# Patient Record
Sex: FEMALE | Race: BLACK OR AFRICAN AMERICAN | Employment: FULL TIME | RURAL
[De-identification: names, ages, dates, MRNs, and addresses within clinical notes are randomized per-mention and may not be internally consistent; named-entity substitution may affect disease eponyms.]

---

## 2017-03-07 ENCOUNTER — OFFICE VISIT (OUTPATIENT)
Dept: FAMILY MEDICINE CLINIC | Age: 47
End: 2017-03-07

## 2017-03-07 VITALS
DIASTOLIC BLOOD PRESSURE: 90 MMHG | SYSTOLIC BLOOD PRESSURE: 147 MMHG | WEIGHT: 123 LBS | BODY MASS INDEX: 24.15 KG/M2 | OXYGEN SATURATION: 98 % | HEIGHT: 60 IN | TEMPERATURE: 98.3 F | HEART RATE: 83 BPM | RESPIRATION RATE: 18 BRPM

## 2017-03-07 DIAGNOSIS — I10 ESSENTIAL HYPERTENSION: ICD-10-CM

## 2017-03-07 DIAGNOSIS — M62.830 BACK MUSCLE SPASM: Primary | ICD-10-CM

## 2017-03-07 DIAGNOSIS — E11.9 TYPE 2 DIABETES MELLITUS WITHOUT COMPLICATION, WITHOUT LONG-TERM CURRENT USE OF INSULIN (HCC): ICD-10-CM

## 2017-03-07 RX ORDER — METHOCARBAMOL 750 MG/1
750 TABLET, FILM COATED ORAL 4 TIMES DAILY
Qty: 100 TAB | Refills: 1 | Status: SHIPPED | OUTPATIENT
Start: 2017-03-07 | End: 2017-11-28

## 2017-03-07 RX ORDER — GLIMEPIRIDE 2 MG/1
2 TABLET ORAL
Qty: 30 TAB | Refills: 5 | Status: SHIPPED | OUTPATIENT
Start: 2017-03-07 | End: 2017-07-25 | Stop reason: SDUPTHER

## 2017-03-07 RX ORDER — DIAZEPAM 10 MG/1
TABLET ORAL
Qty: 10 TAB | Refills: 0 | Status: SHIPPED | OUTPATIENT
Start: 2017-03-07 | End: 2017-11-28

## 2017-03-07 RX ORDER — METFORMIN HYDROCHLORIDE 1000 MG/1
1000 TABLET ORAL 2 TIMES DAILY WITH MEALS
Qty: 180 TAB | Refills: 1 | Status: SHIPPED | OUTPATIENT
Start: 2017-03-07 | End: 2018-02-27 | Stop reason: SDUPTHER

## 2017-03-07 RX ORDER — HYDROCHLOROTHIAZIDE 25 MG/1
25 TABLET ORAL DAILY
Qty: 30 TAB | Refills: 5 | Status: SHIPPED | OUTPATIENT
Start: 2017-03-07 | End: 2017-10-04 | Stop reason: SDUPTHER

## 2017-03-07 NOTE — MR AVS SNAPSHOT
Visit Information Date & Time Provider Department Dept. Phone Encounter #  
 3/7/2017  1:20 PM Kelin Wells MD [facility] [phone] [id number] Upcoming Health Maintenance Date Due  
 FOOT EXAM Q1 11/17/1980 EYE EXAM RETINAL OR DILATED Q1 11/17/1980 Pneumococcal 19-64 Medium Risk (1 of 1 - PPSV23) 11/17/1989 DTaP/Tdap/Td series (1 - Tdap) 11/17/1991 PAP AKA CERVICAL CYTOLOGY 11/17/1991 INFLUENZA AGE 9 TO ADULT 8/1/2016 HEMOGLOBIN A1C Q6M 6/7/2017 MICROALBUMIN Q1 12/7/2017 LIPID PANEL Q1 12/7/2017 Allergies as of 3/7/2017  Review Complete On: 3/7/2017 By: Kelin Wells MD  
 No Known Allergies Current Immunizations  Never Reviewed No immunizations on file. Not reviewed this visit You Were Diagnosed With   
  
 Codes Comments Back muscle spasm    -  Primary ICD-10-CM: V72.714 ICD-9-CM: 724.8 Type 2 diabetes mellitus without complication, without long-term current use of insulin (HCC)     ICD-10-CM: E11.9 ICD-9-CM: 250.00 Essential hypertension     ICD-10-CM: I10 
ICD-9-CM: 401.9 Vitals BP Pulse Temp Resp Height(growth percentile) Weight(growth percentile) 147/90 (BP 1 Location: Left arm, BP Patient Position: Sitting) 83 98.3 °F (36.8 °C) (Oral) 18 5' (1.524 m) 123 lb (55.8 kg) LMP SpO2 BMI OB Status Smoking Status 01/29/2017 (Within Days) 98% 24.02 kg/m2 Having regular periods Current Every Day Smoker Vitals History BMI and BSA Data Body Mass Index Body Surface Area 24.02 kg/m 2 1.54 m 2 Preferred Pharmacy Pharmacy Name Phone [facility] [place] [address] [phone] Your Updated Medication List  
  
   
This list is accurate as of: 3/7/17  2:31 PM.  Always use your most recent med list.  
  
  
  
  
 diazePAM 10 mg tablet Commonly known as:  VALIUM  
 1 by mouth nightly  Indications: MUSCLE SPASM  
  
 gabapentin 600 mg tablet Commonly known as:  NEURONTIN  
1 po tid for pain with extra 1 qhs  Indications: NEUROPATHIC PAIN  
  
 glimepiride 2 mg tablet Commonly known as:  AMARYL Take 1 Tab by mouth every morning. hydroCHLOROthiazide 25 mg tablet Commonly known as:  HYDRODIURIL Take 1 Tab by mouth daily. Indications: hypertension  
  
 metFORMIN 1,000 mg tablet Commonly known as:  GLUCOPHAGE Take 1 Tab by mouth two (2) times daily (with meals). methocarbamol 750 mg tablet Commonly known as:  ROBAXIN Take 1 Tab by mouth four (4) times daily. naproxen 500 mg tablet Commonly known as:  NAPROSYN Take 1 Tab by mouth two (2) times daily (with meals). Prescriptions Printed Refills  
 diazePAM (VALIUM) 10 mg tablet 0 Si by mouth nightly  Indications: MUSCLE SPASM Class: Print Prescriptions Sent to Pharmacy Refills  
 metFORMIN (GLUCOPHAGE) 1,000 mg tablet 1 Sig: Take 1 Tab by mouth two (2) times daily (with meals). Class: Normal  
 Pharmacy: [place] [address] Ph #: [phone] Route: Oral  
 glimepiride (AMARYL) 2 mg tablet 5 Sig: Take 1 Tab by mouth every morning. Class: Normal  
 Pharmacy: [place] [address] Ph #: [phone] Route: Oral  
 hydroCHLOROthiazide (HYDRODIURIL) 25 mg tablet 5 Sig: Take 1 Tab by mouth daily. Indications: hypertension Class: Normal  
 Pharmacy: [place] [address] Ph #: [phone] Route: Oral  
 methocarbamol (ROBAXIN) 750 mg tablet 1 Sig: Take 1 Tab by mouth four (4) times daily. Class: Normal  
 Pharmacy: [place] [address] Ph #: [phone] Route: Oral  
  
We Performed the Following HEMOGLOBIN A1C WITH EAG [66516 CPT(R)] LIPID PANEL [45243 CPT(R)] METABOLIC PANEL, COMPREHENSIVE [78636 CPT(R)] MICROALBUMIN, UR, RAND W/ MICROALBUMIN/CREA RATIO [id number] CPT(R)] Introducing [facility]! [facility] introduces apprupt patient portal. Now you can access parts of your medical record, email your doctor's office, and request medication refills online. 1. In your internet browser, go to [email or web address]. [email or web address] 2. Click on the First Time User? Click Here link in the Sign In box. You will see the New Member Sign Up page. 3. Enter your apprupt Access Code exactly as it appears below. You will not need to use this code after youve completed the sign-up process. If you do not sign up before the expiration date, you must request a new code. · apprupt Access Code: [email or web address] Expires: 6/5/2017  1:21 PM 
 
4. Enter the last four digits of your Social Security Number (xxxx) and Date of Birth (mm/dd/yyyy) as indicated and click Submit. You will be taken to the next sign-up page. 5. Create a apprupt ID. This will be your apprupt login ID and cannot be changed, so think of one that is secure and easy to remember. 6. Create a apprupt password. You can change your password at any time. 7. Enter your Password Reset Question and Answer. This can be used at a later time if you forget your password. 8. Enter your e-mail address. You will receive e-mail notification when new information is available in [address] 9. Click Sign Up. You can now view and download portions of your medical record. 10. Click the Download Summary menu link to download a portable copy of your medical information. If you have questions, please visit the Frequently Asked Questions section of the apprupt website. Remember, apprupt is NOT to be used for urgent needs. For medical emergencies, dial 911. Now available from your iPhone and Android! Please provide this summary of care documentation to your next provider. Your primary care clinician is listed as Jane Limon. If you have any questions after today's visit, please call [phone].

## 2017-03-07 NOTE — PROGRESS NOTES
Carlos Ramos is a 55 y.o. female who presents with the following:  Chief Complaint   Patient presents with    Diabetes     need to discuss dosage of metformin. Patient only been taking 1 a day but should have been taking twice.  Neck Pain       Diabetes   The history is provided by the patient (Ispatient is in for diabetic follow-up with a left trapezius spasm that is causing her a fair amount of pain and she still has her carpal tunnel syndrome. ). Pertinent negatives include no chest pain, no abdominal pain, no headaches and no shortness of breath. Neck Pain   Pertinent negatives include no chest pain, no abdominal pain, no headaches and no shortness of breath. No Known Allergies    Current Outpatient Prescriptions   Medication Sig    metFORMIN (GLUCOPHAGE) 1,000 mg tablet Take 1 Tab by mouth two (2) times daily (with meals).  glimepiride (AMARYL) 2 mg tablet Take 1 Tab by mouth every morning.  hydroCHLOROthiazide (HYDRODIURIL) 25 mg tablet Take 1 Tab by mouth daily. Indications: hypertension    diazePAM (VALIUM) 10 mg tablet 1 by mouth nightly  Indications: MUSCLE SPASM    methocarbamol (ROBAXIN) 750 mg tablet Take 1 Tab by mouth four (4) times daily.  naproxen (NAPROSYN) 500 mg tablet Take 1 Tab by mouth two (2) times daily (with meals).  gabapentin (NEURONTIN) 600 mg tablet  1 po tid for pain with extra 1 qhs  Indications: NEUROPATHIC PAIN     No current facility-administered medications for this visit.         Past Medical History:   Diagnosis Date    Diabetes [facility])        Past Surgical History:   Procedure Laterality Date    HX GYN      BTL       Family History   Problem Relation Age of Onset    Stroke Mother     Diabetes Mother     Heart Disease Father     Diabetes Sister     Diabetes Maternal Grandmother        Social History     Social History    Marital status:      Spouse name: N/A    Number of children: N/A    Years of education: N/A     Social History Main Topics    Smoking status: Current Every Day Smoker     Packs/day: 0.50    Smokeless tobacco: Never Used    Alcohol use No    Drug use: No    Sexual activity: Yes     Partners: Male     Birth control/ protection: Surgical     Other Topics Concern    None     Social History Narrative       Review of Systems   Respiratory: Negative for shortness of breath. Cardiovascular: Negative for chest pain. Gastrointestinal: Negative for abdominal pain. Musculoskeletal: Positive for neck pain. Neurological: Negative for headaches. Visit Vitals    /90 (BP 1 Location: Left arm, BP Patient Position: Sitting)    Pulse 83    Temp 98.3 °F (36.8 °C) (Oral)    Resp 18    Ht 5' (1.524 m)    Wt 123 lb (55.8 kg)    LMP 01/29/2017 (Within Days)    SpO2 98%    BMI 24.02 kg/m2     Physical Exam   Constitutional: She is oriented to person, place, and time and well-developed, well-nourished, and in no distress. HENT:   Head: Normocephalic and atraumatic. Right Ear: External ear normal.   Left Ear: External ear normal.   Mouth/Throat: Oropharynx is clear and moist.   Eyes: Conjunctivae and EOM are normal. Pupils are equal, round, and reactive to light. Right eye exhibits no discharge. Left eye exhibits no discharge. Neck: Normal range of motion. Neck supple. No tracheal deviation present. No thyromegaly present. Cardiovascular: Normal rate, regular rhythm, normal heart sounds and intact distal pulses. Exam reveals no gallop and no friction rub. No murmur heard. Pulmonary/Chest: Effort normal and breath sounds normal. No respiratory distress. She has no wheezes. She exhibits no tenderness. Abdominal: Soft. Bowel sounds are normal. She exhibits no distension and no mass. There is no tenderness. There is no rebound and no guarding. Musculoskeletal: She exhibits tenderness (Left trapezius as spasmed and tender). She exhibits no edema.    Lymphadenopathy:     She has no cervical adenopathy. Neurological: She is alert and oriented to person, place, and time. She has normal reflexes. No cranial nerve deficit. She exhibits normal muscle tone. Gait normal. Coordination normal. GCS score is 15. Skin: Skin is warm and dry. No rash noted. No erythema. No pallor. Psychiatric: Mood, memory, affect and judgment normal.         ICD-10-CM ICD-9-CM    1. Back muscle spasm M62.830 724.8 diazePAM (VALIUM) 10 mg tablet      methocarbamol (ROBAXIN) 750 mg tablet   2. Type 2 diabetes mellitus without complication, without long-term current use of insulin ([facility]) E11.9 250.00 metFORMIN (GLUCOPHAGE) 1,000 mg tablet      glimepiride (AMARYL) 2 mg tablet      LIPID PANEL      METABOLIC PANEL, COMPREHENSIVE      HEMOGLOBIN A1C WITH EAG      MICROALBUMIN, UR, RAND W/ MICROALBUMIN/CREA RATIO   3. Essential hypertension I10 401.9 hydroCHLOROthiazide (HYDRODIURIL) 25 mg tablet      LIPID PANEL      METABOLIC PANEL, COMPREHENSIVE      MICROALBUMIN, UR, RAND W/ MICROALBUMIN/CREA RATIO       Orders Placed This Encounter    LIPID PANEL    METABOLIC PANEL, COMPREHENSIVE    HEMOGLOBIN A1C WITH EAG    MICROALBUMIN, UR, RAND W/ MICROALBUMIN/CREA RATIO    metFORMIN (GLUCOPHAGE) 1,000 mg tablet     Sig: Take 1 Tab by mouth two (2) times daily (with meals). Dispense:  180 Tab     Refill:  1    glimepiride (AMARYL) 2 mg tablet     Sig: Take 1 Tab by mouth every morning. Dispense:  30 Tab     Refill:  5    hydroCHLOROthiazide (HYDRODIURIL) 25 mg tablet     Sig: Take 1 Tab by mouth daily. Indications: hypertension     Dispense:  30 Tab     Refill:  5    diazePAM (VALIUM) 10 mg tablet     Si by mouth nightly  Indications: MUSCLE SPASM     Dispense:  10 Tab     Refill:  0    methocarbamol (ROBAXIN) 750 mg tablet     Sig: Take 1 Tab by mouth four (4) times daily.      Dispense:  100 Tab     Refill:  1       Follow-up Disposition: Not on Eliane Mccarty MD

## 2017-03-08 LAB
ALBUMIN SERPL-MCNC: 4.5 G/DL (ref 3.5–5.5)
ALBUMIN/CREAT UR: 65.3 MG/G CREAT (ref 0–30)
ALBUMIN/GLOB SERPL: 1.7 {RATIO} (ref 1.1–2.5)
ALP SERPL-CCNC: 117 IU/L (ref 39–117)
ALT SERPL-CCNC: 37 IU/L (ref 0–32)
AST SERPL-CCNC: 17 IU/L (ref 0–40)
BILIRUB SERPL-MCNC: 0.4 MG/DL (ref 0–1.2)
BUN SERPL-MCNC: 15 MG/DL (ref 6–24)
BUN/CREAT SERPL: 19 (ref 9–23)
CALCIUM SERPL-MCNC: 9.4 MG/DL (ref 8.7–10.2)
CHLORIDE SERPL-SCNC: 97 MMOL/L (ref 96–106)
CHOLEST SERPL-MCNC: 266 MG/DL (ref 100–199)
CO2 SERPL-SCNC: 22 MMOL/L (ref 18–29)
CREAT SERPL-MCNC: 0.81 MG/DL (ref 0.57–1)
CREAT UR-MCNC: 75.8 MG/DL
EST. AVERAGE GLUCOSE BLD GHB EST-MCNC: 237 MG/DL
GLOBULIN SER CALC-MCNC: 2.6 G/DL (ref 1.5–4.5)
GLUCOSE SERPL-MCNC: 284 MG/DL (ref 65–99)
HBA1C MFR BLD: 9.9 % (ref 4.8–5.6)
HDLC SERPL-MCNC: 40 MG/DL
INTERPRETATION, 910389: NORMAL
LDLC SERPL CALC-MCNC: ABNORMAL MG/DL (ref 0–99)
MICROALBUMIN UR-MCNC: 49.5 UG/ML
POTASSIUM SERPL-SCNC: 4.2 MMOL/L (ref 3.5–5.2)
PROT SERPL-MCNC: 7.1 G/DL (ref 6–8.5)
SODIUM SERPL-SCNC: 137 MMOL/L (ref 134–144)
TRIGL SERPL-MCNC: 416 MG/DL (ref 0–149)
VLDLC SERPL CALC-MCNC: ABNORMAL MG/DL (ref 5–40)

## 2017-07-11 ENCOUNTER — OFFICE VISIT (OUTPATIENT)
Dept: FAMILY MEDICINE CLINIC | Age: 47
End: 2017-07-11

## 2017-07-11 VITALS
RESPIRATION RATE: 18 BRPM | BODY MASS INDEX: 23.29 KG/M2 | WEIGHT: 118.6 LBS | SYSTOLIC BLOOD PRESSURE: 124 MMHG | HEIGHT: 60 IN | DIASTOLIC BLOOD PRESSURE: 76 MMHG | HEART RATE: 71 BPM | OXYGEN SATURATION: 95 %

## 2017-07-11 DIAGNOSIS — Z01.419 WELL WOMAN EXAM: Primary | ICD-10-CM

## 2017-07-11 DIAGNOSIS — E78.2 MIXED DYSLIPIDEMIA: ICD-10-CM

## 2017-07-11 DIAGNOSIS — B37.31 VULVOVAGINITIS CANDIDA ALBICANS: ICD-10-CM

## 2017-07-11 DIAGNOSIS — I10 ESSENTIAL HYPERTENSION: ICD-10-CM

## 2017-07-11 DIAGNOSIS — E11.9 TYPE 2 DIABETES MELLITUS WITHOUT COMPLICATION, WITHOUT LONG-TERM CURRENT USE OF INSULIN (HCC): ICD-10-CM

## 2017-07-11 RX ORDER — FLUCONAZOLE 150 MG/1
150 TABLET ORAL DAILY
Qty: 2 TAB | Refills: 2 | Status: SHIPPED | OUTPATIENT
Start: 2017-07-11 | End: 2017-11-28
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
## 2017-07-12 LAB
ALBUMIN SERPL-MCNC: 4.4 G/DL (ref 3.5–5.5)
ALBUMIN/CREAT UR: 38.3 MG/G CREAT (ref 0–30)
ALBUMIN/GLOB SERPL: 1.6 {RATIO} (ref 1.2–2.2)
ALP SERPL-CCNC: 84 IU/L (ref 39–117)
ALT SERPL-CCNC: 18 IU/L (ref 0–32)
AST SERPL-CCNC: 13 IU/L (ref 0–40)
BILIRUB SERPL-MCNC: 0.5 MG/DL (ref 0–1.2)
BUN SERPL-MCNC: 11 MG/DL (ref 6–24)
BUN/CREAT SERPL: 16 (ref 9–23)
CALCIUM SERPL-MCNC: 10.7 MG/DL (ref 8.7–10.2)
CHLORIDE SERPL-SCNC: 98 MMOL/L (ref 96–106)
CHOLEST SERPL-MCNC: 228 MG/DL (ref 100–199)
CO2 SERPL-SCNC: 23 MMOL/L (ref 18–29)
CREAT SERPL-MCNC: 0.7 MG/DL (ref 0.57–1)
CREAT UR-MCNC: 75.9 MG/DL
EST. AVERAGE GLUCOSE BLD GHB EST-MCNC: 200 MG/DL
GLOBULIN SER CALC-MCNC: 2.7 G/DL (ref 1.5–4.5)
GLUCOSE SERPL-MCNC: 199 MG/DL (ref 65–99)
HBA1C MFR BLD: 8.6 % (ref 4.8–5.6)
HDLC SERPL-MCNC: 43 MG/DL
INTERPRETATION, 910389: NORMAL
LDLC SERPL CALC-MCNC: 152 MG/DL (ref 0–99)
MICROALBUMIN UR-MCNC: 29.1 UG/ML
POTASSIUM SERPL-SCNC: 3.9 MMOL/L (ref 3.5–5.2)
PROT SERPL-MCNC: 7.1 G/DL (ref 6–8.5)
SODIUM SERPL-SCNC: 139 MMOL/L (ref 134–144)
TRIGL SERPL-MCNC: 164 MG/DL (ref 0–149)
VLDLC SERPL CALC-MCNC: 33 MG/DL (ref 5–40)

## 2017-07-13 NOTE — PROGRESS NOTES
Spoke with patient. Patient aware of results and states understanding at this time. Patient has scheduled an appt for 7-25-17 at 1040 to discuss options.

## 2017-07-14 LAB
CYTOLOGIST CVX/VAG CYTO: NORMAL
CYTOLOGY CVX/VAG DOC THIN PREP: NORMAL
DX ICD CODE: NORMAL
DX ICD CODE: NORMAL
HPV I/H RISK 4 DNA CVX QL PROBE+SIG AMP: NEGATIVE
Lab: NORMAL
OTHER STN SPEC: NORMAL
PATH REPORT.FINAL DX SPEC: NORMAL
STAT OF ADQ CVX/VAG CYTO-IMP: NORMAL

## 2017-07-25 ENCOUNTER — OFFICE VISIT (OUTPATIENT)
Dept: FAMILY MEDICINE CLINIC | Age: 47
End: 2017-07-25

## 2017-07-25 ENCOUNTER — TELEPHONE (OUTPATIENT)
Dept: FAMILY MEDICINE CLINIC | Age: 47
End: 2017-07-25

## 2017-07-25 VITALS
RESPIRATION RATE: 18 BRPM | BODY MASS INDEX: 23.56 KG/M2 | OXYGEN SATURATION: 98 % | HEIGHT: 60 IN | TEMPERATURE: 98.3 F | SYSTOLIC BLOOD PRESSURE: 135 MMHG | HEART RATE: 68 BPM | DIASTOLIC BLOOD PRESSURE: 73 MMHG | WEIGHT: 120 LBS

## 2017-07-25 DIAGNOSIS — E11.9 TYPE 2 DIABETES MELLITUS WITHOUT COMPLICATION, WITHOUT LONG-TERM CURRENT USE OF INSULIN (HCC): ICD-10-CM

## 2017-07-25 DIAGNOSIS — Z13.29 SCREENING FOR THYROID DISORDER: Primary | ICD-10-CM

## 2017-07-25 RX ORDER — GLIMEPIRIDE 4 MG/1
4 TABLET ORAL
Qty: 30 TAB | Refills: 5 | Status: SHIPPED | OUTPATIENT
Start: 2017-07-25 | End: 2017-11-28 | Stop reason: SDUPTHER

## 2017-07-25 RX ORDER — PIOGLITAZONEHYDROCHLORIDE 30 MG/1
TABLET ORAL
Qty: 30 TAB | Refills: 5 | Status: SHIPPED | OUTPATIENT
Start: 2017-07-25 | End: 2017-11-28

## 2017-07-25 NOTE — PROGRESS NOTES
Momo Lopez is a 55 y.o. female who presents with the following:  Chief Complaint   Patient presents with    Abnormal Lab Results     discuss labs       Abnormal Lab Results   The history is provided by the patient (Patient's hemoglobin A1c is over 8 for the third straight time and now she is willing to take some medication changes to try to get this down below 8). Pertinent negatives include no chest pain, no abdominal pain, no headaches and no shortness of breath. No Known Allergies    Current Outpatient Prescriptions   Medication Sig    pioglitazone (ACTOS) 30 mg tablet 1 by mouth daily    glimepiride (AMARYL) 4 mg tablet Take 1 Tab by mouth every morning.  metFORMIN (GLUCOPHAGE) 1,000 mg tablet Take 1 Tab by mouth two (2) times daily (with meals).  hydroCHLOROthiazide (HYDRODIURIL) 25 mg tablet Take 1 Tab by mouth daily. Indications: hypertension    fluconazole (DIFLUCAN) 150 mg tablet Take 1 Tab by mouth daily. One po qd  Indications: VULVOVAGINAL CANDIDIASIS    HYDROcodone-acetaminophen (NORCO) 7.5-325 mg per tablet Take 1 Tab by mouth every six (6) hours as needed for Pain. Max Daily Amount: 4 Tabs.  diclofenac EC (VOLTAREN) 75 mg EC tablet Take 1 Tab by mouth two (2) times a day.  diazePAM (VALIUM) 10 mg tablet 1 by mouth nightly  Indications: MUSCLE SPASM    methocarbamol (ROBAXIN) 750 mg tablet Take 1 Tab by mouth four (4) times daily.  gabapentin (NEURONTIN) 600 mg tablet  1 po tid for pain with extra 1 qhs  Indications: NEUROPATHIC PAIN     No current facility-administered medications for this visit.         Past Medical History:   Diagnosis Date    Diabetes [facility])        Past Surgical History:   Procedure Laterality Date    HX GYN      BTL       Family History   Problem Relation Age of Onset   Ralph  Stroke Mother     Diabetes Mother     Heart Disease Father     Diabetes Sister     Diabetes Maternal Grandmother        Social History     Social History    Marital status:      Spouse name: N/A    Number of children: N/A    Years of education: N/A     Social History Main Topics    Smoking status: Current Every Day Smoker     Packs/day: 0.50    Smokeless tobacco: Never Used    Alcohol use No    Drug use: No    Sexual activity: Yes     Partners: Male     Birth control/ protection: Surgical     Other Topics Concern    Not on file     Social History Narrative       Review of Systems   Respiratory: Negative for shortness of breath. Cardiovascular: Negative for chest pain. Gastrointestinal: Negative for abdominal pain. Neurological: Negative for headaches. Visit Vitals    /73 (BP 1 Location: Left arm, BP Patient Position: Sitting)    Pulse 68    Temp 98.3 °F (36.8 °C)    Resp 18    Ht 5' (1.524 m)    Wt 120 lb (54.4 kg)    SpO2 98%    BMI 23.44 kg/m2     Physical Exam   Constitutional: She is oriented to person, place, and time and well-developed, well-nourished, and in no distress. HENT:   Head: Normocephalic and atraumatic. Right Ear: External ear normal.   Left Ear: External ear normal.   Mouth/Throat: Oropharynx is clear and moist.   Eyes: Conjunctivae and EOM are normal. Pupils are equal, round, and reactive to light. Right eye exhibits no discharge. Left eye exhibits no discharge. Neck: Normal range of motion. Neck supple. No tracheal deviation present. Thyromegaly (Thyroid appears prominent on the anterior part of the neck) present. Cardiovascular: Normal rate, regular rhythm, normal heart sounds and intact distal pulses. Exam reveals no gallop and no friction rub. No murmur heard. Pulmonary/Chest: Effort normal and breath sounds normal. No respiratory distress. She has no wheezes. She exhibits no tenderness. Abdominal: Soft. Bowel sounds are normal. She exhibits no distension and no mass. There is no tenderness. There is no rebound and no guarding. Musculoskeletal: She exhibits no edema or tenderness.    Lymphadenopathy: She has no cervical adenopathy. Neurological: She is alert and oriented to person, place, and time. She has normal reflexes. No cranial nerve deficit. She exhibits normal muscle tone. Gait normal. Coordination normal. GCS score is 15. Skin: Skin is warm and dry. No rash noted. No erythema. No pallor. Psychiatric: Mood, memory, affect and judgment normal.         ICD-10-CM ICD-9-CM    1. Screening for thyroid disorder Z13.29 V77.0 [place] COLLECTION VENOUS BLOOD,VENIPUNCTURE      THYROID PANEL W/TSH   2. Type 2 diabetes mellitus without complication, without long-term current use of insulin ([facility]) E11.9 250.00 pioglitazone (ACTOS) 30 mg tablet      glimepiride (AMARYL) 4 mg tablet       Orders Placed This Encounter    THYROID PANEL W/TSH    [place] COLLECTION VENOUS BLOOD,VENIPUNCTURE    pioglitazone (ACTOS) 30 mg tablet     Si by mouth daily     Dispense:  30 Tab     Refill:  5    glimepiride (AMARYL) 4 mg tablet     Sig: Take 1 Tab by mouth every morning. Dispense:  30 Tab     Refill:  5    would have the patient return in 3 months for follow-up laboratory and to see me at that time.     Follow-up Disposition: Not on Ami Marie MD

## 2017-07-26 LAB
FT4I SERPL CALC-MCNC: 2.1 (ref 1.2–4.9)
T3RU NFR SERPL: 30 % (ref 24–39)
T4 SERPL-MCNC: 7 UG/DL (ref 4.5–12)
TSH SERPL DL<=0.005 MIU/L-ACNC: 2.32 UIU/ML (ref 0.45–4.5)

## 2017-07-26 NOTE — TELEPHONE ENCOUNTER
RX denied. Please review form that we had copied to chose which medication you would like for her to try.

## 2017-07-27 NOTE — TELEPHONE ENCOUNTER
Patient was just here on 7-25 for her diabetes. And everything was discussed.   Can we just send in this medication at this time for her to try before we make her come back in?

## 2017-07-27 NOTE — TELEPHONE ENCOUNTER
We never discussed how to use the medicine or the side effects of the medicine or anything along that line

## 2017-07-28 NOTE — TELEPHONE ENCOUNTER
Called and notified patient. She is going to try to get a ride to come in next Tuesday. Patient stated that she will call back to confirm appt time.

## 2017-10-04 DIAGNOSIS — I10 ESSENTIAL HYPERTENSION: ICD-10-CM

## 2017-10-04 RX ORDER — HYDROCHLOROTHIAZIDE 25 MG/1
TABLET ORAL
Qty: 30 TAB | Refills: 5 | Status: SHIPPED | OUTPATIENT
Start: 2017-10-04 | End: 2017-11-28 | Stop reason: SDUPTHER

## 2017-11-28 ENCOUNTER — OFFICE VISIT (OUTPATIENT)
Dept: FAMILY MEDICINE CLINIC | Age: 47
End: 2017-11-28

## 2017-11-28 VITALS
DIASTOLIC BLOOD PRESSURE: 80 MMHG | HEART RATE: 72 BPM | OXYGEN SATURATION: 98 % | WEIGHT: 123.8 LBS | HEIGHT: 60 IN | SYSTOLIC BLOOD PRESSURE: 158 MMHG | BODY MASS INDEX: 24.3 KG/M2 | RESPIRATION RATE: 18 BRPM

## 2017-11-28 DIAGNOSIS — E78.2 MIXED DYSLIPIDEMIA: ICD-10-CM

## 2017-11-28 DIAGNOSIS — Z23 ENCOUNTER FOR IMMUNIZATION: ICD-10-CM

## 2017-11-28 DIAGNOSIS — E11.9 TYPE 2 DIABETES MELLITUS WITHOUT COMPLICATION, WITHOUT LONG-TERM CURRENT USE OF INSULIN (HCC): Primary | ICD-10-CM

## 2017-11-28 DIAGNOSIS — I10 ESSENTIAL HYPERTENSION: ICD-10-CM

## 2017-11-28 RX ORDER — HYDROCHLOROTHIAZIDE 25 MG/1
TABLET ORAL
Qty: 90 TAB | Refills: 1 | Status: SHIPPED | OUTPATIENT
Start: 2017-11-28 | End: 2018-02-27

## 2017-11-28 RX ORDER — AMLODIPINE BESYLATE 10 MG/1
10 TABLET ORAL DAILY
Qty: 90 TAB | Refills: 1 | Status: SHIPPED | OUTPATIENT
Start: 2017-11-28 | End: 2018-02-27

## 2017-11-28 RX ORDER — GLIMEPIRIDE 4 MG/1
4 TABLET ORAL
Qty: 90 TAB | Refills: 1 | Status: SHIPPED | OUTPATIENT
Start: 2017-11-28 | End: 2018-02-27 | Stop reason: SDUPTHER

## 2017-11-28 NOTE — MR AVS SNAPSHOT
Visit Information Date & Time Provider Department Dept. Phone Encounter #  
 11/28/2017  8:40 AM Jared Garcia MD [facility] [phone] [id number] Upcoming Health Maintenance Date Due  
 FOOT EXAM Q1 11/17/1980 EYE EXAM RETINAL OR DILATED Q1 11/17/1980 HEMOGLOBIN A1C Q6M 1/11/2018 MICROALBUMIN Q1 7/11/2018 LIPID PANEL Q1 7/11/2018 PAP AKA CERVICAL CYTOLOGY 7/11/2020 DTaP/Tdap/Td series (2 - Td) 11/4/2026 Allergies as of 11/28/2017  Review Complete On: 11/28/2017 By: Jared Garcia MD  
 No Known Allergies Current Immunizations  Never Reviewed Name Date Influenza Vaccine (Quad) PF 11/28/2017 Tdap 11/4/2016 Not reviewed this visit You Were Diagnosed With   
  
 Codes Comments Type 2 diabetes mellitus without complication, without long-term current use of insulin (HCC)    -  Primary ICD-10-CM: E11.9 ICD-9-CM: 250.00 Mixed dyslipidemia     ICD-10-CM: E78.2 ICD-9-CM: 272.2 Essential hypertension     ICD-10-CM: I10 
ICD-9-CM: 401.9 Encounter for immunization     ICD-10-CM: I98 ICD-9-CM: V03.89 Vitals BP Pulse Resp Height(growth percentile) Weight(growth percentile) LMP  
 158/80 (BP 1 Location: Right arm, BP Patient Position: Sitting) 72 18 5' (1.524 m) 123 lb 12.8 oz (56.2 kg) 10/09/2017 SpO2 BMI OB Status Smoking Status 98% 24.18 kg/m2 Having regular periods Current Every Day Smoker Vitals History BMI and BSA Data Body Mass Index Body Surface Area  
 24.18 kg/m 2 1.54 m 2 Preferred Pharmacy Pharmacy Name Phone [facility] [place] [address], [place]  [address] [phone] Your Updated Medication List  
  
   
This list is accurate as of: 11/28/17  9:13 AM.  Always use your most recent med list. amLODIPine 10 mg tablet Commonly known as:  Daxa Claudia Take 1 Tab by mouth daily. diazePAM 10 mg tablet Commonly known as:  VALIUM  
1 by mouth nightly  Indications: MUSCLE SPASM  
  
 diclofenac EC 75 mg EC tablet Commonly known as:  VOLTAREN Take 1 Tab by mouth two (2) times a day. fluconazole 150 mg tablet Commonly known as:  DIFLUCAN Take 1 Tab by mouth daily. One po qd  Indications: VULVOVAGINAL CANDIDIASIS  
  
 gabapentin 600 mg tablet Commonly known as:  NEURONTIN  
1 po tid for pain with extra 1 qhs  Indications: NEUROPATHIC PAIN  
  
 glimepiride 4 mg tablet Commonly known as:  AMARYL Take 1 Tab by mouth every morning. hydroCHLOROthiazide 25 mg tablet Commonly known as:  HYDRODIURIL  
TAKE ONE TABLET BY MOUTH ONCE DAILY FOR  HYPERTENSION  
  
 HYDROcodone-acetaminophen 7.5-325 mg per tablet Commonly known as:  Rafi Punt Take 1 Tab by mouth every six (6) hours as needed for Pain. Max Daily Amount: 4 Tabs. metFORMIN 1,000 mg tablet Commonly known as:  GLUCOPHAGE Take 1 Tab by mouth two (2) times daily (with meals). methocarbamol 750 mg tablet Commonly known as:  ROBAXIN Take 1 Tab by mouth four (4) times daily. Prescriptions Sent to Pharmacy Refills  
 hydroCHLOROthiazide (HYDRODIURIL) 25 mg tablet 1 Sig: TAKE ONE TABLET BY MOUTH ONCE DAILY FOR  HYPERTENSION Class: Normal  
 Pharmacy: [address] [place] [address] Ph #: [phone]  
 glimepiride (AMARYL) 4 mg tablet 1 Sig: Take 1 Tab by mouth every morning. Class: Normal  
 Pharmacy: [address] [facility], [address] [place] [address] [address] Ph #: [phone] Route: Oral  
 amLODIPine (NORVASC) 10 mg tablet 1 Sig: Take 1 Tab by mouth daily. Class: Normal  
 Pharmacy: [address] [facility], [address] Ph #: [phone] Route: Oral  
  
We Performed the Following HEMOGLOBIN A1C WITH EAG [30065 CPT(R)]  DIABETES FOOT EXAM [7 Custom] INFLUENZA VIRUS VAC QUAD,SPLIT,PRESV FREE SYRINGE IM [id number] CPT(R)] LIPID PANEL [00373 CPT(R)] METABOLIC PANEL, COMPREHENSIVE [53505 CPT(R)] MICROALBUMIN, UR, RAND W/ MICROALBUMIN/CREA RATIO [id number] CPT(R)] OR COLLECTION VENOUS BLOOD,VENIPUNCTURE [id number] CPT(R)] OR IMMUNIZ ADMIN,1 SINGLE/COMB VAC/TOXOID [id number] CPT(R)] Introducing [facility] & HEALTH SERVICES! Dawn Hope introduces [employer] patient portal. Now you can access parts of your medical record, email your doctor's office, and request medication refills online. 1. In your internet browser, go to [email or web address] 2. Click on the First Time User? Click Here link in the Sign In box. You will see the New Member Sign Up page. 3. Enter your [employer] Access Code exactly as it appears below. You will not need to use this code after youve completed the sign-up process. If you do not sign up before the expiration date, you must request a new code. · [employer] Access Code: [email or web address] Expires: 2/26/2018  9:13 AM 
 
4. Enter the last four digits of your Social Security Number (xxxx) and Date of Birth (mm/dd/yyyy) as indicated and click Submit. You will be taken to the next sign-up page. 5. Create a [employer] ID. This will be your [employer] login ID and cannot be changed, so think of one that is secure and easy to remember. 6. Create a [employer] password. You can change your password at any time. 7. Enter your Password Reset Question and Answer. This can be used at a later time if you forget your password. 8. Enter your e-mail address. You will receive e-mail notification when new information is available in [address] 9. Click Sign Up. You can now view and download portions of your medical record. 10. Click the Download Summary menu link to download a portable copy of your medical information.  
 
If you have questions, please visit the Frequently Asked Questions section of the [employer]. Remember, [employer] is NOT to be used for urgent needs. For medical emergencies, dial 911. Now available from your iPhone and Android! Please provide this summary of care documentation to your next provider. Your primary care clinician is listed as Otis Matter. If you have any questions after today's visit, please call [phone].

## 2017-11-28 NOTE — PROGRESS NOTES
Mike Grant is a 52 y.o. female who presents with the following:  Chief Complaint   Patient presents with    Hypertension    Diabetes       Hypertension    The history is provided by the patient (Patient taking her medication without leg cramps chest pain or edema. Pressure still running a bit high though. ). Associated symptoms include blurred vision. Pertinent negatives include no chest pain, no orthopnea, no palpitations, no malaise/fatigue, no headaches, no tinnitus, no dizziness and no shortness of breath. Diabetes   The history is provided by the patient (She never received her p.o. glitazone after the last visit so has not been taking it but feels her sugars are doing reasonably well at this time without any syncope or low readings). Pertinent negatives include no chest pain, no abdominal pain, no headaches and no shortness of breath. Cholesterol Problem   The history is provided by the patient (The patient's lipids were up on her last lab work and we were trying dietary measures first.). Pertinent negatives include no chest pain, no abdominal pain, no headaches and no shortness of breath. No Known Allergies    Current Outpatient Prescriptions   Medication Sig    hydroCHLOROthiazide (HYDRODIURIL) 25 mg tablet TAKE ONE TABLET BY MOUTH ONCE DAILY FOR  HYPERTENSION    glimepiride (AMARYL) 4 mg tablet Take 1 Tab by mouth every morning.  amLODIPine (NORVASC) 10 mg tablet Take 1 Tab by mouth daily.  metFORMIN (GLUCOPHAGE) 1,000 mg tablet Take 1 Tab by mouth two (2) times daily (with meals). No current facility-administered medications for this visit.         Past Medical History:   Diagnosis Date    Diabetes [facility])        Past Surgical History:   Procedure Laterality Date    HX GYN      BTL       Family History   Problem Relation Age of Onset   [de-identified] Stroke Mother     Diabetes Mother     Heart Disease Father     Diabetes Sister     Diabetes Maternal Grandmother     Breast Cancer Maternal Aunt     Breast Cancer Paternal Aunt        Social History     Social History    Marital status:      Spouse name: N/A    Number of children: N/A    Years of education: N/A     Social History Main Topics    Smoking status: Current Every Day Smoker     Packs/day: 0.50    Smokeless tobacco: Never Used    Alcohol use No    Drug use: No    Sexual activity: Yes     Partners: Male     Birth control/ protection: Surgical     Other Topics Concern    None     Social History Narrative       Review of Systems   Constitutional: Negative for chills, fever, malaise/fatigue and weight loss. HENT: Negative for congestion, hearing loss, sore throat and tinnitus. Eyes: Positive for blurred vision. Negative for pain and discharge. Patient is reminded she is overdue for her diabetic vision check. Respiratory: Negative for cough, shortness of breath and wheezing. Cardiovascular: Negative for chest pain, palpitations, orthopnea, claudication and leg swelling. Gastrointestinal: Negative for abdominal pain, constipation and heartburn. Genitourinary: Negative for dysuria, frequency and urgency. Musculoskeletal: Negative for falls, joint pain and myalgias. Skin: Negative for itching and rash. Neurological: Negative for dizziness, tingling, tremors and headaches. Endo/Heme/Allergies: Negative for environmental allergies and polydipsia. Psychiatric/Behavioral: Negative for depression and substance abuse. The patient is not nervous/anxious. Visit Vitals    /80 (BP 1 Location: Right arm, BP Patient Position: Sitting)    Pulse 72    Resp 18    Ht 5' (1.524 m)    Wt 123 lb 12.8 oz (56.2 kg)    LMP 10/09/2017    SpO2 98%    BMI 24.18 kg/m2     Physical Exam   Constitutional: She is oriented to person, place, and time and well-developed, well-nourished, and in no distress. HENT:   Head: Normocephalic and atraumatic.    Right Ear: External ear normal.   Left Ear: External ear normal.   Mouth/Throat: Oropharynx is clear and moist.   Eyes: Conjunctivae and EOM are normal. Pupils are equal, round, and reactive to light. Right eye exhibits no discharge. Left eye exhibits no discharge. Neck: Normal range of motion. Neck supple. No tracheal deviation present. No thyromegaly present. Cardiovascular: Normal rate, regular rhythm, normal heart sounds and intact distal pulses. Exam reveals no gallop and no friction rub. No murmur heard. Pulmonary/Chest: Effort normal and breath sounds normal. No respiratory distress. She has no wheezes. She exhibits no tenderness. Abdominal: Soft. Bowel sounds are normal. She exhibits no distension and no mass. There is no tenderness. There is no rebound and no guarding. Musculoskeletal: She exhibits no edema or tenderness. Lymphadenopathy:     She has no cervical adenopathy. Neurological: She is alert and oriented to person, place, and time. She has normal reflexes. No cranial nerve deficit. She exhibits normal muscle tone. Gait normal. Coordination normal.   Skin: Skin is warm and dry. No rash noted. No erythema. No pallor. Psychiatric: Mood, memory, affect and judgment normal.         ICD-10-CM ICD-9-CM    1. Type 2 diabetes mellitus without complication, without long-term current use of insulin ([facility]) E11.9 250.00 MICROALBUMIN, UR, RAND W/ MICROALBUMIN/CREA RATIO      LIPID PANEL      HEMOGLOBIN A1C WITH EAG      METABOLIC PANEL, COMPREHENSIVE      MN COLLECTION VENOUS BLOOD,VENIPUNCTURE       DIABETES FOOT EXAM      glimepiride (AMARYL) 4 mg tablet   2. Mixed dyslipidemia E78.2 272.2 LIPID PANEL      METABOLIC PANEL, COMPREHENSIVE      MN COLLECTION VENOUS BLOOD,VENIPUNCTURE   3.  Essential hypertension I10 401.9 MICROALBUMIN, UR, RAND W/ MICROALBUMIN/CREA RATIO      LIPID PANEL      METABOLIC PANEL, COMPREHENSIVE      MN COLLECTION VENOUS BLOOD,VENIPUNCTURE      hydroCHLOROthiazide (HYDRODIURIL) 25 mg tablet      amLODIPine (NORVASC) 10 mg tablet   4. Encounter for immunization Z23 V03.89 INFLUENZA VIRUS VAC QUAD,SPLIT,PRESV FREE SYRINGE IM      MN IMMUNIZ ADMIN,1 SINGLE/COMB VAC/TOXOID       Orders Placed This Encounter    MN IMMUNIZ ADMIN,1 SINGLE/COMB VAC/TOXOID    Influenza virus vaccine (QUADRIVALENT PRES FREE SYRINGE) IM (38532)    MICROALBUMIN, UR, RAND W/ MICROALBUMIN/CREA RATIO    LIPID PANEL    HEMOGLOBIN A1C WITH EAG    METABOLIC PANEL, COMPREHENSIVE     DIABETES FOOT EXAM    MN COLLECTION VENOUS BLOOD,VENIPUNCTURE    hydroCHLOROthiazide (HYDRODIURIL) 25 mg tablet     Sig: TAKE ONE TABLET BY MOUTH ONCE DAILY FOR  HYPERTENSION     Dispense:  90 Tab     Refill:  1    glimepiride (AMARYL) 4 mg tablet     Sig: Take 1 Tab by mouth every morning. Dispense:  90 Tab     Refill:  1    amLODIPine (NORVASC) 10 mg tablet     Sig: Take 1 Tab by mouth daily.      Dispense:  90 Tab     Refill:  1       Follow-up Disposition: Not on Socorro Jeffrey MD

## 2017-11-29 LAB
ALBUMIN SERPL-MCNC: 4.2 G/DL (ref 3.5–5.5)
ALBUMIN/CREAT UR: ABNORMAL MG/G CREAT (ref 0–30)
ALBUMIN/GLOB SERPL: 1.8 {RATIO} (ref 1.2–2.2)
ALP SERPL-CCNC: 84 IU/L (ref 39–117)
ALT SERPL-CCNC: 23 IU/L (ref 0–32)
AST SERPL-CCNC: 13 IU/L (ref 0–40)
BILIRUB SERPL-MCNC: 0.4 MG/DL (ref 0–1.2)
BUN SERPL-MCNC: 12 MG/DL (ref 6–24)
BUN/CREAT SERPL: 17 (ref 9–23)
CALCIUM SERPL-MCNC: 9.2 MG/DL (ref 8.7–10.2)
CHLORIDE SERPL-SCNC: 105 MMOL/L (ref 96–106)
CHOLEST SERPL-MCNC: 206 MG/DL (ref 100–199)
CO2 SERPL-SCNC: 21 MMOL/L (ref 18–29)
CREAT SERPL-MCNC: 0.72 MG/DL (ref 0.57–1)
CREAT UR-MCNC: 7.7 MG/DL
EST. AVERAGE GLUCOSE BLD GHB EST-MCNC: 177 MG/DL
GFR SERPLBLD CREATININE-BSD FMLA CKD-EPI: 100 ML/MIN/1.73
GFR SERPLBLD CREATININE-BSD FMLA CKD-EPI: 115 ML/MIN/1.73
GLOBULIN SER CALC-MCNC: 2.3 G/DL (ref 1.5–4.5)
GLUCOSE SERPL-MCNC: 178 MG/DL (ref 65–99)
HBA1C MFR BLD: 7.8 % (ref 4.8–5.6)
HDLC SERPL-MCNC: 41 MG/DL
INTERPRETATION, 910389: NORMAL
LDLC SERPL CALC-MCNC: 130 MG/DL (ref 0–99)
MICROALBUMIN UR-MCNC: <3 UG/ML
POTASSIUM SERPL-SCNC: 4.1 MMOL/L (ref 3.5–5.2)
PROT SERPL-MCNC: 6.5 G/DL (ref 6–8.5)
SODIUM SERPL-SCNC: 142 MMOL/L (ref 134–144)
TRIGL SERPL-MCNC: 174 MG/DL (ref 0–149)
VLDLC SERPL CALC-MCNC: 35 MG/DL (ref 5–40)

## 2018-02-27 ENCOUNTER — OFFICE VISIT (OUTPATIENT)
Dept: FAMILY MEDICINE CLINIC | Age: 48
End: 2018-02-27

## 2018-02-27 ENCOUNTER — TELEPHONE (OUTPATIENT)
Dept: FAMILY MEDICINE CLINIC | Age: 48
End: 2018-02-27

## 2018-02-27 VITALS
HEART RATE: 87 BPM | RESPIRATION RATE: 18 BRPM | TEMPERATURE: 98.1 F | WEIGHT: 121.5 LBS | SYSTOLIC BLOOD PRESSURE: 136 MMHG | BODY MASS INDEX: 23.85 KG/M2 | OXYGEN SATURATION: 97 % | HEIGHT: 60 IN | DIASTOLIC BLOOD PRESSURE: 82 MMHG

## 2018-02-27 DIAGNOSIS — E78.2 MIXED DYSLIPIDEMIA: ICD-10-CM

## 2018-02-27 DIAGNOSIS — R94.31 ABNORMAL EKG: ICD-10-CM

## 2018-02-27 DIAGNOSIS — G95.9 CERVICAL MYELOPATHY WITH CERVICAL RADICULOPATHY (HCC): ICD-10-CM

## 2018-02-27 DIAGNOSIS — R07.9 CHEST PAIN, UNSPECIFIED TYPE: ICD-10-CM

## 2018-02-27 DIAGNOSIS — M54.12 CERVICAL MYELOPATHY WITH CERVICAL RADICULOPATHY (HCC): ICD-10-CM

## 2018-02-27 DIAGNOSIS — E11.9 TYPE 2 DIABETES MELLITUS WITHOUT COMPLICATION, WITHOUT LONG-TERM CURRENT USE OF INSULIN (HCC): ICD-10-CM

## 2018-02-27 DIAGNOSIS — I10 ESSENTIAL HYPERTENSION: Primary | ICD-10-CM

## 2018-02-27 RX ORDER — HYDROCHLOROTHIAZIDE 25 MG/1
25 TABLET ORAL DAILY
Qty: 90 TAB | Refills: 1 | Status: SHIPPED | OUTPATIENT
Start: 2018-02-27 | End: 2018-12-14 | Stop reason: SDUPTHER

## 2018-02-27 RX ORDER — HYDROCHLOROTHIAZIDE 25 MG/1
1 TABLET ORAL DAILY
COMMUNITY
Start: 2017-11-28 | End: 2018-02-27 | Stop reason: SDUPTHER

## 2018-02-27 RX ORDER — METFORMIN HYDROCHLORIDE 1000 MG/1
1000 TABLET ORAL 2 TIMES DAILY WITH MEALS
Qty: 180 TAB | Refills: 1 | Status: SHIPPED | OUTPATIENT
Start: 2018-02-27 | End: 2019-04-04 | Stop reason: SDUPTHER

## 2018-02-27 RX ORDER — GLIMEPIRIDE 4 MG/1
4 TABLET ORAL
Qty: 90 TAB | Refills: 1 | Status: SHIPPED | OUTPATIENT
Start: 2018-02-27 | End: 2018-12-14 | Stop reason: SDUPTHER

## 2018-02-27 NOTE — MR AVS SNAPSHOT
Nasir Tejeda 
 
 
 [address] [facility] [phone] Patient: Rashida Choudhary MRN: [id number] Visit Information Date & Time Provider Department Dept. Phone Encounter #  
 2/27/2018  9:00 AM Leigh Martinez  [address] [place] [address] [phone] [id number] Upcoming Health Maintenance Date Due  
 EYE EXAM RETINAL OR DILATED Q1 11/17/1980 HEMOGLOBIN A1C Q6M 5/28/2018 FOOT EXAM Q1 11/28/2018 MICROALBUMIN Q1 11/28/2018 LIPID PANEL Q1 11/28/2018 PAP AKA CERVICAL CYTOLOGY 7/11/2020 DTaP/Tdap/Td series (2 - Td) 11/4/2026 Allergies as of 2/27/2018  Review Complete On: 2/27/2018 By: Leigh Martinez MD  
 No Known Allergies Current Immunizations  Never Reviewed Name Date Influenza Vaccine (Quad) PF 11/28/2017 Tdap 11/4/2016 Not reviewed this visit You Were Diagnosed With   
  
 Codes Comments Essential hypertension    -  Primary ICD-10-CM: I10 
ICD-9-CM: 401.9 Type 2 diabetes mellitus without complication, without long-term current use of insulin (HCC)     ICD-10-CM: E11.9 ICD-9-CM: 250.00 Mixed dyslipidemia     ICD-10-CM: E78.2 ICD-9-CM: 272.2 Cervical myelopathy with cervical radiculopathy     ICD-10-CM: M47.12 
ICD-9-CM: 721.1 Chest pain, unspecified type     ICD-10-CM: R07.9 ICD-9-CM: 786.50 Abnormal EKG     ICD-10-CM: R94.31 
ICD-9-CM: 794.31 Vitals BP Pulse Temp Resp Height(growth percentile) Weight(growth percentile) 136/82 (BP 1 Location: Left arm, BP Patient Position: Sitting) 87 98.1 °F (36.7 °C) (Oral) 18 5' (1.524 m) 121 lb 8 oz (55.1 kg) LMP SpO2 BMI OB Status Smoking Status 02/23/2018 (Exact Date) 97% 23.73 kg/m2 Having regular periods Current Every Day Smoker Vitals History BMI and BSA Data Body Mass Index Body Surface Area  
 23.73 kg/m 2 1.53 m 2 Preferred Pharmacy Pharmacy Name Phone [facility] [address] [phone] Your Updated Medication List  
  
   
This list is accurate as of 2/27/18  9:41 AM.  Always use your most recent med list.  
  
  
  
  
 glimepiride 4 mg tablet Commonly known as:  AMARYL Take 1 Tab by mouth every morning. hydroCHLOROthiazide 25 mg tablet Commonly known as:  HYDRODIURIL Take 1 Tab by mouth daily. metFORMIN 1,000 mg tablet Commonly known as:  GLUCOPHAGE Take 1 Tab by mouth two (2) times daily (with meals). Prescriptions Sent to Pharmacy Refills  
 metFORMIN (GLUCOPHAGE) 1,000 mg tablet 1 Sig: Take 1 Tab by mouth two (2) times daily (with meals). Class: Normal  
 Pharmacy: [facility] [address] Ph #: [phone] Route: Oral  
 glimepiride (AMARYL) 4 mg tablet 1 Sig: Take 1 Tab by mouth every morning. Class: Normal  
 Pharmacy: [facility] [address] Ph #: [phone] Route: Oral  
 hydroCHLOROthiazide (HYDRODIURIL) 25 mg tablet 1 Sig: Take 1 Tab by mouth daily. Class: Normal  
 Pharmacy: [facility] [address] Ph #: [phone] Route: Oral  
  
We Performed the Following AMB POC EKG ROUTINE W/ 12 LEADS, INTER & REP [72470 CPT(R)] COLLECTION VENOUS BLOOD,VENIPUNCTURE [id number] CPT(R)] HEMOGLOBIN A1C WITH EAG [09821 CPT(R)] LIPID PANEL [19220 CPT(R)] METABOLIC PANEL, COMPREHENSIVE [23434 CPT(R)] MICROALBUMIN, UR, RAND W/ MICROALB/CREAT RATIO [id number] CPT(R)] REFERRAL TO CARDIOLOGY [AZC19 Custom] To-Do List   
 02/27/2018 Imaging:  XR SPINE CERV W OBL/FLEX/EXT MIN 6 V COMP Referral Information Referral ID Referred By Referred To  
  
 [id number] Rajeev Tanner, [address] [place] [address] [address] [facility] Phone: [phone] Fax: [phone] Visits Status Start Date End Date 1 New Request 2/27/18 2/27/19 If your referral has a status of pending review or denied, additional information will be sent to support the outcome of this decision. Introducing \[facility]\"" & [facility] SERVICES! Mercedes Petit introduces [employer] patient portal. Now you can access parts of your medical record, email your doctor's office, and request medication refills online. 1. In your internet browser, go to [email or web address] 2. Click on the First Time User? Click Here link in the Sign In box. You will see the New Member Sign Up page. 3. Enter your [employer] Access Code exactly as it appears below. You will not need to use this code after youve completed the sign-up process. If you do not sign up before the expiration date, you must request a new code. · [employer] Access Code: [email or web address] Expires: 5/28/2018  9:41 AM 
 
4. Enter the last four digits of your Social Security Number (xxxx) and Date of Birth (mm/dd/yyyy) as indicated and click Submit. You will be taken to the next sign-up page. 5. Create a [employer] ID. This will be your [employer] login ID and cannot be changed, so think of one that is secure and easy to remember. 6. Create a [employer] password. You can change your password at any time. 7. Enter your Password Reset Question and Answer. This can be used at a later time if you forget your password. 8. Enter your e-mail address. You will receive e-mail notification when new information is available in [address] 9. Click Sign Up. You can now view and download portions of your medical record. 10. Click the Download Summary menu link to download a portable copy of your medical information. If you have questions, please visit the Frequently Asked Questions section of the [employer] website. Remember, [employer] is NOT to be used for urgent needs. For medical emergencies, dial 911. Now available from your iPhone and Android! Please provide this summary of care documentation to your next provider. Your primary care clinician is listed as Jim Vidal. If you have any questions after today's visit, please call [phone].

## 2018-02-27 NOTE — PROGRESS NOTES
Meenu Guzman is a 52 y.o. female who presents with the following:  Chief Complaint   Patient presents with    Hypertension    Diabetes    Arm Pain     left    Leg Pain     BL, hands and feet will feel numb as well.  Cholesterol Problem       Hypertension    The history is provided by the patient (Patient is been having a great deal of stress at home which causes her to have some chest pain which radiates into her left arm). Associated symptoms include chest pain and neck pain. Pertinent negatives include no orthopnea, no palpitations, no malaise/fatigue, no blurred vision, no headaches, no tinnitus, no dizziness and no shortness of breath. Diabetes   The history is provided by the patient (Patient is been watching her diet and has not experienced any edema or shortness of breath but is having numbness and tingling in her hands and feet when she wakes up in the morning). Associated symptoms include chest pain. Pertinent negatives include no abdominal pain, no headaches and no shortness of breath. Arm Pain    The history is provided by the patient (Patient is having left arm pain that comes from the neck down into the shoulder and down into her triceps and biceps associated with some weakness. ). Associated symptoms include numbness, tingling and neck pain. Pertinent negatives include no itching. Leg Pain    The history is provided by the patient (Patient is having left leg pain as well as weakness in the thigh and calf associated with neck pain but no back pain). Associated symptoms include numbness, tingling and neck pain. Pertinent negatives include no itching. Cholesterol Problem   The history is provided by the patient (Patient has had elevated lipids and is a smoker and has had some chest pain with radiation into the left arm when she is been under family stress). Associated symptoms include chest pain. Pertinent negatives include no abdominal pain, no headaches and no shortness of breath. Associated symptoms comments: Patient does not report chest pain with normal activities even though she is quite physically active. No Known Allergies    Current Outpatient Prescriptions   Medication Sig    metFORMIN (GLUCOPHAGE) 1,000 mg tablet Take 1 Tab by mouth two (2) times daily (with meals).  glimepiride (AMARYL) 4 mg tablet Take 1 Tab by mouth every morning.  hydroCHLOROthiazide (HYDRODIURIL) 25 mg tablet Take 1 Tab by mouth daily. No current facility-administered medications for this visit. Past Medical History:   Diagnosis Date    Diabetes [facility])        Past Surgical History:   Procedure Laterality Date    HX GYN      BTL       Family History   Problem Relation Age of Onset    Stroke Mother     Diabetes Mother     Heart Disease Father     Diabetes Sister     Diabetes Maternal Grandmother     Breast Cancer Maternal Aunt     Breast Cancer Paternal Aunt        Social History     Social History    Marital status:      Spouse name: N/A    Number of children: N/A    Years of education: N/A     Social History Main Topics    Smoking status: Current Every Day Smoker     Packs/day: 0.50    Smokeless tobacco: Never Used    Alcohol use No    Drug use: No    Sexual activity: Yes     Partners: Male     Birth control/ protection: Surgical     Other Topics Concern    None     Social History Narrative       Review of Systems   Constitutional: Negative for chills, fever, malaise/fatigue and weight loss. HENT: Negative for congestion, hearing loss, sore throat and tinnitus. Eyes: Negative for blurred vision, pain and discharge. Respiratory: Negative for cough, shortness of breath and wheezing. Cardiovascular: Positive for chest pain. Negative for palpitations, orthopnea, claudication and leg swelling. Gastrointestinal: Negative for abdominal pain, constipation and heartburn. Genitourinary: Negative for dysuria, frequency and urgency.    Musculoskeletal: Positive for neck pain. Negative for falls, joint pain and myalgias. Skin: Negative for itching and rash. Neurological: Positive for tingling and numbness. Negative for dizziness, tremors and headaches. Endo/Heme/Allergies: Negative for environmental allergies and polydipsia. Psychiatric/Behavioral: Negative for depression and substance abuse. The patient is not nervous/anxious. Visit Vitals    /82 (BP 1 Location: Left arm, BP Patient Position: Sitting)    Pulse 87    Temp 98.1 °F (36.7 °C) (Oral)    Resp 18    Ht 5' (1.524 m)    Wt 121 lb 8 oz (55.1 kg)    LMP 02/23/2018 (Exact Date)    SpO2 97%    BMI 23.73 kg/m2     Physical Exam   Constitutional: She is oriented to person, place, and time and well-developed, well-nourished, and in no distress. HENT:   Head: Normocephalic and atraumatic. Right Ear: External ear normal.   Left Ear: External ear normal.   Mouth/Throat: Oropharynx is clear and moist.   Eyes: Conjunctivae and EOM are normal. Pupils are equal, round, and reactive to light. Right eye exhibits no discharge. Left eye exhibits no discharge. Neck: Normal range of motion. Neck supple. No tracheal deviation present. No thyromegaly present. Cardiovascular: Normal rate, regular rhythm, normal heart sounds and intact distal pulses. Exam reveals no gallop and no friction rub. No murmur heard. Pulmonary/Chest: Effort normal and breath sounds normal. No respiratory distress. She has no wheezes. She exhibits no tenderness. Abdominal: Soft. Bowel sounds are normal. She exhibits no distension and no mass. There is no tenderness. There is no rebound and no guarding. Musculoskeletal: She exhibits no edema or tenderness. Patient is noted to have weakness in the left biceps left triceps and in the left deltoid as well as a weak  in the left upper extremity. Her left thigh and the quadriceps seems to be weak as well as the gastroc group.    Lymphadenopathy:     She has no cervical adenopathy. Neurological: She is alert and oriented to person, place, and time. She has normal reflexes. No cranial nerve deficit. She exhibits normal muscle tone. Gait normal. Coordination normal.   Skin: Skin is warm and dry. No rash noted. No erythema. No pallor. Psychiatric: Mood, memory, affect and judgment normal.         ICD-10-CM ICD-9-CM    1. Essential hypertension I10 401.9 hydroCHLOROthiazide (HYDRODIURIL) 25 mg tablet      COLLECTION VENOUS BLOOD,VENIPUNCTURE      METABOLIC PANEL, COMPREHENSIVE   2. Type 2 diabetes mellitus without complication, without long-term current use of insulin ([facility]) E11.9 250.00 metFORMIN (GLUCOPHAGE) 1,000 mg tablet      glimepiride (AMARYL) 4 mg tablet      COLLECTION VENOUS BLOOD,VENIPUNCTURE      HEMOGLOBIN A1C WITH EAG      MICROALBUMIN, UR, RAND W/ MICROALB/CREAT RATIO   3. Mixed dyslipidemia E78.2 272.2 COLLECTION VENOUS BLOOD,VENIPUNCTURE      LIPID PANEL   4. Cervical myelopathy with cervical radiculopathy M47.12 721.1 XR SPINE CERV W OBL/FLEX/EXT MIN 6 V COMP       Orders Placed This Encounter    COLLECTION VENOUS BLOOD,VENIPUNCTURE    XR SPINE CERV W OBL/FLEX/EXT MIN 6 V COMP     Standing Status:   Future     Standing Expiration Date:   3/27/2019     Order Specific Question:   Reason for Exam     Answer:   Left arm pain, left leg pain     Order Specific Question:   Is Patient Pregnant? Answer:   No    HEMOGLOBIN A1C WITH EAG    LIPID PANEL    METABOLIC PANEL, COMPREHENSIVE    MICROALBUMIN, UR, RAND W/ MICROALB/CREAT RATIO    DISCONTD: hydroCHLOROthiazide (HYDRODIURIL) 25 mg tablet     Sig: Take 1 Tab by mouth daily.  metFORMIN (GLUCOPHAGE) 1,000 mg tablet     Sig: Take 1 Tab by mouth two (2) times daily (with meals). Dispense:  180 Tab     Refill:  1    glimepiride (AMARYL) 4 mg tablet     Sig: Take 1 Tab by mouth every morning.      Dispense:  90 Tab     Refill:  1    hydroCHLOROthiazide (HYDRODIURIL) 25 mg tablet     Sig: Take 1 Tab by mouth daily. Dispense:  90 Tab     Refill:  1   Check the results of the neck x-ray and have told the patient we may have to go into physical therapy to relieve her leg and arm symptoms and then we may have to proceed with an MRI if things do not progress positively from there.     Follow-up Disposition: Not on Joana Blair MD

## 2018-02-28 LAB
ALBUMIN SERPL-MCNC: 4.6 G/DL (ref 3.5–5.5)
ALBUMIN/CREAT UR: 60.7 MG/G CREAT (ref 0–30)
ALBUMIN/GLOB SERPL: 1.7 {RATIO} (ref 1.2–2.2)
ALP SERPL-CCNC: 94 IU/L (ref 39–117)
ALT SERPL-CCNC: 14 IU/L (ref 0–32)
AST SERPL-CCNC: 10 IU/L (ref 0–40)
BILIRUB SERPL-MCNC: 0.5 MG/DL (ref 0–1.2)
BUN SERPL-MCNC: 11 MG/DL (ref 6–24)
BUN/CREAT SERPL: 13 (ref 9–23)
CALCIUM SERPL-MCNC: 9.9 MG/DL (ref 8.7–10.2)
CHLORIDE SERPL-SCNC: 95 MMOL/L (ref 96–106)
CHOLEST SERPL-MCNC: 249 MG/DL (ref 100–199)
CO2 SERPL-SCNC: 23 MMOL/L (ref 18–29)
CREAT SERPL-MCNC: 0.84 MG/DL (ref 0.57–1)
CREAT UR-MCNC: 30.3 MG/DL
EST. AVERAGE GLUCOSE BLD GHB EST-MCNC: 209 MG/DL
GFR SERPLBLD CREATININE-BSD FMLA CKD-EPI: 83 ML/MIN/1.73
GFR SERPLBLD CREATININE-BSD FMLA CKD-EPI: 96 ML/MIN/1.73
GLOBULIN SER CALC-MCNC: 2.7 G/DL (ref 1.5–4.5)
GLUCOSE SERPL-MCNC: 238 MG/DL (ref 65–99)
HBA1C MFR BLD: 8.9 % (ref 4.8–5.6)
HDLC SERPL-MCNC: 37 MG/DL
INTERPRETATION, 910389: NORMAL
LDLC SERPL CALC-MCNC: 165 MG/DL (ref 0–99)
MICROALBUMIN UR-MCNC: 18.4 UG/ML
POTASSIUM SERPL-SCNC: 4.1 MMOL/L (ref 3.5–5.2)
PROT SERPL-MCNC: 7.3 G/DL (ref 6–8.5)
SODIUM SERPL-SCNC: 137 MMOL/L (ref 134–144)
TRIGL SERPL-MCNC: 234 MG/DL (ref 0–149)
VLDLC SERPL CALC-MCNC: 47 MG/DL (ref 5–40)

## 2018-02-28 RX ORDER — PREDNISONE 20 MG/1
TABLET ORAL
Qty: 15 TAB | Refills: 0 | Status: SHIPPED | OUTPATIENT
Start: 2018-02-28 | End: 2018-04-10 | Stop reason: ALTCHOICE

## 2018-04-10 ENCOUNTER — OFFICE VISIT (OUTPATIENT)
Dept: FAMILY MEDICINE CLINIC | Age: 48
End: 2018-04-10

## 2018-04-10 VITALS
DIASTOLIC BLOOD PRESSURE: 84 MMHG | TEMPERATURE: 99.1 F | BODY MASS INDEX: 23.75 KG/M2 | HEART RATE: 90 BPM | SYSTOLIC BLOOD PRESSURE: 126 MMHG | HEIGHT: 60 IN | WEIGHT: 121 LBS | OXYGEN SATURATION: 97 % | RESPIRATION RATE: 18 BRPM

## 2018-04-10 DIAGNOSIS — J00 ACUTE NASOPHARYNGITIS: Primary | ICD-10-CM

## 2018-04-10 DIAGNOSIS — R05.9 COUGH: ICD-10-CM

## 2018-04-10 DIAGNOSIS — R09.81 NASAL CONGESTION: ICD-10-CM

## 2018-04-10 DIAGNOSIS — R50.9 FEVER, UNSPECIFIED FEVER CAUSE: ICD-10-CM

## 2018-04-10 DIAGNOSIS — R52 BODY ACHES: ICD-10-CM

## 2018-04-10 LAB
QUICKVUE INFLUENZA TEST: NEGATIVE
VALID INTERNAL CONTROL?: YES

## 2018-04-10 RX ORDER — PSEUDOEPHEDRINE HCL 120 MG/1
120 TABLET, FILM COATED, EXTENDED RELEASE ORAL
Qty: 20 TAB | Refills: 1 | Status: SHIPPED | OUTPATIENT
Start: 2018-04-10 | End: 2018-12-14 | Stop reason: ALTCHOICE

## 2018-04-10 RX ORDER — PREDNISONE 20 MG/1
TABLET ORAL
Qty: 15 TAB | Refills: 0 | Status: SHIPPED | OUTPATIENT
Start: 2018-04-10 | End: 2018-04-24 | Stop reason: ALTCHOICE

## 2018-04-10 RX ORDER — IPRATROPIUM BROMIDE 42 UG/1
1 SPRAY, METERED NASAL 4 TIMES DAILY
Qty: 15 ML | Refills: 1 | Status: SHIPPED | OUTPATIENT
Start: 2018-04-10 | End: 2018-12-14 | Stop reason: ALTCHOICE

## 2018-04-10 NOTE — MR AVS SNAPSHOT
[address] [facility] [address] 
 
 
 [address] [place] [address] [phone] Patient: Shelley Trujillo MRN: [id number] LE Visit Information Date & Time Provider Department Dept. Phone Encounter #  
 4/10/2018 10:00 AM Nubia Mcpherson  [address] [facility] [phone] [id number] Your Appointments 2018 10:20 AM  
New Patient with Janet Newton MD  
[facility] [address] [facility]) Appt Note: ref hesham - cp;  $50 cp [address] [facility] [address] [phone]  
  
   
 [address] [address] 2018  9:00 AM  
Follow Up with Nubia Mcpherson MD  
[facility] ([address]) Appt Note: 3 month f/u  
 [address] [facility]  
[phone] [address] [facility] Upcoming Health Maintenance Date Due  
 EYE EXAM RETINAL OR DILATED Q1 1980 HEMOGLOBIN A1C Q6M 2018 FOOT EXAM Q1 2018 MICROALBUMIN Q1 2019 LIPID PANEL Q1 2019 PAP AKA CERVICAL CYTOLOGY 2020 DTaP/Tdap/Td series (2 - Td) 2026 Allergies as of 4/10/2018  Review Complete On: 4/10/2018 By: Nubia Mcpherson MD  
 No Known Allergies Current Immunizations  Never Reviewed Name Date Influenza Vaccine (Quad) PF 2017 Tdap 2016 Not reviewed this visit You Were Diagnosed With   
  
 Codes Comments Acute nasopharyngitis    -  Primary ICD-10-CM: [place] Duty ICD-9-CM: 255 Fever, unspecified fever cause     ICD-10-CM: R50.9 ICD-9-CM: 780.60 Body aches     ICD-10-CM: R52 ICD-9-CM: 780.96 Cough     ICD-10-CM: R05 ICD-9-CM: 786.2 Nasal congestion     ICD-10-CM: R09.81 ICD-9-CM: 478.19 Vitals BP Pulse Temp Resp Height(growth percentile) Weight(growth percentile) 126/84 (BP 1 Location: Left arm, BP Patient Position: Sitting) 90 99.1 °F (37.3 °C) (Oral) 18 5' (1.524 m) 121 lb (54.9 kg) SpO2 BMI OB Status Smoking Status 97% 23.63 kg/m2 Having regular periods Current Every Day Smoker Vitals History BMI and BSA Data Body Mass Index Body Surface Area  
 23.63 kg/m 2 1.52 m 2 Preferred Pharmacy Pharmacy Name Phone [address] [phone] Your Updated Medication List  
  
   
This list is accurate as of 4/10/18 10:52 AM.  Always use your most recent med list.  
  
  
  
  
 glimepiride 4 mg tablet Commonly known as:  AMARYL Take 1 Tab by mouth every morning. hydroCHLOROthiazide 25 mg tablet Commonly known as:  HYDRODIURIL Take 1 Tab by mouth daily. metFORMIN 1,000 mg tablet Commonly known as:  GLUCOPHAGE Take 1 Tab by mouth two (2) times daily (with meals). We Performed the Following AMB POC RAPID INFLUENZA TEST [71775 CPT(R)] Introducing [facility] SERVICES! [employer] introduces [employer] patient portal. Now you can access parts of your medical record, email your doctor's office, and request medication refills online. 1. In your internet browser, go to [email or web address] 2. Click on the First Time User? Click Here link in the Sign In box. You will see the New Member Sign Up page. 3. Enter your [employer] Access Code exactly as it appears below. You will not need to use this code after youve completed the sign-up process. If you do not sign up before the expiration date, you must request a new code. · [employer] Access Code: [email or web address] Expires: 5/28/2018 10:41 AM 
 
4. Enter the last four digits of your Social Security Number (xxxx) and Date of Birth (mm/dd/yyyy) as indicated and click Submit. You will be taken to the next sign-up page. 5. Create a [email or web address] ID. This will be your [email or web address] login ID and cannot be changed, so think of one that is secure and easy to remember. 6. Create a [email or web address] password. You can change your password at any time. 7. Enter your Password Reset Question and Answer. This can be used at a later time if you forget your password. 8. Enter your e-mail address. You will receive e-mail notification when new information is available in [address] 9. Click Sign Up. You can now view and download portions of your medical record. 10. Click the Download Summary menu link to download a portable copy of your medical information. If you have questions, please visit the Frequently Asked Questions section of the [email or web address] website. Remember, [email or web address] is NOT to be used for urgent needs. For medical emergencies, dial 911. Now available from your iPhone and Android! Please provide this summary of care documentation to your next provider. Your primary care clinician is listed as Samaria Stokes. If you have any questions after today's visit, please call [phone].

## 2018-04-10 NOTE — LETTER
NOTIFICATION RETURN TO WORK / SCHOOL 
 
4/10/2018 10:52 AM 
 
Ms. Laurent [address] [place] [phone] To Whom It May Concern: 
 
Tim Boucher is currently under the care of Kusum Dunaway. She will return to work/school on: 4/14/18 If there are questions or concerns please have the patient contact our office. Sincerely, Piero Oscar MD

## 2018-04-10 NOTE — PROGRESS NOTES
Manpreet Godinez is a 52 y.o. female who presents with the following:  Chief Complaint   Patient presents with    Cough    Nasal Congestion    Generalized Body Aches    Headache    Fever       Cough   The history is provided by the patient (Patient with a 3 day history of postnasal drip cough with nasal congestion and headache and having general body aches in her torso where she is been coughing so hard). Associated symptoms include headaches. Pertinent negatives include no chest pain, no abdominal pain and no shortness of breath. Nasal Congestion    Associated symptoms include congestion, sore throat, cough and headaches. Pertinent negatives include no chills, no ear pain, no shortness of breath, no neck pain, no neck pain and no chest pain. Generalized Body Aches   Associated symptoms include headaches. Pertinent negatives include no chest pain, no abdominal pain and no shortness of breath. Headache   Associated symptoms include headaches. Pertinent negatives include no chest pain, no abdominal pain and no shortness of breath. Fever    Associated symptoms include congestion, headaches, sore throat and cough. Pertinent negatives include no chest pain, no diarrhea, no vomiting, no shortness of breath, no neck pain and no rash. No Known Allergies    Current Outpatient Prescriptions   Medication Sig    predniSONE (DELTASONE) 20 mg tablet Take 5 tablets day one, take 4 tablets day two, take 3 tablets day three, take 2 tablets day four, take 1 tablet day five.  ipratropium (ATROVENT) 42 mcg (0.06 %) nasal spray 1 [place] by Both Nostrils route four (4) times daily. Indications: Rhinorrhea    pseudoephedrine CR (SUDAFED 12 HOUR) 120 mg CR tablet Take 1 Tab by mouth two (2) times daily as needed for Congestion.  metFORMIN (GLUCOPHAGE) 1,000 mg tablet Take 1 Tab by mouth two (2) times daily (with meals).  glimepiride (AMARYL) 4 mg tablet Take 1 Tab by mouth every morning.     hydroCHLOROthiazide (HYDRODIURIL) 25 mg tablet Take 1 Tab by mouth daily. No current facility-administered medications for this visit. Past Medical History:   Diagnosis Date    Diabetes [facility])        Past Surgical History:   Procedure Laterality Date    HX GYN      BTL       Family History   Problem Relation Age of Onset    Stroke Mother     Diabetes Mother     Heart Disease Father     Diabetes Sister     Diabetes Maternal Grandmother     Breast Cancer Maternal Aunt     Breast Cancer Paternal Aunt        Social History     Social History    Marital status:      Spouse name: N/A    Number of children: N/A    Years of education: N/A     Social History Main Topics    Smoking status: Current Every Day Smoker     Packs/day: 0.50    Smokeless tobacco: Never Used    Alcohol use No    Drug use: No    Sexual activity: Yes     Partners: Male     Birth control/ protection: Surgical     Other Topics Concern    None     Social History Narrative       Review of Systems   Constitutional: Positive for fever and malaise/fatigue. Negative for chills. HENT: Positive for congestion and sore throat. Negative for ear discharge, ear pain, hearing loss, nosebleeds and tinnitus. Eyes: Negative for blurred vision, double vision, photophobia and discharge. Respiratory: Positive for cough. Negative for sputum production, shortness of breath, wheezing and stridor. Cardiovascular: Negative for chest pain, palpitations, orthopnea and PND. Gastrointestinal: Negative for abdominal pain, diarrhea, heartburn, nausea and vomiting. Genitourinary: Negative for dysuria, frequency and urgency. Musculoskeletal: Negative for myalgias and neck pain. Skin: Negative for itching and rash. Neurological: Positive for headaches. Negative for dizziness and tingling. Endo/Heme/Allergies: Does not bruise/bleed easily. Psychiatric/Behavioral: Negative for depression. The patient has insomnia. The patient is not nervous/anxious. Visit Vitals    /84 (BP 1 Location: Left arm, BP Patient Position: Sitting)    Pulse 90    Temp 99.1 °F (37.3 °C) (Oral)    Resp 18    Ht 5' (1.524 m)    Wt 121 lb (54.9 kg)    SpO2 97%    BMI 23.63 kg/m2     Physical Exam   Constitutional: She is oriented to person, place, and time. She appears distressed. Has coryza that is clear - mild distress   HENT:   Head: Normocephalic and atraumatic. Right Ear: External ear normal.   Left Ear: External ear normal.   Mouth/Throat: No oropharyngeal exudate. Red mm's in the nose and tht   Eyes: Conjunctivae and EOM are normal. Pupils are equal, round, and reactive to light. Right eye exhibits no discharge. Left eye exhibits no discharge. Neck: Normal range of motion. Neck supple. No tracheal deviation present. No thyromegaly present. Cardiovascular: Normal rate, regular rhythm, normal heart sounds and intact distal pulses. Exam reveals no gallop and no friction rub. No murmur heard. Pulmonary/Chest: Effort normal and breath sounds normal. No stridor. No respiratory distress. She has no wheezes. She has no rales. She exhibits no tenderness. Abdominal: She exhibits no distension and no mass. There is no tenderness. There is no guarding. Musculoskeletal: She exhibits no edema or tenderness. Lymphadenopathy:     She has no cervical adenopathy. Neurological: She is alert and oriented to person, place, and time. She has normal reflexes. Gait normal.   Skin: Skin is warm and dry. No rash noted. She is not diaphoretic. No erythema. Psychiatric: Mood, memory, affect and judgment normal.         ICD-10-CM ICD-9-CM    1. Acute nasopharyngitis J00 460 predniSONE (DELTASONE) 20 mg tablet      ipratropium (ATROVENT) 42 mcg (0.06 %) nasal spray      pseudoephedrine CR (SUDAFED 12 HOUR) 120 mg CR tablet   2. Fever, unspecified fever cause R50.9 780.60 AMB POC RAPID INFLUENZA TEST   3. Body aches R52 780.96 AMB POC RAPID INFLUENZA TEST   4.  Cough R05 786.2 AMB POC RAPID INFLUENZA TEST   5. Nasal congestion R09.81 478.19 AMB POC RAPID INFLUENZA TEST       Orders Placed This Encounter    AMB POC RAPID INFLUENZA TEST    predniSONE (DELTASONE) 20 mg tablet     Sig: Take 5 tablets day one, take 4 tablets day two, take 3 tablets day three, take 2 tablets day four, take 1 tablet day five. Dispense:  15 Tab     Refill:  0    ipratropium (ATROVENT) 42 mcg (0.06 %) nasal spray     [place] by Both Nostrils route four (4) times daily. Indications: Rhinorrhea     Dispense:  15 mL     Refill:  1    pseudoephedrine CR (SUDAFED 12 HOUR) 120 mg CR tablet     Sig: Take 1 Tab by mouth two (2) times daily as needed for Congestion. 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 Patient's been advised that her sugars will run higher because she will be on prednisone for 5 days.     Follow-up Disposition: Not on Anurag Abraham MD

## 2018-04-17 ENCOUNTER — OFFICE VISIT (OUTPATIENT)
Dept: FAMILY MEDICINE CLINIC | Age: 48
End: 2018-04-17

## 2018-04-17 VITALS
DIASTOLIC BLOOD PRESSURE: 82 MMHG | OXYGEN SATURATION: 97 % | SYSTOLIC BLOOD PRESSURE: 130 MMHG | RESPIRATION RATE: 18 BRPM | HEIGHT: 60 IN | BODY MASS INDEX: 22.89 KG/M2 | WEIGHT: 116.6 LBS | HEART RATE: 116 BPM

## 2018-04-17 DIAGNOSIS — M94.0 COSTOCHONDRITIS, ACUTE: ICD-10-CM

## 2018-04-17 DIAGNOSIS — S39.012A STRAIN OF BACK, INITIAL ENCOUNTER: ICD-10-CM

## 2018-04-17 DIAGNOSIS — S16.1XXA STRAIN OF NECK MUSCLE, INITIAL ENCOUNTER: ICD-10-CM

## 2018-04-17 DIAGNOSIS — S66.911A STRAIN OF RIGHT HAND, INITIAL ENCOUNTER: Primary | ICD-10-CM

## 2018-04-17 RX ORDER — GABAPENTIN 600 MG/1
600 TABLET ORAL
Qty: 30 TAB | Refills: 2 | Status: SHIPPED | OUTPATIENT
Start: 2018-04-17 | End: 2018-05-17
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
## 2018-04-17 NOTE — LETTER
NOTIFICATION RETURN TO WORK / SCHOOL 
 
4/17/2018 1:35 PM 
 
Ms. Linda Shields [address] [place] [id number] To Whom It May Concern: 
 
Linda Shields is currently under the care of Kusum Dunaway. She will return to work/school on: Patient will be out of work from 4/13/18 - 4/25/18 If there are questions or concerns please have the patient contact our office. Sincerely, Kelin Lozada MD

## 2018-04-17 NOTE — MR AVS SNAPSHOT
[address] [facility] 
 
 
 [address] [facility] [phone] Patient: Trav Mccullough MRN: [id number] Visit Information Date & Time Provider Department Dept. Phone Encounter #  
 4/17/2018  1:20 PM Sebastien Fernández  [address] [facility] [phone] [id number] Your Appointments 4/24/2018 10:20 AM  
New Patient with Cloteal Conception, MD [facility]) Appt Note: ref hesham - cp;  $50 cp [address] [facility] 67 [phone]  
  
   
 [address] [address] 5/29/2018  9:00 AM  
Follow Up with Sebastien Fernández MD  
[facility] [address] [facility] ([facility]) Appt Note: 3 month f/u  
 [address]  
[phone] [address] [facility] Upcoming Health Maintenance Date Due  
 EYE EXAM RETINAL OR DILATED Q1 11/17/1980 HEMOGLOBIN A1C Q6M 8/27/2018 FOOT EXAM Q1 11/28/2018 MICROALBUMIN Q1 2/27/2019 LIPID PANEL Q1 2/27/2019 PAP AKA CERVICAL CYTOLOGY 7/11/2020 DTaP/Tdap/Td series (2 - Td) 11/4/2026 Allergies as of 4/17/2018  Review Complete On: 4/17/2018 By: Sebastien Fernández MD  
 No Known Allergies Current Immunizations  Never Reviewed Name Date Influenza Vaccine (Quad) PF 11/28/2017 Tdap 11/4/2016 Not reviewed this visit Vitals BP Pulse Resp Height(growth percentile) Weight(growth percentile) SpO2  
 130/82 (!) 116 18 5' (1.524 m) 116 lb 9.6 oz (52.9 kg) 97% BMI OB Status Smoking Status 22.77 kg/m2 Perimenopausal Current Every Day Smoker Vitals History BMI and BSA Data Body Mass Index Body Surface Area  
 22.77 kg/m 2 1.5 m 2 Preferred Pharmacy Pharmacy Name Phone [address] 212 [facility] [address] [phone] Your Updated Medication List  
  
   
This list is accurate as of 4/17/18  1:36 PM.  Always use your most recent med list.  
  
  
  
  
 cyclobenzaprine 10 mg tablet Commonly known as:  FLEXERIL Take 1 Tab by mouth three (3) times daily as needed for Muscle Spasm(s). FLONASE ALLERGY RELIEF 50 mcg/actuation nasal spray Generic drug:  fluticasone 2 Sprays by Both Nostrils route daily. glimepiride 4 mg tablet Commonly known as:  AMARYL Take 1 Tab by mouth every morning. hydroCHLOROthiazide 25 mg tablet Commonly known as:  HYDRODIURIL Take 1 Tab by mouth daily. * ibuprofen 800 mg tablet Commonly known as:  MOTRIN Take 1 Tab by mouth every eight (8) hours as needed for Pain. * ibuprofen 600 mg tablet Commonly known as:  MOTRIN Take 1 Tab by mouth every eight (8) hours as needed for Pain.  
  
 ipratropium 42 mcg (0.06 %) nasal spray Commonly known as:  ATROVENT  
1 [place] by Both Nostrils route four (4) times daily. Indications: Rhinorrhea  
  
 metFORMIN 1,000 mg tablet Commonly known as:  GLUCOPHAGE Take 1 Tab by mouth two (2) times daily (with meals). predniSONE 20 mg tablet Commonly known as:  Jefm Canter Take 5 tablets day one, take 4 tablets day two, take 3 tablets day three, take 2 tablets day four, take 1 tablet day five. pseudoephedrine  mg CR tablet Commonly known as:  SUDAFED 12 HOUR Take 1 Tab by mouth two (2) times daily as needed for Congestion. * Notice: This list has 2 medication(s) that are the same as other medications prescribed for you. Read the directions carefully, and ask your doctor or other care provider to review them with you. Introducing [facility] HE[facility]!    
 Juli Crockett introduces [employer] patient portal. Now you can access parts of your medical record, email your doctor's office, and request medication refills online. 1. In your internet browser, go to [email or web address] 2. Click on the First Time User? Click Here link in the Sign In box. You will see the New Member Sign Up page. 3. Enter your Accessbio Access Code exactly as it appears below. You will not need to use this code after youve completed the sign-up process. If you do not sign up before the expiration date, you must request a new code. · Accessbio Access Code: [email or web address] Expires: 5/28/2018 10:41 AM 
 
4. Enter the last four digits of your Social Security Number (xxxx) and Date of Birth (mm/dd/yyyy) as indicated and click Submit. You will be taken to the next sign-up page. 5. Create a Accessbio ID. This will be your Accessbio login ID and cannot be changed, so think of one that is secure and easy to remember. 6. Create a Accessbio password. You can change your password at any time. 7. Enter your Password Reset Question and Answer. This can be used at a later time if you forget your password. 8. Enter your e-mail address. You will receive e-mail notification when new information is available in [address] 9. Click Sign Up. You can now view and download portions of your medical record. 10. Click the Download Summary menu link to download a portable copy of your medical information. If you have questions, please visit the Frequently Asked Questions section of the Accessbio website. Remember, Accessbio is NOT to be used for urgent needs. For medical emergencies, dial 911. Now available from your iPhone and Android! Please provide this summary of care documentation to your next provider. Your primary care clinician is listed as Bhumi Licea. If you have any questions after today's visit, please call [phone].

## 2018-04-24 ENCOUNTER — OFFICE VISIT (OUTPATIENT)
Dept: CARDIOLOGY CLINIC | Age: 48
End: 2018-04-24

## 2018-04-24 VITALS
HEIGHT: 60 IN | OXYGEN SATURATION: 97 % | RESPIRATION RATE: 18 BRPM | HEART RATE: 97 BPM | SYSTOLIC BLOOD PRESSURE: 104 MMHG | WEIGHT: 116 LBS | BODY MASS INDEX: 22.78 KG/M2 | DIASTOLIC BLOOD PRESSURE: 68 MMHG

## 2018-04-24 DIAGNOSIS — R94.31 ABNORMAL EKG: ICD-10-CM

## 2018-04-24 DIAGNOSIS — Z72.0 TOBACCO ABUSE: ICD-10-CM

## 2018-04-24 DIAGNOSIS — E11.9 TYPE 2 DIABETES MELLITUS WITHOUT COMPLICATION, WITHOUT LONG-TERM CURRENT USE OF INSULIN (HCC): ICD-10-CM

## 2018-04-24 DIAGNOSIS — R06.09 DYSPNEA ON EXERTION: ICD-10-CM

## 2018-04-24 DIAGNOSIS — E78.2 MIXED DYSLIPIDEMIA: ICD-10-CM

## 2018-04-24 DIAGNOSIS — R07.9 CHEST PAIN, UNSPECIFIED TYPE: Primary | ICD-10-CM

## 2018-04-24 DIAGNOSIS — I10 ESSENTIAL HYPERTENSION: ICD-10-CM

## 2018-04-24 RX ORDER — ASPIRIN 81 MG/1
81 TABLET ORAL DAILY
Qty: 90 TAB | Refills: 3
Start: 2018-04-24 | End: 2019-04-04 | Stop reason: ALTCHOICE

## 2018-04-24 RX ORDER — LISINOPRIL 5 MG/1
5 TABLET ORAL DAILY
Qty: 30 TAB | Refills: 5 | Status: SHIPPED | OUTPATIENT
Start: 2018-04-24 | End: 2018-05-22 | Stop reason: SINTOL

## 2018-04-24 RX ORDER — ATORVASTATIN CALCIUM 20 MG/1
20 TABLET, FILM COATED ORAL
Qty: 30 TAB | Refills: 5 | Status: SHIPPED | OUTPATIENT
Start: 2018-04-24 | End: 2018-12-14 | Stop reason: ALTCHOICE

## 2018-04-24 NOTE — PROGRESS NOTES
Verified patient with two patient identifiers. Medications reviewed/approved by Dr. Regina Coello. Verbal from Dr. Regina Coello to remove the medications that were deleted during the visit. Chief Complaint   Patient presents with    Chest Pain     new patient evaluation - referred by Dr. Ernie Ceja     1. Have you been to the ER, urgent care clinic since your last visit? Hospitalized since your last visit? New pt.    2. Have you seen or consulted any other health care providers outside of the [address] [place] [address] since your last visit? Include any pap smears or colon screening. New pt.

## 2018-04-24 NOTE — MR AVS SNAPSHOT
[address] [facility] [address] 
 
 
 [address] [address] [phone] Patient: Susana Arboleda MRN: [id number] Visit Information Date & Time Provider Department Dept. Phone Encounter #  
 4/24/2018 10:20 AM Melissa Wilde, [address] [place] [address] Cardiology [facility] [id number] Your Appointments 5/29/2018  9:00 AM  
Follow Up with MD CHESTER Valenzuela [address] [facility] PRIMARY CARE [facility] ([facility]) Appt Note: 3 month f/u  
 1600 [facility]  
[phone] [address] [facility] Upcoming Health Maintenance Date Due  
 EYE EXAM RETINAL OR DILATED Q1 11/17/1980 HEMOGLOBIN A1C Q6M 8/27/2018 FOOT EXAM Q1 11/28/2018 MICROALBUMIN Q1 2/27/2019 LIPID PANEL Q1 2/27/2019 PAP AKA CERVICAL CYTOLOGY 7/11/2020 DTaP/Tdap/Td series (2 - Td) 11/4/2026 Allergies as of 4/24/2018  Review Complete On: 4/24/2018 By: Wilmer Barbour LPN No Known Allergies Current Immunizations  Never Reviewed Name Date Influenza Vaccine (Quad) PF 11/28/2017 Tdap 11/4/2016 Not reviewed this visit You Were Diagnosed With   
  
 Codes Comments Chest pain, unspecified type    -  Primary ICD-10-CM: R07.9 ICD-9-CM: 786.50 Type 2 diabetes mellitus without complication, without long-term current use of insulin (HCC)     ICD-10-CM: E11.9 ICD-9-CM: 250.00 Essential hypertension     ICD-10-CM: I10 
ICD-9-CM: 401.9 Mixed dyslipidemia     ICD-10-CM: E78.2 ICD-9-CM: 272.2 Abnormal EKG     ICD-10-CM: R94.31 
ICD-9-CM: 794.31 Tobacco abuse     ICD-10-CM: Z72.0 ICD-9-CM: 305.1 Dyspnea on exertion     ICD-10-CM: R06.09 
ICD-9-CM: 786.09 Vitals  BP Pulse Resp Height(growth percentile) Weight(growth percentile) SpO2  
 104/68 (BP 1 Location: Right arm, BP Patient Position: Sitting) 97 18 5' (1.524 m) 116 lb (52.6 kg) 97% BMI OB Status Smoking Status 22.65 kg/m2 Perimenopausal Current Every Day Smoker Vitals History BMI and BSA Data Body Mass Index Body Surface Area  
 22.65 kg/m 2 1.49 m 2 Preferred Pharmacy Pharmacy Name Phone [address] [phone] Your Updated Medication List  
  
   
This list is accurate as of 4/24/18 11:07 AM.  Always use your most recent med list.  
  
  
  
  
 aspirin delayed-release 81 mg tablet Take 1 Tab by mouth daily. atorvastatin 20 mg tablet Commonly known as:  LIPITOR Take 1 Tab by mouth nightly. cyclobenzaprine 10 mg tablet Commonly known as:  FLEXERIL Take 1 Tab by mouth three (3) times daily as needed for Muscle Spasm(s). gabapentin 600 mg tablet Commonly known as:  NEURONTIN Take 1 Tab by mouth nightly as needed for up to 30 days. glimepiride 4 mg tablet Commonly known as:  AMARYL Take 1 Tab by mouth every morning. hydroCHLOROthiazide 25 mg tablet Commonly known as:  HYDRODIURIL Take 1 Tab by mouth daily. ibuprofen 800 mg tablet Commonly known as:  MOTRIN Take 1 Tab by mouth every eight (8) hours as needed for Pain.  
  
 ipratropium 42 mcg (0.06 %) nasal spray Commonly known as:  ATROVENT  
1 [place] by Both Nostrils route four (4) times daily. Indications: Rhinorrhea  
  
 lisinopril 5 mg tablet Commonly known as:  Florentin Valente Take 1 Tab by mouth daily. metFORMIN 1,000 mg tablet Commonly known as:  GLUCOPHAGE Take 1 Tab by mouth two (2) times daily (with meals). pseudoephedrine  mg CR tablet Commonly known as:  SUDAFED 12 HOUR Take 1 Tab by mouth two (2) times daily as needed for Congestion. Prescriptions Sent to Pharmacy Refills  
 atorvastatin (LIPITOR) 20 mg tablet 5 Sig: Take 1 Tab by mouth nightly.   
 Class: Normal  
 Pharmacy: [address] Ph #: [phone] Route: Oral  
 lisinopril (PRINIVIL, ZESTRIL) 5 mg tablet 5 Sig: Take 1 Tab by mouth daily. Class: Normal  
 Pharmacy: [address] Ph #: [phone] Route: Oral  
  
We Performed the Following AMB POC EKG ROUTINE W/ 12 LEADS, INTER & REP [20730 CPT(R)] To-Do List   
 Around 04/27/2018 ECHO:  2D ECHO COMPLETE ADULT (TTE) W OR WO CONTR   
  
 04/27/2018 Imaging:  NM CARDIAC SPECT W STRS/REST MULT Around 04/27/2018 ECG:  NUCLEAR STRESS TEST Introducing [facility] & HEALTH SERVICES! Ruth Martinez introduces [employer] patient portal. Now you can access parts of your medical record, email your doctor's office, and request medication refills online. 1. In your internet browser, go to [email or web address] 2. Click on the First Time User? Click Here link in the Sign In box. You will see the New Member Sign Up page. 3. Enter your [employer]ISON Access Code exactly as it appears below. You will not need to use this code after youve completed the sign-up process. If you do not sign up before the expiration date, you must request a new code. · [employer]ISON Access Code: [email or web address] Expires: 5/28/2018 10:41 AM 
 
4. Enter the last four digits of your Social Security Number (xxxx) and Date of Birth (mm/dd/yyyy) as indicated and click Submit. You will be taken to the next sign-up page. 5. Create a BISON ID. This will be your [employer]ISON login ID and cannot be changed, so think of one that is secure and easy to remember. 6. Create a BISON password. You can change your password at any time. 7. Enter your Password Reset Question and Answer. This can be used at a later time if you forget your password. 8. Enter your e-mail address. You will receive e-mail notification when new information is available in [address] 9. Click Sign Up. You can now view and download portions of your medical record. 10. Click the Download Summary menu link to download a portable copy of your medical information. If you have questions, please visit the Frequently Asked Questions section of the [employer] website. Remember, [employer] is NOT to be used for urgent needs. For medical emergencies, dial 911. Now available from your iPhone and Android! Please provide this summary of care documentation to your next provider. Your primary care clinician is listed as Christiane Dee. If you have any questions after today's visit, please call [phone].
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
## 2018-05-02 ENCOUNTER — TELEPHONE (OUTPATIENT)
Dept: CARDIOLOGY CLINIC | Age: 48
End: 2018-05-02

## 2018-05-02 NOTE — TELEPHONE ENCOUNTER
Verified patient with two patient identifiers. Results given and questions answered. Patient verbalized understanding. Pt wants to discuss results with Dr. Barney Ro.

## 2018-05-02 NOTE — TELEPHONE ENCOUNTER
----- Message from Dolores Carmona MD sent at 4/30/2018  4:15 PM EDT -----  Regarding: tests  Advise stress nuclear scan and Echo/doppler look fine.   Thanks [facility]

## 2018-05-22 ENCOUNTER — OFFICE VISIT (OUTPATIENT)
Dept: FAMILY MEDICINE CLINIC | Age: 48
End: 2018-05-22

## 2018-05-22 VITALS
WEIGHT: 118 LBS | BODY MASS INDEX: 23.16 KG/M2 | TEMPERATURE: 98.4 F | OXYGEN SATURATION: 100 % | DIASTOLIC BLOOD PRESSURE: 63 MMHG | HEART RATE: 81 BPM | SYSTOLIC BLOOD PRESSURE: 138 MMHG | RESPIRATION RATE: 18 BRPM | HEIGHT: 60 IN

## 2018-05-22 DIAGNOSIS — M25.541 ARTHRALGIA OF RIGHT HAND: Primary | ICD-10-CM

## 2018-05-22 DIAGNOSIS — E78.2 MIXED DYSLIPIDEMIA: ICD-10-CM

## 2018-05-22 DIAGNOSIS — E11.9 TYPE 2 DIABETES MELLITUS WITHOUT COMPLICATION, WITHOUT LONG-TERM CURRENT USE OF INSULIN (HCC): ICD-10-CM

## 2018-05-22 DIAGNOSIS — I10 ESSENTIAL HYPERTENSION: ICD-10-CM

## 2018-05-22 NOTE — PROGRESS NOTES
1. Have you been to the ER, urgent care clinic since your last visit? Hospitalized since your last visit? No    2. Have you seen or consulted any other health care providers outside of the [address] [place] [address] since your last visit? Include any pap smears or colon screening.  No
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
## 2018-05-22 NOTE — MR AVS SNAPSHOT
[address] [phone] Patient: Donis Lombard MRN: [id number] STEPHAN:[id number] Visit Information Date & Time Provider Department Dept. Phone Encounter #  
 5/22/2018  3:20 PM Guido Lennon  [address] [facility] [phone] [id number] Upcoming Health Maintenance Date Due  
 EYE EXAM RETINAL OR DILATED Q1 11/17/1980 Influenza Age 5 to Adult 8/1/2018 HEMOGLOBIN A1C Q6M 8/27/2018 FOOT EXAM Q1 11/28/2018 MICROALBUMIN Q1 2/27/2019 LIPID PANEL Q1 2/27/2019 PAP AKA CERVICAL CYTOLOGY 7/11/2020 DTaP/Tdap/Td series (2 - Td) 11/4/2026 Allergies as of 5/22/2018  Review Complete On: 5/22/2018 By: Rod Escobedo RN No Known Allergies Current Immunizations  Never Reviewed Name Date Influenza Vaccine (Quad) PF 11/28/2017 Tdap 11/4/2016 Not reviewed this visit You Were Diagnosed With   
  
 Codes Comments Arthralgia of right hand    -  Primary ICD-10-CM: M25.541 ICD-9-CM: 719.44 Vitals BP Pulse Temp Resp Height(growth percentile) Weight(growth percentile)  
 138/63 (BP 1 Location: Left arm, BP Patient Position: Sitting) 81 98.4 °F (36.9 °C) (Oral) 18 5' (1.524 m) 118 lb (53.5 kg) SpO2 BMI OB Status Smoking Status 100% 23.05 kg/m2 Perimenopausal Current Every Day Smoker Vitals History BMI and BSA Data Body Mass Index Body Surface Area 23.05 kg/m 2 1.5 m 2 Preferred Pharmacy Pharmacy Name Phone [address] [phone] Your Updated Medication List  
  
   
This list is accurate as of 5/22/18  4:47 PM.  Always use your most recent med list.  
  
  
  
  
 aspirin delayed-release 81 mg tablet Take 1 Tab by mouth daily. atorvastatin 20 mg tablet Commonly known as:  LIPITOR Take 1 Tab by mouth nightly. cyclobenzaprine 10 mg tablet Commonly known as:  FLEXERIL Take 1 Tab by mouth three (3) times daily as needed for Muscle Spasm(s). glimepiride 4 mg tablet Commonly known as:  AMARYL Take 1 Tab by mouth every morning. hydroCHLOROthiazide 25 mg tablet Commonly known as:  HYDRODIURIL Take 1 Tab by mouth daily. ibuprofen 800 mg tablet Commonly known as:  MOTRIN Take 1 Tab by mouth every eight (8) hours as needed for Pain.  
  
 ipratropium 42 mcg (0.06 %) nasal spray Commonly known as:  ATROVENT  
1 [place] by Both Nostrils route four (4) times daily. Indications: Rhinorrhea  
  
 metFORMIN 1,000 mg tablet Commonly known as:  GLUCOPHAGE Take 1 Tab by mouth two (2) times daily (with meals). pseudoephedrine  mg CR tablet Commonly known as:  SUDAFED 12 HOUR Take 1 Tab by mouth two (2) times daily as needed for Congestion. We Performed the Following SHMUEL, DIRECT, W/REFLEX [id number] CPT(R)] COLLECTION VENOUS BLOOD,VENIPUNCTURE [02591 CPT(R)]   
 RA + CCP ABS [MCN22441 Custom] SED RATE (ESR) Q098328 CPT(R)] Introducing [facility]! [address] introduces [employer] patient portal. Now you can access parts of your medical record, email your doctor's office, and request medication refills online. 1. In your internet browser, go to [email or web address] 2. Click on the First Time User? Click Here link in the Sign In box. You will see the New Member Sign Up page. 3. Enter your [employer] Access Code exactly as it appears below. You will not need to use this code after youve completed the sign-up process. If you do not sign up before the expiration date, you must request a new code. · [employer] Access Code: [email or web address] Expires: 5/28/2018 10:41 AM 
 
4. Enter the last four digits of your Social Security Number (xxxx) and Date of Birth (mm/dd/yyyy) as indicated and click Submit. You will be taken to the next sign-up page. 5. Create a [employer] ID. This will be your [employer] login ID and cannot be changed, so think of one that is secure and easy to remember. 6. Create a [employer] password. You can change your password at any time. 7. Enter your Password Reset Question and Answer. This can be used at a later time if you forget your password. 8. Enter your e-mail address. You will receive e-mail notification when new information is available in [address] 9. Click Sign Up. You can now view and download portions of your medical record. 10. Click the Download Summary menu link to download a portable copy of your medical information. If you have questions, please visit the Frequently Asked Questions section of the [employer] website. Remember, [employer] is NOT to be used for urgent needs. For medical emergencies, dial 911. Now available from your iPhone and Android! Please provide this summary of care documentation to your next provider. Your primary care clinician is listed as Moiz Melo. If you have any questions after today's visit, please call [phone].

## 2018-05-24 LAB
ANA SER QL: NEGATIVE
CCP IGA+IGG SERPL IA-ACNC: 17 UNITS (ref 0–19)
ERYTHROCYTE [SEDIMENTATION RATE] IN BLOOD BY WESTERGREN METHOD: 4 MM/HR (ref 0–32)
RHEUMATOID FACT SERPL-ACNC: 16.5 IU/ML (ref 0–13.9)

## 2018-06-08 ENCOUNTER — TELEPHONE (OUTPATIENT)
Dept: FAMILY MEDICINE CLINIC | Age: 48
End: 2018-06-08

## 2018-06-11 NOTE — TELEPHONE ENCOUNTER
Left vm both hand surgeons stated she needed an xray first so I can order one for Filomena Ramirez [facility] if ok with the patient

## 2018-06-14 ENCOUNTER — DOCUMENTATION ONLY (OUTPATIENT)
Dept: FAMILY MEDICINE CLINIC | Age: 48
End: 2018-06-14

## 2018-06-14 DIAGNOSIS — M79.643 PAIN OF HAND, UNSPECIFIED LATERALITY: Primary | ICD-10-CM

## 2018-07-10 ENCOUNTER — OFFICE VISIT (OUTPATIENT)
Dept: FAMILY MEDICINE CLINIC | Age: 48
End: 2018-07-10

## 2018-07-10 VITALS
WEIGHT: 115 LBS | OXYGEN SATURATION: 99 % | SYSTOLIC BLOOD PRESSURE: 155 MMHG | DIASTOLIC BLOOD PRESSURE: 90 MMHG | TEMPERATURE: 99.1 F | BODY MASS INDEX: 22.58 KG/M2 | HEIGHT: 60 IN | RESPIRATION RATE: 18 BRPM | HEART RATE: 85 BPM

## 2018-07-10 DIAGNOSIS — I10 ESSENTIAL HYPERTENSION: Primary | ICD-10-CM

## 2018-07-10 DIAGNOSIS — M79.641 RIGHT HAND PAIN: ICD-10-CM

## 2018-07-10 DIAGNOSIS — E11.9 TYPE 2 DIABETES MELLITUS WITHOUT COMPLICATION, WITHOUT LONG-TERM CURRENT USE OF INSULIN (HCC): ICD-10-CM

## 2018-07-10 PROBLEM — E11.21 TYPE 2 DIABETES WITH NEPHROPATHY (HCC): Status: ACTIVE | Noted: 2018-07-10

## 2018-07-10 RX ORDER — AMLODIPINE BESYLATE 10 MG/1
10 TABLET ORAL DAILY
Qty: 30 TAB | Refills: 5 | Status: SHIPPED | OUTPATIENT
Start: 2018-07-10 | End: 2018-12-14 | Stop reason: ALTCHOICE

## 2018-07-10 NOTE — PROGRESS NOTES
Asher Preciado is a 52 y.o. female who presents with the following:  Chief Complaint   Patient presents with    Hand Injury     right, discuss work    Diabetes       Hand Injury    The history is provided by the patient (Patient is currently in physical therapy for her right hand which is still swelling and giving her pain but she wants me to sign an affidavit stating she is able to go back to work when I have not been following this problem. ). Associated symptoms comments: Patient has been followed by Evita Collier and I suggested she take the form back to me him and see if they believe they can sign. .   Diabetes   The history is provided by the patient (The patient's diabetes is being treated with 2 oral agents but the patient is not checking her blood sugars at all. She states she is only checking her sugars when she does not feel right.). Pertinent negatives include no chest pain and no shortness of breath. Hypertension    The history is provided by the patient (The patient's blood pressure is still elevated and she needs an additional medication to try to get this down. No chest pain no shortness of breath no edema. ). Pertinent negatives include no chest pain, no palpitations, no peripheral edema and no shortness of breath. No Known Allergies    Current Outpatient Prescriptions   Medication Sig    amLODIPine (NORVASC) 10 mg tablet Take 1 Tab by mouth daily.  atorvastatin (LIPITOR) 20 mg tablet Take 1 Tab by mouth nightly.  aspirin delayed-release 81 mg tablet Take 1 Tab by mouth daily.  ibuprofen (MOTRIN) 800 mg tablet Take 1 Tab by mouth every eight (8) hours as needed for Pain.  ipratropium (ATROVENT) 42 mcg (0.06 %) nasal spray 1 Center by Both Nostrils route four (4) times daily. Indications: Rhinorrhea    pseudoephedrine CR (SUDAFED 12 HOUR) 120 mg CR tablet Take 1 Tab by mouth two (2) times daily as needed for Congestion.     metFORMIN (GLUCOPHAGE) 1,000 mg tablet Take 1 Tab by mouth two (2) times daily (with meals).  glimepiride (AMARYL) 4 mg tablet Take 1 Tab by mouth every morning.  hydroCHLOROthiazide (HYDRODIURIL) 25 mg tablet Take 1 Tab by mouth daily.  cyclobenzaprine (FLEXERIL) 10 mg tablet Take 1 Tab by mouth three (3) times daily as needed for Muscle Spasm(s). No current facility-administered medications for this visit. Past Medical History:   Diagnosis Date    Asthma     Chest wall contusion, left, subsequent encounter     Diabetes ([address]     Diverticular disease     Dyslipidemia     Hypertension     Rib fractures        Past Surgical History:   Procedure Laterality Date    HX GYN      BTL       Family History   Problem Relation Age of Onset    Stroke Mother     Diabetes Mother     Heart Disease Father     Diabetes Sister     Diabetes Maternal Grandmother     Breast Cancer Maternal Aunt     Breast Cancer Paternal Aunt        Social History     Social History    Marital status:      Spouse name: N/A    Number of children: N/A    Years of education: N/A     Social History Main Topics    Smoking status: Current Every Day Smoker     Packs/day: 0.50    Smokeless tobacco: Never Used    Alcohol use No    Drug use: No    Sexual activity: Yes     Partners: Male     Birth control/ protection: Surgical     Other Topics Concern    None     Social History Narrative       Review of Systems   Respiratory: Negative for shortness of breath. Cardiovascular: Negative for chest pain and palpitations. Visit Vitals    /90 (BP 1 Location: Left arm, BP Patient Position: Sitting)  Comment: has not taken BP med today    Pulse 85    Temp 99.1 °F (37.3 °C) (Oral)    Resp 18    Ht 5' (1.524 m)    Wt 115 lb (52.2 kg)    SpO2 99%    BMI 22.46 kg/m2     Physical Exam   Constitutional: She is oriented to person, place, and time and well-developed, well-nourished, and in no distress. HENT:   Head: Normocephalic and atraumatic.    Right Ear: External ear normal.   Left Ear: External ear normal.   Mouth/Throat: Oropharynx is clear and moist.   Eyes: Conjunctivae and EOM are normal. Pupils are equal, round, and reactive to light. Right eye exhibits no discharge. Left eye exhibits no discharge. Neck: Normal range of motion. Neck supple. No tracheal deviation present. No thyromegaly present. Cardiovascular: Normal rate, regular rhythm, normal heart sounds and intact distal pulses. Exam reveals no gallop and no friction rub. No murmur heard. Pulmonary/Chest: Effort normal and breath sounds normal. No respiratory distress. She has no wheezes. She exhibits no tenderness. Abdominal: Soft. Bowel sounds are normal. She exhibits no distension and no mass. There is no tenderness. There is no rebound and no guarding. Musculoskeletal: She exhibits tenderness (The patient complains of pain in the right hand and swelling and states she really cannot use it at this time). She exhibits no edema. Lymphadenopathy:     She has no cervical adenopathy. Neurological: She is alert and oriented to person, place, and time. She has normal reflexes. No cranial nerve deficit. She exhibits normal muscle tone. Gait normal. Coordination normal.   Skin: Skin is warm and dry. No rash noted. No erythema. No pallor. Psychiatric: Mood, memory, affect and judgment normal.         ICD-10-CM ICD-9-CM    1. Essential hypertension I10 401.9 amLODIPine (NORVASC) 10 mg tablet   2. Type 2 diabetes mellitus without complication, without long-term current use of insulin ([facility]) E11.9 250.00 CANCELED: METABOLIC PANEL, COMPREHENSIVE      CANCELED: LIPID PANEL      CANCELED: HEMOGLOBIN A1C WITH EAG      CANCELED: MICROALBUMIN, UR, RAND W/ MICROALB/CREAT RATIO   3. Right hand pain M79.641 729.5        Orders Placed This Encounter    amLODIPine (NORVASC) 10 mg tablet     Sig: Take 1 Tab by mouth daily.      Dispense:  30 Tab     Refill:  5   I told the patient she needs to start checking her blood sugars once daily but varying between breakfast lunch dinner and bedtime and she needs to write these down and bring it with her to her next appointment concerning her diabetes. That should be in August.  She needs to come back next month to have her blood pressure rechecked and she needs to follow-up with Denzel Lucas regarding her hand.     Follow-up Disposition: Not on Zeina Lozano MD

## 2018-07-10 NOTE — MR AVS SNAPSHOT
[address] [place] [address] [phone] Patient: Rosemary Feliciano MRN: [id number] Visit Information Date & Time Provider Department Dept. Phone Encounter #  
 7/10/2018  3:20 PM Sona Brennan MD [address] [place] [address] [id number] Upcoming Health Maintenance Date Due  
 EYE EXAM RETINAL OR DILATED Q1 11/17/1980 Influenza Age 5 to Adult 8/1/2018 HEMOGLOBIN A1C Q6M 8/27/2018 FOOT EXAM Q1 11/28/2018 MICROALBUMIN Q1 2/27/2019 LIPID PANEL Q1 2/27/2019 PAP AKA CERVICAL CYTOLOGY 7/11/2020 DTaP/Tdap/Td series (2 - Td) 11/4/2026 Allergies as of 7/10/2018  Review Complete On: 7/10/2018 By: Sona Brennan MD  
 No Known Allergies Current Immunizations  Never Reviewed Name Date Influenza Vaccine (Quad) PF 11/28/2017 Tdap 11/4/2016 Not reviewed this visit You Were Diagnosed With   
  
 Codes Comments Type 2 diabetes mellitus without complication, without long-term current use of insulin (HCC)    -  Primary ICD-10-CM: E11.9 ICD-9-CM: 250.00 Essential hypertension     ICD-10-CM: I10 
ICD-9-CM: 401.9 Vitals BP Pulse Temp Resp Height(growth percentile) Weight(growth percentile) 155/90 (BP 1 Location: Left arm, BP Patient Position: Sitting) 85 99.1 °F (37.3 °C) (Oral) 18 5' (1.524 m) 115 lb (52.2 kg) SpO2 BMI OB Status Smoking Status 99% 22.46 kg/m2 Perimenopausal Current Every Day Smoker Vitals History BMI and BSA Data Body Mass Index Body Surface Area  
 22.46 kg/m 2 1.49 m 2 Preferred Pharmacy Pharmacy Name Phone [address] [address] Juan Diego Rachelle [phone] Your Updated Medication List  
  
   
This list is accurate as of 7/10/18  3:49 PM.  Always use your most recent med list.  
  
  
  
  
 aspirin delayed-release 81 mg tablet Take 1 Tab by mouth daily. atorvastatin 20 mg tablet Commonly known as:  LIPITOR Take 1 Tab by mouth nightly. cyclobenzaprine 10 mg tablet Commonly known as:  FLEXERIL Take 1 Tab by mouth three (3) times daily as needed for Muscle Spasm(s). glimepiride 4 mg tablet Commonly known as:  AMARYL Take 1 Tab by mouth every morning. hydroCHLOROthiazide 25 mg tablet Commonly known as:  HYDRODIURIL Take 1 Tab by mouth daily. ibuprofen 800 mg tablet Commonly known as:  MOTRIN Take 1 Tab by mouth every eight (8) hours as needed for Pain.  
  
 ipratropium 42 mcg (0.06 %) nasal spray Commonly known as:  ATROVENT  
1 [place] by Both Nostrils route four (4) times daily. Indications: Rhinorrhea  
  
 metFORMIN 1,000 mg tablet Commonly known as:  GLUCOPHAGE Take 1 Tab by mouth two (2) times daily (with meals). pseudoephedrine  mg CR tablet Commonly known as:  SUDAFED 12 HOUR Take 1 Tab by mouth two (2) times daily as needed for Congestion. We Performed the Following HEMOGLOBIN A1C WITH EAG [16316 CPT(R)] LIPID PANEL [32987 CPT(R)] METABOLIC PANEL, COMPREHENSIVE [87210 CPT(R)] MICROALBUMIN, UR, RAND W/ MICROALB/CREAT RATIO [id number] CPT(R)] Introducing [facility]! Owen Landeros introduces [employer] patient portal. Now you can access parts of your medical record, email your doctor's office, and request medication refills online. 1. In your internet browser, go to [email or web address] 2. Click on the First Time User? Click Here link in the Sign In box. You will see the New Member Sign Up page. 3. Enter your [employer] Access Code exactly as it appears below. You will not need to use this code after youve completed the sign-up process. If you do not sign up before the expiration date, you must request a new code. · [employer] Access Code: [email or web address] Expires: 9/10/2018 10:08 AM 
 
 4. Enter the last four digits of your Social Security Number (xxxx) and Date of Birth (mm/dd/yyyy) as indicated and click Submit. You will be taken to the next sign-up page. 5. Create a Peerform ID. This will be your Peerform login ID and cannot be changed, so think of one that is secure and easy to remember. 6. Create a Peerform password. You can change your password at any time. 7. Enter your Password Reset Question and Answer. This can be used at a later time if you forget your password. 8. Enter your e-mail address. You will receive e-mail notification when new information is available in [address] 9. Click Sign Up. You can now view and download portions of your medical record. 10. Click the Download Summary menu link to download a portable copy of your medical information. If you have questions, please visit the Frequently Asked Questions section of the Peerform website. Remember, [employer] is NOT to be used for urgent needs. For medical emergencies, dial 911. Now available from your iPhone and Android! Please provide this summary of care documentation to your next provider. Your primary care clinician is listed as Lorin Yanes. If you have any questions after today's visit, please call [phone].

## 2018-12-14 PROBLEM — E11.21 TYPE 2 DIABETES WITH NEPHROPATHY (HCC): Status: RESOLVED | Noted: 2018-07-10 | Resolved: 2018-12-14

## 2019-07-24 PROBLEM — L02.91 ABSCESS: Status: ACTIVE | Noted: 2019-07-24

## 2019-07-24 PROBLEM — B37.31 VAGINAL CANDIDIASIS: Status: ACTIVE | Noted: 2019-07-24

## 2020-03-03 PROBLEM — R94.31 ABNORMAL EKG: Status: RESOLVED | Noted: 2018-02-27 | Resolved: 2020-03-03

## 2020-03-03 PROBLEM — R07.9 CHEST PAIN: Status: RESOLVED | Noted: 2018-02-27 | Resolved: 2020-03-03

## 2020-03-03 PROBLEM — M79.641 RIGHT HAND PAIN: Status: RESOLVED | Noted: 2018-07-10 | Resolved: 2020-03-03

## 2020-03-03 PROBLEM — L02.91 ABSCESS: Status: RESOLVED | Noted: 2019-07-24 | Resolved: 2020-03-03

## 2020-03-03 PROBLEM — B37.31 VAGINAL CANDIDIASIS: Status: RESOLVED | Noted: 2019-07-24 | Resolved: 2020-03-03

## 2020-03-09 ENCOUNTER — PATIENT OUTREACH (OUTPATIENT)
Dept: OTHER | Age: 50
End: 2020-03-09

## 2020-03-09 NOTE — LETTER
3/9/2020 2:06 PM 
 
Ms. Mak Lunch Get Eddy [phone] Welcome to Associate Care Management Congratulations for taking a step towards improving your health by participating in the [facility] Management (ACM) program. ACM is a new model of care designed to improve the coordination of your health care with an emphasis on your overall well-being. This confidential program is offered free of charge to [facility] participants and their covered family members. To make the most of your first ACM call, please have the following items ready to discuss: 
  
Make a list of any questions you have about your health including questions about dietary recommendations, lifestyle, and disease management. ? Inform the ACM of any health care providers you have visited in the last month and the reason for your visit. This includes urgent care or ED visits. ? Have a list of all prescribed medications including, over-the counter, herbal and dietary supplements. Inform ACM of any refills needed. ? Inform ACM if any new problems have developed in the last month. ? Confirm the date of your next ACM call. ? Activate a [employer] account, if you haven't already. [employer] can provide a communication between you and your care team; as well as a chance to view your care plan. ? If you want to designate a caregiver have access to your record, please ask your doctor's office for the form. ? Think about your goals to achieve better health. With continued partnership through [place], we hope to optimize your health, increase your quality of life, and prevent hospitalization. We look forward to continuing to serve you. If you have any health concerns prior to your next [place] outreach, please contact your primary care doctor. Your ACM contact information is listed below for non-urgent questions. Jocelin Mcclellan RN, MS, [facility]  Associate Care Manager [employer]       Phone:  [phone]     Fax:  [phone]    [email or web address]

## 2020-03-09 NOTE — PROGRESS NOTES
[facility] progress note    Patient eligible for [facility] 994 care management    Two patient identifiers verified. Discussed the care management program.  Patient agrees to care management services at this time. Patient's primary care provider relationship reviewed with patient and modified, as applicable. Patient has significant diagnosis of IDDM; HTN; Diverticular Dz; now R leg pain (numbness and tingling x 6 weeks). Care management assessment completed:  * Ms. Thurman notes that she is very worried about her R leg.     - No injury or activity provoking it,   - Just woke up one day and felt the numbness/ tingling down her Right leg.     - Doesn't like the way the pain meds work. - Anxious to find out what is going on.    - For LUPILLO tomorrow and ortho apt Wednesday. 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 - He walks with walker or 4 pt cane, so she does not have to lift or turn him. - Father also has some sun-downers and wanders at night. * CM asks which MMO plan she is using/  Plus or Flex? - She doesn't know. -\"this insurance thing is really confusing. I don't know why I have to pay all this much money now. \"      - CM searches media for current insurance card/  Not found. * DM is better controlled/  A1C is down from 11 in July to 9.5 with use of the Cohen devise and finding it easier to control BS. Patient stated problem: \"I am really worried about my leg. \"    Care manager identified primary concern:  Higher risk of nerve and vascular problems with diabetes and HTN. Emotional Status/Adjustment to Health State: Anxious about her leg pain source- and would like to know possible treatment plan.        Readiness to Change: []  Pre-contemplative    [x]  Contemplative  []  Preparation               []  Action                  []  Maintenance    Barriers/Challenges to Care: []  Decline in memory    []  Language barrier     [x]  Emotional                  []  Limited mobility  []  Lack of motivation     [] Vision, hearing or cognitive impairment []  Knowledge [x] Financial Barriers  []  Other     Patient stated goal \" help me with this leg pain. \"    Care Manager/Provider identified medical goal:  Pain control- treatment;   DM management. Support System/Resources: Grown children/ two live with her 34 and 22. Help with their grandfather. Advance Care Planning: Patient defers to another time. Upcoming appointments:   Future Appointments   Date Time Provider Ankush Nicholson   3/10/2020 10:30 AM Johnm Melissa 36. for Chronic Disease:    1. Diagnosis 1- IDDM  2. Diagnosis 2-  HTN/  R leg pain   - Current smoker. 3. Focused Assessment-  Diabetes Condition Focused Assessment    Skin- any open wounds or incisions? no  New or worsening pain? yes  If yes, pain rated 0-10: 6 Location/pain characteristics:  Right leg/ numbness and tingling in thigh mostly. But also in feet. New or worsening numbness or tingling? yes    Patient reported important numbers to know:  Last    Last A1C 9.5   2/10   Urine protein (microalbumin) neg or pos? Positive 3/22/19   43.2   Weight 108   /76     Last lipid panel:   2/10/20  2/11/2020 12:12 PM - Parmin[facility], [facility] Lab Results In     Component Value Flag Ref Range Units Status   Cholesterol, total 200  High   100 - 199 mg/dL Final   Triglyceride 84   0 - 149 mg/dL Final   HDL Cholesterol 49   >39 mg/dL Final   VLDL, calculated 17   5 - 40 mg/dL Final   LDL, calculated 134          Nutrition- prescribed diet? yes   Diet prescribed or recommended: Diabetic diet/  No added sugar.     4. Key pt activities to achieve better health:   []  Weight loss  []  Improved diabetic control  []  Decreased cholesterol levels  []  Decreased blood pressure  [x]  Smoking cessation /  Stress control/  Pain management  []    Patient verbalized understanding of all information discussed. Pt has my contact information for any further questions, concerns, or needs. Plan for next call: Tomorrow after testing. Chart Review:     OV  2/10      Blood counts look good and thyroid is normal.  Kidney and liver enzyme levels are good. Cholesterol levels are improved from 6 months ago but are still elevated-you would benefit from being on a medication to help lower the cholesterol levels because of the diabetes. Your sugars are definitely heading in the right direction-6 months ago your number was 11.6. Now it is 9.5. Keep up the work as our goal is to get it to 6.5. Your vitamin D is extremely low-would like to send in a replacement therapy to your pharmacy if that is okay with you. Let me know what you think about being on a medication for your cholesterol as well.         Note Details   Progress Notes     Expand All Collapse All       Tiffany Babb is a 52 y.o. female who presents to the office today with the following:       Chief Complaint   Patient presents with    Leg Pain       Right Upper Thigh.      painful, burning sensation on the outer side of the thigh and anterior of the thighs bilaterally , worse on the right than on the left. No weakness, no fatigue noted with activity, just worsening pain. No new activities, no wearing tight clothes, no recent unusual weight gain. States that her blood sugars have been doing better with the [place] device. No fevers, chills, abdominal pain, abdominal pain, claudication, rashes. Vitals:     02/10/20 0825   BP: 112/76   BP 1 Location: Left arm   BP Patient Position: Sitting   Pulse: 79   Resp: 20   Temp: 96.8 °F (36 °C)   TempSrc: Temporal   SpO2: 99%   Weight: 108 lb 12.8 oz (49.4 kg)   Height: 5' 1\" (1.549 m)     ASSESSMENT AND PLAN:  Meralgia parasthetica   remove the cause of the compression.  This may mean resting from an aggravating activity, losing weight, wearing loose clothing, muscle relaxant, OMT on inguinal ligament, numbness will persist despite treatment. In more severe cases, your doctor may give you an injection of a corticosteroid preparation to reduce inflammation. This generally relieves the symptoms for some time.

## 2020-03-10 ENCOUNTER — PATIENT OUTREACH (OUTPATIENT)
Dept: OTHER | Age: 50
End: 2020-03-10

## 2020-03-10 NOTE — PROGRESS NOTES
CM  follow up call. Patient with IDDM; HTN; Diverticular Dz; now R leg pain (numbness and tingling x 6 weeks). Chart review:  LUPILLO testing today. 4:30pm  CM called from patient for CM follow up services. Verified  and address for HIPAA security. *LUPILLO attended today. - Hasn't talked to the doctor to find out the results. * MED CHANGES:  None  * MD APTS:   PT apt for tomorrow/  [facility]. - CM encourages pt to utilize [facility] practice to save money. Reviewed how our insurance is so different this year. - CM asks pt which kind of insurance she has/  Flex or Plus - she does not know,and doesn't have her card with  Her.     -CM encourages pt to call practice and ask for referral to in network PT. CM looks up provider network for Plus Plan    PT in network is [facility].    * Patient Concerns: Worried she will upset her doctor. - CM would be glad to speak with practice if needed. * CM Concerns:   CM shares that her financial burden will be very costly if she goes out of network. * Education/ Resources. Utilizing in network facilities/ physicians. 5:00pm  Sent staff message to Ms. Gonzalez Mccormack LPN at [facility] practice. Explained the 50 mile rule/ and offered my assistance pursuing an OON Exception. Instructed to call  Customer Service issues, please make sure you are using the [facility] dedicated line, as those reps have been educated about working with [facility] -- [phone]   - Please let me know what time you called (date and morning/afternoon would be helpful so we can find the call). * For issues finding a provider, USE the SITE: [email or web address]     Will follow for CM services. Next planned outreach:   FU tomorrow if needed.                Chart Review:    Lower Extremity Arterial Findings     LUPILLO     The right resting LUPILLO is normal. The left resting LUPILLO is normal. The right anterior tibial artery and posterior tibial artery has biphasic waveforms. The left anterior tibial artery and posterior tibial artery has biphasic waveforms.    Arterial Pressure Measurements      Right Left   Brachial  mmHg       150 mmHg         Post Tibial  mmHg       150 mmHg         Tibial/DP  mmHg       151 mmHg         LUPILLO 1.1        0.97

## 2020-03-11 ENCOUNTER — TELEPHONE (OUTPATIENT)
Dept: PHARMACY | Age: 50
End: 2020-03-11

## 2020-03-11 ENCOUNTER — PATIENT OUTREACH (OUTPATIENT)
Dept: OTHER | Age: 50
End: 2020-03-11

## 2020-03-11 NOTE — PROGRESS NOTES
CCM  Care Coordination. Patient with IDDM; HTN; Diverticular Dz; now R leg pain (numbness and tingling x 6 weeks). Chart Review only. No call made to patient. Viewed information that Ms. Corazon Hines LPN at PCP practice has submitted an OON Exception for PT services. 2:10pm  Called Ms. Thurman. Verified  and address for HIPAA security. * Ms. Ounr Prescott has not heard any results from LUPILLO yesterday. * At work today. Pain is about the same. * Pt is aware the OON exception is in process to allow PT closer to home. - Quite anxious to \"get started. \"     - Encouraged her to contact me by end of next week if she hasn't heard anything. I can be an advocate for her. * CM has offered assistance to pt and practice if problem arise. Next outreach if no assistance needed:   . Info on smoking, DM and vascular disease. Gauge patient on willingness to consider smoking Cessation.

## 2020-03-11 NOTE — TELEPHONE ENCOUNTER
Called patient regarding [facility] Wellness program and P. Emailed enrollment forms for both using email on file. Routed to  for tracking purposes.

## 2020-03-11 NOTE — LETTER
Kendra [address] [facility] Yaneth S[address] [place] [address] Phone: toll free [phone] option 7 Ms. Con Zarate [address]  Congratulations! You have successfully enrolled in the Be Well With Diabetes program for 2020. What you receive Beginning July 1, you will begin receiving up to $300 in waived copays for specific medications and pharmacy-related supplies purchased through our home delivery pharmacy, [facility]. A list of eligible medications and pharmacy supplies can be found at [employer] under Be Well With Diabetes. In addition, youll receive advice and help from our pharmacists, associate care management team and diabetes educators. (And, if you also participate in the Be Well program, you can earn points and Lifestyle Management or Health Management program credit, if applicable.) What you need to do To maintain your benefit this year and remain eligible next year, complete the following requirements: 
 
 
 
 
 
Remember, program requirements must be completed by deadlines shown. If not, your benefit may be terminated, and you will not be eligible to participate again until the following year. To keep you on track, well review your [employer] account and send reminders for action. (If you dont have a [address], submit documentation to [email or web address] or by fax to [phone].) Congratulations and thank you for taking steps to improve your health and to Be Well With Diabetes. [address] [facility] Phone: [phone], option 7 Email: [email or web address] Fax: [phone]

## 2020-03-12 NOTE — TELEPHONE ENCOUNTER
Pharmacy Pop Care Documentation:   Patient is missing the following requirements: DM Program Application. If completed by 3/15/20 patient will be eligible for enrollment in the DM Program on 4/01/20.     Dennie Schlossman, Via [employer] [facility]   Department, toll free: [phone], option 7

## 2020-04-02 ENCOUNTER — PATIENT OUTREACH (OUTPATIENT)
Dept: OTHER | Age: 50
End: 2020-04-02

## 2020-04-02 NOTE — PROGRESS NOTES
Resolving current episode of case management. Patient with IDDM; HTN; Diverticular Dz; now R leg pain (numbness and tingling x 6 weeks). * Ms. Thurman reports that her back pain has gotten a little better. - Still working. * No PT - she thinks this is due to COVID-19 problems. * CM reports to patient that the Diabetes program has tried to reach her. She has no message or letter from them. - Shared contact info of [id number] [facility] staff:   Abdullahi Perales, [address] [employer]   Department, toll free: [phone], option 7   * CM reviewed that she would be of higher risk for COVID complications with IDDM and active smoking.     - Patient understands. She is practicing all the good hygiene measures that are being promoted by CDC and WHO. - Educated at work. * Informed patient that my department is being pulled into screening efforts for the COVID-19 outbreak. - Ending CCM at this time/  She is open to CM calls at a later time after pandemic is controlled.

## 2020-04-03 ENCOUNTER — PATIENT MESSAGE (OUTPATIENT)
Dept: OTHER | Age: 50
End: 2020-04-03

## 2020-04-03 DIAGNOSIS — G62.9 NEUROPATHY: Primary | ICD-10-CM

## 2020-04-03 DIAGNOSIS — M54.31 SCIATICA OF RIGHT SIDE: ICD-10-CM

## 2020-04-06 NOTE — TELEPHONE ENCOUNTER
DM Program Application received: 9/48/02. Pharmacy Pop Care Documentation:   Patient has completed all the requirements by 6/01/20 and therefore will be enrolled in the DM Program on 7/01/20. Application received: 7/30/30  Application scanned. Letter mailed to patient.     Nails Reynolds, Via [employer] [facility]   Department, toll free: [phone], option 7

## 2020-06-30 ENCOUNTER — DOCUMENTATION ONLY (OUTPATIENT)
Dept: PHARMACY | Age: 50
End: 2020-06-30

## 2020-06-30 NOTE — PROGRESS NOTES
Pharmacy Pop Care Documentation:      The application for Elizabeth Liang for enrollment into the diabetes management program has been reviewed and accepted on 6/30/20.     Fei Shea

## 2020-07-16 ENCOUNTER — TELEPHONE (OUTPATIENT)
Dept: PHARMACY | Age: 50
End: 2020-07-16

## 2020-07-16 NOTE — TELEPHONE ENCOUNTER
Called patient to schedule 2020 yearly pharmacist appointment to discuss medications for Diabetes Management Program.    No answer. TAD was full and would not accept any further messages. I will continue to attempt to contact this patient. [employer]era message sent to patient.     Faith Jerez, Via [employer]   Department, toll free: [phone], option 7

## 2020-07-22 NOTE — TELEPHONE ENCOUNTER
Second attempt made to contact patient to schedule 2020 yearly pharmacist appointment to discuss medications for Diabetes Management Program.    Spoke to patient and appointment scheduled for 7/28/20 at 1 PM.    Patient needs to get set-up with [facility]. I attempted to transfer her to them to start the process but she declined and advised that she would get it all straightened out at her telephone appointment on 7/28/20.     Urmila Botello, Via [employer] [facility]   Department, toll free: [phone], option 7

## 2020-09-25 ENCOUNTER — PATIENT OUTREACH (OUTPATIENT)
Dept: OTHER | Age: 50
End: 2020-09-25

## 2020-09-25 NOTE — PROGRESS NOTES
CCM progress note    Former CM patient with- significant diagnosis of IDDM (A1C 10.3- 5/8/2020) ; HTN; Diverticular Dz;  HTN    Patient eligible for [place] [address] care management    Two patient identifiers verified. Discussed the care management program.  Patient agrees to care management services at this time. Patient's primary care provider relationship reviewed with patient and modified, as applicable. 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 - Shares 24/7 monitoring with her children and siblings. * Has been 'sick with a cold' - tested negative for COVID (unable to locate testing in labs)     - Sinus infection - now cleared. - Still smoking, is considering quitting.     - No one else in the household became sick. * DM is better controlled with CGM- Librae machine.   - Altered dosing for insulin since she works nights, she now takes her insulin in the morning before sleeping and her BS are now running in the 100's. - Due for FU A1C - last tested in May. Patient stated problem: \"I am still dealing with this leg pain\"  * She reports that \"now they think it is coming from my back. \"   * She thinks they want to order an MRI. Unsure. Care manager identified primary concern:   DM/ HTN/ Radicular leg pain vs vascular. Emotional Status/Adjustment to Health State: Stable, no big changes - disappointed that leg pain is still present. Readiness to Change: []  Pre-contemplative    []  Contemplative  [x]  Preparation               []  Action                  []  Maintenance    Barriers/Challenges to Care: []  Decline in memory    []  Language barrier     []  Emotional                  []  Limited mobility  []  Lack of motivation     [] Vision, hearing or cognitive impairment [x]  Knowledge [] Financial Barriers  [x]  Other - Works nights.      Patient stated goal   /  Care Manager/Provider identified medical goal s  * Reactivating DM support, education and Care Coordination  * Care Coordination for R leg pain     Support System/Resources: Enrolled with Juany with DM; Interested in smoking cessation. Advance Care Planning: None     Upcoming appointments: No future appointments. Care Plan for Chronic Disease:    1. Diabetes:  1)  General:   Patient is able to state a basic definition of diabetes including: risk factors, basic pathophysiology, complications and treatment. 2)  Diet:   Patient will be able to read a basic food label and identify role of calories, carbohydrates, protein and fats in healthy eating. 3)  Activity:   Patient will identify the potential health benefits of regular exercise:  decreased cholesterol, improved mood, decreased blood pressure, lower stress/anxiety, weight loss, decreased blood sugar. 4)  Monitoring:  Patient will be able to identify what an A1C test measures. 5)  Medications:  Patient will demonstrate knowledge of diabetes medications. 6)  Risk Reduction:  Patient is able to state goal target for A1C (<7), blood pressure (<130/70), and LDL cholesterol (<100) to reduce diabetes complications. 7)  Problem Solving:  Patient will identify s/sx and treatment for hyper- and hypoglycemia. 8)  Healthy Coping:  Patient will be able to identify two community support resources. Patient will identify two stress relieving techniques. Patient will identify health risk factors and complications of diabetes  1) Kidney disease  2) Heart disease  3) Neuropathy  4) Skin breakdown  5) Diabetic Retinopathy  6) Prevention:   Stable controlled A1C,  healthy lifestyle (diet, exercise, hydration, stress reduction, monitoring, skin care)   SMOKING CESSATION?      4. Key pt activities to achieve better health:   []  Weight loss  [x]  Improved diabetic control  [x]  Decreased cholesterol levels  []  Decreased blood pressure  []    []    Patient encouraged to reach out to Be Well with DM program for updates. Need for repeat A1C to monitor progress. Patient verbalized understanding of all information discussed. Mailing resouces for Quit Smoking support:   Quit Smoking Resource I found on [employer] website- our insurance. Enroll by :  log in to [employer] site, select healthy living, then Quit Smoking! Or Call toll-free QuitLine at [phone]    CM informed patient that I would be out of the office 10/5-10/12. Shared contact information for coverage:   Martha Swift RN  [phone] if needs arise in my absence  Pt has my contact information for any further questions, concerns, or needs.     * Sharing chart with Henry Darby for next call:  Monday 10/19

## 2020-09-29 ENCOUNTER — TELEPHONE (OUTPATIENT)
Dept: PHARMACY | Age: 50
End: 2020-09-29

## 2020-10-08 ENCOUNTER — DOCUMENTATION ONLY (OUTPATIENT)
Dept: PHARMACY | Age: 50
End: 2020-10-08

## 2020-10-08 NOTE — PROGRESS NOTES
The application for Rosalio Salguero for enrollment into the diabetes management program has been reviewed and accepted on 10/08/20.     Nixon Pandey

## 2020-10-20 ENCOUNTER — PATIENT OUTREACH (OUTPATIENT)
Dept: OTHER | Age: 50
End: 2020-10-20

## 2020-10-20 NOTE — PROGRESS NOTES
CM  follow up call. Former CM patient with- significant diagnosis of IDDM (A1C 10.3- 2020) ; HTN; Diverticular Dz;  HTN. Chart review:  Enrollment in Be Well with DM program in place. Contacted  patient for CM follow up services. Verified  and address for HIPAA security. * Working- today was 6th day in a row. - Has an apt with her father for 11:30.   - She knows lack of sleep and stress are components that impact DM and HTN. - Latrell drives her and her dad when she is tired. * CM asks what she does to relieve stress. - Spiritual - prays, reads bible. - CM introduces meditation as a good option- picking one phrase from the bible and repeating while taking deep abdominal breaths. Start at 1-2 minutes and build up to 10-20 minutes-  Helps with relaxing entire body- functions better. - Another strategy for decreasing stress- taking a walk. - CM reviews fight or flight response- see education. * Next A1C in November.  (every 3 months with Dr. Alvarado Seen. )    - Reports BS ranging 120-210. * MED CHANGES:  None  * MD APTS:   BW and OV in Nov- doesn't know specific date. * Patient Concerns:  Lack of sleep- working nights 6 days in a row. * CM Concerns:  Stress level impacting HTN/DM  * Education/ Resources. A.  Stress is a normal and natural part of life. There are both good and bad stressors that effect our bodies. Johanna Ariza is a fight or flight reflex that used to save us from danger. Our bodies produce stress hormones (adrenaline, cortisol and norepinephrine) that give us energy and ability to fight for our lives or flee from danger. That includes a surge in our blood pressure to kick us into gear for action. C. If we dont use our body to react to what upsets us, our body still responds (increase heart rate, shifting blood flow to big muscles and away from our gut and skin). It can take hours to days to get our bodies back to calm, normal state.     D. What do you normally do to handle your stress? Do you practice that regularly, or just when stress really gets to you? Would you be willing to find 5-10 minutes per day for stress relief? E. Ideas for stress reduction:    1. Take a walk (mindfully with noticing nature, sun, breeze);    2.  Breathing exercise -  Focus on in and out, Belly breathing  three areas belly, chest, shoulders; In- belly first/ out- belly last.  Breathe in to the count of three, breathe out to the count of 6;   3.  Meditation. Sit in quiet place, hands on lap, find a word or phrase to repeat. If thoughts of the day come, gently refocus to your word/phrase. 4.  Progressive Relaxation. Squeeze and release parts of your body, starting with your toes and working up to your neck and head. 5.  Stretching or Yoga. Door jam hang with your hands on either side; yawn with your arms stretched out; slow twists to each side; bend to touch your toes. 6.  Laugh!!! Find a funny video, movie, jokes. 7.  Exercise- proven to reduce stress. 8.  Guided Visualization. Picture your happy place with focus on details like color and smells. Aides can be purchased. 9.  Massage. 10. Nap for 15-30 minutes. 11.  Pet time. Time with your animals helps to reduce stress. Dogs can also encourage exercise. A famous Cardiologist, Sarah Beth Foley said, [facility] your dog three times per day, even if you dont have one.     12. Journal.  Its helpful to process your day and reflect. Many people find that a gratitude journal is a mood . Even on a bad day, you can dig deep and find 2-3 things you are thankful for. Will follow for CM services. Next planned outreach:  Tues 11/17 - DM FU- A1C check? Leg pain?

## 2020-11-03 PROBLEM — E11.21 TYPE 2 DIABETES WITH NEPHROPATHY (HCC): Status: ACTIVE | Noted: 2020-11-03

## 2020-11-17 ENCOUNTER — PATIENT OUTREACH (OUTPATIENT)
Dept: OTHER | Age: 50
End: 2020-11-17

## 2020-11-17 NOTE — PROGRESS NOTES
CM  follow up call. Former CM patient with- significant diagnosis of IDDM (A1C 10.3- 2020) ; HTN; Diverticular Dz;  HTN. Chart review:  OV and VV with PCP    Contacted  patient for CM follow up services. Verified  and address for HIPAA security. * CM wishes Ms. Thurman a happy birthday!     - she reports that she will be busy today and does not expect a celebration   - CM hopes that that is not the case, and notes that she deserves to be honored for her 50th birthday. * Renewed FMLA for elderly father with dementia. - Virtual visit at noon to support paperwork. * Sister is also having surgery for cancer in [address] [place] [address] today. - She is POA for her, and is going to the hospital to support her sister. * Improved A1C  9.1. Lipid panel  Total Chol was 200 now 227.    - Reviewed fats. Animal (butter/ red meat/ cheese vs plant (Olive/ avacado. - into  good cholesterol (HDL- plant based fats)  and bad cholesterol (animal based fats)  as well as fats that make your blood clot more and slow down healing (triglycerides)  and  microalbumin- which is a report of how much blood protein is found in your urine and can mean that your kidneys may be hurt. Will follow for CM services. Next planned outreach:  Tues 12/15- Close episode?         Chart Review:     2020 12:08 PM - Parminder, [facility] Lab Results In     Component  Value  Flag  Ref Range  Units  Status    Hemoglobin A1c  9.1  High    4.8 - 5.6  %  Final    Comment:      2020 12:08 PM - Parminder, [facility] Lab Results In     Component  Value  Flag  Ref Range  Units  Status    Cholesterol, total  227  High    100 - 199  mg/dL  Final    Triglyceride  114   0 - 149  mg/dL  Final    HDL Cholesterol  50   >39  mg/dL  Final    VLDL Cholesterol Jed  20   5 - 40  mg/dL  Final    LDL Chol Calc ([facility])  157  High    0 - 99  mg/dL  Final           OV 11/3  Overall, you are doing better with your sugars, so you are absolutely right that your sugars are getting better! Your kidney function, potassium and calcium are good; your alk phos (a liver and bone enzyme) is elevated, but likely because your vitamin d is low - I will send in a supplement to the pharmacy for this if you'd like. Additionally, with diabetes and elevated cholesterol levels, it would be good to start a cholesterol medication to reduce the chances of developing cardiac disease and risk of stroke.      11/03/20 1308   BP: 120/80   Pulse: 97   Resp: 12   Temp: 97.7 °F (36.5 °C)   SpO2: 97%   Weight: 115 lb 3.2 oz (52.3 kg)       ASSESSMENT AND PLAN:  diabetes  diabetes improved, asymptomatic, no significant medication side effects noted. current treatment plan is effective, no change in therapy  orders and follow up as documented in patient record  reviewed diet, exercise and weight control  specific diabetic recommendations: low cholesterol diet, weight control and daily exercise discussed, home glucose monitoring emphasized, all medications, side effects and compliance discussed carefully, diabetic Sick Day rules reviewed, handout given and labs immediately prior to next visit.      Low back pain   Refer to PT, continue to monitor  Conservative treatment (otc analgesics, heat, stretches)     Hand pain, weakness, difficulty with  - refer to hand surgeon for further evaluation  Treatment for hand weakness that can begin at home includes the following. Immobilization: Simply limiting the use of the affected hand can help build strength. Splints and braces can help provide stability and promote improved functionality.

## 2020-12-15 ENCOUNTER — PATIENT OUTREACH (OUTPATIENT)
Dept: OTHER | Age: 50
End: 2020-12-15

## 2020-12-15 NOTE — PROGRESS NOTES
Resolving current episode of case management. Patient has met patient stated and/or medical goals. CM patient with- significant diagnosis of IDDM (A1C 10.3- 2020) ; HTN; Diverticular Dz;  HTN. Patient consistenly demonstrates understanding of the medical action plan through execution of plan. Appointments with key providers are scheduled and attended. Plan of care is modified and updated to address new challenges and barriers with minimal support from the CM team(proactively uses physicians and community resources) The support system remains current and has been modified as needed. Patient continues to acquire needed resources from the current support system established. Teach back demonstrates that patient understands education for self management of chronic conditions. Patient consistenly communicates understanding of signs,symptoms and complications for all major diagnoses. Patient modifies her lifestyle toreduce or avoid risk factors to her health. ECM will follow as needed and patient has ECM contact information for future needs. Final call to Ms. Thurman. Verified  and address for HIPAA security. * Ms. Thurman reports that her blood sugars are running 170-130. - Taking Lantus and Januvia in the morning now. Helping to keep blood sugars stable. * She is working [employer]. * CM reminds her that she is working on COVID units, so will be offered vaccine. - Asks if she has plans to accept the shot. - She worries because she has allergies. - CM clarified- she did get the flu shot with no reaction. - CM explains that if she did not have reaction to flu shot she most likely would not have reaction to COVID vaccine. Explained that seasonal allergies are not the same as an allergic reaction that would threaten airway/ cause hives. - she has never needed ED for allergic response. - She will rethink getting the COVID vaccine.     * She has cut down on cigarettes, but is not interested in quitting, \"just yet. \"      - CM offers coaching and assistance when she does decide to quit. - CM will call in spring to check in with patient. * Agreed that this episode for CM is complete and her DM needs have been met and her leg pain has subsided. * CM reminds patient to take care of herself as well as she cares for her friends and family. - Wishes patient a Wang Scott. - Offers assistance if patient needs her. Feel free to call.

## 2020-12-21 ENCOUNTER — PATIENT MESSAGE (OUTPATIENT)
Dept: PHARMACY | Age: 50
End: 2020-12-21

## 2020-12-21 NOTE — LETTER
Kendra Gonzáles [address] [facility], Luige [place] [address] Phone: toll free [phone] option 7 Ms. Michelle Clifton [address] [address] Thanks so much for taking the first step towards better health.  
   
Congratulations! You have completed the requirements needed to maintain enrollment in the [address] [place] [address] Diabetes Management Program for 2020 and you will automatically be re-enrolled into the [address] [place] [address] Diabetes Management Program for 2021.  
   
You do not need to re-apply for 2021. Your waived copay allowance for specific medications and pharmacy-related supplies received via mail will reset to $600 on 1/01/2021.  
   
To maintain your benefit throughout the year and be eligible to receive the benefit next year, you must make sure you complete all the following ongoing requirements:  
   
          Requirements to be completed between Jan. 1 and June 30:  
   
1. Visit with your physician (First yearly visit) 2. Meet with a Kendra Gonzáles Team Member by phone or in person at a hospital location (This visit may be conducted prior to the start of the requirements period.) 3. First A1C  
   
          Requirements to be completed between July 1 and Dec. 31  
1. Visit with your physician (Second yearly visit) 2. Second A1C  
3. Flu vaccination  
   
          Requirements to be completed throughout year:  
   
1. Lipid panel (once yearly) 2. Urine albumin (once yearly) 3. Pneumonia Vaccination (as indicated) 4. Take diabetes medication as prescribed as well as cholesterol (Statin) or high blood pressure (ACE/ARB), if needed. 5. 70% adherence is required of diabetes medications 6. Provide documentation if cholesterol and/or high blood pressure medications are not needed.  
   
          Requirements if A1C is greater than 8 percent: Engage with a [facility] [address]  diabetes educator at one of our hospital locations or an ambulatory care coordinator by phone, if your A1c is greater than 8 percent.  
   
You will have to submit documentation of completion of requirements if your Physician/diabetes educator/ambulatory care coordinator does not use the [facility] electronic charting system or if you have your lab/urine tests done outside of [facility] [place]. Return the documentation to [email or web address] or by fax at [phone]  
   
If requirements(s) are not met by the dates listed above you will be disqualified from the program and the credit valued at $600 towards your diabetic medications and supplies will be revoked. You will be able to reapply the following calendar year.  
[address] [facility] Team  
[phone] Option #7 Email: [email or web address] Fax Number: [phone]

## 2021-02-01 ENCOUNTER — TELEPHONE (OUTPATIENT)
Dept: PHARMACY | Age: 51
End: 2021-02-01

## 2021-02-01 RX ORDER — BACLOFEN 10 MG/1
10 TABLET ORAL 3 TIMES DAILY
Qty: 30 TAB | Refills: 0 | Status: SHIPPED | OUTPATIENT
Start: 2021-02-01 | End: 2021-08-27 | Stop reason: SDUPTHER

## 2021-02-01 RX ORDER — KETOROLAC TROMETHAMINE 10 MG/1
10 TABLET, FILM COATED ORAL
Qty: 10 TAB | Refills: 0 | Status: SHIPPED | OUTPATIENT
Start: 2021-02-01 | End: 2021-03-23 | Stop reason: ALTCHOICE

## 2021-02-01 RX ORDER — METHYLPREDNISOLONE 4 MG/1
TABLET ORAL
Qty: 1 DOSE PACK | Refills: 0 | Status: SHIPPED | OUTPATIENT
Start: 2021-02-01 | End: 2021-03-23 | Stop reason: ALTCHOICE

## 2021-02-02 DIAGNOSIS — M54.31 SCIATICA OF RIGHT SIDE: ICD-10-CM

## 2021-02-02 DIAGNOSIS — G62.9 NEUROPATHY: ICD-10-CM

## 2021-02-02 NOTE — TELEPHONE ENCOUNTER
Called patient to schedule 2021 yearly pharmacist appointment to discuss medications for Diabetes Management Program.      No answer. Unable to leave VM due to mailbox being full.        [address] [facility] Specialist  Department, toll free: [phone], option 7

## 2021-02-11 NOTE — PROGRESS NOTES
Results and comments sent through my chart to patient. MRI of the spine indicates some mild degenerative changes and mild right foraminal protrusion with minimal to mild stenosis at the lower aspect of the lumbar spine L4-L5. We will follow up with pain management and with a spine specialist, if you would like. Hope you are doing better!

## 2021-03-09 DIAGNOSIS — E11.65 UNCONTROLLED TYPE 2 DIABETES MELLITUS WITH HYPERGLYCEMIA (HCC): ICD-10-CM

## 2021-03-09 RX ORDER — INSULIN GLARGINE 100 [IU]/ML
INJECTION, SOLUTION SUBCUTANEOUS
Qty: 21 ML | Refills: 3 | Status: SHIPPED | OUTPATIENT
Start: 2021-03-09 | End: 2021-09-30 | Stop reason: SDUPTHER

## 2021-03-15 ENCOUNTER — PATIENT OUTREACH (OUTPATIENT)
Dept: OTHER | Age: 51
End: 2021-03-15

## 2021-03-15 ENCOUNTER — TELEPHONE (OUTPATIENT)
Dept: FAMILY MEDICINE CLINIC | Age: 51
End: 2021-03-15

## 2021-03-15 DIAGNOSIS — M54.30 SCIATICA, UNSPECIFIED LATERALITY: Primary | ICD-10-CM

## 2021-03-15 NOTE — TELEPHONE ENCOUNTER
Patient is being followed for DM and told Kady Arnold that she continues to have back problems and needed a N Surg referral.  Kady Arnold suggests Dr. Elmira Ruff with BS N Surg.

## 2021-03-15 NOTE — ACP (ADVANCE CARE PLANNING)
Advance Care Planning (ACP)       Attempted to discuss ACP with Ms. Thurman today, she declines to discuss at this time. Will readdress at future visit.

## 2021-03-15 NOTE — PROGRESS NOTES
[facility] progress note    Patient eligible for [facility] 994 care management    Two patient identifiers verified. Discussed the care management program.  Patient agrees to care management services at this time. Patient's primary care provider relationship reviewed with patient and modified, as applicable. Patient has significant diagnosis of DM type 2 and low back pain/radicular pain to LE bilateral.     Care management assessment completed:  * LPB with radicular pain to LEs- limiting ability to work. - Short course of steroids/ reviewed steroid elevating BS.    - Attended vascular surgeon apt/  With no POC/ cleared for DVT. - Awaiting referral to neuro surgeon/  Can't find anyone for her insurance. Plus plan. CM will assist/ call MD office with Dr. Piotr Matthews information. 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 - Stress with poor sleep patterns/  Works nights/ but has to be up to care for father during the day as well. * DM -  \"Somedays are good/ somedays are bad\"     - Going tomorrow for BW/ A1C. Patient stated problem: \"My back hurts so bad- it's hard to \"    Care manager identified primary concern:  Removing barriers to treatment for radicular LBP/   DM with A1C >9       Emotional Status/Adjustment to Health State: Tired of pain/ but active with family and hopeful for treatment. Readiness to Change: []  Pre-contemplative    [x]  Contemplative  []  Preparation               []  Action                  []  Maintenance    Barriers/Challenges to Care: []  Decline in memory    []  Language barrier     []  Emotional                  []  Limited mobility  []  Lack of motivation     [] Vision, hearing or cognitive impairment [x]  Knowledge [x] Financial Barriers  [x]  Comorbid conditions using steroids to treat radicular pain. Patient stated goal -  Care Coordination for radicular LBP.      Care Manager/Provider identified medical goals:  DM       Advance Care Planning: Declines    11:17 - Call placed to Dr. Renae Orona office.     - Problem with finding in network physicians for neruro surgery consult. - CM shares in [address] her number for further assistance if needed. 11:30 am  Called patient back to inform her of Dr. Yecenia Mccormack identified with PCP. Office visit tomorrow. - She will get formal referral at that time. Upcoming appointments:   Future Appointments   Date Time Provider Ankush Lyn   3/16/2021  8:10 AM Alcides Montalvo DO [facility] MAIN BS AMB       Care Plan for Chronic Disease:    Goals        Patient Stated    Tjalling Harkeswei 125 for radicular LBP (pt-stated)      3/15-  Care Coordination with PCP office- finding in network provider for Plus plan - neuro surgeon.  Dr. Major Round elevated BS with steroids to be expected. Other     DM  Diabetes Support and Education / Care Coordination      Referred from Be Well with DM. A1C>9    1)  General:   Patient is able to state a basic definition of diabetes including: risk factors, basic pathophysiology, complications and treatment. 2)  Diet:   Patient will be able to read a basic food label and identify role of calories, carbohydrates, protein and fats in healthy eating. 3)  Activity:   Patient will identify the potential health benefits of regular exercise:  decreased cholesterol, improved mood, decreased blood pressure, lower stress/anxiety, weight loss, decreased blood sugar. 4)  Monitoring:  Patient will be able to identify what an A1C test measures. 5)  Medications:  Patient will demonstrate knowledge of diabetes medications. 6)  Risk Reduction:  Patient is able to state goal target for A1C (<7), blood pressure (<130/70), and LDL cholesterol (<100) to reduce diabetes complications. 7)  Problem Solving:  Patient will identify s/sx and treatment for hyper- and hypoglycemia.     8)  Healthy Coping:  Patient will be able to identify two community support resources. Patient will identify two stress relieving techniques. Patient will identify health risk factors and complications of diabetes  1) Kidney disease  2) Heart disease  3) Neuropathy  4) Skin breakdown  5) Diabetic Retinopathy  6) Prevention:   Stable controlled A1C,  healthy lifestyle (diet, exercise, hydration, stress reduction, monitoring, skin care)   3/15- Pt to have BW this week/  A1C. Focused Assessment  Patient declines/   \"nothing you can do about it anyway. \"     4. Key pt activities to achieve better health:   []  Weight loss  [x]  Improved diabetic control  []  Decreased cholesterol levels  []  Decreased blood pressure  [x]  Treatment for chronic LBP with radicular pain to LE    Patient verbalized understanding of all information discussed. Pt has my contact information for any further questions, concerns, or needs. Plan for next call:  Tues 4/6 - FU on referral for LBP.

## 2021-03-18 ENCOUNTER — TELEPHONE (OUTPATIENT)
Dept: PHARMACY | Age: 51
End: 2021-03-18

## 2021-03-18 NOTE — TELEPHONE ENCOUNTER
Called patient to schedule 2021 yearly pharmacist appointment to discuss medications for Diabetes Management Program.    Spoke to patient and appointment scheduled for 3/23/21 at 2:30 PM.    Shukri Hinkle, [address] [employer]   Department, toll free: [phone], option 7

## 2021-03-23 ENCOUNTER — TELEPHONE (OUTPATIENT)
Dept: PHARMACY | Age: 51
End: 2021-03-23

## 2021-03-23 NOTE — LETTER
[address] [facility],[address] 
[address] [facility] [address] [place] [id number] [facility] [address]  
 
 
 
 
03/23/21 Dear Kar Garnett, You are currently enrolled in the Be Well with Diabetes program. After your recent pharmacist visit, the below were identified as opportunities to assist you with your diabetes management: - I have sent a message to your doctor about starting a medication to help lower your cholesterol to help prevent heart attack and stoke. I will let you know if I hear anything from her. 
- I have sent a message to your doctor about starting a medication to help protect your kidneys from further damage done by high blood sugar levels. I will let you know if I hear anything from her. 
- It is very important to call in your medications 1-2 weeks ahead of time to give the pharmacy time to fill them and deliver. When you stop taking a medication like Januvia for 1-2 week, your blood sugar can spiral out of control. I have asked your doctor about considering a different medication that can be taken once weekly and filled for a 3 month supply. - There is no reason why your Lantus would have been denied. You had not filled it at mail order since July of last year. It is very important that you take this as directed and do not miss doses. Current medications eligible for copay waiver, up to $600, through [employer]: 
- Lantus, Enrique Bone - Freestyle Antony and generic pen needles and syringes Home Delivery pharmacy: [phone], option 0 - please allow 2 weeks for processing and shipping prescriptions To sign up for an online [facility] account, you can visit this website: [facility]. [employer] or search for \"[facility]\" in your mobile phone's mary alice store.  You will need one of your prescription numbers, mobile phone number associated with your home delivery, and email address to get started. If you have any questions/issues with making an account, you can email [email or web address] or call [phone] for assistance. Please work with your provider to ensure that the 2021 requirements are completed by the appropriate dates. - Initial Requirements due by 07/01/2021: A1c (1st) - Ongoing Requirements due by 12/31/2021: Provider visit for DM (2nd), ACC/diabetes educator visit (if A1c over 8%), A1c (2nd), On statin or contraindication(s) Not identified, On ACEi/ARB or contraindication(s) Not identified, Lipid panel and Urine microalbumin *Documentation of any requirements completed or reviewed outside of the [address] [place] [address] medical record will need to be faxed or emailed (fax/email info below). If there are outstanding items for the pharmacist to discuss with your provider, the pharmacist will follow-up with you as appropriate. Thank you, Kendra 2 Team 
Telephone: [phone] Option #7 Fax: [phone] Email: [email or web address]

## 2021-03-23 NOTE — TELEPHONE ENCOUNTER
[facility] HEALTH CLINICAL PHARMACY REVIEW - Be Well with Diabetes Phu Montoya is a 48 y.o. female enrolled in the [address] [place] [address] Diabetes Program. Patient provided Yohannes Zepeda with verbal consent to remain in the program for this year. Patient enrolled 7/1/20. Medications:  
Current Outpatient Medications Medication Sig  
 insulin glargine (LANTUS,BASAGLAR) 100 unit/mL (3 mL) inpn Take 20 units QHS  baclofen (LIORESAL) 10 mg tablet Take 1 Tab by mouth three (3) times daily. Indications: muscle spasms caused by a spinal disease  ketorolac (TORADOL) 10 mg tablet Take 1 Tab by mouth every six (6) hours as needed for Pain. Indications: excessive pain  methylPREDNISolone (MEDROL DOSEPACK) 4 mg tablet As directed  Indications: OA, nerve impingement  omeprazole (PRILOSEC) 40 mg capsule Take 1 Cap by mouth daily.  SITagliptin (Januvia) 100 mg tablet TAKE 1 TABLET BY MOUTH ONCE DAILY  amitriptyline (ELAVIL) 25 mg tablet Take 1 Tab by mouth nightly. Indications: neuropathic pain  diclofenac (VOLTAREN) 1 % gel Apply 4 g to affected area four (4) times daily.  DULoxetine (CYMBALTA) 30 mg capsule Take 1 Cap by mouth two (2) times a day. Indications: chronic muscle or bone pain, diabetic complication causing injury to some body nerves  albuterol (PROVENTIL HFA, VENTOLIN HFA, PROAIR HFA) 90 mcg/actuation inhaler Take 2 Puffs by inhalation every four (4) hours as needed for Wheezing or Shortness of Breath. Indications: asthma attack  fluconazole (DIFLUCAN) 150 mg tablet TAKE 1 TABLET BY MOUTH AND THEN TAKE 1 TABLET 3 DAYS LATER, if needed repeat for 3rd dose  TRUEplus Pen Needle 31 gauge x 3/16\" ndle  inhalational spacing device 1 Each by Does Not Apply route as needed for Wheezing. Emergency one time authorization for acute symptoms- please notify [facility] if necessary  Insulin Needles, Disposable, 31 gauge x 5/16\" ndle Use to administer insulin SQ QPM as directed.  flash glucose sensor (FreeStyle Antony 14 Day Sensor) kit Use to check blood glucose TID and PRN. Dx E11.9  
 flash glucose scanning reader (FreeStyle Antony 14 Day [place]) misc Use to check blood glucose TID and PRN. Dx E11.9 No current facility-administered medications for this visit. Current Pharmacy: [facility] Current testing supplies/frequency: [facility] Pen needles/syringes: [employer] brand Allergies: 
No Known Allergies Vitals/Labs: 
BP Readings from Last 3 Encounters:  
11/03/20 120/80  
05/08/20 126/82  
02/10/20 112/76 Lab Results Component Value Date/Time Microalb/Creat ratio (ug/mg creat.) 365 (H) 05/08/2020 09:25 AM  
 
Lab Results Component Value Date/Time Hemoglobin A1c 9.1 (H) 11/03/2020 01:53 PM  
 Hemoglobin A1c 10.3 (H) 05/08/2020 09:25 AM  
 Hemoglobin A1c 9.5 (H) 02/10/2020 08:40 AM  
 
Lab Results Component Value Date/Time Cholesterol, total 227 (H) 11/03/2020 01:53 PM  
 HDL Cholesterol 50 11/03/2020 01:53 PM  
 LDL, calculated 157 (H) 11/03/2020 01:53 PM  
 LDL, calculated 134 (H) 02/10/2020 08:40 AM  
 VLDL, calculated 20 11/03/2020 01:53 PM  
 VLDL, calculated 17 02/10/2020 08:40 AM  
 Triglyceride 114 11/03/2020 01:53 PM  
 
ALT (SGPT) Date Value Ref Range Status 11/03/2020 13 0 - 32 IU/L Final  
 
AST (SGOT) Date Value Ref Range Status 11/03/2020 10 0 - 40 IU/L Final  
 
ASCVD Risk Score- 8.7% - 10 year risk Lab Results Component Value Date/Time Creatinine 0.68 11/03/2020 01:53 PM  
 
Lab Results Component Value Date/Time GFR est non- 11/03/2020 01:53 PM  
 GFR est  11/03/2020 01:53 PM  
 
 
Immunizations: 
Immunization History Administered Date(s) Administered  Influenza Vaccine 10/11/2018, 11/02/2020  Influenza Vaccine Korbitec) PF (>6 Mo Flulaval, Fluarix, and >3 Yrs [address], [place] [address]) 11/28/2017, 10/08/2019  Pneumococcal Polysaccharide (PPSV-23) 11/03/2020  Tdap 11/04/2016 Social History: 
Social History Tobacco Use  Smoking status: Current Every Day Smoker Packs/day: 0.50  Smokeless tobacco: Never Used Substance Use Topics  Alcohol use: No  
 
ASSESSMENT: 
Initial Program Requirements (Y indicates has completed for the year, N indicates needs to be completed by 07/01/2021): Yes - Provider Visit for DM (1st) No - A1c (1st) Ongoing Program Requirements (Y indicates has completed for the year, N indicates needs to be completed by 12/31/2021): No - Provider Visit for DM (2nd) Yes - ACC/diabetes educator visit (if A1c over 8%)- Enrolled in CCM and currently engaged. No - A1c (2nd) No - Lipid panel No - Urine microalbumin Yes - Pneumococcal vaccination: Up to date, not needed again until 72 Yes - Influenza vaccination for Fall 2021- employee Yes - Medication adherence over 70%- On insulin so PDC will not calculate. Patient has significant adherence issues outline below. No - On statin or contraindication(s) Not identified No - On ACEi/ARB or contraindication(s) Not identified Current medications eligible for copay waiver, up to $600, through [employer]: 
- Lantus, Pamela Kramer - Freestyle Antony and generic pen needles and syringes Diabetes Care: - Glycemic Goal: <7.0%. Is not at blood glucose goal but is on Lantus 25 units daily and Januvia 100mg daily therapy. Type 2 DM under poor control as evidenced by A1c >9% over the last year or more. - Patient seemed to have very low health literacy and did not seem to understand severity of disease. Has to help take care of a lot of other people. Father hads dementia, sister just had heart attack. She has a lot of things on her plate and struggles to balance everything. 
- Home blood sugar records:  Patient checks multiple times per day with Kylah Patricia. Stated that she averages ~160 over the last month.  She is going to have an A1c completed in the next 1-2 weeks. - Any episodes of hypoglycemia? yes - She was having lows which led to skipping doses of lantus. Directly related ot skipping meals. Patient reports recent improvement. - Appropriateness of Insulin Therapy: Lantus dosing seems appropriate. Skips dose if BG <100 - Therapy Optimization: Discussed the possibility of changing from Januvia to a GLP1a such as Ozempic or Trulicity. Patient likes the idea of once weekly dosing and also getting 3 month supplies. I think this would help improve adherence thus improve A1c. I will mention in note to pcp. 
- Daily aspirin? No: recommended 81mg daily - Medication compliance: noncompliant: Patient report compliance when she has available medications. She reports that she only refills when she runs out which is a problem. She skips doses for 1-2 weeks because it can take 7-10 business days for refills to process and be sent through the mail. Educated her on the importance of refilling her medication when she has about 1-2 weeks remaining. Encouraged her to set an alarm each month at about the 21 day joan of receiving her medication to remind herself to re-order. 
- Diet compliance: compliant most of the time- Reports that in late 2020 she was struggling to eat consistent meals due to her hectic schedule. She is working full time and taking care of her father who is 80 and has dementia. During this time she was skipping insulin doses if her BG was <100. She reports that her responsibilities have mpt changed, but she has been better about eating on a more regular schedule - Current exercise: no regular exercise - What might prevent you from meeting your goal?: stress and time constraints - Patient plan for overcoming barriers: Focus on eating regular meals, Start setting reminders to refill medications 7-10 before running out.  
 
Other Considerations: 
- Blood Pressure Goal: BP less than 140/90 mmHg due to history of DM: Is at blood pressure goal.  
- Lipids: Pt is a candidate for moderate to high intensity statin therapy based on current guidelines. - Smoking status: Did not address due to length of call. Will discuss in future followup PLAN: 
- Consideration(s) for provider: · Recommended ACEi · Recommended Statin · Asked her to consider GLP1a to replace Januvia to help with adherence. - Refilled lantus, januvia, sensors, pen needles- all went through for 0$ 
- Recommended ASA - DM program gaps identified:  
· Initial requirements: A1c (1st) · Ongoing requirements: Provider visit for DM (2nd), ACC/diabetes educator visit (if A1c over 8%), A1c (2nd), On statin or contraindication(s) Not identified, On ACEi/ARB or contraindication(s) Not identified, Lipid panel and Urine microalbumin  
- Education to patient: Discussed general issues about diabetes pathophysiology and management., Addressed diet and exercise, Addressed medication adherence, Overview of Be Well With Diabetes program, Benefit/indication for statin in patients with diabetes, Benefit/indication for ACE/ARB in patients with diabetes and Reminder A1c and lipid panel can be completed for free at [facility] screenings - Follow up: PCP for identified gaps or as scheduled below - Upcoming appointments:  
Future Appointments Date Time Provider Ankush Nicholson 3/23/2021  2:30 PM [facility] RICRERROL Flowers, PharmD, [address] Phone: [phone] option-7 For Pharmacy Admin Tracking Only PHSO: PHSO Patient?: Yes Total # of Interventions Recommended: Count: 4 
- New Order #: 2 New Medication Order Reason(s): Needs Additional Medication Therapy - Refills Provided #: 4 
- Updated Order #: 1 Updated Order Reason(s): Other Recommended intervention potential cost savings: 1 Total Interventions Accepted: 2 Time Spent (min): 60

## 2021-03-23 NOTE — TELEPHONE ENCOUNTER
Dr. Grace Mc patient is currently enrolled in the [facility] Employee Diabetes Management Program. After your patient's recent visit with a [facility] Clinical Pharmacist, the below were identified as opportunities to assist with their diabetes management (if patient is not eligible for below recommendations, please reply with reason/contraindication): · Based one her diagnosis of diabetes and 10 year ASCVD risk score of 8.6%, it is highly recommended for her to be on a moderate to high-intensity statin as well as a low-dose ACEi/ARB. Based on our discussion today, she is open to trying both of these. I see that you have sent messages to her in the past, but she has never opened a ilab message, therefore has never received them. I did not see any documentation in her recent office visits. It is a requirement of our program for her to be taking both of these medications or have a provider-documented reason for not. · I suspect that adherence is an issue for her, although she will not directly admit it. Due to cost, she can only fill her Januvia for 30 days at a time. She is currently refilling this medication every 45 days. There are several other options available. She was interested in a once weekly GLP1 agonist such as Trulicity or Ozempic to replace Januvia if you agree. Either of these are covered and can be filled as 3 month supplies for a 0$ charge. 
  
See pharmacist note dated 3/23/21 for complete details. Please let me know if you have any questions. 
  
Thanks! Nel Martinez, ShakiraD, BCACP [address] [facility] Phone: [phone] option-7

## 2021-04-08 ENCOUNTER — PATIENT OUTREACH (OUTPATIENT)
Dept: OTHER | Age: 51
End: 2021-04-08

## 2021-04-08 NOTE — PROGRESS NOTES
CM  follow up call. Patient with DM type 2 (A1C 9.1) and low back pain/radicular pain to LE bilateral.     Chart review:  No new documentation     Contacted  patient for CM follow up services. Verified  and address for HIPAA security. * Got her first Moderna shot. * BS \"going well\"      - Likes the Be Well with DM program - home deliveries. * She is not using My Chart even though it is open. - CM suggests she ask a friend at work or a family member to assist.      - Not aware of questionnaire left for her. * Father care taking is taxing for her.     - He has good days and bad days. * Back pain is about the same. MRI completed- thinking about next step. - Financial concerns for interventions. - CM understands we will FU to see what she decides. 10:45 am  Email sent to PharmD-  Frankey Jetty Ms. Thurman's inability to use My Chart. Asked for alternative ways to complete the questionnaire Mail? Offered my support. 12:00 noon Incoming email from [employer]- Mr. Dillone Ya.    * auto generated questionnaire no need to fill out. 12:10 pm  Contacted ms. Thurman to let her know no need to worry about questionnaire. Will follow for CM services. Next planned outreach:   -  FU on back pain plans.

## 2021-04-19 ENCOUNTER — TELEPHONE (OUTPATIENT)
Dept: FAMILY MEDICINE CLINIC | Age: 51
End: 2021-04-19

## 2021-04-19 NOTE — TELEPHONE ENCOUNTER
Patient is still looking for forms about disability she says from February 2 through 22. Please call.

## 2021-04-20 ENCOUNTER — NURSE TRIAGE (OUTPATIENT)
Dept: OTHER | Facility: CLINIC | Age: 51
End: 2021-04-20

## 2021-04-20 ENCOUNTER — VIRTUAL VISIT (OUTPATIENT)
Dept: FAMILY MEDICINE CLINIC | Age: 51
End: 2021-04-20
Payer: COMMERCIAL

## 2021-04-20 DIAGNOSIS — J30.1 SEASONAL ALLERGIC RHINITIS DUE TO POLLEN: ICD-10-CM

## 2021-04-20 DIAGNOSIS — J45.41 MODERATE PERSISTENT ASTHMA WITH EXACERBATION: Primary | ICD-10-CM

## 2021-04-20 PROCEDURE — 99442 PR PHYS/QHP TELEPHONE EVALUATION 11-20 MIN: CPT | Performed by: NURSE PRACTITIONER

## 2021-04-20 RX ORDER — BENZONATATE 200 MG/1
200 CAPSULE ORAL
Qty: 30 CAP | Refills: 0 | Status: SHIPPED | OUTPATIENT
Start: 2021-04-20 | End: 2021-04-27

## 2021-04-20 RX ORDER — FLUTICASONE PROPIONATE AND SALMETEROL 250; 50 UG/1; UG/1
1 POWDER RESPIRATORY (INHALATION) EVERY 12 HOURS
Qty: 1 INHALER | Refills: 0 | Status: SHIPPED | OUTPATIENT
Start: 2021-04-20

## 2021-04-20 RX ORDER — PREDNISONE 10 MG/1
TABLET ORAL
Qty: 9 TAB | Refills: 0 | Status: SHIPPED | OUTPATIENT
Start: 2021-04-20 | End: 2021-07-07 | Stop reason: ALTCHOICE

## 2021-04-20 NOTE — PROGRESS NOTES
Regine Knight is a 48 y.o. female evaluated via telephone on 4/20/2021. Consent:    She and/or health care decision maker is aware that that she may receive a bill for this telephone service, depending on her insurance coverage, and has provided verbal consent to proceed: Yes      Documentation:  I communicated with the patient and/or health care decision maker about cough. She reports a dry, tickling cough for the past week since exposure to pollen. She has been using her albuterol with some relief. Denies fever, sick contacts. She has received her COVID vaccine. Details of this discussion including any medical advice provided: Rx Advair to use BID. Short course of PO prednisone due to hyperglycemia. She needs to schedule a check up with PCP when symptoms resolve. I affirm this is a Patient Initiated Episode with an Established Patient who has not had a related appointment within my department in the past 7 days or scheduled within the next 24 hours. ASSESSMENT AND PLAN:       ICD-10-CM ICD-9-CM    1. Moderate persistent asthma with exacerbation  J45.41 493.92 benzonatate (TESSALON) 200 mg capsule      predniSONE (DELTASONE) 10 mg tablet      fluticasone propion-salmeteroL (ADVAIR/WIXELA) 250-50 mcg/dose diskus inhaler   2. Seasonal allergic rhinitis due to pollen  J30.1 477.0 predniSONE (DELTASONE) 10 mg tablet         Patient aware of plan of care and verbalized understanding. Questions answered. RTC PRN.     Jacky Au NP    Total Time: minutes: 11-20 minutes    Note: not billable if this call serves to triage the patient into an appointment for the relevant concern      Jacky Au NP

## 2021-04-20 NOTE — TELEPHONE ENCOUNTER
Reason for Disposition   Using nasal washes and pain medicine > 24 hours and sinus pain persists    Answer Assessment - Initial Assessment Questions  1. ONSET: \"When did the cough begin? \"       3-4 days ago    2. SEVERITY: \"How bad is the cough today? \"      Annoying    3. RESPIRATORY DISTRESS: \"Describe your breathing. \"   Normal    4. FEVER: \"Do you have a fever? \" If so, ask: \"What is your temperature, how was it measured, and when did it start? \"  No    5. HEMOPTYSIS: \"Are you coughing up any blood? \" If so ask: \"How much? \" (flecks, streaks, tablespoons, etc.)  No    6. TREATMENT: \"What have you done so far to treat the cough? \" (e.g., meds, fluids, humidifier)  No    7. CARDIAC HISTORY: \"Do you have any history of heart disease? \" (e.g., heart attack, congestive heart failure)     No    8. LUNG HISTORY: \"Do you have any history of lung disease? \"  (e.g., pulmonary embolus, asthma, emphysema)  Asthma , smoker    9. PE RISK FACTORS: \"Do you have a history of blood clots? \" (or: recent major surgery, recent prolonged travel, bedridden)  No    10. OTHER SYMPTOMS: \"Do you have any other symptoms? (e.g., runny nose, wheezing, chest pain)  Chest pain off and on    11. PREGNANCY: \"Is there any chance you are pregnant? \" \"When was your last menstrual period? \"       No, tubal    12. TRAVEL: \"Have you traveled out of the country in the last month? \" (e.g., travel history, exposures)  no    Protocols used: COUGH-ADULT-OH    Patient called [facility] with red flag complaint. Call received from [address] [place] [address] description of triage: cough    Triage indicates for patient to be seen today    Care advice provided, patient verbalizes understanding; denies any other questions or concerns; instructed to call back for any new or worsening symptoms. Writer provided warm transfer to [address] [place] [address] at [facility] for appointment scheduling. Attention Provider: Thank you for allowing me to participate in the care of your patient.  The patient was connected to triage from [facility]. Please do not respond through this encounter as the response is not directed to a shared pool.

## 2021-04-20 NOTE — PROGRESS NOTES
Chief Complaint   Patient presents with    Cough     1. Have you been to the ER, urgent care clinic since your last visit? Hospitalized since your last visit? No    2. Have you seen or consulted any other health care providers outside of the [address] [place] [address] since your last visit? Include any pap smears or colon screening. No  Fall Risk Assessment, last 12 mths 4/20/2021   Able to walk? Yes   Fall in past 12 months? 0   Do you feel unsteady? 0   Are you worried about falling 0     Abuse Screening Questionnaire 4/20/2021   Do you ever feel afraid of your partner? N   Are you in a relationship with someone who physically or mentally threatens you? N   Is it safe for you to go home?  Y     Learning Assessment 4/20/2021   PRIMARY LEARNER Patient   HIGHEST LEVEL OF EDUCATION - PRIMARY LEARNER  GRADUATED HIGH SCHOOL OR GED   BARRIERS PRIMARY LEARNER NONE   CO-LEARNER CAREGIVER No   PRIMARY LANGUAGE ENGLISH   LEARNER PREFERENCE PRIMARY READING   ANSWERED BY Patient   RELATIONSHIP SELF     3 most recent PHQ Screens 4/20/2021   PHQ Not Done -   Little interest or pleasure in doing things Not at all   Feeling down, depressed, irritable, or hopeless Not at all   Total Score PHQ 2 0

## 2021-04-20 NOTE — PATIENT INSTRUCTIONS
Allergies: Care Instructions Your Care Instructions Allergies occur when your body's defense system (immune system) overreacts to certain substances. The immune system treats a harmless substance as if it were a harmful germ or virus. Many things can make this happen. These include pollens, medicine, food, dust, animal dander, and mold. Allergies can be mild or severe. Mild allergies can be managed with home treatment. But medicine may be needed to prevent problems. Managing your allergies is an important part of staying healthy. Your doctor may suggest that you have allergy testing to help find out what is causing your allergies. Severe allergies can cause reactions that affect your whole body (anaphylactic reactions). Your doctor may prescribe a shot of epinephrine to carry with you in case you have a severe reaction. Learn how to give yourself the shot and keep it with you at all times. Make sure it is not . Follow-up care is a key part of your treatment and safety. Be sure to make and go to all appointments, and call your doctor if you are having problems. It's also a good idea to know your test results and keep a list of the medicines you take. How can you care for yourself at home? · If you have been told by your doctor that dust or dust mites are causing your allergy, decrease the dust around your bed: 
? Wash sheets, pillowcases, and other bedding in hot water every week. ? Use dust-proof covers for pillows, duvets, and mattresses. Avoid plastic covers because they tear easily and do not \"breathe. \" Wash as instructed on the label. ? Do not use any blankets and pillows that you do not need. ? Use blankets that you can wash in your washing machine. ? Consider removing drapes and carpets, which attract and hold dust, from your bedroom. · If you are allergic to house dust and mites, do not use home humidifiers. Your doctor can suggest ways you can control dust and mites.  
· Look for signs of cockroaches. Cockroaches cause allergic reactions. Use cockroach baits to get rid of them. Then, clean your home well. Cockroaches like areas where grocery bags, newspapers, empty bottles, or cardboard boxes are stored. Do not keep these inside your home, and keep trash and food containers sealed. Seal off any spots where cockroaches might enter your home. · If you are allergic to mold, get rid of furniture, rugs, and drapes that smell musty. Check for mold in the bathroom. · If you are allergic to outdoor pollen or mold spores, use air-conditioning. Change or clean all filters every month. Keep windows closed. · If you are allergic to pollen, stay inside when pollen counts are high. Use a vacuum  with a HEPA filter or a double-thickness filter at least two times each week. · Stay inside when air pollution is bad. Avoid paint fumes, perfumes, and other strong odors. · Avoid conditions that make your allergies worse. Stay away from smoke. Do not smoke or let anyone else smoke in your house. Do not use fireplaces or wood-burning stoves. · If you are allergic to your pets, change the air filter in your furnace every month. Use high-efficiency filters. · If you are allergic to pet dander, keep pets outside or out of your bedroom. Old carpet and cloth furniture can hold a lot of animal dander. You may need to replace them. When should you call for help? Give an epinephrine shot if: 
  · You think you are having a severe allergic reaction.  
  · You have symptoms in more than one body area, such as mild nausea and an itchy mouth. After giving an epinephrine shot call 911, even if you feel better. Call 911 if: 
  · You have symptoms of a severe allergic reaction. These may include: 
? Sudden raised, red areas (hives) all over your body. ? Swelling of the throat, mouth, lips, or tongue. ? Trouble breathing. ? Passing out (losing consciousness).  Or you may feel very lightheaded or suddenly feel weak, confused, or restless.  
  · You have been given an epinephrine shot, even if you feel better. Call your doctor now or seek immediate medical care if: 
  · You have symptoms of an allergic reaction, such as: ? A rash or hives (raised, red areas on the skin). ? Itching. ? Swelling. ? Belly pain, nausea, or vomiting. Watch closely for changes in your health, and be sure to contact your doctor if: 
  · You do not get better as expected. Where can you learn more? Go to http://www.gray.com/ Enter I741 in the search box to learn more about \"Allergies: Care Instructions. \" Current as of: November 6, 2020               Content Version: 12.8 © 2006-2021 Healthwise, [employer]. Care instructions adapted under license by [employer] (which disclaims liability or warranty for this information). If you have questions about a medical condition or this instruction, always ask your healthcare professional. [address] any warranty or liability for your use of this information.

## 2021-04-20 NOTE — LETTER
NOTIFICATION RETURN TO WORK / SCHOOL 
 
4/20/2021 2:36 PM 
 
Ms. Gamaliel Redmond [address] [address] To Whom It May Concern: 
 
Gamaliel Redmond is currently under the care of Lily Enrique. She will return to work/school on: 4/21/21 If there are questions or concerns please have the patient contact our office.  
 
 
 
Sincerely, 
 
 
Isha Cain NP

## 2021-04-22 ENCOUNTER — PATIENT OUTREACH (OUTPATIENT)
Dept: OTHER | Age: 51
End: 2021-04-22

## 2021-04-22 NOTE — TELEPHONE ENCOUNTER
Paperwork was turned in/placed in my box before I left. I stayed late in order to make sure that all of the written paperwork was done prior to leaving. Thanks!

## 2021-04-22 NOTE — PROGRESS NOTES
CM  follow up call. Patient with DM type 2 (A1C 9.1) and low back pain/radicular pain to LE bilateral.   * OV  - asthma exacerbation. (prednisone)     Chart review:  VV- for asthmatic flare/  Used Nurse Triage. Contacted  patient for CM follow up services. Verified  and address for HIPAA security. * Ms. Thurman reports that she is working and can't talk long. * Prednisone has helped her allergic asthma attack- pollen load has been very heavy. * Bigger concern is that short term disability has not been paid while she was off with LBP. - Spoke with HR, told that [employer] has not received clinical.     - MD office claims that they sent it. - CM encourages her to call office and ask if they have a fax confirmation page to prove that fax was sent and received. - Offered referral to [address] [place] [address].   Ms. [employer] will call the office first.       * MED CHANGES: Taking prednisone as offered. * Due to short call/ unable to address LBP with any apts. * Shared with patient that CM will hand off to new CM due to job position change.    Recommend   Pedrito Wright RN, ACM

## 2021-04-23 ENCOUNTER — TELEPHONE (OUTPATIENT)
Dept: FAMILY MEDICINE CLINIC | Age: 51
End: 2021-04-23

## 2021-04-23 NOTE — TELEPHONE ENCOUNTER
----- Message from Abdi Nighat Wayne sent at 4/23/2021  9:03 AM EDT -----  Regarding: Dr Parminder Manuel: [phone]  General Message/Vendor Calls    Caller's first and last name:lilliam avery      Reason for call:PT states her job has not received the [facility] paperwork. Pt needs confirmation that the paperwork has been sent. She was out from February 2-22,2021. Please advise.       Callback required yes/no and why:yes      Best contact number(s):[phone]      Details to clarify the request:      Abdi Nighat Black

## 2021-04-27 NOTE — TELEPHONE ENCOUNTER
Note was \"done'd\" and closed by PCP without being addressed. Will check to see if addressed at next OV with PCP. Nothing currently scheduled. Pt aware she needs an a1c before 7/1/21 to progam requirements. Adalberto Dickens, PharmD, [address]  Phone: [phone] option-7      For Mango Lopez in place: [place]    Recommendation Provided To: Provider: 2 via Note to Provider    Intervention Detail: New Rx: 2, reason: Needs Additional Therapy   Gap Closed?  NO    Total # of Interventions Recommended: 2   Total # of Interventions Accepted: 0   Intervention Accepted By: Provider: 0   Time Spent (min): 15

## 2021-05-03 ENCOUNTER — TRANSCRIBE ORDER (OUTPATIENT)
Dept: SCHEDULING | Age: 51
End: 2021-05-03

## 2021-05-03 ENCOUNTER — TELEPHONE (OUTPATIENT)
Dept: FAMILY MEDICINE CLINIC | Age: 51
End: 2021-05-03

## 2021-05-03 DIAGNOSIS — M54.16 LUMBAR RADICULOPATHY: Primary | ICD-10-CM

## 2021-05-05 RX ORDER — KETOROLAC TROMETHAMINE 10 MG/1
10 TABLET, FILM COATED ORAL
COMMUNITY
Start: 2021-02-01 | End: 2022-03-15 | Stop reason: ALTCHOICE

## 2021-05-05 RX ORDER — DEXAMETHASONE 2 MG/1
TABLET ORAL
COMMUNITY
Start: 2021-01-12 | End: 2021-07-07 | Stop reason: ALTCHOICE

## 2021-05-05 NOTE — TELEPHONE ENCOUNTER
Spoke with Dr. Armond Vanegas 05/05/21 12:37 PM - no surgery indicated -     Plan for pain management and physical therapy

## 2021-05-06 ENCOUNTER — PATIENT OUTREACH (OUTPATIENT)
Dept: OTHER | Age: 51
End: 2021-05-06

## 2021-05-06 NOTE — PROGRESS NOTES
CM  follow up call.       Patient with DM type 2 (A1C 9.1) and low back pain/radicular pain to LE bilateral.      Chart review:  No new documentation      Contacted  patient for CM follow up services. Verified  and address for HIPAA security. * Got 2nd  Moderna shot, had fever of 102, out of work for 2 days. * BS - Had to take prednisone, medicine for cough                * Back pain is about the same. MRI completed- Will get cortisone injections on .              - Financial concerns for interventions. ACM called [employer] to ask about patient's disability from  -     Spoke with [employer] rep, she is sending docu sign documents to patient via email- Rep states the claim was just set up and should be in the system by , then they have 5 business days to process the claim. Called Melissa back to let her know that this email would be coming, she will have someone she works with to help her get the form signed.               Venessa Luciano follow for CM services. Next planned outreach:   on .     FU on back pain, did shots help with pain?  Check on [employer] disbursement.

## 2021-05-11 ENCOUNTER — HOSPITAL ENCOUNTER (OUTPATIENT)
Dept: INTERVENTIONAL RADIOLOGY/VASCULAR | Age: 51
Discharge: HOME | End: 2021-05-11
Attending: NEUROLOGICAL SURGERY
Payer: COMMERCIAL

## 2021-05-11 VITALS
DIASTOLIC BLOOD PRESSURE: 64 MMHG | SYSTOLIC BLOOD PRESSURE: 135 MMHG | RESPIRATION RATE: 18 BRPM | OXYGEN SATURATION: 98 % | HEART RATE: 65 BPM | TEMPERATURE: 98 F

## 2021-05-11 DIAGNOSIS — M54.16 LUMBAR RADICULOPATHY: ICD-10-CM

## 2021-05-11 PROCEDURE — 74011000636 HC RX REV CODE- 636: Performed by: STUDENT IN AN ORGANIZED HEALTH CARE EDUCATION/TRAINING PROGRAM

## 2021-05-11 PROCEDURE — 2709999900 HC NON-CHARGEABLE SUPPLY

## 2021-05-11 PROCEDURE — 74011250636 HC RX REV CODE- 250/636: Performed by: STUDENT IN AN ORGANIZED HEALTH CARE EDUCATION/TRAINING PROGRAM

## 2021-05-11 PROCEDURE — 64483 NJX AA&/STRD TFRM EPI L/S 1: CPT

## 2021-05-11 PROCEDURE — 74011000250 HC RX REV CODE- 250: Performed by: STUDENT IN AN ORGANIZED HEALTH CARE EDUCATION/TRAINING PROGRAM

## 2021-05-11 RX ORDER — DEXAMETHASONE SODIUM PHOSPHATE 10 MG/ML
15 INJECTION INTRAMUSCULAR; INTRAVENOUS ONCE
Status: COMPLETED | OUTPATIENT
Start: 2021-05-11 | End: 2021-05-11

## 2021-05-11 RX ORDER — LIDOCAINE HYDROCHLORIDE 10 MG/ML
4 INJECTION, SOLUTION EPIDURAL; INFILTRATION; INTRACAUDAL; PERINEURAL ONCE
Status: COMPLETED | OUTPATIENT
Start: 2021-05-11 | End: 2021-05-11

## 2021-05-11 RX ORDER — LIDOCAINE HYDROCHLORIDE 20 MG/ML
20 INJECTION, SOLUTION INFILTRATION; PERINEURAL ONCE
Status: COMPLETED | OUTPATIENT
Start: 2021-05-11 | End: 2021-05-11

## 2021-05-11 RX ADMIN — IOPAMIDOL 1 ML: 408 INJECTION, SOLUTION INTRATHECAL at 14:14

## 2021-05-11 RX ADMIN — DEXAMETHASONE SODIUM PHOSPHATE 10 MG: 10 INJECTION, SOLUTION INTRAMUSCULAR; INTRAVENOUS at 14:14

## 2021-05-11 RX ADMIN — LIDOCAINE HYDROCHLORIDE 2 ML: 10 INJECTION, SOLUTION EPIDURAL; INFILTRATION; INTRACAUDAL; PERINEURAL at 14:14

## 2021-05-11 RX ADMIN — LIDOCAINE HYDROCHLORIDE 100 MG: 20 INJECTION, SOLUTION INFILTRATION; PERINEURAL at 14:14

## 2021-05-11 NOTE — DISCHARGE INSTRUCTIONS
[facility]  Special Procedures/Radiology Department      Steroidal Injection      Go home and rest.     No vigorous physical activity today. Be aware that numbness and/or tingling can occur up to 24 hours after the injection. No driving today. Resume your previous diet. Resume your previous medications. Depending on your job, you may return to work in 25 to 48 hours. It may take up to one week after the injection to see a change or an improvement in your symptoms. Be sure to follow up with your physician. Tell your physician if the injection helped with your symptoms or if the injection did nothing for your symptoms. For minor discomfort, you can take Tylenol, as directed on the label.       If you have any questions or concerns, please call [phone] and ask for the nurse on-call

## 2021-05-14 ENCOUNTER — PATIENT OUTREACH (OUTPATIENT)
Dept: OTHER | Age: 51
End: 2021-05-14

## 2021-05-14 NOTE — PROGRESS NOTES
Follow up phone call to patient, two pt identifiers verified. Discussed patient's goals:   Goals        Patient [place] Coordination for radicular LBP (pt-stated)      3/15-  Care Coordination with PCP office- finding in network provider for Plus plan - neuro surgeon.  Dr. Flor Gonzales elevated BS with steroids to be expected. 5/14: Had injection on 5/11, says back is feeling better, still a little pain. Other     DM  Diabetes Support and Education / Care Coordination      Referred from Be Well with DM. A1C>9    1)  General:   Patient is able to state a basic definition of diabetes including: risk factors, basic pathophysiology, complications and treatment. 2)  Diet:   Patient will be able to read a basic food label and identify role of calories, carbohydrates, protein and fats in healthy eating. 3)  Activity:   Patient will identify the potential health benefits of regular exercise:  decreased cholesterol, improved mood, decreased blood pressure, lower stress/anxiety, weight loss, decreased blood sugar. 4)  Monitoring:  Patient will be able to identify what an A1C test measures. 5)  Medications:  Patient will demonstrate knowledge of diabetes medications. 6)  Risk Reduction:  Patient is able to state goal target for A1C (<7), blood pressure (<130/70), and LDL cholesterol (<100) to reduce diabetes complications. 7)  Problem Solving:  Patient will identify s/sx and treatment for hyper- and hypoglycemia. 8)  Healthy Coping:  Patient will be able to identify two community support resources. Patient will identify two stress relieving techniques.     Patient will identify health risk factors and complications of diabetes  1) Kidney disease  2) Heart disease  3) Neuropathy  4) Skin breakdown  5) Diabetic Retinopathy  6) Prevention:   Stable controlled A1C,  healthy lifestyle (diet, exercise, hydration, stress reduction, monitoring, skin care)   3/15- Pt to have BW this week/  A1C. Patient's primary care provider relationship reviewed with patient and modified, as applicable.       Readiness to Change: []  Pre-contemplative    []  Contemplative  []  Preparation               [x]  Action                  []  Maintenance    Barriers/Challenges to Care: []  Decline in memory    []  Language barrier     []  Emotional                  []  Limited mobility  []  Lack of motivation     [] Vision, hearing or cognitive impairment []  Knowledge [] Financial Barriers []  Lack of support  []  Pain []  Other [x]  None    Key pt activities to achieve better health:   []  Weight loss  [x]  Improved diabetic control  []  Decreased cholesterol levels  []  Decreased blood pressure  []    []    Plan for next call: 6/2

## 2021-06-01 ENCOUNTER — PATIENT OUTREACH (OUTPATIENT)
Dept: OTHER | Age: 51
End: 2021-06-01

## 2021-06-01 NOTE — PROGRESS NOTES
Briefly spoke with patient, stated she was taking care of some business and requested a call back in about an hour. Will call back at that time. Called patient back, she did not answer and mailbox was full. Will attempt again in a week, 6/7.

## 2021-06-16 ENCOUNTER — TELEPHONE (OUTPATIENT)
Dept: PHARMACY | Age: 51
End: 2021-06-16

## 2021-06-16 ENCOUNTER — PATIENT OUTREACH (OUTPATIENT)
Dept: OTHER | Age: 51
End: 2021-06-16

## 2021-06-16 NOTE — TELEPHONE ENCOUNTER
[facility] CLINICAL PHARMACY REVIEW - Be Well with Diabetes    Rosario Thomas is a 48 y.o. female enrolled in the [facility] Be Well with Diabetes Program.. The goal of this voluntary program is to help employees and covered dependents reach their health maintenance goals in regards to their diabetes diagnosis. According to our records, patient is missing the following requirement(s) that must be completed by July 1, 2021 to avoid discharge from the program:   First A1c result in 2021     Plan:    Attempt made to reach patient by telephone to review above. Spoke to patient - verbalized understanding. Patient claims to have reached out to her pcp for an appointment and to get labs scheduled, but I do not see any record of this. I will route a message to her pcp's pool to ask that someone reach out.     Hellen Peraza, PharmD, [address] [facility]  Direct: [phone]  Department, toll free: [phone], option 7

## 2021-06-16 NOTE — PROGRESS NOTES
Patient states she has not been paid for being out of work from 2/2 - 2/22. States her provider told her that all of the paperwork has been sent to [employer]. Called [facility] to ask about this issue. Spoke with Zoe at [facility]. Have not received Attending Physician statements. Fax - pat name, last four of [facility], [phone]. Conducted chart review. Malu Kumar, Dr. Crow Mendosa employee, made a note in patient's chart on 4/19 that the [facility] and scanned to EMR. The form is under  with the confirmation that the fax went through. Called Dr. Crow Mendosa office, spoke with Adina. Asked her to fax documents to [phone], along with patient identifiers on the cover page, patient's name, last for of [facility]. States she will fax the documentation to [facility]. Expressed my gratitude. Called patient back to let her know.

## 2021-06-16 NOTE — TELEPHONE ENCOUNTER
Dr. Marcela Wright, DO or team,    Your patient is currently enrolled in the Be Well with Diabetes program.  · Can someone please reach out to this patient? She is overdue for an A1c and needs to have completed to avoid discharge from our program. She claims that she tried to schedule, but I don't see any record of it. To remain active in the program, the patient is to meet the requirements and documentation must be provided by pre-established deadlines (see below). Program Requirements  Initial Program Requirements (to be completed by 07/01/20): Still missing  · A1c (1st)    Thank you,  HEIDY Noe, PharmD, [address]  Direct: [phone]  Department, toll free: [phone], option 7

## 2021-06-23 ENCOUNTER — PATIENT OUTREACH (OUTPATIENT)
Dept: OTHER | Age: 51
End: 2021-06-23

## 2021-06-23 NOTE — PROGRESS NOTES
Reached out to [facility] to check on patient's claim. Spoke with Zeeshan Brooks, who states they still have not received documentation from provider's office. I was informed that the have 7-9 business days to complete review. Called Dr. Latonia Howell office, spoke with JESÚS. Provided her with summary of current situation. She stated she would have someone fax the information to [facility]. Per chart review, patient has an appointment with NP Elvis Cortes on 6/25. I will call [facility] back on 6/30 to ensure they have received required documentation to process this claim.

## 2021-07-07 ENCOUNTER — OFFICE VISIT (OUTPATIENT)
Dept: FAMILY MEDICINE CLINIC | Age: 51
End: 2021-07-07
Payer: COMMERCIAL

## 2021-07-07 VITALS
SYSTOLIC BLOOD PRESSURE: 104 MMHG | HEART RATE: 86 BPM | DIASTOLIC BLOOD PRESSURE: 70 MMHG | HEIGHT: 61 IN | OXYGEN SATURATION: 95 % | RESPIRATION RATE: 18 BRPM | BODY MASS INDEX: 21.34 KG/M2 | TEMPERATURE: 97 F | WEIGHT: 113 LBS

## 2021-07-07 DIAGNOSIS — Z12.11 SCREENING FOR COLON CANCER: ICD-10-CM

## 2021-07-07 DIAGNOSIS — L60.0 INGROWN TOENAIL OF BOTH FEET: ICD-10-CM

## 2021-07-07 DIAGNOSIS — M54.32 SCIATICA OF LEFT SIDE: ICD-10-CM

## 2021-07-07 DIAGNOSIS — M54.16 LUMBAR RADICULOPATHY: Primary | ICD-10-CM

## 2021-07-07 DIAGNOSIS — E11.9 COMPREHENSIVE DIABETIC FOOT EXAMINATION, TYPE 2 DM, ENCOUNTER FOR (HCC): ICD-10-CM

## 2021-07-07 DIAGNOSIS — F17.200 SMOKER: ICD-10-CM

## 2021-07-07 DIAGNOSIS — E11.65 UNCONTROLLED TYPE 2 DIABETES MELLITUS WITH HYPERGLYCEMIA (HCC): ICD-10-CM

## 2021-07-07 LAB
ALBUMIN UR QL STRIP: 80 MG/L
CREATININE, URINE POC: 50 MG/DL
MICROALBUMIN/CREAT RATIO POC: >300 MG/G

## 2021-07-07 PROCEDURE — 99214 OFFICE O/P EST MOD 30 MIN: CPT | Performed by: NURSE PRACTITIONER

## 2021-07-07 PROCEDURE — 82044 UR ALBUMIN SEMIQUANTITATIVE: CPT | Performed by: NURSE PRACTITIONER

## 2021-07-07 RX ORDER — FLUCONAZOLE 150 MG/1
TABLET ORAL
Qty: 3 TABLET | Refills: 5 | Status: SHIPPED | OUTPATIENT
Start: 2021-07-07 | End: 2021-09-30 | Stop reason: SDUPTHER

## 2021-07-07 RX ORDER — DICLOFENAC SODIUM 10 MG/G
4 GEL TOPICAL 4 TIMES DAILY
Qty: 350 G | Refills: 2 | Status: SHIPPED | OUTPATIENT
Start: 2021-07-07 | End: 2022-05-20 | Stop reason: SDUPTHER

## 2021-07-07 NOTE — PROGRESS NOTES
1. Have you been to the ER, urgent care clinic since your last visit? Hospitalized since your last visit? No    2. Have you seen or consulted any other health care providers outside of the [address] [place] [address] since your last visit? Include any pap smears or colon screening.  No      Chief Complaint   Patient presents with    Diabetes    Other     ORTIZ paper         Visit Vitals  /70 (BP 1 Location: Right upper arm, BP Patient Position: Sitting, BP Cuff Size: Adult)   Pulse 86   Temp 97 °F (36.1 °C) (Temporal)   Resp 18   Ht 5' 1\" (1.549 m)   Wt 113 lb (51.3 kg)   SpO2 95%   BMI 21.35 kg/m²       Pain Scale: 7/10  Pain Location: Back ( and legs)

## 2021-07-12 NOTE — PROGRESS NOTES
Subjective:     Jeffery Crowell is a 48 y.o. female who presents today with the following:  Chief Complaint   Patient presents with    Back Pain    Other     ORTIZ paper       Patient Active Problem List   Diagnosis Code    Type 2 diabetes mellitus without complication, with long-term current use of insulin ([facility] E11.9, Z79.4    Essential hypertension I10    Mixed dyslipidemia E78.2    Type 2 diabetes with nephropathy ([facility] E11.21         COMPLIANT WITH MEDICATION:    Denies chest pain, dyspnea, palpitations, headache and blurred vision. Blood pressure normotensive. Asked to review [employer] notes and reports to complete for short term disability form. Scanned     Back pain Sciatica; Intermittent at present requests referral back to Dr. Rosemary Lebron. Bilateral ingrown toenails; request referral to podiatry. depression screening addressed not at risk    abuse screening addressed denies    learning assessment addressed reviewed nurses notes    fall risk addressed not at risk    HM: addressed    ROS:  Gen: denies fever, chills, fatigue, weight loss, weight gain  HEENT:denies blurry vision, nasal congestion, sore throat  Resp: denies dypsnea, cough, wheezing  CV: denies chest pain radiating to the jaws or arms, palpitations, lower extremity edema  Abd: denies nausea, vomiting, diarrhea, constipation  Neuro: denies numbness/tingling  Endo: denies polyuria, polydipsia, heat/cold intolerance  Heme: no lymphadenopathy    No Known Allergies      Current Outpatient Medications:     diclofenac (VOLTAREN) 1 % gel, Apply 4 g to affected area four (4) times daily. , Disp: 350 g, Rfl: 2    fluconazole (DIFLUCAN) 150 mg tablet, TAKE 1 TABLET BY MOUTH AND THEN TAKE 1 TABLET 3 DAYS LATER, if needed repeat for 3rd dose, Disp: 3 Tablet, Rfl: 5    ketorolac (TORADOL) 10 mg tablet, Take 10 mg by mouth.  PRN, Disp: , Rfl:     SITagliptin (Januvia) 100 mg tablet, TAKE 1 TABLET BY MOUTH ONE TIME A DAY, Disp: 30 Tab, Rfl: 5   fluticasone propion-salmeteroL (ADVAIR/WIXELA) 250-50 mcg/dose diskus inhaler, Take 1 Puff by inhalation every twelve (12) hours. , Disp: 1 Inhaler, Rfl: 0    insulin glargine (LANTUS,BASAGLAR) 100 unit/mL (3 mL) inpn, Take 20 units QHS, Disp: 21 mL, Rfl: 3    baclofen (LIORESAL) 10 mg tablet, Take 1 Tab by mouth three (3) times daily. Indications: muscle spasms caused by a spinal disease (Patient taking differently: Take 10 mg by mouth three (3) times daily. PRN  Indications: muscle spasms caused by a spinal disease), Disp: 30 Tab, Rfl: 0    omeprazole (PRILOSEC) 40 mg capsule, Take 1 Cap by mouth daily. , Disp: 90 Cap, Rfl: 3    amitriptyline (ELAVIL) 25 mg tablet, Take 1 Tab by mouth nightly. Indications: neuropathic pain (Patient taking differently: Take 25 mg by mouth nightly. As needed  Indications: neuropathic pain), Disp: 30 Tab, Rfl: 0    DULoxetine (CYMBALTA) 30 mg capsule, Take 1 Cap by mouth two (2) times a day. Indications: chronic muscle or bone pain, diabetic complication causing injury to some body nerves (Patient taking differently: Take 30 mg by mouth two (2) times a day. PRN  Indications: chronic muscle or bone pain, diabetic complication causing injury to some body nerves), Disp: 60 Cap, Rfl: 5    albuterol (PROVENTIL HFA, VENTOLIN HFA, PROAIR HFA) 90 mcg/actuation inhaler, Take 2 Puffs by inhalation every four (4) hours as needed for Wheezing or Shortness of Breath. Indications: asthma attack, Disp: 2 Inhaler, Rfl: 5    TRUEplus Pen Needle 31 gauge x 3/16\" ndle, , Disp: , Rfl:     Insulin Needles, Disposable, 31 gauge x 5/16\" ndle, Use to administer insulin SQ QPM as directed., Disp: 1 Package, Rfl: 11    flash glucose scanning reader ([employer] 14 Day [place]) [facility], Use to check blood glucose TID and PRN. Dx E11.9, Disp: 1 Each, Rfl: 0    inhalational spacing device, 1 Each by Does Not Apply route as needed for Wheezing.  Emergency one time authorization for acute symptoms- please notify [facility] if necessary (Patient not taking: Reported on 7/7/2021), Disp: 1 Device, Rfl: 0    Past Medical History:   Diagnosis Date    Abnormal EKG 2/27/2018    Abscess 7/24/2019    Asthma     Chest pain 2/27/2018    Chest wall contusion, left, subsequent encounter     Diabetes ([facility]     Diverticular disease     Diverticulitis large intestine w/o perforation or abscess w/o bleeding 10/25/2016    Dyslipidemia     Hypertension     Menopause     Pelvic pain 7/26/2016    Rib fractures     Right hand pain 7/10/2018    Uterine leiomyoma 7/26/2016    Vaginal candidiasis 7/24/2019       Past Surgical History:   Procedure Laterality Date    HX GYN      BTL    IR INJ FORAMIN EPID LUMB ANES/STER [facility]  5/11/2021       Social History     Tobacco Use   Smoking Status Current Every Day Smoker    Packs/day: 0.50   Smokeless Tobacco Never Used       Social History     Socioeconomic History    Marital status: SINGLE     Spouse name: Not on file    Number of children: Not on file    Years of education: Not on file    Highest education level: Not on file   Tobacco Use    Smoking status: Current Every Day Smoker     Packs/day: 0.50    Smokeless tobacco: Never Used   Vaping Use    Vaping Use: Never used   Substance and Sexual Activity    Alcohol use: No    Drug use: No    Sexual activity: Yes     Partners: Male     Birth control/protection: Surgical     Social Determinants of Health     Financial Resource Strain:     Difficulty of Paying Living Expenses:    Food Insecurity:     Worried About Running Out of Food in the Last Year:     Ran Out of Food in the Last Year:    Transportation Needs:     Lack of Transportation (Medical):      Lack of Transportation (Non-Medical):    Physical Activity:     Days of Exercise per Week:     Minutes of Exercise per Session:    Stress:     Feeling of Stress :    Social Connections:     Frequency of Communication with Friends and Family:     Frequency of Social Gatherings with Friends and Family:     Attends Congregational Services:     Active Member of Clubs or Organizations:     Attends Club or Organization Meetings:     Marital Status:        Family History   Problem Relation Age of Onset   Nagi Márquez Stroke Mother     Diabetes Mother     Heart Disease Father     Diabetes Sister     Diabetes Maternal Grandmother     Breast Cancer Maternal Aunt     Breast Cancer Paternal Aunt          Objective:     Visit Vitals  /70 (BP 1 Location: Right upper arm, BP Patient Position: Sitting, BP Cuff Size: Adult)   Pulse 86   Temp 97 °F (36.1 °C) (Temporal)   Resp 18   Ht 5' 1\" (1.549 m)   Wt 113 lb (51.3 kg)   SpO2 95%   BMI 21.35 kg/m²     Body mass index is 21.35 kg/m². General: Alert and oriented. No acute distress. Well nourished  HEENT :  Ears:TMs are normal. Canals are clear. Eyes: pupils equal, round, react to light and accommodation. Extra ocular movements intact. Nose: patent. Mouth and throat is clear. Neck:supple full range of motion no thyromegaly. Trachea midline, No carotid bruits. No significant lymphadenopathy  Lungs[de-identified] clear to auscultation without wheezes, rales, or rhonchi. Heart :RRR, S1 & S2 are normal intensity. No murmur; no gallop  Abdomen: bowel sounds active. No tenderness, guarding, rebound, masses, hepatic or spleen enlargement  Back: no CVA tenderness. Extremities: without clubbing, cyanosis, or edema  Pulses: radial and femoral pulses are normal  Neuro: HMF intact. Cranial nerves II through XII grossly normal.  Motor: is 5 over 5 and symmetrical.   Deep tendon reflexes: +2 equal  Integumentary- bilateral great toe ingrown toe nails. Psych:appropriate behavior, mood, affect and judgement.           Diabetic foot exam performed by Ana Brice NP     Measurement  Response Nurse Comment Physician Comment   Monofilament  R - normal sensation with micro filament  L - normal sensation with micro filament     Pulse DP R - present  L - present Pulse TP R - present  L - present     Structural deformity R - None  L - None     Skin Integrity / Deformity R - Yes - ingrown toe nail great toe  L - Yes - ingrown toenail great toe. Reviewed by:         Results for orders placed or performed in visit on 07/07/21   AMB POC URINE, MICROALBUMIN, SEMIQUANT (3 RESULTS)   Result Value Ref Range    ALBUMIN, URINE POC 80 Negative mg/L    CREATININE, URINE POC 50 mg/dL    Microalbumin/creat ratio (POC) >300 <30 MG/G       Results for orders placed or performed in visit on 07/07/21   AMB POC URINE, MICROALBUMIN, SEMIQUANT (3 RESULTS)   Result Value Ref Range    ALBUMIN, URINE POC 80 Negative mg/L    CREATININE, URINE POC 50 mg/dL    Microalbumin/creat ratio (POC) >300 <30 MG/G       Assessment/ Plan:     1. Uncontrolled type 2 diabetes mellitus with hyperglycemia (HCC)  - AMB POC URINE, MICROALBUMIN, SEMIQUANT (3 RESULTS)  - CBC WITH AUTOMATED DIFF; Future  - HEMOGLOBIN A1C WITH EAG; Future  - LIPID PANEL; Future  - METABOLIC PANEL, COMPREHENSIVE; Future  - COLLECTION VENOUS BLOOD,VENIPUNCTURE; Future    2. Ingrown toenail of both feet    - REFERRAL TO PODIATRY    3. Sciatica of left side    - REFERRAL TO NEUROLOGY    4. Lumbar radiculopathy    - REFERRAL TO NEUROLOGY    5. Screening for colon cancer    - REFERRAL TO GENERAL SURGERY    6. Comprehensive diabetic foot examination, type 2 DM, encounter for ([facility]    -  DIABETES FOOT EXAM    7. Smoker    - CBC WITH AUTOMATED DIFF; Future  - HEMOGLOBIN A1C WITH EAG; Future  - LIPID PANEL; Future  - METABOLIC PANEL, COMPREHENSIVE;  Future  - COLLECTION VENOUS BLOOD,VENIPUNCTURE; Future      Orders Placed This Encounter    COLLECTION VENOUS BLOOD,VENIPUNCTURE     Standing Status:   Future     Standing Expiration Date:   7/7/2022    CBC WITH AUTOMATED DIFF     Standing Status:   Future     Standing Expiration Date:   11/7/2021    HEMOGLOBIN A1C WITH EAG     Standing Status:   Future     Standing Expiration Date: 11/7/2021    LIPID PANEL     Standing Status:   Future     Standing Expiration Date:   45/6/1297    METABOLIC PANEL, COMPREHENSIVE     Standing Status:   Future     Standing Expiration Date:   11/7/2021    REFERRAL TO PODIATRY     Referral Priority:   Routine     Referral Type:   Consultation     Referral Reason:   Specialty Services Required     Referred to Provider:   Elyse Chew     Requested Specialty:   Podiatry     Number of Visits Requested:   1    [address] Neurosurgery [address]     Referral Priority:   Routine     Referral Type:   Consultation     Referral Reason:   Specialty Services Required     Referred to Provider:   Kusum Sanchez MD     Number of Visits Requested:   1    REFERRAL TO GENERAL SURGERY     Referral Priority:   Routine     Referral Type:   Consultation     Referral Reason:   Specialty Services Required     Referred to Provider:   Melissa Sotelo MD     Requested Specialty:   General Surgery     Number of Visits Requested:   1    AMB POC URINE, MICROALBUMIN, SEMIQUANT (3 RESULTS)    HM DIABETES FOOT EXAM    diclofenac (VOLTAREN) 1 % gel     Sig: Apply 4 g to affected area four (4) times daily. Dispense:  350 g     Refill:  2    fluconazole (DIFLUCAN) 150 mg tablet     Sig: TAKE 1 TABLET BY MOUTH AND THEN TAKE 1 TABLET 3 DAYS LATER, if needed repeat for 3rd dose     Dispense:  3 Tablet     Refill:  5         Verbal and written instructions (see AVS) provided. Patient expresses understanding of diagnosis and treatment plan.     Health Maintenance Due   Topic Date Due    Hepatitis C Screening  Never done    Colorectal Cancer Screening Combo  Never done    PAP AKA CERVICAL CYTOLOGY  07/11/2020    Shingrix Vaccine Age 50> (1 of 2) Never done    A1C test (Diabetic or Prediabetic)  02/03/2021               PATO Ayala

## 2021-07-13 ENCOUNTER — CLINICAL SUPPORT (OUTPATIENT)
Dept: FAMILY MEDICINE CLINIC | Age: 51
End: 2021-07-13
Payer: COMMERCIAL

## 2021-07-13 ENCOUNTER — TELEPHONE (OUTPATIENT)
Dept: PHARMACY | Age: 51
End: 2021-07-13

## 2021-07-13 DIAGNOSIS — E11.65 UNCONTROLLED TYPE 2 DIABETES MELLITUS WITH HYPERGLYCEMIA (HCC): Primary | ICD-10-CM

## 2021-07-13 DIAGNOSIS — F17.200 SMOKER: ICD-10-CM

## 2021-07-13 PROCEDURE — 36415 COLL VENOUS BLD VENIPUNCTURE: CPT | Performed by: NURSE PRACTITIONER

## 2021-07-13 NOTE — TELEPHONE ENCOUNTER
Pharmacy Pop Care Documentation:   Called patient with reminder for requirements for Diabetes Management Program.     According to our records, patient is missing the following requirement(s) that must be completed by July 1st 2021:     · 1st 2021 A1C        Spoke to patient at mobile number and advised them of the above information. Patient verified understanding. Stated she had gone last week but they would not do A1c for some reason. She stated she would go this week to get it done before deadline.          Kimberly Robertson, [address]   Department, toll free: [phone], option 7

## 2021-07-13 NOTE — PROGRESS NOTES
Patient presented the office for a hgbA1C via right arm venipuncture with no complications for her Be Well program.  She stated, it should be time for a cholesterol check, but not sure of if Gerhard May had overlooked in on her last OV. I stated to her I will check with Gerhard Martin for confirmation. She stated OK and thanks. I Bear Child, she stated, she already has the labs in a future order, so it is OK to release them. The orders were released to be sent out for testing.

## 2021-07-14 LAB
ALBUMIN SERPL-MCNC: 3.7 G/DL (ref 3.5–5)
ALBUMIN/GLOB SERPL: 1.1 {RATIO} (ref 1.1–2.2)
ALP SERPL-CCNC: 168 U/L (ref 45–117)
ALT SERPL-CCNC: 26 U/L (ref 12–78)
ANION GAP SERPL CALC-SCNC: 8 MMOL/L (ref 5–15)
AST SERPL-CCNC: 11 U/L (ref 15–37)
BASOPHILS # BLD: 0.1 K/UL (ref 0–0.1)
BASOPHILS NFR BLD: 1 % (ref 0–1)
BILIRUB SERPL-MCNC: 0.3 MG/DL (ref 0.2–1)
BUN SERPL-MCNC: 19 MG/DL (ref 6–20)
BUN/CREAT SERPL: 18 (ref 12–20)
CALCIUM SERPL-MCNC: 9.4 MG/DL (ref 8.5–10.1)
CHLORIDE SERPL-SCNC: 102 MMOL/L (ref 97–108)
CHOLEST SERPL-MCNC: 332 MG/DL
CO2 SERPL-SCNC: 25 MMOL/L (ref 21–32)
CREAT SERPL-MCNC: 1.08 MG/DL (ref 0.55–1.02)
DIFFERENTIAL METHOD BLD: ABNORMAL
EOSINOPHIL # BLD: 0.1 K/UL (ref 0–0.4)
EOSINOPHIL NFR BLD: 1 % (ref 0–7)
ERYTHROCYTE [DISTWIDTH] IN BLOOD BY AUTOMATED COUNT: 12.8 % (ref 11.5–14.5)
EST. AVERAGE GLUCOSE BLD GHB EST-MCNC: 312 MG/DL
GLOBULIN SER CALC-MCNC: 3.5 G/DL (ref 2–4)
GLUCOSE SERPL-MCNC: 517 MG/DL (ref 65–100)
HBA1C MFR BLD: 12.5 % (ref 4–5.6)
HCT VFR BLD AUTO: 38.3 % (ref 35–47)
HDLC SERPL-MCNC: 37 MG/DL
HDLC SERPL: 9 {RATIO} (ref 0–5)
HGB BLD-MCNC: 13.7 G/DL (ref 11.5–16)
IMM GRANULOCYTES # BLD AUTO: 0 K/UL (ref 0–0.04)
IMM GRANULOCYTES NFR BLD AUTO: 0 % (ref 0–0.5)
LDLC SERPL CALC-MCNC: ABNORMAL MG/DL (ref 0–100)
LDLC SERPL DIRECT ASSAY-MCNC: 202 MG/DL (ref 0–100)
LYMPHOCYTES # BLD: 2.4 K/UL (ref 0.8–3.5)
LYMPHOCYTES NFR BLD: 20 % (ref 12–49)
MCH RBC QN AUTO: 30.6 PG (ref 26–34)
MCHC RBC AUTO-ENTMCNC: 35.8 G/DL (ref 30–36.5)
MCV RBC AUTO: 85.5 FL (ref 80–99)
MONOCYTES # BLD: 0.7 K/UL (ref 0–1)
MONOCYTES NFR BLD: 6 % (ref 5–13)
NEUTS SEG # BLD: 8.9 K/UL (ref 1.8–8)
NEUTS SEG NFR BLD: 73 % (ref 32–75)
NRBC # BLD: 0 K/UL (ref 0–0.01)
NRBC BLD-RTO: 0 PER 100 WBC
PLATELET # BLD AUTO: 179 K/UL (ref 150–400)
POTASSIUM SERPL-SCNC: 4.2 MMOL/L (ref 3.5–5.1)
PROT SERPL-MCNC: 7.2 G/DL (ref 6.4–8.2)
RBC # BLD AUTO: 4.48 M/UL (ref 3.8–5.2)
SODIUM SERPL-SCNC: 135 MMOL/L (ref 136–145)
TRIGL SERPL-MCNC: 776 MG/DL (ref ?–150)
VLDLC SERPL CALC-MCNC: ABNORMAL MG/DL
WBC # BLD AUTO: 12.2 K/UL (ref 3.6–11)

## 2021-07-20 ENCOUNTER — PATIENT OUTREACH (OUTPATIENT)
Dept: OTHER | Age: 51
End: 2021-07-20

## 2021-07-20 NOTE — PROGRESS NOTES
Call placed to [facility] to inquire about patient's STD claim from February. Spoke with Bryan Menon, who informed me that claim was approved on 7/16, absence team was notified and will mail out the check. Attempted to reach patient, unable to leave a message, as voice mailbox was full. Will attempt another outreach on 7/22.

## 2021-07-21 ENCOUNTER — PATIENT OUTREACH (OUTPATIENT)
Dept: OTHER | Age: 51
End: 2021-07-21

## 2021-07-21 NOTE — PROGRESS NOTES
Patient returned my phone call. Briefly spoke with patient and let her know that I spoke with Ilia Blanchard at [facility] and her disability claim from February had been approved on 7/16. Patient was appreciative for me calling and letting her know. Will reach out again in two weeks to ensure no further issues and discuss goals.  (8/3)

## 2021-07-27 ENCOUNTER — TELEPHONE (OUTPATIENT)
Dept: FAMILY MEDICINE CLINIC | Age: 51
End: 2021-07-27

## 2021-08-03 ENCOUNTER — PATIENT OUTREACH (OUTPATIENT)
Dept: OTHER | Age: 51
End: 2021-08-03

## 2021-08-03 NOTE — PROGRESS NOTES
Follow up phone call to patient, two pt identifiers verified. Discussed patient's goals:   Goals        Patient [address] Coordination for radicular LBP (pt-stated)       3/15-  Care Coordination with PCP office- finding in network provider for Plus plan - neuro surgeon.  Dr. Carrol Aschoff elevated BS with steroids to be expected. 5/14: Had injection on 5/11, says back is feeling better, still a little pain. Other      DM  Diabetes Support and Education / Care Coordination       Referred from Be Well with DM. A1C>9    1)  General:   Patient is able to state a basic definition of diabetes including: risk factors, basic pathophysiology, complications and treatment. 2)  Diet:   Patient will be able to read a basic food label and identify role of calories, carbohydrates, protein and fats in healthy eating. 3)  Activity:   Patient will identify the potential health benefits of regular exercise:  decreased cholesterol, improved mood, decreased blood pressure, lower stress/anxiety, weight loss, decreased blood sugar. 4)  Monitoring:  Patient will be able to identify what an A1C test measures. 5)  Medications:  Patient will demonstrate knowledge of diabetes medications. 6)  Risk Reduction:  Patient is able to state goal target for A1C (<7), blood pressure (<130/70), and LDL cholesterol (<100) to reduce diabetes complications. 7)  Problem Solving:  Patient will identify s/sx and treatment for hyper- and hypoglycemia. 8)  Healthy Coping:  Patient will be able to identify two community support resources. Patient will identify two stress relieving techniques.     Patient will identify health risk factors and complications of diabetes  1) Kidney disease  2) Heart disease  3) Neuropathy  4) Skin breakdown  5) Diabetic Retinopathy  6) Prevention:   Stable controlled A1C,  healthy lifestyle (diet, exercise, hydration, stress reduction, monitoring, skin care)   3/15- Pt to have BW this week/  A1C.    8/3: Patient's A1C up to 12.5 7/21 - discussed healthy food options. States she eats cheerios or corn flakes for breakfast.  Encouraged lean protein, egg whites, whole grains, yogurt. 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 Will mail nutritional information from "[employer]. Encouraged drinking more water, plans to take water bottles with her to work, as they no longer offer water bottles. Has been drinking 3 sodas/day. Will cut these out. Scheduled to meet with a nutritionist tomorrow, 8/4. Patient's primary care provider relationship reviewed with patient and modified, as applicable. Patient has been told by HR that she will get her STD payment from time missed in February on the paycheck for this week. Discussed diet in depth today, lab results for Be Well were \"bad\" per patient. Patient was at the grocery store and buying healthy food options to do better. Acknowledges the need to eat healthier. Assisted with brainstorming ways to prepare and plan for healthy eating. Will begin taking water bottles to work with her to avoid buying soda. Also, plans to begin eating more salads for lunch. She has a meeting with a nutritionist tomorrow to discuss meals. I will mail nutritional information, as well, and add references to my chart.     Readiness to Change: []  Pre-contemplative    []  Contemplative  []  Preparation               [x]  Action                  []  Maintenance    Barriers/Challenges to Care: []  Decline in memory    []  Language barrier     []  Emotional                  []  Limited mobility  []  Lack of motivation     [] Vision, hearing or cognitive impairment []  Knowledge [] Financial Barriers []  Lack of support  []  Pain []  Other [x]  None    Key pt activities to achieve better health:   []  Weight loss  [] Improved diabetic control  []  Decreased cholesterol levels  []  Decreased blood pressure  []    []  Plan for next call:  Will call in one week, 8/10

## 2021-08-03 NOTE — PATIENT INSTRUCTIONS
Learning About Meal Planning for Diabetes  Why plan your meals? Meal planning can be a key part of managing diabetes. Planning meals and snacks with the right balance of carbohydrate, protein, and fat can help you keep your blood sugar at the target level you set with your doctor. You don't have to eat special foods. You can eat what your family eats, including sweets once in a while. But you do have to pay attention to how often you eat and how much you eat of certain foods. You may want to work with a dietitian or a certified diabetes educator. He or she can give you tips and meal ideas and can answer your questions about meal planning. This health professional can also help you reach a healthy weight if that is one of your goals. What plan is right for you? Your dietitian or diabetes educator may suggest that you start with the plate format or carbohydrate counting. The plate format  The plate format is a simple way to help you manage how you eat. You plan meals by learning how much space each food should take on a plate. Using the plate format helps you spread carbohydrate throughout the day. It can make it easier to keep your blood sugar level within your target range. It also helps you see if you're eating healthy portion sizes. To use the plate format, you put non-starchy vegetables on half your plate. Add meat or meat substitutes on one-quarter of the plate. Put a grain or starchy vegetable (such as brown rice or a potato) on the final quarter of the plate. You can add a small piece of fruit and some low-fat or fat-free milk or yogurt, depending on your carbohydrate goal for each meal.  Here are some tips for using the plate format:  · Make sure that you are not using an oversized plate. A 9-inch plate is best. Many restaurants use larger plates. · Get used to using the plate format at home. Then you can use it when you eat out. · Write down your questions about using the plate format.  Talk to your doctor, a dietitian, or a diabetes educator about your concerns. Carbohydrate counting  With carbohydrate counting, you plan meals based on the amount of carbohydrate in each food. Carbohydrate raises blood sugar higher and more quickly than any other nutrient. It is found in desserts, breads and cereals, and fruit. It's also found in starchy vegetables such as potatoes and corn, grains such as rice and pasta, and milk and yogurt. Spreading carbohydrate throughout the day helps keep your blood sugar levels within your target range. Your daily amount depends on several things, including your weight, how active you are, which diabetes medicines you take, and what your goals are for your blood sugar levels. A registered dietitian or diabetes educator can help you plan how much carbohydrate to include in each meal and snack. A guideline for your daily amount of carbohydrate is:  · 45 to 60 grams at each meal. That's about the same as 3 to 4 carbohydrate servings. · 15 to 20 grams at each snack. That's about the same as 1 carbohydrate serving. The Nutrition Facts label on packaged foods tells you how much carbohydrate is in a serving of the food. First, look at the serving size on the food label. Is that the amount you eat in a serving? All of the nutrition information on a food label is based on that serving size. So if you eat more or less than that, you'll need to adjust the other numbers. Total carbohydrate is the next thing you need to look for on the label. If you count carbohydrate servings, one serving of carbohydrate is 15 grams. For foods that don't come with labels, such as fresh fruits and vegetables, you'll need a guide that lists carbohydrate in these foods. Ask your doctor, dietitian, or diabetes educator about books or other nutrition guides you can use.   If you take insulin, you need to know how many grams of carbohydrate are in a meal. This lets you know how much rapid-acting insulin to take before you eat. If you use an insulin pump, you get a constant rate of insulin during the day. So the pump must be programmed at meals to give you extra insulin to cover the rise in blood sugar after meals. When you know how much carbohydrate you will eat, you can take the right amount of insulin. Or, if you always use the same amount of insulin, you need to make sure that you eat the same amount of carbohydrate at meals. If you need more help to understand carbohydrate counting and food labels, ask your doctor, dietitian, or diabetes educator. How can you plan healthy meals? Here are some tips to get started:  · Plan your meals a week at a time. Don't forget to include snacks too. · Use cookbooks or online recipes to plan several main meals. Plan some quick meals for busy nights. You also can double some recipes that freeze well. Then you can save half for other busy nights when you don't have time to cook. · Make sure you have the ingredients you need for your recipes. If you're running low on basic items, put these items on your shopping list too. · List foods that you use to make breakfasts, lunches, and snacks. List plenty of fruits and vegetables. · Post this list on the refrigerator. Add to it as you think of more things you need. · Take the list to the store to do your weekly shopping. Follow-up care is a key part of your treatment and safety. Be sure to make and go to all appointments, and call your doctor if you are having problems. It's also a good idea to know your test results and keep a list of the medicines you take. Where can you learn more? Go to http://www.gray.com/  Enter L056 in the search box to learn more about \"Learning About Meal Planning for Diabetes. \"  Current as of: August 31, 2020               Content Version: 12.8  © 2175-4874 Healthwise, Incorporated.    Care instructions adapted under license by [employer] (which disclaims liability or warranty for this information). If you have questions about a medical condition or this instruction, always ask your healthcare professional. [address] any warranty or liability for your use of this information. Nutrition Tips for Diabetes: After Your Visit  Your Care Instructions  A healthy diet is important to manage diabetes. It helps you lose weight (if you need to) and keep it off. It gives you the nutrition and energy your body needs and helps prevent heart disease. But a diet for diabetes does not mean that you have to eat special foods. You can eat what your family eats, including occasional sweets and other favorites. But you do have to pay attention to how often you eat and how much you eat of certain foods. The right plan for you will give you meals that help you keep your blood sugar at healthy levels. Try to eat a variety of foods and to spread carbohydrate throughout the day. Carbohydrate raises blood sugar higher and more quickly than any other nutrient does. Carbohydrate is found in sugar, breads and cereals, fruit, starchy vegetables such as potatoes and corn, and milk and yogurt. You may want to work with a dietitian or diabetes educator to help you plan meals and snacks. A dietitian or diabetes educator also can help you lose weight if that is one of your goals. The following tips can help you enjoy your meals and stay healthy. Follow-up care is a key part of your treatment and safety. Be sure to make and go to all appointments, and call your doctor if you are having problems. Its also a good idea to know your test results and keep a list of the medicines you take. How can you care for yourself at home? · Learn which foods have carbohydrate and how much carbohydrate to eat. A dietitian or diabetes educator can help you learn to keep track of how much carbohydrate you eat. · Spread carbohydrate throughout the day.  Eat some carbohydrate at all meals, but do not eat too much at any one time. · Plan meals to include food from all the food groups. These are the food groups and some example portion sizes:  ¨ Grains: 1 slice of bread (1 ounce), ½ cup of cooked cereal, and 1/3 cup of cooked pasta or rice. These have about 15 grams of carbohydrate in a serving. Choose whole grains such as whole wheat bread or crackers, oatmeal, and brown rice more often than refined grains. ¨ Fruit: 1 small fresh fruit, such as an apple or orange; ½ of a banana; ½ cup of chopped, cooked, or canned fruit; ½ cup of fruit juice; 1 cup of melon or raspberries; and 2 tablespoons of dried fruit. These have about 15 grams of carbohydrate in a serving. ¨ Dairy: 1 cup of nonfat or low-fat milk and 2/3 cup of plain yogurt. These have about 15 grams of carbohydrate in a serving. ¨ Protein foods: Beef, chicken, turkey, fish, eggs, tofu, cheese, cottage cheese, and peanut butter. A serving size of meat is 3 ounces, which is about the size of a deck of cards. Examples of meat substitute serving sizes (equal to 1 ounce of meat) are 1/4 cup of cottage cheese, 1 egg, 1 tablespoon of peanut butter, and ½ cup of tofu. These have very little or no carbohydrate per serving. ¨ Vegetables: Starchy vegetables such as ½ cup of cooked dried beans, peas, potatoes, or corn have about 15 grams of carbohydrate. Nonstarchy vegetables have very little carbohydrate, such as 1 cup of raw leafy vegetables (such as spinach), ½ cup of other vegetables (cooked or chopped), and 3/4 cup of vegetable juice. · Use the plate format to plan meals. It is a good, quick way to make sure that you have a balanced meal. It also helps you spread carbohydrate throughout the day. You divide your plate by types of foods. Put vegetables on half the plate, meat or meat substitutes on one-quarter of the plate, and a grain or starchy vegetable (such as brown rice or a potato) in the final quarter of the plate.  To this you can add a small piece of fruit and 1 cup of milk or yogurt, depending on how much carbohydrate you are supposed to eat at a meal.  · Talk to your dietitian or diabetes educator about ways to add limited amounts of sweets into your meal plan. You can eat these foods now and then, as long as you include the amount of carbohydrate they have in your daily carbohydrate allowance. · If you drink alcohol, limit it to no more than 1 drink a day for women and 2 drinks a day for men. If you are pregnant, no amount of alcohol is known to be safe. · Protein, fat, and fiber do not raise blood sugar as much as carbohydrate does. If you eat a lot of these nutrients in a meal, your blood sugar will rise more slowly than it would otherwise. · Limit saturated fats, such as those from meat and dairy products. Try to replace it with monounsaturated fat, such as olive oil. This is a healthier choice because people who have diabetes are at higher-than-average risk of heart disease. But use a modest amount of olive oil. A tablespoon of olive oil has 14 grams of fat and 120 calories. · Exercise lowers blood sugar. If you take insulin by shots or pump, you can use less than you would if you were not exercising. Keep in mind that timing matters. If you exercise within 1 hour after a meal, your body may need less insulin for that meal than it would if you exercised 3 hours after the meal. Test your blood sugar to find out how exercise affects your need for insulin. · Exercise on most days of the week. Aim for at least 30 minutes. Exercise helps you stay at a healthy weight and helps your body use insulin. Walking is an easy way to get exercise. Gradually increase the amount you walk every day. You also may want to swim, bike, or do other activities. When you eat out  · Learn to estimate the serving sizes of foods that have carbohydrate. If you measure food at home, it will be easier to estimate the amount in a serving of restaurant food.   · If the meal you order has too much carbohydrate (such as potatoes, corn, or baked beans), ask to have a low-carbohydrate food instead. Ask for a salad or green vegetables. · If you use insulin, check your blood sugar before and after eating out to help you plan how much to eat in the future. · If you eat more carbohydrate at a meal than you had planned, take a walk or do other exercise. This will help lower your blood sugar. Where can you learn more? Go to Searchspace.be  Enter O447 in the search box to learn more about \"Nutrition Tips for Diabetes: After Your Visit. \"   © 8004-0333 Healthwise, Incorporated. Care instructions adapted under license by [facility] (which disclaims liability or warranty for this information). This care instruction is for use with your licensed healthcare professional. If you have questions about a medical condition or this instruction, always ask your healthcare professional. [address] any warranty or liability for your use of this information.   Content Version: 60.6.160778; Current as of: June 4, 2014

## 2021-08-04 ENCOUNTER — DOCUMENTATION ONLY (OUTPATIENT)
Dept: FAMILY MEDICINE CLINIC | Age: 51
End: 2021-08-04

## 2021-08-04 NOTE — PROGRESS NOTES
Called Dr. Popeye No office to follow up on the referral request. They have called the patient twice and left messages, trying to set up her appointment. She has not returned their call.  KT

## 2021-08-10 ENCOUNTER — PATIENT OUTREACH (OUTPATIENT)
Dept: OTHER | Age: 51
End: 2021-08-10

## 2021-08-10 NOTE — PROGRESS NOTES
Follow up phone call to patient, two pt identifiers verified. Discussed patient's goals:   Goals        Patient [address] Coordination for radicular LBP (pt-stated)       3/15-  Care Coordination with PCP office- finding in network provider for Plus plan - neuro surgeon.  Dr. Byrd Lav elevated BS with steroids to be expected. 5/14: Had injection on 5/11, says back is feeling better, still a little pain. Other      DM  Diabetes Support and Education / Care Coordination       Referred from Be Well with DM. A1C>9    1)  General:   Patient is able to state a basic definition of diabetes including: risk factors, basic pathophysiology, complications and treatment. 2)  Diet:   Patient will be able to read a basic food label and identify role of calories, carbohydrates, protein and fats in healthy eating. 3)  Activity:   Patient will identify the potential health benefits of regular exercise:  decreased cholesterol, improved mood, decreased blood pressure, lower stress/anxiety, weight loss, decreased blood sugar. 4)  Monitoring:  Patient will be able to identify what an A1C test measures. 5)  Medications:  Patient will demonstrate knowledge of diabetes medications. 6)  Risk Reduction:  Patient is able to state goal target for A1C (<7), blood pressure (<130/70), and LDL cholesterol (<100) to reduce diabetes complications. 7)  Problem Solving:  Patient will identify s/sx and treatment for hyper- and hypoglycemia. 8)  Healthy Coping:  Patient will be able to identify two community support resources. Patient will identify two stress relieving techniques.     Patient will identify health risk factors and complications of diabetes  1) Kidney disease  2) Heart disease  3) Neuropathy  4) Skin breakdown  5) Diabetic Retinopathy  6) Prevention:   Stable controlled A1C,  healthy lifestyle (diet, exercise, hydration, stress reduction, monitoring, skin care)   3/15- Pt to have BW this week/  A1C.    8/3: Patient's A1C up to 12.5 7/21 - discussed healthy food options. States she eats cheerios or corn flakes for breakfast.  Encouraged lean protein, egg whites, whole grains, yogurt. 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 Will mail nutritional information from [employer]. Encouraged drinking more water, plans to take water bottles with her to work, as they no longer offer water bottles. Has been drinking 3 sodas/day. Will cut these out. Scheduled to meet with a nutritionist tomorrow, 8/4.  8/10: Has been able to cut soda, drinks one a day. Drinking water. Trying to meal prep. Bought fruits and veggies. Patient's primary care provider relationship reviewed with patient and modified, as applicable. Readiness to Change: []  Pre-contemplative    []  Contemplative  []  Preparation               [x]  Action                  []  Maintenance    Barriers/Challenges to Care: []  Decline in memory    []  Language barrier     []  Emotional                  []  Limited mobility  []  Lack of motivation     [] Vision, hearing or cognitive impairment []  Knowledge [] Financial Barriers []  Lack of support  []  Pain []  Other [x]  None    Key pt activities to achieve better health:   [x]  Weight loss  [x]  Improved diabetic control  []  Decreased cholesterol levels  []  Decreased blood pressure  []    []    Plan for next call: Call back in three weeks, 8/31.

## 2021-08-11 ENCOUNTER — PATIENT MESSAGE (OUTPATIENT)
Dept: PHARMACY | Age: 51
End: 2021-08-11

## 2021-08-11 NOTE — LETTER
Kendra [address]  [address], [address]  Phone: toll free [phone] Option #3        Ms. Carter Austin  [facility] [address]          This is a friendly message to advise that the phone number for the [facility] Be Well With Diabetes Program has changed. The new phone number is [phone] Option #3. Please be sure to contact us at [phone] Option #3 for any questions or concerns regarding the [facility] Be Well With Diabetes Program.       [facility] Team   [phone] Option #3   Email: [email or web address]   Fax Number: [phone]

## 2021-08-17 ENCOUNTER — TELEPHONE (OUTPATIENT)
Dept: PHARMACY | Age: 51
End: 2021-08-17

## 2021-08-17 NOTE — TELEPHONE ENCOUNTER
Magdiel Ricks NP,    I spoke with this patient briefly yesterday, can someone in your office reach out about scheduling? She claims to have tried calling the office for an appt (she has made this same claim in the past so not sure if I believe her). She no-showed an appt with  Anurag Sanchez NP a couple weeks ago on 7/27. Her recent A1c is extremely elevated at 12.5% and she also claims to currently have COVID (no documentation of this)    See pharmacist note below for complete details. Thank you,  HEIDY Lewis, PharmD, [address] [facility]  Department, toll free: [phone]      ========================================================================      HEALTH CLINICAL PHARMACY REVIEW - BE WELL WITH DIABETES: Statin, ACE/ARB Gaps  =================================================================  Coral Jiang is a 48 y.o. female enrolled in the [address] [place] [address] Diabetes Program.      Identified care gap: Patient with diabetes not currently filling Statin or ACE/ARB therapy, A1c >9%    Spoke with patient yesterday evening around 530pm.  Questioned her on why she continues to cancel and no-show her DM appointments. She became argumentative and said that she went to all of her appointments and had not missed anything. The last visit documented was with a NP on 7/7 and it did not look like DM was discussed. She then had an appt scheduled for 7/27 which she no-showed. She then went on to say that Dr. Jon Rivera has left the practice. She says she prefers to see Magdiel Ricks NP if possible. Urged patient to reach out to the office and reschedule. She claims she has been diagnosed with COVID19, but there was nothing documented in this EMR. She stated \"I did the test at [facility], nobody must have documented\".   Her BG is severely uncontrolled and I fear things will get worse if she truly has COVID. Will send message to  who has spoken with her recently, and forward message to provider asking someone to reach out.     Zenia Durand PharmD, [address]  Phone: [phone] option-7'

## 2021-08-18 ENCOUNTER — PATIENT OUTREACH (OUTPATIENT)
Dept: OTHER | Age: 51
End: 2021-08-18

## 2021-08-18 NOTE — PROGRESS NOTES
Called patient to check on her, as I received a message stating she had COVID. Patient confirms she had a positive covid test on 8/13, states her whole household has it. She is diligently wiping things down with bleach wipes. Encouraged her to drink plenty of fluids and listen to her body, rest.  No complaints of shortness of breath. States she has sore throat, fever, chills. States she is getting her taste back. She states she did have a meeting with the nutritionist and was told that Judith would likely increase her blood sugar, as well as if she had to take steroids. She has attempted to get through to her providers office, but can't sit on hold for that long. Offered to call for her, she was appreciative. [address] [facility] [phone] - spoke with Bertha lUloa. I was informed that LUCINDA Slater is the only provider at this practice currently. Let her know about patient's A1C being elevated and her COVID diagnosis. Next available appointment is on 8/30, which was scheduled but she requested a sooner appointment. They will let patient know if they will be able to get her in sooner. Called patient to let her know appt date. I did confirm with her that she had eaten prior to having her last lab draw on 7/13. Will call on 8/20.

## 2021-08-19 NOTE — TELEPHONE ENCOUNTER
With help of , she was scheduled for a visit on 8/26/21. Will sign off.     Kennedi Batres, ShakiraD, [address] [facility] Tracking Only     Recommendation Provided To: Provider: 1 via Note to Provider  and Patient/Caregiver: 1 via Telephone   Intervention Detail: Scheduled Appointment   Gap Closed?: Yes   Intervention Accepted By: Provider: 1 and Patient/Caregiver: 1   Time Spent (min): 20

## 2021-08-20 ENCOUNTER — TELEPHONE (OUTPATIENT)
Dept: FAMILY MEDICINE CLINIC | Age: 51
End: 2021-08-20

## 2021-08-20 NOTE — TELEPHONE ENCOUNTER
Pt states she needs to talk to someone about her blood sugar its staying in the 300's  She cant  come in for apt because she has tested postive for covid last Wednesday she has apt here on 26th after her quaratine .  She is on [place] and [place] medication

## 2021-08-25 ENCOUNTER — TELEPHONE (OUTPATIENT)
Dept: PHARMACY | Age: 51
End: 2021-08-25

## 2021-08-25 NOTE — TELEPHONE ENCOUNTER
Aneudy Hand NP,    Your patient is currently enrolled in the Be Well with Diabetes program. After your patient's recent visit with a [facility] Clinical Pharmacist, the below were identified as opportunities to assist with their diabetes management (if patient is not eligible for below recommendations, please reply with reason/contraindication): · Based one her age >43, DM diagnosis and 10 year ASCVD risk score of 8.6%, it is highly recommended for her to be on a moderate intensity statin. Please consider the addition of Rosuvastatin 5-10mg, or Atorvastatin 10-20mg. · Her most recent Microalbumin lab showed that she has significant proteinuria. Please consider the addition of a low dose ACE inhibitor or ARB such as Lisinopril 2.5mg daily or Losartan 25mg daily. · Adherence is an issue for her, although she will not directly admit it. Due to cost, she can only fill her Januvia for 30 days at a time. She is currently refilling this medication every 45 days. There are several other options available. She was interested in a once weekly GLP1 agonist such as Trulicity or Ozempic to replace Januvia if you agree. Either of these are covered and can be filled as 3 month supplies for a 0$ charge. If you prefer her to stay on a DPP4i, consider Onglyza which is available as a 90ds for 0$.    Please let me know if you have any questions. Patient has VV scheduled 8/26/21    Thank you,  HEIDY Hope, PharmD, [address] [facility], toll free: [phone]

## 2021-08-26 ENCOUNTER — LAB ONLY (OUTPATIENT)
Dept: FAMILY MEDICINE CLINIC | Age: 51
End: 2021-08-26

## 2021-08-26 ENCOUNTER — VIRTUAL VISIT (OUTPATIENT)
Dept: FAMILY MEDICINE CLINIC | Age: 51
End: 2021-08-26
Payer: COMMERCIAL

## 2021-08-26 DIAGNOSIS — U07.1 COVID-19: Primary | ICD-10-CM

## 2021-08-26 DIAGNOSIS — Z29.9 PREVENTIVE MEASURE: ICD-10-CM

## 2021-08-26 DIAGNOSIS — E11.65 UNCONTROLLED TYPE 2 DIABETES MELLITUS WITH HYPERGLYCEMIA (HCC): ICD-10-CM

## 2021-08-26 DIAGNOSIS — K31.9 NSAID-ASSOCIATED GASTROPATHY: ICD-10-CM

## 2021-08-26 DIAGNOSIS — T39.395A NSAID-ASSOCIATED GASTROPATHY: ICD-10-CM

## 2021-08-26 PROCEDURE — 99443 PR PHYS/QHP TELEPHONE EVALUATION 21-30 MIN: CPT | Performed by: NURSE PRACTITIONER

## 2021-08-26 NOTE — TELEPHONE ENCOUNTER
[place] message not read by patient. Letter mailed.     Vickie Garay, [employer]   Department, toll free: [phone] Option #3

## 2021-08-26 NOTE — PROGRESS NOTES
Chief Complaint   Patient presents with    Positive For Covid-19     f/u    diabetes management    Abuse Screening Questionnaire 7/7/2021   Do you ever feel afraid of your partner? N   Are you in a relationship with someone who physically or mentally threatens you? N   Is it safe for you to go home?  Y     3 most recent PHQ Screens 7/7/2021   PHQ Not Done -   Little interest or pleasure in doing things Not at all   Feeling down, depressed, irritable, or hopeless Not at all   Total Score PHQ 2 0     Learning Assessment 4/20/2021   PRIMARY LEARNER Patient   HIGHEST LEVEL OF EDUCATION - PRIMARY LEARNER  GRADUATED HIGH SCHOOL OR GED   BARRIERS PRIMARY LEARNER NONE   CO-LEARNER CAREGIVER No   PRIMARY LANGUAGE ENGLISH   LEARNER PREFERENCE PRIMARY READING   ANSWERED BY Patient   RELATIONSHIP SELF

## 2021-08-27 ENCOUNTER — PATIENT OUTREACH (OUTPATIENT)
Dept: OTHER | Age: 51
End: 2021-08-27

## 2021-08-27 ENCOUNTER — TELEPHONE (OUTPATIENT)
Dept: PEDIATRICS CLINIC | Age: 51
End: 2021-08-27

## 2021-08-27 RX ORDER — OMEPRAZOLE 40 MG/1
40 CAPSULE, DELAYED RELEASE ORAL DAILY
Qty: 90 CAPSULE | Refills: 3 | Status: SHIPPED | OUTPATIENT
Start: 2021-08-27 | End: 2021-09-28 | Stop reason: SDUPTHER

## 2021-08-27 RX ORDER — BACLOFEN 10 MG/1
10 TABLET ORAL 3 TIMES DAILY
Qty: 30 TABLET | Refills: 2 | Status: SHIPPED | OUTPATIENT
Start: 2021-08-27 | End: 2022-07-15

## 2021-08-27 RX ORDER — ATORVASTATIN CALCIUM 20 MG/1
20 TABLET, FILM COATED ORAL DAILY
Qty: 30 TABLET | Refills: 5 | Status: SHIPPED | OUTPATIENT
Start: 2021-08-27 | End: 2022-03-15 | Stop reason: SINTOL

## 2021-08-27 NOTE — TELEPHONE ENCOUNTER
----- Message from Tono Andrade NP sent at 8/27/2021  7:01 AM EDT -----  Please call patient to let her know that Andrea Spann is more affordable. I sent her rx to [facility] in [facility]. Prayers for her father . Ask her to schedule a follow up in 3 weeks telemedicine is fine for diabetes. Please have blood glucose log so I can adjust her ozempic dosage. Thanks!  DL

## 2021-08-27 NOTE — PROGRESS NOTES
Follow up phone call to patient, two pt identifiers verified. Discussed patient's goals:   Goals        Patient [address] Coordination for radicular LBP (pt-stated)       3/15-  Care Coordination with PCP office- finding in network provider for Plus plan - neuro surgeon.  Dr. Alexander Craw elevated BS with steroids to be expected. 5/14: Had injection on 5/11, says back is feeling better, still a little pain. Other      DM  Diabetes Support and Education / Care Coordination       Referred from [facility] with DM. A1C>9    1)  General:   Patient is able to state a basic definition of diabetes including: risk factors, basic pathophysiology, complications and treatment. 2)  Diet:   Patient will be able to read a basic food label and identify role of calories, carbohydrates, protein and fats in healthy eating. 3)  Activity:   Patient will identify the potential health benefits of regular exercise:  decreased cholesterol, improved mood, decreased blood pressure, lower stress/anxiety, weight loss, decreased blood sugar. 4)  Monitoring:  Patient will be able to identify what an A1C test measures. 5)  Medications:  Patient will demonstrate knowledge of diabetes medications. 6)  Risk Reduction:  Patient is able to state goal target for A1C (<7), blood pressure (<130/70), and LDL cholesterol (<100) to reduce diabetes complications. 7)  Problem Solving:  Patient will identify s/sx and treatment for hyper- and hypoglycemia. 8)  Healthy Coping:  Patient will be able to identify two community support resources. Patient will identify two stress relieving techniques.     Patient will identify health risk factors and complications of diabetes  1) Kidney disease  2) Heart disease  3) Neuropathy  4) Skin breakdown  5) Diabetic Retinopathy  6) Prevention:   Stable controlled A1C,  healthy lifestyle (diet, exercise, hydration, stress reduction, monitoring, skin care)   3/15- Pt to have BW this week/  A1C.    8/3: Patient's A1C up to 12.5 7/21 - discussed healthy food options. States she eats cheerios or corn flakes for breakfast.  Encouraged lean protein, egg whites, whole grains, yogurt. 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 Will mail nutritional information from [employer]. Encouraged drinking more water, plans to take water bottles with her to work, as they no longer offer water bottles. Has been drinking 3 sodas/day. Will cut these out. Scheduled to meet with a nutritionist tomorrow, 8/4.  8/10: Has been able to cut soda, drinks one a day. Drinking water. Trying to meal prep. Bought fruits and veggies. 8/27: Patient had visit with nurse practitioner. On some medications to help control diabetes. Still recovering from [place]. Patient's primary care provider relationship reviewed with patient and modified, as applicable. Patient states her father is hospitalized due to COVID and renal failure. She is concerned about him, states she is still having symptoms, chills and body aches. Readiness to Change: []  Pre-contemplative    []  Contemplative  []  Preparation               [x]  Action                  []  Maintenance    Barriers/Challenges to Care: []  Decline in memory    []  Language barrier     []  Emotional                  []  Limited mobility  []  Lack of motivation     [] Vision, hearing or cognitive impairment []  Knowledge [] Financial Barriers []  Lack of support  []  Pain []  Other [x]  None    Key pt activities to achieve better health:   []  Weight loss  [x]  Improved diabetic control  []  Decreased cholesterol levels  []  Decreased blood pressure  []    []    Upcoming appointments: F/up in three weeks. Plan for next call: Call in one week, 9/3.

## 2021-08-27 NOTE — TELEPHONE ENCOUNTER
Called pt, advised that Rx was sent to [facility] in [place]. She needs to set up appt in 3wks to discuss diabetes. She states that someone is going to be texting Ms. Miner Fought about her return to work. I tried to explain that this would be unrelated to that. Pt didn't seem to understand. She said\" just set something up\". KT    ----- Message from Jonah Sage NP sent at 8/27/2021  7:01 AM EDT -----  Please call patient to let her know that Pavel Lulas is more affordable. I sent her rx to [facility] in [facility]. Prayers for her father . Ask her to schedule a follow up in 3 weeks telemedicine is fine for diabetes. Please have blood glucose log so I can adjust her ozempic dosage. Thanks!  DL

## 2021-08-28 LAB
SARS-COV-2, NAA 2 DAY TAT: NORMAL
SARS-COV-2, NAA: NOT DETECTED

## 2021-09-03 ENCOUNTER — PATIENT OUTREACH (OUTPATIENT)
Dept: OTHER | Age: 51
End: 2021-09-03

## 2021-09-03 NOTE — PROGRESS NOTES
Attempt to reach patient for follow up. Voicemail box was full, unable to leave a message. Will try back next week, 9/8.

## 2021-09-05 NOTE — PROGRESS NOTES
Ponce Santana is a 48 y.o. female, evaluated via audio-only technology on 8/26/2021 father ill hospitalized with COVID 23 and doing poorly   Ms Jitendra Castellanos is requesting FMLA   Back Pain has been seen by Spine specialist   Diabetes uncontrolled. Not a good time to assess evaluate and mange due to circumstances. Call regarding father from hospital  Plan to treat immediate needs as noted below . And reschedule   FMLA and Medical leave form scanned into chart. Assessment & Plan:   Diagnoses and all orders for this visit:    1. COVID-19  -     [facility]   2. Preventive measure  -     omeprazole (PRILOSEC) 40 mg capsule; Take 1 Capsule by mouth daily. 3. NSAID-associated gastropathy  -     omeprazole (PRILOSEC) 40 mg capsule; Take 1 Capsule by mouth daily. 4. Uncontrolled type 2 diabetes mellitus with hyperglycemia (HCC)    Other orders  -     baclofen (LIORESAL) 10 mg tablet; Take 1 Tablet by mouth three (3) times daily. PRN  Indications: muscle spasms caused by a spinal disease  -     semaglutide (OZEMPIC) 0.25 mg or 0.5 mg/dose (2 mg/1.5 ml) subq pen; 0.25 mg by SubCUTAneous route every seven (7) days. Indications: type 2 diabetes mellitus  -     atorvastatin (LIPITOR) 20 mg tablet; Take 1 Tablet by mouth daily.  -     SARS-COV-2, EDVIN 2 DAY TAT      The complexity of medical decision making for this visit is moderate         12  Subjective:       Prior to Admission medications    Medication Sig Start Date End Date Taking? Authorizing Provider   baclofen (LIORESAL) 10 mg tablet Take 1 Tablet by mouth three (3) times daily. PRN  Indications: muscle spasms caused by a spinal disease 8/27/21  Yes Katt Cox NP   semaglutide (OZEMPIC) 0.25 mg or 0.5 mg/dose (2 mg/1.5 ml) subq pen 0.25 mg by SubCUTAneous route every seven (7) days.  Indications: type 2 diabetes mellitus 8/27/21  Yes Katt Cox NP   omeprazole (PRILOSEC) 40 mg capsule Take 1 Capsule by mouth daily. 8/27/21  Yes Hiwot Christiansen NP   atorvastatin (LIPITOR) 20 mg tablet Take 1 Tablet by mouth daily. 8/27/21  Yes Hiwot Christiansen NP   diclofenac (VOLTAREN) 1 % gel Apply 4 g to affected area four (4) times daily. 7/7/21  Yes Hiwot Christiansen NP   fluconazole (DIFLUCAN) 150 mg tablet TAKE 1 TABLET BY MOUTH AND THEN TAKE 1 TABLET 3 DAYS LATER, if needed repeat for 3rd dose 7/7/21  Yes Hiwot Christiansen NP   ketorolac (TORADOL) 10 mg tablet Take 10 mg by mouth. PRN 2/1/21  Yes Provider, Historical   fluticasone propion-salmeteroL (ADVAIR/WIXELA) 250-50 mcg/dose diskus inhaler Take 1 Puff by inhalation every twelve (12) hours. 4/20/21  Yes Diaz Ball NP   insulin glargine (LANTUS,BASAGLAR) 100 unit/mL (3 mL) inpn Take 20 units QHS 3/9/21  Yes Cora Montalvo DO   amitriptyline (ELAVIL) 25 mg tablet Take 1 Tab by mouth nightly. Indications: neuropathic pain  Patient taking differently: Take 25 mg by mouth nightly. As needed  Indications: neuropathic pain 1/12/21  Yes Cora Montalvo DO   DULoxetine (CYMBALTA) 30 mg capsule Take 1 Cap by mouth two (2) times a day. Indications: chronic muscle or bone pain, diabetic complication causing injury to some body nerves  Patient taking differently: Take 30 mg by mouth two (2) times a day. PRN  Indications: chronic muscle or bone pain, diabetic complication causing injury to some body nerves 11/3/20  Yes Cora Montalvo DO   albuterol (PROVENTIL HFA, VENTOLIN HFA, PROAIR HFA) 90 mcg/actuation inhaler Take 2 Puffs by inhalation every four (4) hours as needed for Wheezing or Shortness of Breath. Indications: asthma attack 11/3/20  Yes Cora Montalvo DO   TRUEplus Pen Needle 31 gauge x 3/16\" ndle  7/30/20  Yes Provider, Historical   Insulin Needles, Disposable, 31 gauge x 5/16\" ndle Use to administer insulin SQ QPM as directed.  8/28/20  Yes Valorie Montalvoth R, DO   flash glucose scanning reader ([employer] 14 Day [place]) misc Use to check blood glucose TID and PRN. Dx E11.9 6/12/20  Yes Lonn Friends R, DO   inhalational spacing device 1 Each by Does Not Apply route as needed for Wheezing. Emergency one time authorization for acute symptoms- [facility] necessary  Patient not taking: Reported on 8/26/2021 9/21/20   Serjio Estevez DO     Patient Active Problem List   Diagnosis Code    Type 2 diabetes mellitus without complication, with long-term current use of insulin ([facility] E11.9, Z79.4    Essential hypertension I10    Mixed dyslipidemia E78.2    Type 2 diabetes with nephropathy ([facility] E11.21     Patient Active Problem List    Diagnosis Date Noted    Type 2 diabetes with nephropathy ([facility] 11/03/2020    Mixed dyslipidemia 07/11/2017    Essential hypertension 03/07/2017    Type 2 diabetes mellitus without complication, with long-term current use of insulin ([facility] 12/07/2016     Current Outpatient Medications   Medication Sig Dispense Refill    baclofen (LIORESAL) 10 mg tablet Take 1 Tablet by mouth three (3) times daily. PRN  Indications: muscle spasms caused by a spinal disease 30 Tablet 2    semaglutide (OZEMPIC) 0.25 mg or 0.5 mg/dose (2 mg/1.5 ml) subq pen 0.25 mg by SubCUTAneous route every seven (7) days. Indications: type 2 diabetes mellitus 1 Box 0    omeprazole (PRILOSEC) 40 mg capsule Take 1 Capsule by mouth daily. 90 Capsule 3    atorvastatin (LIPITOR) 20 mg tablet Take 1 Tablet by mouth daily. 30 Tablet 5    diclofenac (VOLTAREN) 1 % gel Apply 4 g to affected area four (4) times daily. 350 g 2    fluconazole (DIFLUCAN) 150 mg tablet TAKE 1 TABLET BY MOUTH AND THEN TAKE 1 TABLET 3 DAYS LATER, if needed repeat for 3rd dose 3 Tablet 5    ketorolac (TORADOL) 10 mg tablet Take 10 mg by mouth. PRN      fluticasone propion-salmeteroL (ADVAIR/WIXELA) 250-50 mcg/dose diskus inhaler Take 1 Puff by inhalation every twelve (12) hours.  1 Inhaler 0    insulin glargine (LANTUS,BASAGLAR) 100 unit/mL (3 mL) inpn Take 20 units QHS 21 mL 3  amitriptyline (ELAVIL) 25 mg tablet Take 1 Tab by mouth nightly. Indications: neuropathic pain (Patient taking differently: Take 25 mg by mouth nightly. As needed  Indications: neuropathic pain) 30 Tab 0    DULoxetine (CYMBALTA) 30 mg capsule Take 1 Cap by mouth two (2) times a day. Indications: chronic muscle or bone pain, diabetic complication causing injury to some body nerves (Patient taking differently: Take 30 mg by mouth two (2) times a day. PRN  Indications: chronic muscle or bone pain, diabetic complication causing injury to some body nerves) 60 Cap 5    albuterol (PROVENTIL HFA, VENTOLIN HFA, PROAIR HFA) 90 mcg/actuation inhaler Take 2 Puffs by inhalation every four (4) hours as needed for Wheezing or Shortness of Breath. Indications: asthma attack 2 Inhaler 5    TRUEplus Pen Needle 31 gauge x 3/16\" ndle       Insulin Needles, Disposable, 31 gauge x 5/16\" ndle Use to administer insulin SQ QPM as directed. 1 Package 11    flash glucose scanning reader ([employer] 14 Day [place]) [facility] Use to check blood glucose TID and PRN. Dx E11.9 1 Each 0    inhalational spacing device 1 Each by Does Not Apply route as needed for Wheezing.  Emergency one time authorization for acute symptoms- please notify [facility] if necessary (Patient not taking: Reported on 8/26/2021) 1 Device 0     No Known Allergies  Past Medical History:   Diagnosis Date    Abnormal EKG 2/27/2018    Abscess 7/24/2019    Asthma     Chest pain 2/27/2018    Chest wall contusion, left, subsequent encounter     Diabetes ([facility]     Diverticular disease     Diverticulitis large intestine w/o perforation or abscess w/o bleeding 10/25/2016    Dyslipidemia     Hypertension     Lumbar radiculopathy 02/01/2021    Menopause     Pelvic pain 7/26/2016    Rib fractures     Right hand pain 7/10/2018    Sciatica of left side 02/01/2021    Uterine leiomyoma 7/26/2016    Vaginal candidiasis 7/24/2019     Past Surgical History: Procedure Laterality Date    HX GYN      BTL    IR INJ FORAMIN EPID LUMB ANES/STER [facility]  5/11/2021     Family History   Problem Relation Age of Onset    Stroke Mother     Diabetes Mother     Heart Disease Father     Diabetes Sister     Diabetes Maternal Grandmother     Breast Cancer Maternal Aunt     Breast Cancer Paternal Aunt      Social History     Tobacco Use    Smoking status: Current Every Day Smoker     Packs/day: 0.50    Smokeless tobacco: Never Used   Substance Use Topics    Alcohol use: No       ROS  Pertinent items are noted in the HPI  Patient-Reported Vitals 9/21/2020   Patient-Reported Temperature 97.7   Patient-Reported LMP none       Melissa Thurman, who was evaluated through a patient-initiated, synchronous (real-time) audio only encounter, and/or her healthcare decision maker, is aware that it is a billable service, with coverage as determined by her insurance carrier. She provided verbal consent to proceed: Yes. She has not had a related appointment within my department in the past 7 days or scheduled within the next 24 hours. On this date 08/26/2021 I have spent 30 minutes reviewing previous notes, test results and face to face (virtual) with the patient discussing the diagnosis and importance of compliance with the treatment plan as well as documenting on the day of the visit.     Gayathri Farfan NP

## 2021-09-08 ENCOUNTER — PATIENT OUTREACH (OUTPATIENT)
Dept: OTHER | Age: 51
End: 2021-09-08

## 2021-09-08 NOTE — PROGRESS NOTES
Follow up phone call to patient, two pt identifiers verified. Discussed patient's goals:   Goals        Patient [address] Coordination for radicular LBP (pt-stated)       3/15-  Care Coordination with PCP office- finding in network provider for Plus plan - neuro surgeon.  Dr. Max Doan elevated BS with steroids to be expected. 5/14: Had injection on 5/11, says back is feeling better, still a little pain. Other      DM  Diabetes Support and Education / Care Coordination       Referred from [facility] with DM. A1C>9    1)  General:   Patient is able to state a basic definition of diabetes including: risk factors, basic pathophysiology, complications and treatment. 2)  Diet:   Patient will be able to read a basic food label and identify role of calories, carbohydrates, protein and fats in healthy eating. 3)  Activity:   Patient will identify the potential health benefits of regular exercise:  decreased cholesterol, improved mood, decreased blood pressure, lower stress/anxiety, weight loss, decreased blood sugar. 4)  Monitoring:  Patient will be able to identify what an A1C test measures. 5)  Medications:  Patient will demonstrate knowledge of diabetes medications. 6)  Risk Reduction:  Patient is able to state goal target for A1C (<7), blood pressure (<130/70), and LDL cholesterol (<100) to reduce diabetes complications. 7)  Problem Solving:  Patient will identify s/sx and treatment for hyper- and hypoglycemia. 8)  Healthy Coping:  Patient will be able to identify two community support resources. Patient will identify two stress relieving techniques.     Patient will identify health risk factors and complications of diabetes  1) Kidney disease  2) Heart disease  3) Neuropathy  4) Skin breakdown  5) Diabetic Retinopathy  6) Prevention:   Stable controlled A1C,  healthy lifestyle (diet, exercise, hydration, stress reduction, monitoring, skin care)   3/15- Pt to have BW this week/  A1C.    8/3: Patient's A1C up to 12.5  - discussed healthy food options. States she eats cheerios or corn flakes for breakfast.  Encouraged lean protein, egg whites, whole grains, yogurt. 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 Will mail nutritional information from [employer]. Encouraged drinking more water, plans to take water bottles with her to work, as they no longer offer water bottles. Has been drinking 3 sodas/day. Will cut these out. Scheduled to meet with a nutritionist tomorrow, .  8/10: Has been able to cut soda, drinks one a day. Drinking water. Trying to meal prep. Bought fruits and veggies. : Patient had visit with nurse practitioner. On some medications to help control diabetes. Still recovering from [facility].  : Patient states glucose has been well controlled. Complaints of blurred vision with new weekly medication. Encouraged her to discuss this with her PCP, states she will tomorrow, as she needs to go to the office to  her \"leave paperwork\". Patient's primary care provider relationship reviewed with patient and modified, as applicable. Patient states she is doing better from [facility], making  arrangements for her father, who passed away due to [facility]. Provided condolences. She states she has not gotten a call back from her absence manager, Rachid Hernández. Let her know I would put in a call to ask if there was any paperwork missing. Called and left voicemail for Padmini Mcdowell, provided my call back number. Will let patient know what I find out.     Readiness to Change: []  Pre-contemplative    []  Contemplative  []  Preparation               [x]  Action                  []  Maintenance    Barriers/Challenges to Care: []  Decline in memory    []  Language barrier     []  Emotional                  [] Limited mobility  []  Lack of motivation     [] Vision, hearing or cognitive impairment []  Knowledge [] Financial Barriers []  Lack of support  []  Pain []  Other [x]  None    Key pt activities to achieve better health:   []  Weight loss  [x]  Improved diabetic control  []  Decreased cholesterol levels  []  Decreased blood pressure  []    []    Upcoming appointments:   Future Appointments   Date Time Provider Ankush Nicholson   9/20/2021  9:50 AM Becky Valdivia NP [facility] AMB     Plan for next call: Call on 9/9. Absence Mgr?

## 2021-09-09 ENCOUNTER — PATIENT OUTREACH (OUTPATIENT)
Dept: OTHER | Age: 51
End: 2021-09-09

## 2021-09-09 NOTE — PROGRESS NOTES
Briefly spoke with patient to let her know I had not heard back from Js Olsen. She states that she has spoke with Js Olsen and was told that she needed to get paperwork to [facility] for her claim. She plans to go to her doctor's office to get the paperwork to send in. She is also concerned with getting her father's  taken care of, which is scheduled for . She does not want to go back to work until after that and was told she needed to have her father's provider to sign off on paperwork for her being out for his illness. Will touch base in two weeks, .

## 2021-09-14 ENCOUNTER — TELEPHONE (OUTPATIENT)
Dept: FAMILY MEDICINE CLINIC | Age: 51
End: 2021-09-14

## 2021-09-14 DIAGNOSIS — E11.65 UNCONTROLLED TYPE 2 DIABETES MELLITUS WITH HYPERGLYCEMIA (HCC): ICD-10-CM

## 2021-09-14 NOTE — TELEPHONE ENCOUNTER
Patient reports she had to stop her injection weekly , Ozempic due to blurred vision and dizziness. She is still taking her 25 units of Lantus insulin daily. Her sugar today was 213.  Do you want her to go back on Januvia, please advise

## 2021-09-23 ENCOUNTER — PATIENT OUTREACH (OUTPATIENT)
Dept: OTHER | Age: 51
End: 2021-09-23

## 2021-09-23 NOTE — PROGRESS NOTES
Briefly spoke with patient, she states she is on her way to her provider's office to address issues with her disability paperwork not getting submitted. Asked her to call me if she is unable to get the issue resolved. Will reach out to them if needed. Next outreach on 10/8.

## 2021-09-28 ENCOUNTER — OFFICE VISIT (OUTPATIENT)
Dept: FAMILY MEDICINE CLINIC | Age: 51
End: 2021-09-28
Payer: COMMERCIAL

## 2021-09-28 VITALS
BODY MASS INDEX: 20.2 KG/M2 | SYSTOLIC BLOOD PRESSURE: 120 MMHG | HEART RATE: 92 BPM | RESPIRATION RATE: 16 BRPM | HEIGHT: 61 IN | WEIGHT: 107 LBS | DIASTOLIC BLOOD PRESSURE: 80 MMHG | OXYGEN SATURATION: 99 % | TEMPERATURE: 97.4 F

## 2021-09-28 DIAGNOSIS — J01.00 ACUTE NON-RECURRENT MAXILLARY SINUSITIS: ICD-10-CM

## 2021-09-28 DIAGNOSIS — H10.31 ACUTE BACTERIAL CONJUNCTIVITIS OF RIGHT EYE: ICD-10-CM

## 2021-09-28 DIAGNOSIS — T39.395A NSAID-ASSOCIATED GASTROPATHY: ICD-10-CM

## 2021-09-28 DIAGNOSIS — Z29.9 PREVENTIVE MEASURE: ICD-10-CM

## 2021-09-28 DIAGNOSIS — K31.9 NSAID-ASSOCIATED GASTROPATHY: ICD-10-CM

## 2021-09-28 DIAGNOSIS — E11.65 UNCONTROLLED TYPE 2 DIABETES MELLITUS WITH HYPERGLYCEMIA (HCC): Primary | ICD-10-CM

## 2021-09-28 PROCEDURE — 99213 OFFICE O/P EST LOW 20 MIN: CPT | Performed by: NURSE PRACTITIONER

## 2021-09-28 PROCEDURE — 36415 COLL VENOUS BLD VENIPUNCTURE: CPT | Performed by: NURSE PRACTITIONER

## 2021-09-28 RX ORDER — POLYMYXIN B SULFATE AND TRIMETHOPRIM 1; 10000 MG/ML; [USP'U]/ML
1 SOLUTION OPHTHALMIC EVERY 4 HOURS
Qty: 1 EACH | Refills: 0 | Status: SHIPPED | OUTPATIENT
Start: 2021-09-28 | End: 2021-10-08

## 2021-09-28 RX ORDER — OMEPRAZOLE 40 MG/1
40 CAPSULE, DELAYED RELEASE ORAL DAILY
Qty: 90 CAPSULE | Refills: 3 | Status: SHIPPED | OUTPATIENT
Start: 2021-09-28 | End: 2021-09-30 | Stop reason: SDUPTHER

## 2021-09-28 NOTE — PROGRESS NOTES
1. Have you been to the ER, urgent care clinic since your last visit? Hospitalized since your last visit? No    2. Have you seen or consulted any other health care providers outside of the [address] [place] [address] since your last visit? Include any pap smears or colon screening.  No      Chief Complaint   Patient presents with    Eye Pain     right with swelling         Visit Vitals  /80 (BP 1 Location: Left upper arm, BP Patient Position: Sitting, BP Cuff Size: Adult)   Pulse 92   Temp 97.4 °F (36.3 °C) (Temporal)   Resp 16   Ht 5' 1\" (1.549 m)   Wt 107 lb (48.5 kg)   SpO2 99%   BMI 20.22 kg/m²       Pain Scale: 9/10  Pain Location: Eye

## 2021-09-29 LAB
EST. AVERAGE GLUCOSE BLD GHB EST-MCNC: 289 MG/DL
HBA1C MFR BLD: 11.7 % (ref 4–5.6)

## 2021-09-30 ENCOUNTER — OFFICE VISIT (OUTPATIENT)
Dept: FAMILY MEDICINE CLINIC | Age: 51
End: 2021-09-30
Payer: COMMERCIAL

## 2021-09-30 VITALS
OXYGEN SATURATION: 98 % | WEIGHT: 107 LBS | BODY MASS INDEX: 20.2 KG/M2 | HEART RATE: 84 BPM | TEMPERATURE: 97.3 F | DIASTOLIC BLOOD PRESSURE: 80 MMHG | SYSTOLIC BLOOD PRESSURE: 140 MMHG | RESPIRATION RATE: 16 BRPM | HEIGHT: 61 IN

## 2021-09-30 DIAGNOSIS — Z29.9 PREVENTIVE MEASURE: ICD-10-CM

## 2021-09-30 DIAGNOSIS — J01.00 ACUTE NON-RECURRENT MAXILLARY SINUSITIS: ICD-10-CM

## 2021-09-30 DIAGNOSIS — K31.9 NSAID-ASSOCIATED GASTROPATHY: ICD-10-CM

## 2021-09-30 DIAGNOSIS — E11.65 UNCONTROLLED TYPE 2 DIABETES MELLITUS WITH HYPERGLYCEMIA (HCC): ICD-10-CM

## 2021-09-30 DIAGNOSIS — T39.395A NSAID-ASSOCIATED GASTROPATHY: ICD-10-CM

## 2021-09-30 DIAGNOSIS — H10.31 ACUTE BACTERIAL CONJUNCTIVITIS OF RIGHT EYE: Primary | ICD-10-CM

## 2021-09-30 PROCEDURE — 96372 THER/PROPH/DIAG INJ SC/IM: CPT | Performed by: NURSE PRACTITIONER

## 2021-09-30 PROCEDURE — 99214 OFFICE O/P EST MOD 30 MIN: CPT | Performed by: NURSE PRACTITIONER

## 2021-09-30 RX ORDER — FLUCONAZOLE 150 MG/1
TABLET ORAL
Qty: 3 TABLET | Refills: 5 | Status: SHIPPED | OUTPATIENT
Start: 2021-09-30 | End: 2021-09-30 | Stop reason: SDUPTHER

## 2021-09-30 RX ORDER — FLUCONAZOLE 150 MG/1
TABLET ORAL
Qty: 3 TABLET | Refills: 5 | Status: SHIPPED | OUTPATIENT
Start: 2021-09-30 | End: 2022-03-15

## 2021-09-30 RX ORDER — INSULIN GLARGINE 100 [IU]/ML
INJECTION, SOLUTION SUBCUTANEOUS
Qty: 21 ML | Refills: 3 | Status: SHIPPED | OUTPATIENT
Start: 2021-09-30 | End: 2021-10-27 | Stop reason: SDUPTHER

## 2021-09-30 RX ORDER — AMOXICILLIN AND CLAVULANATE POTASSIUM 500; 125 MG/1; MG/1
1 TABLET, FILM COATED ORAL EVERY 12 HOURS
Qty: 20 TABLET | Refills: 0 | Status: SHIPPED | OUTPATIENT
Start: 2021-09-30 | End: 2021-09-30 | Stop reason: SDUPTHER

## 2021-09-30 RX ORDER — AMOXICILLIN AND CLAVULANATE POTASSIUM 500; 125 MG/1; MG/1
1 TABLET, FILM COATED ORAL EVERY 12 HOURS
Qty: 20 TABLET | Refills: 0 | Status: SHIPPED | OUTPATIENT
Start: 2021-09-30 | End: 2021-10-26 | Stop reason: ALTCHOICE

## 2021-09-30 RX ORDER — OMEPRAZOLE 40 MG/1
40 CAPSULE, DELAYED RELEASE ORAL DAILY
Qty: 90 CAPSULE | Refills: 3 | Status: SHIPPED | OUTPATIENT
Start: 2021-09-30 | End: 2022-03-15

## 2021-09-30 RX ORDER — CEFTRIAXONE 1 G/1
1 INJECTION, POWDER, FOR SOLUTION INTRAMUSCULAR; INTRAVENOUS ONCE
Qty: 1 G | Refills: 0
Start: 2021-09-30 | End: 2021-09-30

## 2021-09-30 NOTE — PROGRESS NOTES
Patient was administered rocephin 1 gram  via IM . Patient tolerated medication without noted side effects. Medication information reviewed with patient, patient states understanding. Patient to resume routine medications at home. Patient given copy of AVS with medication information and instructions for home. AVS reviewed with patient and patient states understanding.

## 2021-09-30 NOTE — PROGRESS NOTES
Patient verified by stating name and date of birth.  Patient informed of lab results and states understanding per Pippa Boyd

## 2021-09-30 NOTE — PATIENT INSTRUCTIONS
Pinkeye: Care Instructions  Your Care Instructions     Pinkeye is redness and swelling of the eye surface and the conjunctiva (the lining of the eyelid and the covering of the white part of the eye). Pinkeye is also called conjunctivitis. Pinkeye is often caused by infection with bacteria or a virus. Dry air, allergies, smoke, and chemicals are other common causes. Pinkeye often clears on its own in 7 to 10 days. Antibiotics only help if the pinkeye is caused by bacteria. Pinkeye caused by infection spreads easily. If an allergy or chemical is causing pinkeye, it will not go away unless you can avoid whatever is causing it. Follow-up care is a key part of your treatment and safety. Be sure to make and go to all appointments, and call your doctor if you are having problems. It's also a good idea to know your test results and keep a list of the medicines you take. How can you care for yourself at home? · Wash your hands often. Always wash them before and after you treat pinkeye or touch your eyes or face. · Use moist cotton or a clean, wet cloth to remove crust. Wipe from the inside corner of the eye to the outside. Use a clean part of the cloth for each wipe. · Put cold or warm wet cloths on your eye a few times a day if the eye hurts. · Do not wear contact lenses or eye makeup until the pinkeye is gone. Throw away any eye makeup you were using when you got pinkeye. Clean your contacts and storage case. If you wear disposable contacts, use a new pair when your eye has cleared and it is safe to wear contacts again. · If the doctor gave you antibiotic ointment or eyedrops, use them as directed. Use the medicine for as long as instructed, even if your eye starts looking better soon. Keep the bottle tip clean, and do not let it touch the eye area. · To put in eyedrops or ointment:  ? Tilt your head back, and pull your lower eyelid down with one finger. ?  Drop or squirt the medicine inside the lower lid.  ? Close your eye for 30 to 60 seconds to let the drops or ointment move around. ? Do not touch the ointment or dropper tip to your eyelashes or any other surface. · Do not share towels, pillows, or washcloths while you have pinkeye. When should you call for help? Call your doctor now or seek immediate medical care if:    · You have pain in your eye, not just irritation on the surface.     · You have a change in vision or loss of vision.     · You have an increase in discharge from the eye.     · Your eye has not started to improve or begins to get worse within 48 hours after you start using antibiotics.     · Pinkeye lasts longer than 7 days. Watch closely for changes in your health, and be sure to contact your doctor if you have any problems. Where can you learn more? Go to http://www.gray.com/  Enter Y392 in the search box to learn more about \"Pinkeye: Care Instructions. \"  Current as of: July 1, 2021               Content Version: 13.0  © 2006-2021 [employer]wise[employer]. Care instructions adapted under license by [employer] (which disclaims liability or warranty for this information). If you have questions about a medical condition or this instruction, always ask your healthcare professional. [address] any warranty or liability for your use of this information.

## 2021-10-01 NOTE — PROGRESS NOTES
Subjective:   Renzo Abdullahi is a 48 y.o. female who presents for evaluation of redness, exudate and swelling . She has noticed the above symptoms in the right eye for 7 days. Onset was sudden. Symptoms have included tearing, pain, foreign body sensation, discharge, erythema, itching. Patient denies blurred vision, visual field deficit. Refills as noted below. Review and complete FMLA paper work. Attached  Review of Systems  eye pain    Objective:     Visit Vitals  /80 (BP 1 Location: Left upper arm, BP Patient Position: Sitting, BP Cuff Size: Adult)   Pulse 92   Temp 97.4 °F (36.3 °C) (Temporal)   Resp 16   Ht 5' 1\" (1.549 m)   Wt 107 lb (48.5 kg)   SpO2 99%   BMI 20.22 kg/m²                 General: alert, cooperative, no distress, appears stated age   Eyes:  positive findings: eyelids/periorbital: ecchymosis on the right, conjunctivae: negative injection,  bacterial conjunctivitis, trace exudate    Vision: Not performed   Fluorescein:  {not done      Assessment/Plan:     acute conjunctivitis, sinusitis    1. Discussed the diagnosis and proper care of conjunctivitis. Stressed household hygiene. 2. Ophthalmic drops per orders. 3. Antibiotics per orders. 4. Warm compress to eye(s). 5. Local eye care discussed. 6. Patient instructions: contagiousness precautions, compresses: every 4 hours. 7. Risks and side effects of medications was discussed. 8. Pt advised to read written information provided by the pharmacist: yes  9. Followup as needed. ICD-10-CM ICD-9-CM    1. Uncontrolled type 2 diabetes mellitus with hyperglycemia (HCC)  E11.65 250.02 HEMOGLOBIN A1C WITH EAG      COLLECTION VENOUS BLOOD,VENIPUNCTURE      HEMOGLOBIN A1C WITH EAG   2. Acute bacterial conjunctivitis of right eye  H10.31 372.03 trimethoprim-polymyxin b (POLYTRIM) ophthalmic solution   3. Preventive measure  Z29.9 V07.9 DISCONTINUED: omeprazole (PRILOSEC) 40 mg capsule   4.  NSAID-associated gastropathy  K31.9 537.9 DISCONTINUED: omeprazole (PRILOSEC) 40 mg capsule    T39.395A E935.8    5. Acute non-recurrent maxillary sinusitis  J01.00 461.0      Orders Placed This Encounter    COLLECTION VENOUS BLOOD,VENIPUNCTURE    HEMOGLOBIN A1C WITH EAG    trimethoprim-polymyxin b (POLYTRIM) ophthalmic solution    DISCONTD: omeprazole (PRILOSEC) 40 mg capsule     Diagnoses and all orders for this visit:    1. Uncontrolled type 2 diabetes mellitus with hyperglycemia (HCC)  -     HEMOGLOBIN A1C WITH EAG; Future  -     COLLECTION VENOUS BLOOD,VENIPUNCTURE    2. Acute bacterial conjunctivitis of right eye  -     trimethoprim-polymyxin b (POLYTRIM) ophthalmic solution; Administer 1 Drop to right eye every four (4) hours for 10 days. 3. Preventive measure    4. NSAID-associated gastropathy    5. Acute non-recurrent maxillary sinusitis        .   Ivone N[place]

## 2021-10-02 NOTE — PROGRESS NOTES
Subjective:     Shahab Grady is a 48 y.o. female who presents today with the following:  Chief Complaint   Patient presents with   [address]     rt       Patient Active Problem List   Diagnosis Code    Type 2 diabetes mellitus without complication, with long-term current use of insulin ([address] E11.9, Z79.4    Essential hypertension I10    Mixed dyslipidemia E78.2    Type 2 diabetes with nephropathy ([address] E11.21         COMPLIANT WITH MEDICATION:   Denies chest pain, dyspnea, palpitations, headache and blurred vision. Blood pressure mild elevation today. Conjunctivitis: see on 9/28 2 days ago dx and treatment . She presents today with right eye swollen ,glued together. Right side of face is puffy and tender to the touch. Wm compresses every 4 hours    We briefly discussed adjusting insulin to help lower HGBA1C and help with preventing further infections. GERD requesting refill of GERD medication stable. Effective. depression screening addressed not at risk    abuse screening addressed denies    learning assessment addressed reviewed nurses notes    fall risk addressed not at risk    HM: addressed sick visit. ROS:  Gen: denies fever, chills, fatigue, weight loss, weight gain  HEENT: endorsed painful,tearing right eye.  , right eye photophobia, blurry vision, negative nasal congestion, sore throat  Resp: denies dypsnea, cough, wheezing  CV: denies chest pain radiating to the jaws or arms, palpitations, lower extremity edema  Abd: denies nausea, vomiting, diarrhea, constipation  Neuro: denies numbness/tingling  Endo: denies polyuria, polydipsia, heat/cold intolerance  Heme: no lymphadenopathy    No Known Allergies      Current Outpatient Medications:     insulin glargine (LANTUS,BASAGLAR) 100 unit/mL (3 mL) inpn, Take 27 units QHS  Indications: type 2 diabetes mellitus, Disp: 21 mL, Rfl: 3    omeprazole (PRILOSEC) 40 mg capsule, Take 1 Capsule by mouth daily. , Disp: 90 Capsule, Rfl: 3   amoxicillin-clavulanate (AUGMENTIN) 500-125 mg per tablet, Take 1 Tablet by mouth every twelve (12) hours. , Disp: 20 Tablet, Rfl: 0    fluconazole (DIFLUCAN) 150 mg tablet, TAKE 1 TABLET BY MOUTH AND THEN TAKE 1 TABLET 3 DAYS LATER, if needed repeat for 3rd dose, Disp: 3 Tablet, Rfl: 5    trimethoprim-polymyxin b (POLYTRIM) ophthalmic solution, Administer 1 Drop to right eye every four (4) hours for 10 days. , Disp: 1 Each, Rfl: 0    SITagliptin (Januvia) 100 mg tablet, TAKE 1 TABLET BY MOUTH ONE TIME A DAY, Disp: 30 Tablet, Rfl: 5    baclofen (LIORESAL) 10 mg tablet, Take 1 Tablet by mouth three (3) times daily. PRN  Indications: muscle spasms caused by a spinal disease, Disp: 30 Tablet, Rfl: 2    atorvastatin (LIPITOR) 20 mg tablet, Take 1 Tablet by mouth daily. , Disp: 30 Tablet, Rfl: 5    diclofenac (VOLTAREN) 1 % gel, Apply 4 g to affected area four (4) times daily. , Disp: 350 g, Rfl: 2    ketorolac (TORADOL) 10 mg tablet, Take 10 mg by mouth. PRN, Disp: , Rfl:     fluticasone propion-salmeteroL (ADVAIR/WIXELA) 250-50 mcg/dose diskus inhaler, Take 1 Puff by inhalation every twelve (12) hours. , Disp: 1 Inhaler, Rfl: 0    amitriptyline (ELAVIL) 25 mg tablet, Take 1 Tab by mouth nightly. Indications: neuropathic pain (Patient taking differently: Take 25 mg by mouth nightly. As needed  Indications: neuropathic pain), Disp: 30 Tab, Rfl: 0    DULoxetine (CYMBALTA) 30 mg capsule, Take 1 Cap by mouth two (2) times a day. Indications: chronic muscle or bone pain, diabetic complication causing injury to some body nerves (Patient taking differently: Take 30 mg by mouth two (2) times a day. PRN  Indications: chronic muscle or bone pain, diabetic complication causing injury to some body nerves), Disp: 60 Cap, Rfl: 5    albuterol (PROVENTIL HFA, VENTOLIN HFA, PROAIR HFA) 90 mcg/actuation inhaler, Take 2 Puffs by inhalation every four (4) hours as needed for Wheezing or Shortness of Breath.  Indications: asthma attack, Disp: 2 Inhaler, Rfl: 5    TRUEplus Pen Needle 31 gauge x 3/16\" ndle, , Disp: , Rfl:     Insulin Needles, Disposable, 31 gauge x 5/16\" ndle, Use to administer insulin SQ QPM as directed., Disp: 1 Package, Rfl: 11    flash glucose scanning reader ([email or web address] [address]) misc, Use to check blood glucose TID and PRN. Dx E11.9, Disp: 1 Each, Rfl: 0    inhalational spacing device, 1 Each by Does Not Apply route as needed for Wheezing.  Emergency one time authorization for acute symptoms- please notify [facility] if necessary (Patient not taking: Reported on 8/26/2021), Disp: 1 Device, Rfl: 0    Past Medical History:   Diagnosis Date    Abnormal EKG 2/27/2018    Abscess 7/24/2019    Asthma     Chest pain 2/27/2018    Chest wall contusion, left, subsequent encounter     Diabetes [facility]     Diverticular disease     Diverticulitis large intestine w/o perforation or abscess w/o bleeding 10/25/2016    Dyslipidemia     Hypertension     Lumbar radiculopathy 02/01/2021    Menopause     Pelvic pain 7/26/2016    Rib fractures     Right hand pain 7/10/2018    Sciatica of left side 02/01/2021    Uterine leiomyoma 7/26/2016    Vaginal candidiasis 7/24/2019       Past Surgical History:   Procedure Laterality Date    HX GYN      BTL    IR INJ FORAMIN EPID LUMB ANES/[facility]  5/11/2021       Social History     Tobacco Use   Smoking Status Current Every Day Smoker    Packs/day: 0.50   Smokeless Tobacco Never Used       Social History     Socioeconomic History    Marital status: SINGLE     Spouse name: Not on file    Number of children: Not on file    Years of education: Not on file    Highest education level: Not on file   Tobacco Use    Smoking status: Current Every Day Smoker     Packs/day: 0.50    Smokeless tobacco: Never Used   Vaping Use    Vaping Use: Never used   Substance and Sexual Activity    Alcohol use: No    Drug use: No    Sexual activity: Yes     Partners: Male     Birth control/protection: Surgical     Social Determinants of Health     Financial Resource Strain:     Difficulty of Paying Living Expenses:    Food Insecurity:     Worried About Running Out of Food in the Last Year:     [address] in the Last Year:    Transportation Needs:     Lack of Transportation (Medical):  Lack of Transportation (Non-Medical):    Physical Activity:     Days of Exercise per Week:     Minutes of Exercise per Session:    Stress:     Feeling of Stress :    Social Connections:     Frequency of Communication with Friends and Family:     Frequency of Social Gatherings with Friends and Family:     Attends Buddhism Services:     Active Member of Clubs or Organizations:     Attends Club or Organization Meetings:     Marital Status:        Family History   Problem Relation Age of Onset    Stroke Mother     Diabetes Mother     Heart Disease Father     Diabetes Sister     Diabetes Maternal Grandmother     Breast Cancer Maternal Aunt     Breast Cancer Paternal Aunt          Objective:     Visit Vitals  BP (!) 140/80 (BP 1 Location: Right upper arm, BP Patient Position: Sitting, BP Cuff Size: Adult)   Pulse 84   Temp 97.3 °F (36.3 °C) (Temporal)   Resp 16   Ht 5' 1\" (1.549 m)   Wt 107 lb (48.5 kg)   SpO2 98%   BMI 20.22 kg/m²     Body mass index is 20.22 kg/m². General: Alert and oriented. Mild  distress. Well nourished  HEENT : facial edema noted from above and below right eye  And right maxillary sinus. Facial tenderness. Ears:TMs are normal. Canals are clear. Eyes:right eye erythematous, photophobic exquisite discomfort. Sterile saline to cleanse eyelid to remove exudate. Injected sclera   . Nose: patent. Mouth and throat is clear. Neck:supple full range of motion no thyromegaly. Trachea midline, No carotid bruits. No significant lymphadenopathy  Lungs[de-identified] clear to auscultation without wheezes, rales, or rhonchi. Heart :RRR, S1 & S2 are normal intensity.  No murmur; no gallop  Abdomen: bowel sounds active. No tenderness, guarding, rebound, masses, hepatic or spleen enlargement  Back: no CVA tenderness. Extremities: without clubbing, cyanosis, or edema  Pulses: radial and femoral pulses are normal  Neuro: HMF intact. Cranial nerves II through XII grossly normal.  Motor: is 5 over 5 and symmetrical.   Deep tendon reflexes: +2 equal  Psych:appropriate behavior, mood, affect and judgement. Results for orders placed or performed in visit on 09/28/21   HEMOGLOBIN A1C WITH EAG   Result Value Ref Range    Hemoglobin A1c 11.7 (H) 4.0 - 5.6 %    Est. average glucose 289 mg/dL       No results found for this visit on 09/30/21. Assessment/ Plan:     1. Acute bacterial conjunctivitis of right eye    - CEFTRIAXONE SODIUM INJECTION  MG  - ME THER/PROPH/DIAG INJECTION, SUBCUT/IM    2. Uncontrolled type 2 diabetes mellitus with hyperglycemia (HCC)    - insulin glargine (LANTUS,BASAGLAR) 100 unit/mL (3 mL) inpn; Take 27 units QHS  Indications: type 2 diabetes mellitus  Dispense: 21 mL; Refill: 3    3. Preventive measure    - omeprazole (PRILOSEC) 40 mg capsule; Take 1 Capsule by mouth daily. Dispense: 90 Capsule; Refill: 3    4. NSAID-associated gastropathy    - omeprazole (PRILOSEC) 40 mg capsule; Take 1 Capsule by mouth daily. Dispense: 90 Capsule; Refill: 3    5. Acute non-recurrent maxillary sinusitis    - CEFTRIAXONE SODIUM INJECTION  MG  - ME THER/PROPH/DIAG INJECTION, SUBCUT/IM      Orders Placed This Encounter    CEFTRIAXONE SODIUM INJECTION  MG (Qty 4 for 1 gm)     Order Specific Question:   Charge Quantity?      Answer:   4     Order Specific Question:   Dose     Answer:   250 mg     Order Specific Question:   Site     Answer:   LEFT GLUTEUS     Order Specific Question:   Expiration Date     Answer:   3/31/2022     Order Specific Question:   Lot#     Answer:   [id number]     Order Specific Question:        Answer:   WG bonnie     Order Specific Question:   Perfomed by/Witnessed by: Answer:   Phyllis Bravo     Order Specific Question:   NDC#     Answer:   [id number] [[id number]]    THER/PROPH/DIAG INJECTION, SUBCUT/IM    insulin glargine (LANTUS,BASAGLAR) 100 unit/mL (3 mL) inpn     Sig: Take 27 units QHS  Indications: type 2 diabetes mellitus     Dispense:  21 mL     Refill:  3     Lantus per insurance    omeprazole (PRILOSEC) 40 mg capsule     Sig: Take 1 Capsule by mouth daily. Dispense:  90 Capsule     Refill:  3    DISCONTD: amoxicillin-clavulanate (AUGMENTIN) 500-125 mg per tablet     Sig: Take 1 Tablet by mouth every twelve (12) hours. Dispense:  20 Tablet     Refill:  0    DISCONTD: fluconazole (DIFLUCAN) 150 mg tablet     Sig: TAKE 1 TABLET BY MOUTH AND THEN TAKE 1 TABLET 3 DAYS LATER, if needed repeat for 3rd dose     Dispense:  3 Tablet     Refill:  5    amoxicillin-clavulanate (AUGMENTIN) 500-125 mg per tablet     Sig: Take 1 Tablet by mouth every twelve (12) hours. Dispense:  20 Tablet     Refill:  0    fluconazole (DIFLUCAN) 150 mg tablet     Sig: TAKE 1 TABLET BY MOUTH AND THEN TAKE 1 TABLET 3 DAYS LATER, if needed repeat for 3rd dose     Dispense:  3 Tablet     Refill:  5    cefTRIAXone (Rocephin) 1 gram injection     Si g by IntraMUSCular route once for 1 dose. Dispense:  1 g     Refill:  0         Verbal and written instructions (see AVS) provided. Patient expresses understanding of diagnosis and treatment plan.     Health Maintenance Due   Topic Date Due    Hepatitis C Screening  Never done    Colorectal Cancer Screening Combo  Never done    Shingrix Vaccine Age 50> (1 of 2) Never done    Flu Vaccine (1) 2021               PATO Giraldo

## 2021-10-08 ENCOUNTER — PATIENT OUTREACH (OUTPATIENT)
Dept: OTHER | Age: 51
End: 2021-10-08

## 2021-10-08 ENCOUNTER — TELEPHONE (OUTPATIENT)
Dept: FAMILY MEDICINE CLINIC | Age: 51
End: 2021-10-08
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
## 2021-10-08 NOTE — TELEPHONE ENCOUNTER
Patient missed her appt on Wed. Needs letter in order to go back to work on Monday. Patient was made aware that there was no provider in office today. Wants nurse to call her about getting note.

## 2021-10-09 NOTE — TELEPHONE ENCOUNTER
Note authorized return to work on Monday OCt 11 th. Please contact patient Elvin Bustamante . Thanks!  Tato PASCUAL

## 2021-10-25 ENCOUNTER — PATIENT OUTREACH (OUTPATIENT)
Dept: OTHER | Age: 51
End: 2021-10-25

## 2021-10-25 NOTE — PROGRESS NOTES
Follow up phone call to patient, two pt identifiers verified. Discussed patient's goals:   Goals        Patient [address] Coordination for radicular LBP (pt-stated)       3/15-  Care Coordination with PCP office- finding in network provider for Plus plan - neuro surgeon.  Dr. Edwina Ramos elevated BS with steroids to be expected. 5/14: Had injection on 5/11, says back is feeling better, still a little pain. Other      DM  Diabetes Support and Education / Care Coordination       Referred from [facility]. A1C>9    1)  General:   Patient is able to state a basic definition of diabetes including: risk factors, basic pathophysiology, complications and treatment. 2)  Diet:   Patient will be able to read a basic food label and identify role of calories, carbohydrates, protein and fats in healthy eating. 3)  Activity:   Patient will identify the potential health benefits of regular exercise:  decreased cholesterol, improved mood, decreased blood pressure, lower stress/anxiety, weight loss, decreased blood sugar. 4)  Monitoring:  Patient will be able to identify what an A1C test measures. 5)  Medications:  Patient will demonstrate knowledge of diabetes medications. 6)  Risk Reduction:  Patient is able to state goal target for A1C (<7), blood pressure (<130/70), and LDL cholesterol (<100) to reduce diabetes complications. 7)  Problem Solving:  Patient will identify s/sx and treatment for hyper- and hypoglycemia. 8)  Healthy Coping:  Patient will be able to identify two community support resources. Patient will identify two stress relieving techniques.     Patient will identify health risk factors and complications of diabetes  1) Kidney disease  2) Heart disease  3) Neuropathy  4) Skin breakdown  5) Diabetic Retinopathy  6) Prevention:   Stable controlled A1C,  healthy lifestyle (diet, exercise, hydration, stress reduction, monitoring, skin care)   3/15- Pt to have BW this week/  A1C.    8/3: Patient's A1C up to 12.5 7/21 - discussed healthy food options. States she eats cheerios or corn flakes for breakfast.  Encouraged lean protein, egg whites, whole grains, yogurt. 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 Will mail nutritional information from [employer]. Encouraged drinking more water, plans to take water bottles with her to work, as they no longer offer water bottles. Has been drinking 3 sodas/day. Will cut these out. Scheduled to meet with a nutritionist tomorrow, 8/4.  8/10: Has been able to cut soda, drinks one a day. Drinking water. Trying to meal prep. Bought fruits and veggies. 8/27: Patient had visit with nurse practitioner. On some medications to help control diabetes. Still recovering from [place].  9/8: Patient states glucose has been well controlled. Complaints of blurred vision with new weekly medication. Encouraged her to discuss this with her PCP, states she will tomorrow, as she needs to go to the office to  her \"leave paperwork\". 9/23: Discussed medication issues. Back on jardiance. 10/8: Diabetes, glucose has been in lower 200s. Taking 27 units of Lantus/daily, Januvia. Working on getting glucose under better control. Has been stressed out with everything, loss of father, covid, pink eye. Has gotten STD approved and has received check. 10/25: Blood sugars are still up and down. Taking januvia and lantus 27 units. Has appointment to go to doctor tomorrow to get STD paperwork filled out for 10 days she missed due to \"pink eye\". Discussed trying to decrease stress levels to help with glucose. Patient's primary care provider relationship reviewed with patient and modified, as applicable.     Readiness to Change: []  Pre-contemplative    []  Contemplative  []  Preparation [x]  Action                  []  Maintenance    Barriers/Challenges to Care: []  Decline in memory    []  Language barrier     []  Emotional                  []  Limited mobility  []  Lack of motivation     [] Vision, hearing or cognitive impairment []  Knowledge [] Financial Barriers []  Lack of support  []  Pain []  Other [x]  None    Key pt activities to achieve better health:   []  Weight loss  [x]  Improved diabetic control  []  Decreased cholesterol levels  []  Decreased blood pressure  []    []    Upcoming appointments:   Future Appointments   Date Time Provider Ankush Nicholson   10/26/2021 11:10 AM Destiney Hui NP [facility] BS AMB     Plan for next call: Call in one month, 11/15.

## 2021-10-26 ENCOUNTER — OFFICE VISIT (OUTPATIENT)
Dept: FAMILY MEDICINE CLINIC | Age: 51
End: 2021-10-26
Payer: COMMERCIAL

## 2021-10-26 VITALS
HEART RATE: 68 BPM | HEIGHT: 61 IN | DIASTOLIC BLOOD PRESSURE: 60 MMHG | SYSTOLIC BLOOD PRESSURE: 122 MMHG | OXYGEN SATURATION: 98 % | BODY MASS INDEX: 20.58 KG/M2 | RESPIRATION RATE: 18 BRPM | TEMPERATURE: 97.2 F | WEIGHT: 109 LBS

## 2021-10-26 DIAGNOSIS — E11.65 UNCONTROLLED TYPE 2 DIABETES MELLITUS WITH HYPERGLYCEMIA (HCC): Primary | ICD-10-CM

## 2021-10-26 PROCEDURE — 99214 OFFICE O/P EST MOD 30 MIN: CPT | Performed by: NURSE PRACTITIONER

## 2021-10-26 NOTE — PROGRESS NOTES
1. Have you been to the ER, urgent care clinic since your last visit? Hospitalized since your last visit? No    2. Have you seen or consulted any other health care providers outside of the [address] [place] [address] since your last visit? Include any pap smears or colon screening.   Della [facility]  10-      Chief Complaint   Patient presents with    Other     PAPERS FOR fAMILY lEAVE         Visit Vitals  /60 (BP 1 Location: Right upper arm, BP Patient Position: Sitting, BP Cuff Size: Adult)   Pulse 68   Temp 97.2 °F (36.2 °C) (Temporal)   Resp 18   Ht 5' 1\" (1.549 m)   Wt 109 lb (49.4 kg)   SpO2 98%   BMI 20.60 kg/m²       Pain Scale: 0 - No pain/10  Pain Location:

## 2021-10-26 NOTE — PROGRESS NOTES
Subjective:     Steve Brooke is a 48 y.o. female who presents today with the following:  Chief Complaint   Patient presents with    Other     PAPERS FOR fAMILY lEAVE    Blood sugar problem       Patient Active Problem List   Diagnosis Code    Type 2 diabetes mellitus without complication, with long-term current use of insulin ([facility] [address] E11.9, Z79.4    Essential hypertension I10    Mixed dyslipidemia E78.2    Type 2 diabetes with nephropathy ([facility] [address] E11.21         COMPLIANT WITH MEDICATION:   Denies chest pain, dyspnea, palpitations, headache and blurred vision. Blood pressure normotensive. FMLA Paperwork: patient requesting paperwork be filled out and faxed to employer following 10 days of missed work following conjunctivitis infection between 9/30-10/11. Uncontrolled T2DM: Pt states her blood glucose levels have been very high and poorly controlled lately. She checks once in the AM and once in PM and averages in the 300s. Currently taking 27 units of Lantus in evenings along with Januvia once a day. She states she has not been eating \"like she should\" but does get exercise regularly at work on her feet. She has had multiple stressful life events as of late which she attributes to her poor control but is motivated to get her blood sugar lower. Recently had her eyes examined and was told that her eye sight is poor due to diabetes per patient. Diabetes.   Sugars controlled poorly  Hypoglycemia: none  Tolerating current treatment well  Current medications include diet  Insulin and januvia     Lab Results   Component Value Date/Time    Hemoglobin A1c 11.7 (H) 09/28/2021 05:00 PM    Hemoglobin A1c 12.5 (H) 07/13/2021 04:53 PM    Hemoglobin A1c 9.1 (H) 11/03/2020 01:53 PM    Glucose 517 (H) 07/13/2021 04:53 PM    Glucose  07/21/2014 04:03 PM    Microalb/Creat ratio (ug/mg creat.) 365 (H) 05/08/2020 09:25 AM    Microalbumin/creat ratio (POC) >300 07/07/2021 05:05 PM    LDL,Direct 202 (H) 07/13/2021 04:53 PM    LDL, calculated Not calculated due to elevated triglyceride level 07/13/2021 04:53 PM    Creatinine 1.08 (H) 07/13/2021 04:53 PM     Lab Results   Component Value Date/Time    Microalb/Creat ratio (ug/mg creat.) 365 (H) 05/08/2020 09:25 AM       Last eye exam performed  less than 1 year  ago and was abnormal: conjunctives and vision changes . She is planning to get glasses. Last foot exam 07/11/2021 performed less than 1 year ago. warm, good capillary refill      depression screening addressed not at risk    abuse screening addressed denies    learning assessment addressed reviewed nurses notes    fall risk addressed not at risk    HM: addressed. FIT test provided. ROS:  Gen: denies fever, chills, fatigue, weight loss, weight gain  HEENT:denies blurry vision, nasal congestion, sore throat  Resp: denies dypsnea, cough, wheezing  CV: denies chest pain radiating to the jaws or arms, palpitations, lower extremity edema  Abd: denies nausea, vomiting, diarrhea, constipation  Neuro: denies numbness/tingling  Endo: denies polyuria, polydipsia, heat/cold intolerance  Heme: no lymphadenopathy    No Known Allergies      Current Outpatient Medications:     insulin glargine (LANTUS,BASAGLAR) 100 unit/mL (3 mL) inpn, Take 27 units QHS  Indications: type 2 diabetes mellitus, Disp: 21 mL, Rfl: 3    omeprazole (PRILOSEC) 40 mg capsule, Take 1 Capsule by mouth daily. , Disp: 90 Capsule, Rfl: 3    SITagliptin (Januvia) 100 mg tablet, TAKE 1 TABLET BY MOUTH ONE TIME A DAY, Disp: 30 Tablet, Rfl: 5    baclofen (LIORESAL) 10 mg tablet, Take 1 Tablet by mouth three (3) times daily. PRN  Indications: muscle spasms caused by a spinal disease, Disp: 30 Tablet, Rfl: 2    atorvastatin (LIPITOR) 20 mg tablet, Take 1 Tablet by mouth daily. , Disp: 30 Tablet, Rfl: 5    diclofenac (VOLTAREN) 1 % gel, Apply 4 g to affected area four (4) times daily. , Disp: 350 g, Rfl: 2    ketorolac (TORADOL) 10 mg tablet, Take 10 mg by mouth. PRN, Disp: , Rfl:     fluticasone propion-salmeteroL (ADVAIR/WIXELA) 250-50 mcg/dose diskus inhaler, Take 1 Puff by inhalation every twelve (12) hours. , Disp: 1 Inhaler, Rfl: 0    amitriptyline (ELAVIL) 25 mg tablet, Take 1 Tab by mouth nightly. Indications: neuropathic pain (Patient taking differently: Take 25 mg by mouth nightly. As needed  Indications: neuropathic pain), Disp: 30 Tab, Rfl: 0    DULoxetine (CYMBALTA) 30 mg capsule, Take 1 Cap by mouth two (2) times a day. Indications: chronic muscle or bone pain, diabetic complication causing injury to some body nerves (Patient taking differently: Take 30 mg by mouth two (2) times a day. PRN  Indications: chronic muscle or bone pain, diabetic complication causing injury to some body nerves), Disp: 60 Cap, Rfl: 5    albuterol (PROVENTIL HFA, VENTOLIN HFA, PROAIR HFA) 90 mcg/actuation inhaler, Take 2 Puffs by inhalation every four (4) hours as needed for Wheezing or Shortness of Breath. Indications: asthma attack, Disp: 2 Inhaler, Rfl: 5    TRUEplus Pen Needle 31 gauge x 3/16\" ndle, , Disp: , Rfl:     Insulin Needles, Disposable, 31 gauge x 5/16\" ndle, Use to administer insulin SQ QPM as directed., Disp: 1 Package, Rfl: 11    flash glucose scanning reader ([employer] 14 Day [place]) [facility], Use to check blood glucose TID and PRN. Dx E11.9, Disp: 1 Each, Rfl: 0    fluconazole (DIFLUCAN) 150 mg tablet, TAKE 1 TABLET BY MOUTH AND THEN TAKE 1 TABLET 3 DAYS LATER, if needed repeat for 3rd dose (Patient not taking: Reported on 10/26/2021), Disp: 3 Tablet, Rfl: 5    inhalational spacing device, 1 Each by Does Not Apply route as needed for Wheezing.  Emergency one time authorization for acute symptoms- please notify [facility] health if necessary (Patient not taking: Reported on 8/26/2021), Disp: 1 Device, Rfl: 0    Past Medical History:   Diagnosis Date    Abnormal EKG 2/27/2018    Abscess 7/24/2019    Asthma     Chest pain 2/27/2018    Chest wall contusion, left, subsequent encounter     Diabetes ([facility] [address]     Diverticular disease     Diverticulitis large intestine w/o perforation or abscess w/o bleeding 10/25/2016    Dyslipidemia     Hypertension     Lumbar radiculopathy 02/01/2021    Menopause     Pelvic pain 7/26/2016    Rib fractures     Right hand pain 7/10/2018    Sciatica of left side 02/01/2021    Uterine leiomyoma 7/26/2016    Vaginal candidiasis 7/24/2019       Past Surgical History:   Procedure Laterality Date    HX GYN      BTL    IR INJ FORAMIN EPID LUMB ANES/STER [facility]  5/11/2021       Social History     Tobacco Use   Smoking Status Current Every Day Smoker    Packs/day: 0.50   Smokeless Tobacco Never Used       Social History     Socioeconomic History    Marital status: SINGLE     Spouse name: Not on file    Number of children: Not on file    Years of education: Not on file    Highest education level: Not on file   Tobacco Use    Smoking status: Current Every Day Smoker     Packs/day: 0.50    Smokeless tobacco: Never Used   Vaping Use    Vaping Use: Never used   Substance and Sexual Activity    Alcohol use: No    Drug use: No    Sexual activity: Yes     Partners: Male     Birth control/protection: Surgical     Social Determinants of Health     Financial Resource Strain:     Difficulty of Paying Living Expenses:    Food Insecurity:     Worried About Running Out of Food in the Last Year:     Ran Out of Food in the Last Year:    Transportation Needs:     Lack of Transportation (Medical):      Lack of Transportation (Non-Medical):    Physical Activity:     Days of Exercise per Week:     Minutes of Exercise per Session:    Stress:     Feeling of Stress :    Social Connections:     Frequency of Communication with Friends and Family:     Frequency of Social Gatherings with Friends and Family:     Attends [employer] Services:     Active Member of Clubs or Organizations:     Attends Club or Organization Meetings:    Vicki Marital Status:        Family History   Problem Relation Age of Onset   [facility] Stroke Mother     Diabetes Mother     Heart Disease Father     Diabetes Sister     Diabetes Maternal Grandmother     Breast Cancer Maternal Aunt     Breast Cancer Paternal Aunt          Objective:     Visit Vitals  /60 (BP 1 Location: Right upper arm, BP Patient Position: Sitting, BP Cuff Size: Adult)   Pulse 68   Temp 97.2 °F (36.2 °C) (Temporal)   Resp 18   Ht 5' 1\" (1.549 m)   Wt 109 lb (49.4 kg)   SpO2 98%   BMI 20.60 kg/m²     Body mass index is 20.6 kg/m². General: Alert and oriented. No acute distress. Well nourished  HEENT :  Eyes: pupils equal, round, react to light and accommodation. Extra ocular movements intact. Nose: patent. Mouth and throat is clear. Neck:supple full range of motion no thyromegaly. Trachea midline, No carotid bruits. No significant lymphadenopathy  Lungs: clear to auscultation without wheezes, rales, or rhonchi. Heart :RRR, S1 & S2 are normal intensity. No murmur; no gallop  Abdomen: bowel sounds active. No tenderness, guarding, rebound, masses, hepatic or spleen enlargement  Extremities: without clubbing, cyanosis, or edema  Pulses: radial and femoral pulses are normal  Neuro: HMF intact. Cranial nerves II through XII grossly normal.  Motor: is 5 over 5 and symmetrical.   Psych: appropriate behavior, mood, affect and judgement. Results for orders placed or performed in visit on 09/28/21   HEMOGLOBIN A1C WITH EAG   Result Value Ref Range    Hemoglobin A1c 11.7 (H) 4.0 - 5.6 %    Est. average glucose 289 mg/dL       No results found for this visit on 10/26/21. Assessment/ Plan:     1. Uncontrolled type 2 diabetes mellitus with hyperglycemia ([address]  Discussed risks associated with hyperglycemia and importance of getting better control of her T2DM. Provided several handouts on signs of high vs low blood sugar, planning healthy meals and carb counting.  Discussed increasing Lantus from 27 units to 30 units in evenings and starting 10 units in the mornings. Educated patient on checking blood sugar prior to giving insulin and then eating afterwards. Encouraged healthy, low sugar meals with plenty of greens. Discouraged drinking soda and sweet teas. Pt understanding and motivated to make changes. Plan for follow up in 2 weeks. 2. FMLA Paperwork  Paperwork completed and faxed after missing work 9/30-10/11 for conjunctivitis. Orders Placed This Encounter    insulin glargine (LANTUS,BASAGLAR) 100 unit/mL (3 mL) inpn     Sig: Take  10 units in the morning and 30 units  QHS. DX E11.65  Indications: type 2 diabetes mellitus     Dispense:  21 mL     Refill:  3     Lantus per insurance         Verbal and written instructions (see AVS) provided. Patient expresses understanding of diagnosis and treatment plan. Health Maintenance Due   Topic Date Due    Hepatitis C Screening  Never done    Colorectal Cancer Screening Combo  Never done    Shingrix Vaccine Age 50> (1 of 2) Never done     Follow up in 2 weeks or sooner if needed.           ROBERTO FisherC

## 2021-10-27 RX ORDER — INSULIN GLARGINE 100 [IU]/ML
INJECTION, SOLUTION SUBCUTANEOUS
Qty: 21 ML | Refills: 3 | Status: SHIPPED | OUTPATIENT
Start: 2021-10-27 | End: 2022-07-18

## 2021-10-27 NOTE — ACP (ADVANCE CARE PLANNING)
Discussed importance of advanced medical directives with patient. Patient is capable of making decisions.   Onita Route NP-C

## 2021-11-22 ENCOUNTER — PATIENT OUTREACH (OUTPATIENT)
Dept: OTHER | Age: 51
End: 2021-11-22

## 2021-11-22 NOTE — PROGRESS NOTES
Follow up phone call to patient, two pt identifiers verified. Discussed patient's goals:   Goals        Patient [address] Coordination for radicular LBP (pt-stated)       3/15-  Care Coordination with PCP office- finding in network provider for Plus plan - neuro surgeon.  Dr. Tre Valderrama elevated BS with steroids to be expected. 5/14: Had injection on 5/11, says back is feeling better, still a little pain. Other      DM  Diabetes Support and Education / Care Coordination       Referred from [facility] with DM. A1C>9    1)  General:   Patient is able to state a basic definition of diabetes including: risk factors, basic pathophysiology, complications and treatment. 2)  Diet:   Patient will be able to read a basic food label and identify role of calories, carbohydrates, protein and fats in healthy eating. 3)  Activity:   Patient will identify the potential health benefits of regular exercise:  decreased cholesterol, improved mood, decreased blood pressure, lower stress/anxiety, weight loss, decreased blood sugar. 4)  Monitoring:  Patient will be able to identify what an A1C test measures. 5)  Medications:  Patient will demonstrate knowledge of diabetes medications. 6)  Risk Reduction:  Patient is able to state goal target for A1C (<7), blood pressure (<130/70), and LDL cholesterol (<100) to reduce diabetes complications. 7)  Problem Solving:  Patient will identify s/sx and treatment for hyper- and hypoglycemia. 8)  Healthy Coping:  Patient will be able to identify two community support resources. Patient will identify two stress relieving techniques.     Patient will identify health risk factors and complications of diabetes  1) Kidney disease  2) Heart disease  3) Neuropathy  4) Skin breakdown  5) Diabetic Retinopathy  6) Prevention:   Stable controlled A1C,  healthy lifestyle (diet, exercise, hydration, stress reduction, monitoring, skin care)   3/15- Pt to have BW this week/  A1C.    8/3: Patient's A1C up to 12.5 7/21 - discussed healthy food options. States she eats cheerios or corn flakes for breakfast.  Encouraged lean protein, egg whites, whole grains, yogurt. 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 Will mail nutritional information from [employer]. Encouraged drinking more water, plans to take water bottles with her to work, as they no longer offer water bottles. Has been drinking 3 sodas/day. Will cut these out. Scheduled to meet with a nutritionist tomorrow, 8/4.  8/10: Has been able to cut soda, drinks one a day. Drinking water. Trying to meal prep. Bought fruits and veggies. 8/27: Patient had visit with nurse practitioner. On some medications to help control diabetes. Still recovering from [place].  9/8: Patient states glucose has been well controlled. Complaints of blurred vision with new weekly medication. Encouraged her to discuss this with her PCP, states she will tomorrow, as she needs to go to the office to  her \"leave paperwork\". 9/23: Discussed medication issues. Back on jardiance. 10/8: Diabetes, glucose has been in lower 200s. Taking 27 units of Lantus/daily, Januvia. Working on getting glucose under better control. Has been stressed out with everything, loss of father, covid, pink eye. Has gotten STD approved and has received check. 10/25: Blood sugars are still up and down. Taking januvia and lantus 27 units. Has appointment to go to doctor tomorrow to get STD paperwork filled out for 10 days she missed due to \"pink eye\". Discussed trying to decrease stress levels to help with glucose. 11/22: Blood sugars - patient states they have been \"alright\"  Provided encouragement to make healthy choices, acknowledging that it can be difficult during the holidays.              Patient's primary care provider relationship reviewed with patient and modified, as applicable. Readiness to Change: []  Pre-contemplative    []  Contemplative  []  Preparation               [x]  Action                  []  Maintenance    Barriers/Challenges to Care: []  Decline in memory    []  Language barrier     []  Emotional                  []  Limited mobility  []  Lack of motivation     [] Vision, hearing or cognitive impairment []  Knowledge [] Financial Barriers []  Lack of support  []  Pain []  Other [x]  None    Key pt activities to achieve better health:   []  Weight loss  [x]  Improved diabetic control  []  Decreased cholesterol levels  []  Decreased blood pressure  []    []      Plan for next call: Call in four weeks, 12/20.

## 2021-11-23 NOTE — TELEPHONE ENCOUNTER
Sp/w with staff at Dr. Ella Lazaro office, pt was seen in May, Dr. Sidney Bower reviewed MRI results with patient and determined she is not a candidate for surgery at this time. No need for an additional appt for the same problem.  KT
No

## 2021-12-06 DIAGNOSIS — E11.65 UNCONTROLLED TYPE 2 DIABETES MELLITUS WITH HYPERGLYCEMIA (HCC): ICD-10-CM

## 2021-12-06 NOTE — TELEPHONE ENCOUNTER
----- Message from Ainsley Mays sent at 12/6/2021 11:13 AM EST -----  Subject: Refill Request    QUESTIONS  Name of Medication? SITagliptin (Januvia) 100 mg tablet  Patient-reported dosage and instructions? 1 time a day   How many days do you have left? 3  Preferred Pharmacy? [address] phone number (if available)? [phone]  ---------------------------------------------------------------------------  --------------  Terra GOODEN  What is the best way for the office to contact you? OK to leave message on   voicemail  Preferred Call Back Phone Number?  [phone]

## 2021-12-15 ENCOUNTER — TELEPHONE (OUTPATIENT)
Dept: FAMILY MEDICINE CLINIC | Age: 51
End: 2021-12-15

## 2021-12-15 DIAGNOSIS — E11.65 UNCONTROLLED TYPE 2 DIABETES MELLITUS WITH HYPERGLYCEMIA (HCC): ICD-10-CM

## 2021-12-15 NOTE — TELEPHONE ENCOUNTER
SUN lIfe forms were sent in several times. Scanned in chart please resend . Medication escribed to [facility] today.

## 2021-12-15 NOTE — TELEPHONE ENCOUNTER
----- Message from Caroline Briana sent at 12/15/2021  9:54 AM EST -----  Subject: Message to Provider    QUESTIONS  Information for Provider? Pt called in and stated has been out of   SITagliptin (Januvia) 100 mg [facility] and [facility] should   have went to [address] [place] [address] and pt states has been out for six days now   and needs this . Also states Anay Rivas has not receive the paper work that   was do back in September and \[facility]\"" can bring paper work when pt gets off   but needs this stuff done . If someone can give pt a call.  ---------------------------------------------------------------------------  --------------  CALL BACK INFO  What is the best way for the office to contact you? OK to leave message on   voicemail  Preferred Call Back Phone Number? [phone]  ---------------------------------------------------------------------------  --------------  SCRIPT ANSWERS  Relationship to Patient?  Self

## 2021-12-15 NOTE — TELEPHONE ENCOUNTER
S/w patient . She is talking about a newer form  for her time out from 9- thru  part of October. She said she brought it in . She will bring in tomorrow. I told her you will be back in 2 weeks.  She wants another provider to fill out, [facility]

## 2021-12-15 NOTE — TELEPHONE ENCOUNTER
Patient called no voice mailbox set up. She can  [email or web address] report here up front. We have faxed 4 times with confirmation.   Her RX was sent today to [facility]

## 2021-12-20 ENCOUNTER — PATIENT OUTREACH (OUTPATIENT)
Dept: OTHER | Age: 51
End: 2021-12-20

## 2021-12-20 NOTE — PROGRESS NOTES
Follow up phone call to patient, two pt identifiers verified. Discussed patient's goals:   Goals      DM  Diabetes Support and Education / Care Coordination      Referred from [facility]. A1C>9    1)  General:   Patient is able to state a basic definition of diabetes including: risk factors, basic pathophysiology, complications and treatment. 2)  Diet:   Patient will be able to read a basic food label and identify role of calories, carbohydrates, protein and fats in healthy eating. 3)  Activity:   Patient will identify the potential health benefits of regular exercise:  decreased cholesterol, improved mood, decreased blood pressure, lower stress/anxiety, weight loss, decreased blood sugar. 4)  Monitoring:  Patient will be able to identify what an A1C test measures. 5)  Medications:  Patient will demonstrate knowledge of diabetes medications. 6)  Risk Reduction:  Patient is able to state goal target for A1C (<7), blood pressure (<130/70), and LDL cholesterol (<100) to reduce diabetes complications. 7)  Problem Solving:  Patient will identify s/sx and treatment for hyper- and hypoglycemia. 8)  Healthy Coping:  Patient will be able to identify two community support resources. Patient will identify two stress relieving techniques. Patient will identify health risk factors and complications of diabetes  1) Kidney disease  2) Heart disease  3) Neuropathy  4) Skin breakdown  5) Diabetic Retinopathy  6) Prevention:   Stable controlled A1C,  healthy lifestyle (diet, exercise, hydration, stress reduction, monitoring, skin care)   3/15- Pt to have BW this week/  A1C.    8/3: Patient's A1C up to 12.5 7/21 - discussed healthy food options. States she eats cheerios or corn flakes for breakfast.  Encouraged lean protein, egg whites, whole grains, yogurt.   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 Will mail nutritional information from [employer]. Encouraged drinking more water, plans to take water bottles with her to work, as they no longer offer water bottles. Has been drinking 3 sodas/day. Will cut these out. Scheduled to meet with a nutritionist tomorrow, 8/4.  8/10: Has been able to cut soda, drinks one a day. Drinking water. Trying to meal prep. Bought fruits and veggies. 8/27: Patient had visit with nurse practitioner. On some medications to help control diabetes. Still recovering from [place].  9/8: Patient states glucose has been well controlled. Complaints of blurred vision with new weekly medication. Encouraged her to discuss this with her PCP, states she will tomorrow, as she needs to go to the office to  her \"leave paperwork\". 9/23: Discussed medication issues. Back on jardiance. 10/8: Diabetes, glucose has been in lower 200s. Taking 27 units of Lantus/daily, Januvia. Working on getting glucose under better control. Has been stressed out with everything, loss of father, covid, pink eye. Has gotten STD approved and has received check. 10/25: Blood sugars are still up and down. Taking januvia and lantus 27 units. Has appointment to go to doctor tomorrow to get STD paperwork filled out for 10 days she missed due to \"pink eye\". Discussed trying to decrease stress levels to help with glucose. 11/22: Blood sugars - patient states they have been \"alright\"  Provided encouragement to make healthy choices, acknowledging that it can be difficult during the holidays. 12/20: Patient is at her provider's office right now, glucose was 400 and she was having chest pain on and off for about a week or two. The doctor is out until January, they told her she should go to the emergency room. Encouraged her to call the nurse access or call 911 due to her symptoms. She says she has had a lot of stress and been dealing with that.   Says she is waiting until tomorrow and if she still doesn't feel well she will go to the ER. Says her symptoms have been going on for weeks, it's not constant pain. Says she was told it could be reflux, says she takes medication for that. Continued to encourage her to call the [facility] or go to the ED, as this was the recommendation from her provider's office. She wants to wait until tomorrow. Patient's primary care provider relationship reviewed with patient and modified, as applicable. Readiness to Change: []  Pre-contemplative    []  Contemplative  []  Preparation               [x]  Action                  []  Maintenance    Barriers/Challenges to Care: []  Decline in memory    []  Language barrier     []  Emotional                  []  Limited mobility  []  Lack of motivation     [] Vision, hearing or cognitive impairment []  Knowledge [] Financial Barriers []  Lack of support  []  Pain []  Other [x]  None    Key pt activities to achieve better health:   []  Weight loss  [x]  Improved diabetic control  []  Decreased cholesterol levels  []  Decreased blood pressure  []    []    Upcoming appointments:   Future Appointments   Date Time Provider Ankush Nicholson   1/3/2022  1:20 PM True LUCINDA Shen [facility] BS AMB     Plan for next call: Call on 12/21.

## 2021-12-21 ENCOUNTER — HOSPITAL ENCOUNTER (EMERGENCY)
Age: 51
Discharge: HOME | End: 2021-12-21
Attending: FAMILY MEDICINE
Payer: COMMERCIAL

## 2021-12-21 ENCOUNTER — PATIENT OUTREACH (OUTPATIENT)
Dept: OTHER | Age: 51
End: 2021-12-21

## 2021-12-21 ENCOUNTER — APPOINTMENT (OUTPATIENT)
Dept: GENERAL RADIOLOGY | Age: 51
End: 2021-12-21
Attending: FAMILY MEDICINE
Payer: COMMERCIAL

## 2021-12-21 VITALS
DIASTOLIC BLOOD PRESSURE: 71 MMHG | RESPIRATION RATE: 19 BRPM | HEIGHT: 60 IN | WEIGHT: 116.4 LBS | OXYGEN SATURATION: 100 % | SYSTOLIC BLOOD PRESSURE: 152 MMHG | HEART RATE: 68 BPM | TEMPERATURE: 98.2 F | BODY MASS INDEX: 22.85 KG/M2

## 2021-12-21 DIAGNOSIS — M54.2 ACUTE NECK PAIN: ICD-10-CM

## 2021-12-21 DIAGNOSIS — R73.9 HYPERGLYCEMIA: ICD-10-CM

## 2021-12-21 DIAGNOSIS — E11.65 UNCONTROLLED TYPE 2 DIABETES MELLITUS WITH HYPERGLYCEMIA (HCC): ICD-10-CM

## 2021-12-21 DIAGNOSIS — R07.89 ATYPICAL CHEST PAIN: Primary | ICD-10-CM

## 2021-12-21 DIAGNOSIS — K21.00 GASTROESOPHAGEAL REFLUX DISEASE WITH ESOPHAGITIS, UNSPECIFIED WHETHER HEMORRHAGE: ICD-10-CM

## 2021-12-21 LAB
ALBUMIN SERPL-MCNC: 3.1 G/DL (ref 3.5–5)
ALBUMIN/GLOB SERPL: 0.8 {RATIO} (ref 1.1–2.2)
ALP SERPL-CCNC: 132 U/L (ref 45–117)
ALT SERPL-CCNC: 31 U/L (ref 12–78)
ANION GAP SERPL CALC-SCNC: 9 MMOL/L (ref 5–15)
AST SERPL-CCNC: 12 U/L (ref 15–37)
ATRIAL RATE: 73 BPM
BASOPHILS # BLD: 0 K/UL (ref 0–0.1)
BASOPHILS NFR BLD: 0 % (ref 0–1)
BILIRUB SERPL-MCNC: 0.3 MG/DL (ref 0.2–1)
BUN SERPL-MCNC: 22 MG/DL (ref 6–20)
BUN/CREAT SERPL: 25 (ref 12–20)
CALCIUM SERPL-MCNC: 8.9 MG/DL (ref 8.5–10.1)
CALCULATED P AXIS, ECG09: 57 DEGREES
CALCULATED R AXIS, ECG10: 53 DEGREES
CALCULATED T AXIS, ECG11: 54 DEGREES
CHLORIDE SERPL-SCNC: 100 MMOL/L (ref 97–108)
CO2 SERPL-SCNC: 27 MMOL/L (ref 21–32)
CREAT SERPL-MCNC: 0.87 MG/DL (ref 0.55–1.02)
DIAGNOSIS, 93000: NORMAL
DIFFERENTIAL METHOD BLD: NORMAL
EOSINOPHIL # BLD: 0.2 K/UL (ref 0–0.4)
EOSINOPHIL NFR BLD: 2 % (ref 0–7)
ERYTHROCYTE [DISTWIDTH] IN BLOOD BY AUTOMATED COUNT: 13.3 % (ref 11.5–14.5)
GLOBULIN SER CALC-MCNC: 3.8 G/DL (ref 2–4)
GLUCOSE SERPL-MCNC: 313 MG/DL (ref 65–100)
HCT VFR BLD AUTO: 35.6 % (ref 35–47)
HGB BLD-MCNC: 12.6 G/DL (ref 11.5–16)
IMM GRANULOCYTES # BLD AUTO: 0 K/UL (ref 0–0.04)
IMM GRANULOCYTES NFR BLD AUTO: 0 % (ref 0–0.5)
LYMPHOCYTES # BLD: 2.7 K/UL (ref 0.8–3.5)
LYMPHOCYTES NFR BLD: 26 % (ref 12–49)
MAGNESIUM SERPL-MCNC: 2 MG/DL (ref 1.6–2.4)
MCH RBC QN AUTO: 28.7 PG (ref 26–34)
MCHC RBC AUTO-ENTMCNC: 35.4 G/DL (ref 30–36.5)
MCV RBC AUTO: 81.1 FL (ref 80–99)
MONOCYTES # BLD: 0.9 K/UL (ref 0–1)
MONOCYTES NFR BLD: 8 % (ref 5–13)
NEUTS SEG # BLD: 6.6 K/UL (ref 1.8–8)
NEUTS SEG NFR BLD: 64 % (ref 32–75)
NRBC # BLD: 0 K/UL (ref 0–0.01)
NRBC BLD-RTO: 0 PER 100 WBC
P-R INTERVAL, ECG05: 174 MS
PLATELET # BLD AUTO: 216 K/UL (ref 150–400)
PMV BLD AUTO: 12 FL (ref 8.9–12.9)
POTASSIUM SERPL-SCNC: 4 MMOL/L (ref 3.5–5.1)
PROT SERPL-MCNC: 6.9 G/DL (ref 6.4–8.2)
Q-T INTERVAL, ECG07: 412 MS
QRS DURATION, ECG06: 74 MS
QTC CALCULATION (BEZET), ECG08: 453 MS
RBC # BLD AUTO: 4.39 M/UL (ref 3.8–5.2)
SODIUM SERPL-SCNC: 136 MMOL/L (ref 136–145)
TROPONIN-HIGH SENSITIVITY: 6 NG/L (ref 0–51)
TSH SERPL DL<=0.05 MIU/L-ACNC: 5.55 UIU/ML (ref 0.36–3.74)
VENTRICULAR RATE, ECG03: 73 BPM
WBC # BLD AUTO: 10.4 K/UL (ref 3.6–11)

## 2021-12-21 PROCEDURE — 84484 ASSAY OF TROPONIN QUANT: CPT

## 2021-12-21 PROCEDURE — 83735 ASSAY OF MAGNESIUM: CPT

## 2021-12-21 PROCEDURE — 85025 COMPLETE CBC W/AUTO DIFF WBC: CPT

## 2021-12-21 PROCEDURE — 74011250637 HC RX REV CODE- 250/637: Performed by: FAMILY MEDICINE

## 2021-12-21 PROCEDURE — 93005 ELECTROCARDIOGRAM TRACING: CPT

## 2021-12-21 PROCEDURE — 99284 EMERGENCY DEPT VISIT MOD MDM: CPT

## 2021-12-21 PROCEDURE — 74011000250 HC RX REV CODE- 250: Performed by: FAMILY MEDICINE

## 2021-12-21 PROCEDURE — 96374 THER/PROPH/DIAG INJ IV PUSH: CPT

## 2021-12-21 PROCEDURE — C9113 INJ PANTOPRAZOLE SODIUM, VIA: HCPCS | Performed by: FAMILY MEDICINE

## 2021-12-21 PROCEDURE — 36415 COLL VENOUS BLD VENIPUNCTURE: CPT

## 2021-12-21 PROCEDURE — 71045 X-RAY EXAM CHEST 1 VIEW: CPT

## 2021-12-21 PROCEDURE — 74011250636 HC RX REV CODE- 250/636: Performed by: FAMILY MEDICINE

## 2021-12-21 PROCEDURE — 80053 COMPREHEN METABOLIC PANEL: CPT

## 2021-12-21 PROCEDURE — 84443 ASSAY THYROID STIM HORMONE: CPT

## 2021-12-21 RX ORDER — LIDOCAINE 4 G/100G
1 PATCH TOPICAL
Status: DISCONTINUED | OUTPATIENT
Start: 2021-12-21 | End: 2021-12-21 | Stop reason: HOSPADM

## 2021-12-21 RX ORDER — PANTOPRAZOLE SODIUM 40 MG/1
40 TABLET, DELAYED RELEASE ORAL DAILY
Qty: 20 TABLET | Refills: 0 | Status: SHIPPED | OUTPATIENT
Start: 2021-12-21 | End: 2022-01-20 | Stop reason: SDUPTHER

## 2021-12-21 RX ORDER — SODIUM CHLORIDE 0.9 % (FLUSH) 0.9 %
5-10 SYRINGE (ML) INJECTION ONCE
Status: COMPLETED | OUTPATIENT
Start: 2021-12-21 | End: 2021-12-21

## 2021-12-21 RX ORDER — LIDOCAINE 50 MG/G
PATCH TOPICAL
Qty: 1 EACH | Refills: 0 | Status: SHIPPED | OUTPATIENT
Start: 2021-12-21 | End: 2022-07-15

## 2021-12-21 RX ORDER — SUCRALFATE 1 G/10ML
1 SUSPENSION ORAL 4 TIMES DAILY
Qty: 400 ML | Refills: 0 | Status: SHIPPED | OUTPATIENT
Start: 2021-12-21 | End: 2022-01-10

## 2021-12-21 RX ADMIN — SODIUM CHLORIDE 1000 ML: 9 INJECTION, SOLUTION INTRAVENOUS at 04:06

## 2021-12-21 RX ADMIN — Medication 10 ML: at 02:10

## 2021-12-21 RX ADMIN — SODIUM CHLORIDE 40 MG: 9 INJECTION, SOLUTION INTRAMUSCULAR; INTRAVENOUS; SUBCUTANEOUS at 04:18

## 2021-12-21 RX ADMIN — MAGNESIUM HYDROXIDE/ALUMINUM HYDROXICE/SIMETHICONE 40 ML: 120; 1200; 1200 SUSPENSION ORAL at 02:54

## 2021-12-21 NOTE — DISCHARGE INSTRUCTIONS
Stop Prilosec and start Protonix daily for acid reduction. Carafate 4 times a day to coat your esophagus. Lidoderm Derm patch to left neck muscles. Tylenol as needed for pain. Try to avoid caffeine, acid-containing foods, smoking. Follow-up symptoms with your primary care doctor within the next 3 to 7 days. Work note today. Return if worse or for change in symptoms new symptoms or problems as noted above. Start Januvia as soon as you get it.

## 2021-12-21 NOTE — ED TRIAGE NOTES
Pt reports chest fluttering to left side of chest radiating down left arm x 1 week. Denies n/v/diaphoresis, SOB. Tylenol helps \"slow my heart down from beating real fast.\" States that her blood sugar has been elevated today.
not examined

## 2021-12-21 NOTE — Clinical Note
[address] [place] [address] EMERGENCY DEP  [address] [place] [id number]  [phone]    Work/School Note    Date: 12/21/2021    To Whom It May concern:      Sade Richter was seen and treated today in the emergency room by the following provider(s):  Attending Provider: Salina Hernandez MD.      Sade Richter is excused from work/school on 12/21/21. She is clear to return to work/school on 12/22/21.         Sincerely,          Brandi Mehta MD

## 2021-12-21 NOTE — ED NOTES
Pt assessed her blood sugar using her Freestyle monitor reading was 180. MD at bedside. Prosper ROCHA.

## 2021-12-21 NOTE — ED PROVIDER NOTES
Patient is a 59-year-old female with a history of diabetes' smoking, asthma diverticular disease, hyperlipidemia,  who presents with chest pain. She states that she has been having chest pain intermittently for the last 2 weeks. Is been constant since 8:30 AM yesterday. Pain does not seem to be precipitated consistently by exertion although it may be somewhat worse when reclining. She states she feels that she has a lot of acid in her chest going up into her throat. She also complains of left neck pain radiating down into the left shoulder and left upper arm. No numbness or tingling. She has had palpitations associated with chest discomfort. She states that working today was difficult due to the chest pain. She states that she felt \"lightheaded at times. No sweats or chills or cough or shortness of breath is noted. Last week she had a left upper quadrant pain which resolved with Tylenol. She states that her sugars been in the 450 range earlier today. She states that she is taking her insulin but she has been out of her Januvia for the last week. Patient's last stress test on record was April 2018 which was negative, her ejection fraction was 70%.            Past Medical History:   Diagnosis Date    Abnormal EKG 2/27/2018    Abscess 7/24/2019    Asthma     Chest pain 2/27/2018    Chest wall contusion, left, subsequent encounter     Diabetes ([facility] [address]     Diverticular disease     Diverticulitis large intestine w/o perforation or abscess w/o bleeding 10/25/2016    Dyslipidemia     Hypertension     Lumbar radiculopathy 02/01/2021    Menopause     Pelvic pain 7/26/2016    Rib fractures     Right hand pain 7/10/2018    Sciatica of left side 02/01/2021    Uterine leiomyoma 7/26/2016    Vaginal candidiasis 7/24/2019       Past Surgical History:   Procedure Laterality Date    HX GYN      BTL    IR INJ FORAMIN EPID LUMB ANES/STER SNGL  5/11/2021         Family History:   Problem Relation Age of Onset    Stroke Mother     Diabetes Mother     Heart Disease Father     Diabetes Sister     Diabetes Maternal Grandmother     Breast Cancer Maternal Aunt     Breast Cancer Paternal Aunt        Social History     Socioeconomic History    Marital status: SINGLE     Spouse name: Not on file    Number of children: Not on file    Years of education: Not on file    Highest education level: Not on file   Occupational History    Not on file   Tobacco Use    Smoking status: Current Every Day Smoker     Packs/day: 0.50    Smokeless tobacco: Never Used   Vaping Use    Vaping Use: Never used   Substance and Sexual Activity    Alcohol use: No    Drug use: No    Sexual activity: Yes     Partners: Male     Birth control/protection: Surgical   Other Topics Concern    Not on file   Social History Narrative    Not on file     Social Determinants of Health     Financial Resource Strain:     Difficulty of Paying Living Expenses: Not on file   Food Insecurity:     Worried About Running Out of Food in the Last Year: Not on file    Kajal of Food in the Last Year: Not on file   Transportation Needs:     Lack of Transportation (Medical): Not on file    Lack of Transportation (Non-Medical):  Not on file   Physical Activity:     Days of Exercise per Week: Not on file    Minutes of Exercise per Session: Not on file   Stress:     Feeling of Stress : Not on file   Social Connections:     Frequency of Communication with Friends and Family: Not on file    Frequency of Social Gatherings with Friends and Family: Not on file    Attends Hindu Services: Not on file    Active Member of Clubs or Organizations: Not on file    Attends Club or Organization Meetings: Not on file    Marital Status: Not on file   Intimate Partner Violence:     Fear of Current or Ex-Partner: Not on file    Emotionally Abused: Not on file    Physically Abused: Not on file    Sexually Abused: Not on file   Housing Stability:     Unable to Pay for Housing in the Last Year: Not on file    Number of Places Lived in the Last Year: Not on file    Unstable Housing in the Last Year: Not on file         ALLERGIES: Patient has no known allergies. Review of Systems   All other systems reviewed and are negative. Vitals:    12/21/21 0417 12/21/21 0418 12/21/21 0419 12/21/21 0420   BP:       Pulse: 69 67 68 64   Resp: 17 15 17 14   Temp:       SpO2: 100% 100% 100% 100%   Weight:       Height:                Physical Exam  Vitals and nursing note reviewed. Constitutional:       General: She is not in acute distress. Appearance: Normal appearance. She is not ill-appearing or toxic-appearing. HENT:      Head: Normocephalic and atraumatic. Right Ear: External ear normal.      Left Ear: External ear normal.      Nose: Nose normal.      Mouth/Throat:      Mouth: Mucous membranes are moist.      Pharynx: No oropharyngeal exudate or posterior oropharyngeal erythema. Eyes:      Extraocular Movements: Extraocular movements intact. Conjunctiva/sclera: Conjunctivae normal.      Pupils: Pupils are equal, round, and reactive to light. Neck:      Thyroid: No thyroid mass, thyromegaly or thyroid tenderness. Trachea: Trachea and phonation normal.     Cardiovascular:      Rate and Rhythm: Normal rate and regular rhythm. Heart sounds: Normal heart sounds. No murmur heard. No friction rub. No gallop. Pulmonary:      Effort: Pulmonary effort is normal. No respiratory distress. Breath sounds: Normal breath sounds. No stridor. No wheezing or rales. Chest:      Chest wall: No tenderness. Abdominal:      General: There is no distension. Palpations: Abdomen is soft. Tenderness: There is no abdominal tenderness. There is no right CVA tenderness, left CVA tenderness, guarding or rebound. Musculoskeletal:         General: No swelling, tenderness, deformity or signs of injury. Normal range of motion.       Cervical back: Neck supple. No signs of trauma. Pain with movement and muscular tenderness present. Normal range of motion. Right lower leg: No edema. Left lower leg: No edema. Lymphadenopathy:      Cervical: No cervical adenopathy. Skin:     General: Skin is warm and dry. Capillary Refill: Capillary refill takes less than 2 seconds. Coloration: Skin is not jaundiced or pale. Findings: No rash. Neurological:      General: No focal deficit present. Mental Status: She is alert and oriented to person, place, and time. Cranial Nerves: No cranial nerve deficit. Sensory: No sensory deficit. Motor: No weakness. Coordination: Coordination normal.      Gait: Gait normal.   Psychiatric:         Mood and Affect: Mood normal.         Behavior: Behavior normal.         Thought Content: Thought content normal.         Judgment: Judgment normal.        Labs -  Recent Results (from the past 24 hour(s))   CBC WITH AUTOMATED DIFF    Collection Time: 12/21/21  2:12 AM   Result Value Ref Range    WBC 10.4 3.6 - 11.0 K/uL    RBC 4.39 3.80 - 5.20 M/uL    HGB 12.6 11.5 - 16.0 g/dL    HCT 35.6 35.0 - 47.0 %    MCV 81.1 80.0 - 99.0 FL    MCH 28.7 26.0 - 34.0 PG    MCHC 35.4 30.0 - 36.5 g/dL    RDW 13.3 11.5 - 14.5 %    PLATELET 115 299 - 192 K/uL    MPV 12.0 8.9 - 12.9 FL    NRBC 0.0 0  WBC    ABSOLUTE NRBC 0.00 0.00 - 0.01 K/uL    NEUTROPHILS 64 32 - 75 %    LYMPHOCYTES 26 12 - 49 %    MONOCYTES 8 5 - 13 %    EOSINOPHILS 2 0 - 7 %    BASOPHILS 0 0 - 1 %    IMMATURE GRANULOCYTES 0 0.0 - 0.5 %    ABS. NEUTROPHILS 6.6 1.8 - 8.0 K/UL    ABS. LYMPHOCYTES 2.7 0.8 - 3.5 K/UL    ABS. MONOCYTES 0.9 0.0 - 1.0 K/UL    ABS. EOSINOPHILS 0.2 0.0 - 0.4 K/UL    ABS. BASOPHILS 0.0 0.0 - 0.1 K/UL    ABS. IMM.  GRANS. 0.0 0.00 - 0.04 K/UL    DF AUTOMATED     METABOLIC PANEL, COMPREHENSIVE    Collection Time: 12/21/21  2:12 AM   Result Value Ref Range    Sodium 136 136 - 145 mmol/L    Potassium 4.0 3.5 - 5.1 mmol/L    Chloride 100 97 - 108 mmol/L    CO2 27 21 - 32 mmol/L    Anion gap 9 5 - 15 mmol/L    Glucose 313 (H) 65 - 100 mg/dL    BUN 22 (H) 6 - 20 MG/DL    Creatinine 0.87 0.55 - 1.02 MG/DL    BUN/Creatinine ratio 25 (H) 12 - 20      GFR est AA >60 >60 ml/min/1.73m2    GFR est non-AA >60 >60 ml/min/1.73m2    Calcium 8.9 8.5 - 10.1 MG/DL    Bilirubin, total 0.3 0.2 - 1.0 MG/DL    ALT (SGPT) 31 12 - 78 U/L    AST (SGOT) 12 (L) 15 - 37 U/L    Alk. phosphatase 132 (H) 45 - 117 U/L    Protein, total 6.9 6.4 - 8.2 g/dL    Albumin 3.1 (L) 3.5 - 5.0 g/dL    Globulin 3.8 2.0 - 4.0 g/dL    A-G Ratio 0.8 (L) 1.1 - 2.2     MAGNESIUM    Collection Time: 12/21/21  2:12 AM   Result Value Ref Range    Magnesium 2.0 1.6 - 2.4 mg/dL   TSH 3RD GENERATION    Collection Time: 12/21/21  2:12 AM   Result Value Ref Range    TSH 5.55 (H) 0.36 - 3.74 uIU/mL   TROPONIN-HIGH SENSITIVITY    Collection Time: 12/21/21  2:12 AM   Result Value Ref Range    Troponin-High Sensitivity 6 0 - 51 ng/L     Radiologic Studies -   CT Results  (Last 48 hours)    None          XR Results (most recent):  Results from [place] encounter on 12/21/21    XR CHEST PORT    Narrative  EXAM:  XR CHEST PORT    INDICATION: Chest pain    COMPARISON: 4/13/2018. TECHNIQUE: Portable AP upright chest view at 0216 hours    FINDINGS: The cardiomediastinal contours are stable. The lungs and pleural  spaces are clear. There is no pneumothorax. Chronic left rib deformities are  stable. The upper abdomen is unremarkable. Impression  No acute process.         MDM  Number of Diagnoses or Management Options  Acute neck pain: new and requires workup  Atypical chest pain: new and requires workup  Gastroesophageal reflux disease with esophagitis, unspecified whether hemorrhage: established and worsening  Hyperglycemia: established and worsening     Amount and/or Complexity of Data Reviewed  Clinical lab tests: reviewed and ordered  Tests in the radiology section of CPT®: reviewed and ordered  Tests in the medicine section of CPT®: ordered and reviewed  Decide to obtain previous medical records or to obtain history from someone other than the patient: yes  Review and summarize past medical records: yes    Risk of Complications, Morbidity, and/or Mortality  Presenting problems: high  Diagnostic procedures: moderate  Management options: high  General comments: Differential includes musculoskeletal pain, reflux esophagitis, dyspepsia, ulcer, gallbladder disease, coronary artery disease, unstable angina, STEMI, NSTEMI, dysrhythmia    Patient Progress  Patient progress: improved    ED Course as of 12/21/21 0506   Tue Dec 21, 2021   0235 EKG demonstrates normal sinus rhythm, normal ventricular rate of 73, normal CA interval of 174, normal QRS duration of 74, normal QTC of 453, no evidence of acute ischemia or infarction. [PS]   [id number] Chest x-ray is with chronic rib deformities, no acute process is noted. [PS]   0307 Magnesium is normal, TSH is elevated at 5.55 consistent with possible hypothyroidism, CBC is normal, CMP is unremarkable except for sugar 313 a BUN of 22 AST is 12 ALT 31, alk phos 132 bilirubin is normal, potassium normal at 4.0 anion gap is normal at nine. High-sensitivity troponin is 6 after more than 12 hours of chest pain so feel that this does not reflect that the patient has coronary artery disease and further testing is not indicated. [PS]   [id number] HEART Score for Major Cardiac Events from [email or web address]  on 12/21/2021  ** All calculations should be rechecked by clinician prior to use **    RESULT SUMMARY:  3 points  Low Score (0-3 points)    Risk of MACE of 0.9-1.7%. INPUTS:  History -> 0 = Slightly suspicious  EKG -> 0 = Normal  Age -> 1 = 45-64  Risk factors -> 2 = =3 risk factors or history of atherosclerotic disease  Initial troponin -> 0 = =normal limit   [PS]   0441 Chest discomfort is improved after GI cocktail.   Patient will be given a liter of normal saline to decrease blood glucose and Protonix IV. Anticipate discharge home to follow-up with primary care doctor to discuss thyroid testing, add Carafate to medication regime and change Prilosec to Protonix. We use lidocaine patch to neck for musculoskeletal pain. Patient advised to avoid caffeine alcohol and cigarettes. [PS]   4432 Blood sugar is 180 by her freestyle osvaldo machine at the time of her discharge. She was feeling better at the time of discharge. Discharge instructions discussed in detail. [PS]      ED Course User Index  [PS] Kandis Faria MD       Procedures      Orders Placed This Encounter    XR CHEST PORT    CBC WITH AUTOMATED DIFF    CMP    MAGNESIUM    TSH 3RD GENERATION    TROPONIN-HIGH SENSITIVITY    CARDIAC MONITOR - ED ONLY    B/P    RT--OXIMETRY, CONTINUOUS    EKG, 12 LEAD, INITIAL    SALINE LOCK IV ONE TIME STAT    sodium chloride (NS) flush 5-10 mL    mylanta/viscous lidocaine (GI COCKTAIL)    sodium chloride 0.9 % bolus infusion 1,000 mL    pantoprazole (PROTONIX) 40 mg in 0.9% sodium chloride 10 mL injection    lidocaine 4 % patch 1 Patch    pantoprazole (Protonix) 40 mg tablet    sucralfate (Carafate) 100 mg/mL suspension    lidocaine (Lidoderm) 5 %         MEDICATIONS GIVEN:    Current Facility-Administered Medications   Medication Dose Route Frequency    lidocaine 4 % patch 1 Patch  1 Patch TransDERmal NOW     Current Outpatient Medications   Medication Sig    pantoprazole (Protonix) 40 mg tablet Take 1 Tablet by mouth daily for 20 days.  sucralfate (Carafate) 100 mg/mL suspension Take 5 mL by mouth four (4) times daily for 20 days.  lidocaine (Lidoderm) 5 % Apply patch to the affected area for 12 hours a day and remove for 12 hours a day.  SITagliptin (Januvia) 100 mg tablet TAKE 1 TABLET BY MOUTH ONE TIME A DAY    insulin glargine (LANTUS,BASAGLAR) 100 unit/mL (3 mL) inpn Take  10 units in the morning and 30 units  QHS.   DX E11.65  Indications: type 2 diabetes mellitus    omeprazole (PRILOSEC) 40 mg capsule Take 1 Capsule by mouth daily.  fluconazole (DIFLUCAN) 150 mg tablet TAKE 1 TABLET BY MOUTH AND THEN TAKE 1 TABLET 3 DAYS LATER, if needed repeat for 3rd dose (Patient not taking: Reported on 10/26/2021)    baclofen (LIORESAL) 10 mg tablet Take 1 Tablet by mouth three (3) times daily. 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  diclofenac (VOLTAREN) 1 % gel Apply 4 g to affected area four (4) times daily.  ketorolac (TORADOL) 10 mg tablet Take 10 mg by mouth. PRN    fluticasone propion-salmeteroL (ADVAIR/WIXELA) 250-50 mcg/dose diskus inhaler Take 1 Puff by inhalation every twelve (12) hours.  amitriptyline (ELAVIL) 25 mg tablet Take 1 Tab by mouth nightly. Indications: neuropathic pain (Patient taking differently: Take 25 mg by mouth nightly. As needed  Indications: neuropathic pain)    DULoxetine (CYMBALTA) 30 mg capsule Take 1 Cap by mouth two (2) times a day. Indications: chronic muscle or bone pain, diabetic complication causing injury to some body nerves (Patient taking differently: Take 30 mg by mouth two (2) times a day. PRN  Indications: chronic muscle or bone pain, diabetic complication causing injury to some body nerves)    albuterol (PROVENTIL HFA, VENTOLIN HFA, PROAIR HFA) 90 mcg/actuation inhaler Take 2 Puffs by inhalation every four (4) hours as needed for Wheezing or Shortness of Breath. Indications: asthma attack    TRUEplus Pen Needle 31 gauge x 3/16\" ndle     inhalational spacing device 1 Each by Does Not Apply route as needed for Wheezing. Emergency one time authorization for acute symptoms- please notify [facility] health if necessary (Patient not taking: Reported on 8/26/2021)    Insulin Needles, Disposable, 31 gauge x 5/16\" ndle Use to administer insulin SQ QPM as directed.     flash glucose scanning reader (FreeStyle [employer] 14 Day [place]) misc Use to check blood glucose TID and PRN.  Dx E11.9       Patient Vitals for the past 8 hrs:   Temp Pulse Resp BP SpO2   12/21/21 0420 -- 64 14 -- 100 %   12/21/21 0419 -- 68 17 -- 100 %   12/21/21 0418 -- 67 15 -- 100 %   12/21/21 0417 -- 69 17 -- 100 %   12/21/21 0416 -- 66 14 (!) 145/54 100 %   12/21/21 0415 -- 66 13 -- 100 %   12/21/21 0414 -- 68 15 -- 100 %   12/21/21 0413 -- 71 16 -- 99 %   12/21/21 0412 -- 69 10 -- 99 %   12/21/21 0411 -- 75 23 -- --   12/21/21 0410 -- 84 21 -- 100 %   12/21/21 0409 -- 79 14 -- 100 %   12/21/21 0408 -- 66 15 -- 99 %   12/21/21 0407 -- 70 14 -- 100 %   12/21/21 0406 -- 71 19 -- 100 %   12/21/21 0405 -- 69 14 -- 99 %   12/21/21 0404 -- 66 13 -- 100 %   12/21/21 0403 -- 67 15 -- 100 %   12/21/21 0402 -- 71 17 (!) 148/56 100 %   12/21/21 0401 -- 67 15 -- 100 %   12/21/21 0400 -- 67 17 -- 100 %   12/21/21 0359 -- 66 15 -- 100 %   12/21/21 0358 -- 67 11 -- 100 %   12/21/21 0357 -- 67 13 -- 100 %   12/21/21 0356 -- 71 15 -- 100 %   12/21/21 0355 -- 67 14 -- 100 %   12/21/21 0354 -- 68 14 -- 99 %   12/21/21 0353 -- 66 13 -- 100 %   12/21/21 0352 -- 71 14 -- 99 %   12/21/21 0351 -- 66 14 -- 97 %   12/21/21 0350 -- 69 12 -- 98 %   12/21/21 0349 -- 64 13 -- 98 %   12/21/21 0348 -- 62 15 -- 99 %   12/21/21 0347 -- 70 21 -- 97 %   12/21/21 0346 -- 70 (!) 7 (!) 144/58 97 %   12/21/21 0345 -- 68 (!) 6 -- 97 %   12/21/21 0344 -- 67 14 -- 98 %   12/21/21 0343 -- 72 15 -- 98 %   12/21/21 0342 -- 68 15 -- 97 %   12/21/21 0341 -- 66 14 -- 99 %   12/21/21 0340 -- 64 21 -- 99 %   12/21/21 0339 -- 65 13 -- 96 %   12/21/21 0338 -- 66 12 -- 99 %   12/21/21 0337 -- 69 (!) 3 -- 100 %   12/21/21 0336 -- 71 18 -- 99 %   12/21/21 0335 -- 67 15 -- 100 %   12/21/21 0334 -- 70 16 -- 99 %   12/21/21 0333 -- 70 17 -- 99 %   12/21/21 0332 -- 81 15 -- 99 %   12/21/21 0331 -- 72 12 -- 100 %   12/21/21 0330 -- 74 15 (!) 141/60 100 %   12/21/21 0329 -- 71 14 -- 100 %   12/21/21 0328 -- 70 15 -- 99 % 12/21/21 0327 -- 67 14 -- 99 %   12/21/21 0326 -- 67 14 -- 100 %   12/21/21 0325 -- 68 16 -- 99 %   12/21/21 0324 -- 69 17 -- 99 %   12/21/21 0323 -- 70 16 -- 100 %   12/21/21 0322 -- 71 15 -- 99 %   12/21/21 0321 -- 66 14 -- 99 %   12/21/21 0320 -- 68 15 -- 99 %   12/21/21 0319 -- 73 15 -- 98 %   12/21/21 0318 -- 70 17 -- 98 %   12/21/21 0317 -- 70 17 -- 99 %   12/21/21 0316 -- 69 13 -- 99 %   12/21/21 0315 -- 68 16 (!) 150/69 99 %   12/21/21 0314 -- 70 16 -- 100 %   12/21/21 0313 -- 75 18 -- 99 %   12/21/21 0312 -- 66 15 -- 99 %   12/21/21 0311 -- 73 17 -- 99 %   12/21/21 0310 -- 65 14 -- 99 %   12/21/21 0309 -- 63 14 -- 99 %   12/21/21 0308 -- 66 15 -- 100 %   12/21/21 0307 -- -- 10 -- --   12/21/21 0300 -- 91 15 (!) 158/63 100 %   12/21/21 0259 -- 80 15 -- 99 %   12/21/21 0258 -- 80 15 -- 100 %   12/21/21 0257 -- 72 13 -- 100 %   12/21/21 0256 -- 79 11 -- 100 %   12/21/21 0255 -- 73 15 -- 100 %   12/21/21 0254 -- 74 15 -- 100 %   12/21/21 0253 -- 72 13 -- 100 %   12/21/21 0252 -- 71 10 -- 99 %   12/21/21 0251 -- 70 12 -- 100 %   12/21/21 0250 -- 74 17 -- 100 %   12/21/21 0249 -- 77 17 -- 100 %   12/21/21 0248 -- 74 16 -- 100 %   12/21/21 0247 -- 71 24 -- 99 %   12/21/21 0246 -- 66 15 -- 100 %   12/21/21 0245 -- 71 16 (!) 165/83 100 %   12/21/21 0244 -- 76 14 -- 100 %   12/21/21 0243 -- 79 14 -- 100 %   12/21/21 0242 -- 77 16 -- 100 %   12/21/21 0241 -- 70 18 -- 100 %   12/21/21 0240 -- 77 14 -- 100 %   12/21/21 0239 -- 76 14 -- 100 %   12/21/21 0238 -- 65 17 -- 100 %   12/21/21 0237 -- 75 15 -- 99 %   12/21/21 0236 -- 71 14 -- 99 %   12/21/21 0235 -- 71 13 -- 99 %   12/21/21 0234 -- 74 18 -- 99 %   12/21/21 0233 -- 70 12 -- 99 %   12/21/21 0232 -- 71 14 -- 100 %   12/21/21 0231 -- 78 14 -- 99 %   12/21/21 0230 -- 75 15 (!) 153/76 99 %   12/21/21 0229 -- 78 14 -- 100 %   12/21/21 0228 -- -- -- -- 100 %   12/21/21 0228 -- 76 17 -- 100 %   12/21/21 0227 -- 70 12 -- 99 %   12/21/21 0226 -- 75 16 -- 100 % 12/21/21 0225 -- -- -- -- 99 %   12/21/21 0224 -- -- -- -- 99 %   12/21/21 0223 -- -- -- -- 100 %   12/21/21 0222 -- -- -- -- 99 %   12/21/21 0221 -- -- 16 -- 99 %   12/21/21 0220 -- -- 17 -- 99 %   12/21/21 0219 -- -- 15 -- 99 %   12/21/21 0218 -- -- 13 -- 99 %   12/21/21 0217 -- -- 12 -- 99 %   12/21/21 0216 -- -- -- -- 99 %   12/21/21 0215 -- -- 19 (!) 162/75 100 %   12/21/21 0214 -- -- 12 -- 99 %   12/21/21 0213 -- -- 13 -- 98 %   12/21/21 0212 -- -- 18 -- 99 %   12/21/21 0211 -- -- -- -- 99 %   12/21/21 0210 -- -- 15 -- 98 %   12/21/21 0209 -- -- 22 -- 100 %   12/21/21 0208 -- -- 27 -- 99 %   12/21/21 0207 -- 75 13 -- 100 %   12/21/21 0202 98.2 °F (36.8 °C) 71 14 -- 100 %   12/21/21 0200 -- -- -- (!) 167/73 --       Medications   lidocaine 4 % patch 1 Patch (1 Patch TransDERmal Apply Patch 12/21/21 0501)   sodium chloride (NS) flush 5-10 mL (10 mL IntraVENous Given 12/21/21 0210)   mylanta/viscous lidocaine (GI COCKTAIL) (40 mL Oral Given 12/21/21 0254)   sodium chloride 0.9 % bolus infusion 1,000 mL (1,000 mL IntraVENous New Bag 12/21/21 0406)   pantoprazole (PROTONIX) 40 mg in 0.9% sodium chloride 10 mL injection (40 mg IntraVENous Given 12/21/21 0468)       Discharge note:    I have reviewed all pertinent and currently available diagnostic test results for this visit including, but not limited to, labs, xrays, and EKGs. I have reviewed all pertinent and currently available medical records. My plan of care and further evaluation and/or disposition is based on these results, as well as the initial, and subsequent, history and physical exam, as well as any additional complaints during the visit. Pt has been re-examined, appears to be stable states that they are feeling better and have no new complaints. Patient has nontoxic appearance and condition is stable for discharge.  Laboratory tests, medications, x-rays, diagnosis, follow up plan and return instructions have been reviewed and discussed with the patient and/or family. Pt and/or family were instructed on symptoms that may arise after discharge requiring re-evaluation by a physician. Pt and/or family have had the opportunity to ask questions about their care. Patient and/or family verbalized understanding and agreement with care plan, follow up and return instructions. Patient and/or family agree to return if their symptoms are not improving or immediately if they have any change in their condition. I have also put together some discharge instructions for the patient that include: 1) educational information regarding their diagnosis, 2) how to care for their diagnosis at home, as well a 3) list of reasons why they would want to return to the ED prior to their follow-up appointment, should their condition change as well as instructions to return to the ED should sx worsen at any time. Vital signs stable for discharge. Jasmny Navarrete MD      Disposition     Clinical Impression:     ICD-10-CM ICD-9-CM    1. Atypical chest pain  R07.89 786.59    2. Hyperglycemia  R73.9 790.29    3. Gastroesophageal reflux disease with esophagitis, unspecified whether hemorrhage  K21.00 530.11    4. Acute neck pain  M54.2 723.1          PLAN:  1. Discharge home in stable condition  2. Current Discharge Medication List      START taking these medications    Details   pantoprazole (Protonix) 40 mg tablet Take 1 Tablet by mouth daily for 20 days. Qty: 20 Tablet, Refills: 0  Start date: 12/21/2021, End date: 1/10/2022      sucralfate (Carafate) 100 mg/mL suspension Take 5 mL by mouth four (4) times daily for 20 days. Qty: 400 mL, Refills: 0  Start date: 12/21/2021, End date: 1/10/2022      lidocaine (Lidoderm) 5 % Apply patch to the affected area for 12 hours a day and remove for 12 hours a day. Qty: 1 Each, Refills: 0  Start date: 12/21/2021           3.    Follow-up Information     Follow up With Specialties Details Why Contact Fish Dsouza NP Nurse Practitioner In 1 week Follow up todays symptoms. [address]  [phone]          4. Discharged    Return to ED if worse    Please note, this dictation was completed with [email or web address], the [employer] voice recognition software. Quite often unanticipated grammatical, syntax, homophones, and other interpretive errors are inadvertently transcribed by the computer software. Please disregard these errors. Please excuse any errors that have escaped final proof reading.

## 2021-12-21 NOTE — PROGRESS NOTES
Patient had an ED visit on today, 12/21. States she continued to have the chest pain after we spoke yesterday. She was diagnosed with GERD and provided with script for prilosec and carafate. Follow up with her PCP is scheduled for 1/3 (PCP is out of the office until that time). Follow up phone call to patient, two pt identifiers verified. Discussed patient's goals:   Goals      DM  Diabetes Support and Education / Care Coordination      Referred from [facility]. A1C>9    1)  General:   Patient is able to state a basic definition of diabetes including: risk factors, basic pathophysiology, complications and treatment. 2)  Diet:   Patient will be able to read a basic food label and identify role of calories, carbohydrates, protein and fats in healthy eating. 3)  Activity:   Patient will identify the potential health benefits of regular exercise:  decreased cholesterol, improved mood, decreased blood pressure, lower stress/anxiety, weight loss, decreased blood sugar. 4)  Monitoring:  Patient will be able to identify what an A1C test measures. 5)  Medications:  Patient will demonstrate knowledge of diabetes medications. 6)  Risk Reduction:  Patient is able to state goal target for A1C (<7), blood pressure (<130/70), and LDL cholesterol (<100) to reduce diabetes complications. 7)  Problem Solving:  Patient will identify s/sx and treatment for hyper- and hypoglycemia. 8)  Healthy Coping:  Patient will be able to identify two community support resources. Patient will identify two stress relieving techniques.     Patient will identify health risk factors and complications of diabetes  1) Kidney disease  2) Heart disease  3) Neuropathy  4) Skin breakdown  5) Diabetic Retinopathy  6) Prevention:   Stable controlled A1C,  healthy lifestyle (diet, exercise, hydration, stress reduction, monitoring, skin care)   3/15- Pt to have BW this week/  A1C.    8/3: Patient's A1C up to 12.5 7/21 - discussed healthy food options. States she eats cheerios or corn flakes for breakfast.  Encouraged lean protein, egg whites, whole grains, yogurt. 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 Will mail nutritional information from [employer]. Encouraged drinking more water, plans to take water bottles with her to work, as they no longer offer water bottles. Has been drinking 3 sodas/day. Will cut these out. Scheduled to meet with a nutritionist tomorrow, 8/4.  8/10: Has been able to cut soda, drinks one a day. Drinking water. Trying to meal prep. Bought fruits and veggies. 8/27: Patient had visit with nurse practitioner. On some medications to help control diabetes. Still recovering from [place].  9/8: Patient states glucose has been well controlled. Complaints of blurred vision with new weekly medication. Encouraged her to discuss this with her PCP, states she will tomorrow, as she needs to go to the office to  her \"leave paperwork\". 9/23: Discussed medication issues. Back on jardiance. 10/8: Diabetes, glucose has been in lower 200s. Taking 27 units of Lantus/daily, Januvia. Working on getting glucose under better control. Has been stressed out with everything, loss of father, covid, pink eye. Has gotten STD approved and has received check. 10/25: Blood sugars are still up and down. Taking januvia and lantus 27 units. Has appointment to go to doctor tomorrow to get STD paperwork filled out for 10 days she missed due to \"pink eye\". Discussed trying to decrease stress levels to help with glucose. 11/22: Blood sugars - patient states they have been \"alright\"  Provided encouragement to make healthy choices, acknowledging that it can be difficult during the holidays.    12/20: Patient is at her provider's office right now, glucose was 400 and she was having chest pain on and off for about a week or two. The doctor is out until January, they told her she should go to the emergency room. Encouraged her to call the nurse access or call 911 due to her symptoms. She says she has had a lot of stress and been dealing with that. Says she is waiting until tomorrow and if she still doesn't feel well she will go to the ER. Says her symptoms have been going on for weeks, it's not constant pain. Says she was told it could be reflux, says she takes medication for that. Continued to encourage her to call the NAL or go to the ED, as this was the recommendation from her provider's office. She wants to wait until tomorrow. 12/21Zigmund Megan to ED, they did confirm that her chest pain was related to reflux, ordered prilosec, carafate to help relieve this pain. She had run out of her Larrie Storey, has ordered it. Also needs osvaldo sensors, offered to call [employer] for her, she said she would appreciate that. Called to order, spoke with Ursula Lockett at [facility], she is requesting new script from patient's PCP and will fill. Josi is expected to arrive to patient on 12/22. Patient's primary care provider relationship reviewed with patient and modified, as applicable.     Readiness to Change: []  Pre-contemplative    []  Contemplative  []  Preparation               [x]  Action                  []  Maintenance    Barriers/Challenges to Care: []  Decline in memory    []  Language barrier     []  Emotional                  []  Limited mobility  []  Lack of motivation     [] Vision, hearing or cognitive impairment []  Knowledge [] Financial Barriers []  Lack of support  []  Pain []  Other [x]  None    Key pt activities to achieve better health:   []  Weight loss  [x]  Improved diabetic control  []  Decreased cholesterol levels  []  Decreased blood pressure  []    []    Upcoming appointments:   Future Appointments   Date Time Provider Ankush Lyn   1/3/2022  1:20 PM Craig Catalan NP [facility] MAIN BS AMB Plan for next call: Call in one week, 12/28.

## 2021-12-21 NOTE — Clinical Note
[address] [place] [address] EMERGENCY DEP  [address] [place] [address] Dr Jhonathan Yañez [id number]  [phone]    Work/School Note    Date: 12/21/2021    To Whom It May concern:      Blanche Soni was seen and treated today in the emergency room by the following provider(s):  Attending Provider: Haritha Devi MD.      Blanche Soni is excused from work/school on 12/21/21. She is clear to return to work/school on 12/22/21.         Sincerely,          Nico Rios MD

## 2021-12-22 RX ORDER — FLASH GLUCOSE SCANNING READER
EACH MISCELLANEOUS
Qty: 1 EACH | Refills: 0 | OUTPATIENT
Start: 2021-12-22

## 2021-12-22 RX ORDER — FLASH GLUCOSE SENSOR
1 KIT MISCELLANEOUS EVERY 2 WEEKS
Qty: 6 KIT | Refills: 3 | Status: SHIPPED | OUTPATIENT
Start: 2021-12-22 | End: 2022-09-20

## 2021-12-28 ENCOUNTER — PATIENT OUTREACH (OUTPATIENT)
Dept: OTHER | Age: 51
End: 2021-12-28

## 2021-12-28 NOTE — PROGRESS NOTES
Follow up phone call to patient, two pt identifiers verified. Discussed patient's goals:   Goals      DM  Diabetes Support and Education / Care Coordination      Referred from [facility]. A1C>9    1)  General:   Patient is able to state a basic definition of diabetes including: risk factors, basic pathophysiology, complications and treatment. 2)  Diet:   Patient will be able to read a basic food label and identify role of calories, carbohydrates, protein and fats in healthy eating. 3)  Activity:   Patient will identify the potential health benefits of regular exercise:  decreased cholesterol, improved mood, decreased blood pressure, lower stress/anxiety, weight loss, decreased blood sugar. 4)  Monitoring:  Patient will be able to identify what an A1C test measures. 5)  Medications:  Patient will demonstrate knowledge of diabetes medications. 6)  Risk Reduction:  Patient is able to state goal target for A1C (<7), blood pressure (<130/70), and LDL cholesterol (<100) to reduce diabetes complications. 7)  Problem Solving:  Patient will identify s/sx and treatment for hyper- and hypoglycemia. 8)  Healthy Coping:  Patient will be able to identify two community support resources. Patient will identify two stress relieving techniques. Patient will identify health risk factors and complications of diabetes  1) Kidney disease  2) Heart disease  3) Neuropathy  4) Skin breakdown  5) Diabetic Retinopathy  6) Prevention:   Stable controlled A1C,  healthy lifestyle (diet, exercise, hydration, stress reduction, monitoring, skin care)   3/15- Pt to have BW this week/  A1C.    8/3: Patient's A1C up to 12.5 7/21 - discussed healthy food options. States she eats cheerios or corn flakes for breakfast.  Encouraged lean protein, egg whites, whole grains, yogurt.   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 Will mail nutritional information from [employer]. Encouraged drinking more water, plans to take water bottles with her to work, as they no longer offer water bottles. Has been drinking 3 sodas/day. Will cut these out. Scheduled to meet with a nutritionist tomorrow, 8/4.  8/10: Has been able to cut soda, drinks one a day. Drinking water. Trying to meal prep. Bought fruits and veggies. 8/27: Patient had visit with nurse practitioner. On some medications to help control diabetes. Still recovering from [place].  9/8: Patient states glucose has been well controlled. Complaints of blurred vision with new weekly medication. Encouraged her to discuss this with her PCP, states she will tomorrow, as she needs to go to the office to  her \"leave paperwork\". 9/23: Discussed medication issues. Back on jardiance. 10/8: Diabetes, glucose has been in lower 200s. Taking 27 units of Lantus/daily, Januvia. Working on getting glucose under better control. Has been stressed out with everything, loss of father, covid, pink eye. Has gotten STD approved and has received check. 10/25: Blood sugars are still up and down. Taking januvia and lantus 27 units. Has appointment to go to doctor tomorrow to get STD paperwork filled out for 10 days she missed due to \"pink eye\". Discussed trying to decrease stress levels to help with glucose. 11/22: Blood sugars - patient states they have been \"alright\"  Provided encouragement to make healthy choices, acknowledging that it can be difficult during the holidays. 12/20: Patient is at her provider's office right now, glucose was 400 and she was having chest pain on and off for about a week or two. The doctor is out until January, they told her she should go to the emergency room. Encouraged her to call the nurse access or call 911 due to her symptoms. She says she has had a lot of stress and been dealing with that.   Says she is waiting until tomorrow and if she still doesn't feel well she will go to the ER. Says her symptoms have been going on for weeks, it's not constant pain. Says she was told it could be reflux, says she takes medication for that. Continued to encourage her to call the NAL or go to the ED, as this was the recommendation from her provider's office. She wants to wait until tomorrow. 12/21Gino Brie to ED, they did confirm that her chest pain was related to reflux, ordered prilosec, carafate to help relieve this pain. She had run out of her [employer], has ordered it. Also needs osvaldo sensors, offered to call [facility] for her, she said she would appreciate that. Called to order, spoke with German Dahl at [facility], she is requesting new script from patient's PCP and will fill. Josi is expected to arrive to patient on 12/22.  12/28: She got her sensors, and has received her Januvia. Upset, she has dropped off STD paperwork again at her doctor's office (2nd time). [employer] says they have not received it. Contacted PCP office and spoke with [facility], she checked on the paperwork and stated that because her provider is out, back on 1/3, the paperwork cannot be sent yet. Relayed this information to the patient. She was appreciative for my calling. She requested the phone number to get her password reset for my chart. Provided that to her. Patient's primary care provider relationship reviewed with patient and modified, as applicable.     Readiness to Change: []  Pre-contemplative    []  Contemplative  []  Preparation               [x]  Action                  []  Maintenance    Barriers/Challenges to Care: []  Decline in memory    []  Language barrier     []  Emotional                  []  Limited mobility  []  Lack of motivation     [] Vision, hearing or cognitive impairment []  Knowledge [] Financial Barriers []  Lack of support  []  Pain []  Other [x]  None    Key pt activities to achieve better health:   []  Weight loss  [] Improved diabetic control  []  Decreased cholesterol levels  []  Decreased blood pressure  []    []    Upcoming appointments:   Future Appointments   Date Time Provider Ankush Lyn   1/3/2022  1:20 PM Benita Covington NP [facility] MAIN BS AMB     Plan for next call: Call in one week, 1/4

## 2022-01-04 ENCOUNTER — HOSPITAL ENCOUNTER (EMERGENCY)
Age: 52
Discharge: HOME | End: 2022-01-04
Attending: EMERGENCY MEDICINE
Payer: COMMERCIAL

## 2022-01-04 VITALS
RESPIRATION RATE: 18 BRPM | SYSTOLIC BLOOD PRESSURE: 143 MMHG | TEMPERATURE: 98.5 F | HEIGHT: 60 IN | HEART RATE: 90 BPM | OXYGEN SATURATION: 98 % | DIASTOLIC BLOOD PRESSURE: 64 MMHG | WEIGHT: 115 LBS | BODY MASS INDEX: 22.58 KG/M2

## 2022-01-04 DIAGNOSIS — Z20.822 SUSPECTED COVID-19 VIRUS INFECTION: Primary | ICD-10-CM

## 2022-01-04 LAB
FLUAV AG NPH QL IA: NEGATIVE
FLUBV AG NOSE QL IA: NEGATIVE

## 2022-01-04 PROCEDURE — 87804 INFLUENZA ASSAY W/OPTIC: CPT

## 2022-01-04 PROCEDURE — 99282 EMERGENCY DEPT VISIT SF MDM: CPT

## 2022-01-04 PROCEDURE — U0005 INFEC AGEN DETEC AMPLI PROBE: HCPCS

## 2022-01-04 NOTE — ED PROVIDER NOTES
EMERGENCY DEPARTMENT HISTORY AND PHYSICAL EXAM          Date: 1/4/2022  Patient Name: López Kemp    History of Presenting Illness     Chief Complaint   Patient presents with    Concern For COVID-19 (Coronavirus)       History Provided By: Patient    HPI: López Kemp is a 46 y.o. female, pmhx listed below, who presents to the ED c/o sinus congestion, general malaise, fatigue, subjective fever, cough. Patient reports symptoms began 2 days ago. Exposed to daughter and granddaughter who have similar symptoms and Covid. Patient reports she is vaccinated and boosted, also had Covid in August 2021. Able to eat and drink. No shortness of breath. No OTC treatments to arrival.        PCP: Phillip Araiza NP    There are no other complaints, changes, or physical findings at this time.          Past History       Past Medical History:  Past Medical History:   Diagnosis Date    Abnormal EKG 2/27/2018    Abscess 7/24/2019    Asthma     Chest pain 2/27/2018    Chest wall contusion, left, subsequent encounter     Diabetes ([facility] [address]     Diverticular disease     Diverticulitis large intestine w/o perforation or abscess w/o bleeding 10/25/2016    Dyslipidemia     Hypertension     Lumbar radiculopathy 02/01/2021    Menopause     Pelvic pain 7/26/2016    Rib fractures     Right hand pain 7/10/2018    Sciatica of left side 02/01/2021    Uterine leiomyoma 7/26/2016    Vaginal candidiasis 7/24/2019       Past Surgical History:  Past Surgical History:   Procedure Laterality Date    HX GYN      BTL    IR INJ FORAMIN EPID LUMB ANES/STER [facility]  5/11/2021       Family History:  Family History   Problem Relation Age of Onset   Clem Fritz Stroke Mother     Diabetes Mother     Heart Disease Father     Diabetes Sister     Diabetes Maternal Grandmother     Breast Cancer Maternal Aunt     Breast Cancer Paternal Aunt        Social History:  Social History     Tobacco Use    Smoking status: Current Every Day Smoker Packs/day: 0.50    Smokeless tobacco: Never Used   Vaping Use    Vaping Use: Never used   Substance Use Topics    Alcohol use: No    Drug use: No       Current Outpatient Medications   Medication Sig Dispense Refill    flash glucose sensor (FreeStyle Antony 14 Day Sensor) kit 1 Device by SubCUTAneous route Once every 2 weeks. Apply to upper arm 6 Kit 3    pantoprazole (Protonix) 40 mg tablet Take 1 Tablet by mouth daily for 20 days. 20 Tablet 0    sucralfate (Carafate) 100 mg/mL suspension Take 5 mL by mouth four (4) times daily for 20 days. 400 mL 0    lidocaine (Lidoderm) 5 % Apply patch to the affected area for 12 hours a day and remove for 12 hours a day. 1 Each 0    SITagliptin (Januvia) 100 mg tablet TAKE 1 TABLET BY MOUTH ONE TIME A DAY 90 Tablet 1    insulin glargine (LANTUS,BASAGLAR) 100 unit/mL (3 mL) inpn Take  10 units in the morning and 30 units  QHS. DX E11.65  Indications: type 2 diabetes mellitus 21 mL 3    omeprazole (PRILOSEC) 40 mg capsule Take 1 Capsule by mouth daily. 90 Capsule 3    fluconazole (DIFLUCAN) 150 mg tablet TAKE 1 TABLET BY MOUTH AND THEN TAKE 1 TABLET 3 DAYS LATER, if needed repeat for 3rd dose (Patient not taking: Reported on 10/26/2021) 3 Tablet 5    baclofen (LIORESAL) 10 mg tablet Take 1 Tablet by mouth three (3) times daily. PRN  Indications: muscle spasms caused by a spinal disease 30 Tablet 2    atorvastatin (LIPITOR) 20 mg tablet Take 1 Tablet by mouth daily. 30 Tablet 5    diclofenac (VOLTAREN) 1 % gel Apply 4 g to affected area four (4) times daily. 350 g 2    ketorolac (TORADOL) 10 mg tablet Take 10 mg by mouth. PRN      fluticasone propion-salmeteroL (ADVAIR/WIXELA) 250-50 mcg/dose diskus inhaler Take 1 Puff by inhalation every twelve (12) hours. 1 Inhaler 0    amitriptyline (ELAVIL) 25 mg tablet Take 1 Tab by mouth nightly. Indications: neuropathic pain (Patient taking differently: Take 25 mg by mouth nightly.  As needed  Indications: neuropathic pain) 30 Tab 0    DULoxetine (CYMBALTA) 30 mg capsule Take 1 Cap by mouth two (2) times a day. Indications: chronic muscle or bone pain, diabetic complication causing injury to some body nerves (Patient taking differently: Take 30 mg by mouth two (2) times a day. PRN  Indications: chronic muscle or bone pain, diabetic complication causing injury to some body nerves) 60 Cap 5    albuterol (PROVENTIL HFA, VENTOLIN HFA, PROAIR HFA) 90 mcg/actuation inhaler Take 2 Puffs by inhalation every four (4) hours as needed for Wheezing or Shortness of Breath. Indications: asthma attack 2 Inhaler 5    TRUEplus Pen Needle 31 gauge x 3/16\" ndle       inhalational spacing device 1 Each by Does Not Apply route as needed for Wheezing. Emergency one time authorization for acute symptoms- please notify [facility] if necessary (Patient not taking: Reported on 8/26/2021) 1 Device 0    Insulin Needles, Disposable, 31 gauge x 5/16\" ndle Use to administer insulin SQ QPM as directed. 1 Package 11    flash glucose scanning reader ([employer] Antony 14 Day [place]) [facility] Use to check blood glucose TID and PRN. Dx E11.9 1 Each 0       Allergies:  No Known Allergies      Review of Systems   Review of Systems   Constitutional: Positive for chills, fatigue and fever. HENT: Positive for congestion and sinus pressure. Eyes: Negative for pain. Respiratory: Positive for cough. Negative for shortness of breath. Cardiovascular: Negative for chest pain. Gastrointestinal: Negative for abdominal pain. Genitourinary: Negative for flank pain. Musculoskeletal: Negative for back pain. Neurological: Negative for headaches. Psychiatric/Behavioral: Negative for agitation. Physical Exam     Vital Signs-Reviewed the patient's vital signs. Patient Vitals for the past 12 hrs:   Temp Pulse Resp BP SpO2   01/04/22 0835 98.5 °F (36.9 °C) 90 18 (!) 143/64 98 %       Physical Exam  Constitutional:       Appearance: Normal appearance.    HENT: Head: Normocephalic and atraumatic. Mouth/Throat:      Mouth: Mucous membranes are moist.      Pharynx: No oropharyngeal exudate or posterior oropharyngeal erythema. Cardiovascular:      Rate and Rhythm: Normal rate and regular rhythm. Pulmonary:      Effort: Pulmonary effort is normal.      Breath sounds: Normal breath sounds. Musculoskeletal:         General: No swelling. Skin:     General: Skin is warm and dry. Neurological:      Mental Status: She is alert and oriented to person, place, and time. Psychiatric:         Mood and Affect: Mood normal.         Diagnostic Study Results     Labs -   No results found for this or any previous visit (from the past 12 hour(s)). Radiologic Studies -   No orders to display     CT Results  (Last 48 hours)    None        CXR Results  (Last 48 hours)    None            Medical Decision Making   I am the first provider for this patient. I reviewed the vital signs, available nursing notes, past medical history, past surgical history, family history and social history. Records Reviewed: Nursing Notes and Old Medical Records    Provider Notes (Medical Decision Making):   MDM: 26-year-old female with Covid-like symptoms. Also consider influenza. O2 sat normal, vital signs okay, patient appears nontoxic. Will recommend at home OTC medications for symptomatic relief and rest/p.o. hydration. We discussed return to ED precautions. Will follow up as instructed. All questions have been answered, pt voiced understanding and agreement with plan. Specific return precautions provided as well as instructions to return to the ED should sx worsen at any time. Vital signs stable for discharge. Diagnosis     Clinical Impression:   1. Suspected COVID-19 virus infection            Disposition:  Discharged    Current Discharge Medication List            Please note, this dictation was completed with [employer], the [email or web address] voice recognition software.  Quite often unanticipated grammatical, syntax, homophones, and other interpretive errors are inadvertently transcribed by the computer software. Please disregard these errors. Please excuse any errors that have escaped final proof reading.

## 2022-01-04 NOTE — Clinical Note
[address] [place] [address] EMERGENCY DEP  [address] [facility]   [place] Hui [id number]  [phone]    Work/School Note    Date: 1/4/2022     To Whom It May concern:    Dioni Burnett was evaluated by the following provider(s):  Attending Provider: Piotr Chambers MD.   Santiago Cox virus is suspected. Per the CDC guidelines we recommend home isolation until the following conditions are all met:    1. At least five days have passed since symptoms first appeared and/or had a close exposure,   2. After home isolation for five days, wearing a mask around others for the next five days,  3. At least 24 have passed since last fever without the use of fever-reducing medications and  4.  Symptoms (eg cough, shortness of breath) have improved      Sincerely,          Ronda Cabot, MD

## 2022-01-04 NOTE — ED TRIAGE NOTES
Pt c/o runny nose, muscle aches, cough since Friday, has had her 2 shots and a booster, she was exposed on Saturday to known positive family

## 2022-01-04 NOTE — Clinical Note
[address] [place] [address] EMERGENCY DEP  [address] [facility] [address] [place] [address]  [phone]    Work/School Note    Date: 1/4/2022     To Whom It May concern:    Demian Ambriz was evaluated by the following provider(s):  Attending Provider: Herminio Carlin MD.   [facility] virus is suspected. Per the CDC guidelines we recommend home isolation until the following conditions are all met:    1. At least five days have passed since symptoms first appeared and/or had a close exposure,   2. After home isolation for five days, wearing a mask around others for the next five days,  3. At least 24 have passed since last fever without the use of fever-reducing medications and  4.  Symptoms (eg cough, shortness of breath) have improved      Sincerely,          Ana Tomas RN

## 2022-01-05 ENCOUNTER — PATIENT OUTREACH (OUTPATIENT)
Dept: OTHER | Age: 52
End: 2022-01-05

## 2022-01-05 LAB
SARS-COV-2, XPLCVT: DETECTED
SOURCE, COVRS: ABNORMAL

## 2022-01-05 NOTE — PROGRESS NOTES
Briefly spoke with patient to let her know that her COVID test results were positive. Encouraged her to reach out to her PCP to determine if she needs any prescriptions. Will reach out to her on 1/7 to check in.

## 2022-01-05 NOTE — PROGRESS NOTES
Patient contacted regarding COVID-19 . Discussed COVID-19 related testing which was pending at this time. Test results were pending. Patient informed of results, if available? Not available. Ambulatory Care Manager contacted the patient by telephone to perform post discharge assessment. Call within 2 business days of discharge: Yes Verified name and  with patient as identifiers. Provided introduction to self, and explanation of the CTN/ACM role, and reason for call due to risk factors for infection and/or exposure to COVID-19. Symptoms reviewed with patient who verbalized the following symptoms: fever, fatigue, pain or aching joints, cough and shortness of breath      Due to no new or worsening symptoms encounter was not routed to provider for escalation. Discussed follow-up appointments. If no appointment was previously scheduled, appointment scheduling offered:  .  Nicolasa Aviles follow up appointment(s):   Future Appointments   Date Time Provider nAkush Nicholson   2022  4:20 PM Paresh Ricketts NP [facility] MAIN BS AMB     Interventions to address risk factors: Obtained and reviewed discharge summary and/or continuity of care documents     Advance Care Planning:   Does patient have an Advance Directive: not on file; education provided. Educated patient about risk for severe COVID-19 due to risk factors according to CDC guidelines. ACM reviewed discharge instructions, medical action plan and red flag symptoms with the patient who verbalized understanding. Discussed COVID vaccination status: yes. Education provided on COVID-19 vaccination as appropriate. Discussed exposure protocols and quarantine with CDC Guidelines. Patient was given an opportunity to verbalize any questions and concerns and agrees to contact ACM or health care provider for questions related to their healthcare.     Reviewed and educated patient on any new and changed medications related to discharge diagnosis     States she called [employer] [facility] and was unable to get through. Has spoken with her manager's assistant to let her know she has pending. Was patient discharged with a pulse oximeter? no Discussed and confirmed pulse oximeter discharge instructions and when to notify provider or seek emergency care. ACM provided contact information. Plan for follow-up call in 1-2 days based on severity of symptoms and risk factors. 1/6.

## 2022-01-06 ENCOUNTER — PATIENT OUTREACH (OUTPATIENT)
Dept: OTHER | Age: 52
End: 2022-01-06

## 2022-01-06 NOTE — PROGRESS NOTES
Patient left voicemail stating that occupational health is requesting a screen shot of her positive covid results. Checked with Gail Pérez, she states that patient needs to send this information through my chart, as requested. Called patient back explained that we cannot send records. She told occupational health nurse that she couldn't do it. She has tried to call my chart to get assistance with getting in, still unable to do so. This ACM called patient's PCP office. No answer on phone line. Rang.

## 2022-01-06 NOTE — PROGRESS NOTES
Returned patient's call. States she has been on hold with [employer] [facility], Justyn Mcneil and Occupational Health. She did leave messages for all departments. States she got a call back from Justyn Mcneil and they have started the ORTIZ process for her. She is still waiting for a call from [facility]. Thanked her for her patience. Explained that this has been happening and these departments are overwhelmed. Discussed her glucose. Checked it while we were on the phone. Reading was 402. Provided educational support regarding diet and let her know that her infection could increase her glucose. Discussed importance of decreasing carb intake, as she said she had just drank a glass of apple juice. Encouraged her to cut it in half with water, patient states she does not like water. Explained importance healthy diet, especially now. States she would try. Will follow up on 1/7.

## 2022-01-07 ENCOUNTER — TELEPHONE (OUTPATIENT)
Dept: FAMILY MEDICINE CLINIC | Age: 52
End: 2022-01-07

## 2022-01-07 ENCOUNTER — PATIENT OUTREACH (OUTPATIENT)
Dept: OTHER | Age: 52
End: 2022-01-07

## 2022-01-07 NOTE — TELEPHONE ENCOUNTER
Patient needs to speak with nurse. Tested positive for covid.   Questions regarding documentation for employee health

## 2022-01-07 NOTE — PROGRESS NOTES
Per patient's request, contacted her PCP office to ask if they could send her positive results to occupational health. Was told they could not do that, patient needed to call in to request this. Called patient to let her know this. She states she spoke with the  of [facility], Andrea Wakefield and was told that her manager would be directly handling her leave. She would not be eligible to come back to work until 1/9, only if her symptoms are better. Patient feels better about being out of work now, no longer worried about her position. Encouraged her to maintain glucose control. States she is drinking water, instead of juice. Reinforced this behavior with praise. Discussed how higher glucose levels can be a result of infection, which was out of her control but she needs to ensure healthy diet, as this is in her control. Will follow up on 1/11.

## 2022-01-10 ENCOUNTER — PATIENT OUTREACH (OUTPATIENT)
Dept: OTHER | Age: 52
End: 2022-01-10

## 2022-01-10 NOTE — PROGRESS NOTES
Patient states she was told by her manager, SIENA, told her to come back today. She states she is not feeling well, still having sweats. She said she called the , Agustín Davenport, and told him what she said. She was advised by Agustín Davenport, to stay home if she is still having symptoms. Patient states she will plan to go back on Thursday, like she was told by the ED provider. Will call and check on patient later this week.

## 2022-01-12 ENCOUNTER — PATIENT OUTREACH (OUTPATIENT)
Dept: OTHER | Age: 52
End: 2022-01-12

## 2022-01-12 NOTE — PROGRESS NOTES
"Follow Up [facility] Disorders [facility] Note     Chief Complaint: Hypersomnolence    Primary Care Physician: Yakelin Bonds MD    Interval History:   I last saw the patient in October 2018.  Provigil 200 mg daily initiated.  The patient reports that he is 75% better.  He goes to bed 11 PM and awakens at 6:30 AM.  He will awaken once to use the restroom.  Off of Provigil his [place] Sleepiness Scale is abnormal at 17    Review of Systems:    A complete review of systems was done and all were negative with the exception of the above    Social History:    Social History     Socioeconomic History   • Marital status:      Spouse name: Not on file   • Number of children: Not on file   • Years of education: Not on file   • Highest education level: Not on file   Tobacco Use   • Smoking status: Current Some Day Smoker     Packs/day: 1.00     Years: 6.00     Pack years: 6.00     Types: Cigarettes   • Smokeless tobacco: Never Used   Substance and Sexual Activity   • Alcohol use: No   • Drug use: No   • Sexual activity: Defer       Allergies:  Patient has no known allergies.     Medication Review:  Reviewed.      Vital Signs:    Vitals:    07/25/19 0900   Pulse: 67   SpO2: 98%   Weight: 92.3 kg (203 lb 6.4 oz)   Height: 175.3 cm (69\")     Body mass index is 30.04 kg/m².    Physical Exam:    Constitutional:  Well developed 31 y.o. male that appears in no apparent distress.  Awake & oriented times 3.  Normal mood with normal recent and remote memory and normal judgement.  Eyes:  Conjunctivae normal.  Oropharynx:  moist mucous membranes without exudate and large tongue and uvula and moderate narrowing of the posterior pharyngeal opening and class II-3 MP airway     Impression:   The patient has a history of marked hypersomnolence with an average sleep latency of 3.2 minutes for 5 daytime naps during a multiple sleep latency test 9/21/2018 following 7.1 hours of sleep with an overnight polysomnogram 9/20/2018.  The patient had a " Follow up phone call to patient, two pt identifiers verified. Discussed patient's goals:   Goals      DM  Diabetes Support and Education / Care Coordination      Referred from [facility] DM. A1C>9    1)  General:   Patient is able to state a basic definition of diabetes including: risk factors, basic pathophysiology, complications and treatment. 2)  Diet:   Patient will be able to read a basic food label and identify role of calories, carbohydrates, protein and fats in healthy eating. 3)  Activity:   Patient will identify the potential health benefits of regular exercise:  decreased cholesterol, improved mood, decreased blood pressure, lower stress/anxiety, weight loss, decreased blood sugar. 4)  Monitoring:  Patient will be able to identify what an A1C test measures. 5)  Medications:  Patient will demonstrate knowledge of diabetes medications. 6)  Risk Reduction:  Patient is able to state goal target for A1C (<7), blood pressure (<130/70), and LDL cholesterol (<100) to reduce diabetes complications. 7)  Problem Solving:  Patient will identify s/sx and treatment for hyper- and hypoglycemia. 8)  Healthy Coping:  Patient will be able to identify two community support resources. Patient will identify two stress relieving techniques. Patient will identify health risk factors and complications of diabetes  1) Kidney disease  2) Heart disease  3) Neuropathy  4) Skin breakdown  5) Diabetic Retinopathy  6) Prevention:   Stable controlled A1C,  healthy lifestyle (diet, exercise, hydration, stress reduction, monitoring, skin care)   3/15- Pt to have BW this week/  A1C.    8/3: Patient's A1C up to 12.5 7/21 - discussed healthy food options. States she eats cheerios or corn flakes for breakfast.  Encouraged lean protein, egg whites, whole grains, yogurt.   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 Will mail nutritional information from [employer]. Encouraged drinking more water, plans to take water bottles with her to work, as they no longer offer water bottles. Has been drinking 3 sodas/day. Will cut these out. Scheduled to meet with a nutritionist tomorrow, 8/4.  8/10: Has been able to cut soda, drinks one a day. Drinking water. Trying to meal prep. Bought fruits and veggies. 8/27: Patient had visit with nurse practitioner. On some medications to help control diabetes. Still recovering from [place].  9/8: Patient states glucose has been well controlled. Complaints of blurred vision with new weekly medication. Encouraged her to discuss this with her PCP, states she will tomorrow, as she needs to go to the office to  her \"leave paperwork\". 9/23: Discussed medication issues. Back on jardiance. 10/8: Diabetes, glucose has been in lower 200s. Taking 27 units of Lantus/daily, Januvia. Working on getting glucose under better control. Has been stressed out with everything, loss of father, covid, pink eye. Has gotten STD approved and has received check. 10/25: Blood sugars are still up and down. Taking januvia and lantus 27 units. Has appointment to go to doctor tomorrow to get STD paperwork filled out for 10 days she missed due to \"pink eye\". Discussed trying to decrease stress levels to help with glucose. 11/22: Blood sugars - patient states they have been \"alright\"  Provided encouragement to make healthy choices, acknowledging that it can be difficult during the holidays. 12/20: Patient is at her provider's office right now, glucose was 400 and she was having chest pain on and off for about a week or two. The doctor is out until January, they told her she should go to the emergency room. Encouraged her to call the nurse access or call 911 due to her symptoms. She says she has had a lot of stress and been dealing with that.   Says she is waiting until short REM latency on his overnight polysomnogram and also had one REM onset during his multiple sleep latency test.  The patient also describes some symptoms that could be suggestive of cataplexy.  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    Plan:  Good sleep hygiene measures should be maintained.  Weight loss would be beneficial in this patient who is obese by BMI.  The patient is benefiting from the treatment being provided.     Patient will continue Provigil 200 mg 1 by mouth every morning.  A prescription was given for #90 for 3 months with 1 refill.    The patient will call for any problems and will follow up in 6 months.      Cm Gomes MD  Sleep Medicine  07/25/19  11:11 AM         tomorrow and if she still doesn't feel well she will go to the ER. Says her symptoms have been going on for weeks, it's not constant pain. Says she was told it could be reflux, says she takes medication for that. Continued to encourage her to call the [facility] or go to the ED, as this was the recommendation from her provider's office. She wants to wait until tomorrow. 12/21Janise Beam to ED, they did confirm that her chest pain was related to reflux, ordered prilosec, carafate to help relieve this pain. She had run out of her Rishi Schumacher, has ordered it. Also needs osvaldo sensors, offered to call [facility] for her, she said she would appreciate that. Called to order, spoke with Missy Beck at [facility], she is requesting new script from patient's PCP and will fill. Paddyuvia is expected to arrive to patient on 12/22.  12/28: She got her sensors, and has received her Januvia. Upset, she has dropped off STD paperwork again at her doctor's office (2nd time). [facility] says they have not received it. Contacted PCP office and spoke with Pranav Jones, she checked on the paperwork and stated that because her provider is out, back on 1/3, the paperwork cannot be sent yet. Relayed this information to the patient. She was appreciative for my calling. She requested the phone number to get her password reset for my chart. Provided that to her. 1/6: Patient diagnosed with COVID, blood glucose this afternoon was 402. States she has been drinking apple juice, as she does not like water. Encouraged her to try 1/2 water, 1/2 juice. She is taking 30 units of Lantus at night. 1/7: Discussed avoiding juices to maintain hydration. States she is drinking water now. Provided praise. 1/12: Patient feeling better today, will go back to work tomorrow. Discussed issues with her manager, states she hopes she does not give her a hard time. Provided encouragement. Asked her about her glucose.   She states it's been up and down, depending on what she eats.  Provided education about this, states she has been drinking fruit juice and apple juice, reminded her of the importance of decreasing this, explained that one cup of juice is about the total amount of sugar intake she should have for one day. Reminded her she could cut juice with water. She stated she would do better. Labs to be drawn on 1/18 through PCP. Patient's primary care provider relationship reviewed with patient and modified, as applicable. Patient remains at home, recovering from COVID infection. She states she is feeling better today and ready to go back to work tomorrow, 1/13. Readiness to Change: []  Pre-contemplative    []  Contemplative  []  Preparation               []  Action                  []  Maintenance    Barriers/Challenges to Care: []  Decline in memory    []  Language barrier     []  Emotional                  []  Limited mobility  []  Lack of motivation     [] Vision, hearing or cognitive impairment []  Knowledge [] Financial Barriers []  Lack of support  []  Pain []  Other [x]  None    Key pt activities to achieve better health:   []  Weight loss  [x]  Improved diabetic control  []  Decreased cholesterol levels  []  Decreased blood pressure  []    []    Upcoming appointments:   Future Appointments   Date Time Provider Ankush Nicholson   1/18/2022  4:20 PM Neida Gallegos NP [facility] MAIN BS AMB     Plan for next call: Call on 1/19. A1C?

## 2022-01-18 ENCOUNTER — OFFICE VISIT (OUTPATIENT)
Dept: FAMILY MEDICINE CLINIC | Age: 52
End: 2022-01-18
Payer: COMMERCIAL

## 2022-01-18 VITALS
HEIGHT: 60 IN | WEIGHT: 108.4 LBS | TEMPERATURE: 96.8 F | RESPIRATION RATE: 18 BRPM | DIASTOLIC BLOOD PRESSURE: 80 MMHG | SYSTOLIC BLOOD PRESSURE: 122 MMHG | OXYGEN SATURATION: 99 % | BODY MASS INDEX: 21.28 KG/M2 | HEART RATE: 84 BPM

## 2022-01-18 DIAGNOSIS — I10 ESSENTIAL HYPERTENSION: ICD-10-CM

## 2022-01-18 DIAGNOSIS — E11.65 UNCONTROLLED TYPE 2 DIABETES MELLITUS WITH HYPERGLYCEMIA (HCC): Primary | ICD-10-CM

## 2022-01-18 PROCEDURE — 99214 OFFICE O/P EST MOD 30 MIN: CPT | Performed by: NURSE PRACTITIONER

## 2022-01-18 NOTE — PROGRESS NOTES
1. Have you been to the ER, urgent care clinic since your last visit? Hospitalized since your last visit? Yes ER 12- for chest pain, and 1- for COVID    2. Have you seen or consulted any other health care providers outside of the [address] [place] [address] since your last visit? Include any pap smears or colon screening.  No      Chief Complaint   Patient presents with    Diabetes         Visit Vitals  /80 (BP 1 Location: Left upper arm, BP Patient Position: Sitting, BP Cuff Size: Adult)   Pulse 84   Temp 96.8 °F (36 °C) (Temporal)   Resp 18   Ht 5' (1.524 m)   Wt 108 lb 6.4 oz (49.2 kg)   SpO2 99%   BMI 21.17 kg/m²       Pain Scale: 8/10  Pain Location: Back

## 2022-01-19 ENCOUNTER — PATIENT OUTREACH (OUTPATIENT)
Dept: OTHER | Age: 52
End: 2022-01-19

## 2022-01-19 LAB
EST. AVERAGE GLUCOSE BLD GHB EST-MCNC: 321 MG/DL
HBA1C MFR BLD: 12.8 % (ref 4–5.6)

## 2022-01-19 NOTE — PROGRESS NOTES
Subjective:     Kerwin Silva is a 46 y.o. female who presents today with the following:  Chief Complaint   Patient presents with    Diabetes       Patient Active Problem List   Diagnosis Code    Type 2 diabetes mellitus without complication, with long-term current use of insulin ([facility] E11.9, Z79.4    Essential hypertension I10    Mixed dyslipidemia E78.2    Type 2 diabetes with nephropathy ([facility] E11.21         COMPLIANT WITH MEDICATION:   HTN; Denies chest pain, dyspnea, palpitations, headache and blurred vision. Blood pressure normotensive. DM;  Diabetes. Sugars controlled poorly, uncontrolled  Hypoglycemia: none  Tolerating current treatment moderately  Current medications include ;   insulin glargine (LANTUS,BASAGLAR) 100 unit/mL (3 mL) inpn, Take  10 units in the morning and 30 units  QHS. DX E11.65  Indications: type 2 diabetes mellitus, Disp: 21 mL, Rfl: 3 She has been skipping the 10 units in the morning because she leaves between 5-6 AM. . We discussed the importance of checking blood glucose in the morning and taking the 10 units as well as the 30 units in the evening.       SITagliptin (Januvia) 100 mg tablet, TAKE 1 TABLET BY MOUTH ONE TIME A DAY, Disp: 90 Tablet, Rfl: 1    Lab Results   Component Value Date/Time    Hemoglobin A1c 11.7 (H) 09/28/2021 05:00 PM    Hemoglobin A1c 12.5 (H) 07/13/2021 04:53 PM    Hemoglobin A1c 9.1 (H) 11/03/2020 01:53 PM    Glucose 313 (H) 12/21/2021 02:12 AM    Glucose  07/21/2014 04:03 PM    Microalb/Creat ratio (ug/mg creat.) 365 (H) 05/08/2020 09:25 AM    Microalbumin/creat ratio (POC) >300 07/07/2021 05:05 PM    LDL,Direct 202 (H) 07/13/2021 04:53 PM    LDL, calculated Not calculated due to elevated triglyceride level 07/13/2021 04:53 PM    Creatinine 0.87 12/21/2021 02:12 AM     Lab Results   Component Value Date/Time    Microalb/Creat ratio (ug/mg creat.) 365 (H) 05/08/2020 09:25 AM       Last eye exam performed  less than 1 year 9/30/21 ago and was abnormal: bilateral conjunctivitis. Last foot exam 7/11/2021 performed less than 1 year ago. warm, good capillary refill      Form for [employer]  OV 9/29 and 9/30/2021 reviewed and resubmitted. Conjunctivitis , Sinusitis and uncontrolled Diabetes. depression screening addressed not at risk    abuse screening addressed denies    learning assessment addressed reviewed nurses notes    fall risk addressed not at risk    HM: addressed deferred. Recently had COIVD for the 2nd time Jan 4 th to the 11 th     ROS:  Gen: denies fever, chills, fatigue, weight loss, weight gain  HEENT:denies blurry vision, nasal congestion, sore throat  Resp: denies dypsnea, cough, wheezing  CV: denies chest pain radiating to the jaws or arms, palpitations, lower extremity edema  Abd: denies nausea, vomiting, diarrhea, constipation  Neuro: denies numbness/tingling  Endo: denies polyuria, polydipsia, heat/cold intolerance  Heme: no lymphadenopathy    No Known Allergies      Current Outpatient Medications:     flash glucose sensor (TittatStyle Antony 14 Day Sensor) kit, 1 Device by SubCUTAneous route Once every 2 weeks. Apply to upper arm, Disp: 6 Kit, Rfl: 3    lidocaine (Lidoderm) 5 %, Apply patch to the affected area for 12 hours a day and remove for 12 hours a day., Disp: 1 Each, Rfl: 0    SITagliptin (Januvia) 100 mg tablet, TAKE 1 TABLET BY MOUTH ONE TIME A DAY, Disp: 90 Tablet, Rfl: 1    insulin glargine (LANTUS,BASAGLAR) 100 unit/mL (3 mL) inpn, Take  10 units in the morning and 30 units  QHS. DX E11.65  Indications: type 2 diabetes mellitus, Disp: 21 mL, Rfl: 3    fluconazole (DIFLUCAN) 150 mg tablet, TAKE 1 TABLET BY MOUTH AND THEN TAKE 1 TABLET 3 DAYS LATER, if needed repeat for 3rd dose, Disp: 3 Tablet, Rfl: 5    baclofen (LIORESAL) 10 mg tablet, Take 1 Tablet by mouth three (3) times daily.  PRN  Indications: muscle spasms caused by a spinal disease, Disp: 30 Tablet, Rfl: 2    atorvastatin (LIPITOR) 20 mg tablet, Take 1 Tablet by mouth daily. , Disp: 30 Tablet, Rfl: 5    diclofenac (VOLTAREN) 1 % gel, Apply 4 g to affected area four (4) times daily. , Disp: 350 g, Rfl: 2    fluticasone propion-salmeteroL (ADVAIR/WIXELA) 250-50 mcg/dose diskus inhaler, Take 1 Puff by inhalation every twelve (12) hours. (Patient taking differently: Take 1 Puff by inhalation every twelve (12) hours. As needed), Disp: 1 Inhaler, Rfl: 0    DULoxetine (CYMBALTA) 30 mg capsule, Take 1 Cap by mouth two (2) times a day. Indications: chronic muscle or bone pain, diabetic complication causing injury to some body nerves (Patient taking differently: Take 30 mg by mouth two (2) times a day. PRN  Indications: chronic muscle or bone pain, diabetic complication causing injury to some body nerves), Disp: 60 Cap, Rfl: 5    albuterol (PROVENTIL HFA, VENTOLIN HFA, PROAIR HFA) 90 mcg/actuation inhaler, Take 2 Puffs by inhalation every four (4) hours as needed for Wheezing or Shortness of Breath. Indications: asthma attack, Disp: 2 Inhaler, Rfl: 5    TRUEplus Pen Needle 31 gauge x 3/16\" ndle, , Disp: , Rfl:     Insulin Needles, Disposable, 31 gauge x 5/16\" ndle, Use to administer insulin SQ QPM as directed., Disp: 1 Package, Rfl: 11    flash glucose scanning reader (FreeStyle Antony 14 Day [place]) [facility], Use to check blood glucose TID and PRN. Dx E11.9, Disp: 1 Each, Rfl: 0    omeprazole (PRILOSEC) 40 mg capsule, Take 1 Capsule by mouth daily. (Patient not taking: Reported on 1/18/2022), Disp: 90 Capsule, Rfl: 3    ketorolac (TORADOL) 10 mg tablet, Take 10 mg by mouth. PRN (Patient not taking: Reported on 1/18/2022), Disp: , Rfl:     amitriptyline (ELAVIL) 25 mg tablet, Take 1 Tab by mouth nightly. Indications: neuropathic pain (Patient not taking: Reported on 1/18/2022), Disp: 30 Tab, Rfl: 0    inhalational spacing device, 1 Each by Does Not Apply route as needed for Wheezing.  Emergency one time authorization for acute symptoms- please notify [employer] if necessary (Patient not taking: Reported on 8/26/2021), Disp: 1 Device, Rfl: 0    Past Medical History:   Diagnosis Date    Abnormal EKG 2/27/2018    Abscess 7/24/2019    Asthma     Chest pain 2/27/2018    Chest wall contusion, left, subsequent encounter     Diabetes ([facility]     Diverticular disease     Diverticulitis large intestine w/o perforation or abscess w/o bleeding 10/25/2016    Dyslipidemia     Hypertension     Lumbar radiculopathy 02/01/2021    Menopause     Pelvic pain 7/26/2016    Rib fractures     Right hand pain 7/10/2018    Sciatica of left side 02/01/2021    Uterine leiomyoma 7/26/2016    Vaginal candidiasis 7/24/2019       Past Surgical History:   Procedure Laterality Date    HX GYN      BTL    IR INJ FORAMIN EPID LUMB ANES/STER SNGL  5/11/2021       Social History     Tobacco Use   Smoking Status Current Every Day Smoker    Packs/day: 0.50   Smokeless Tobacco Never Used       Social History     Socioeconomic History    Marital status:    Tobacco Use    Smoking status: Current Every Day Smoker     Packs/day: 0.50    Smokeless tobacco: Never Used   Vaping Use    Vaping Use: Never used   Substance and Sexual Activity    Alcohol use: No    Drug use: No    Sexual activity: Yes     Partners: Male     Birth control/protection: Surgical       Family History   Problem Relation Age of Onset    Stroke Mother     Diabetes Mother     Heart Disease Father     Diabetes Sister     Diabetes Maternal Grandmother     Breast Cancer Maternal Aunt     Breast Cancer Paternal Aunt          Objective:     Visit Vitals  /80 (BP 1 Location: Left upper arm, BP Patient Position: Sitting, BP Cuff Size: Adult)   Pulse 84   Temp 96.8 °F (36 °C) (Temporal)   Resp 18   Ht 5' (1.524 m)   Wt 108 lb 6.4 oz (49.2 kg)   SpO2 99%   BMI 21.17 kg/m²     Body mass index is 21.17 kg/m². General: Alert and oriented. No acute distress.   Well nourished  HEENT :  Ears:TMs are normal. Canals are clear. Eyes: pupils equal, round, react to light and accommodation. Extra ocular movements intact. Nose: patent. Mouth and throat is clear. Neck:supple full range of motion no thyromegaly. Trachea midline, No carotid bruits. No significant lymphadenopathy  Lungs[de-identified] clear to auscultation without wheezes, rales, or rhonchi. Heart :RRR, S1 & S2 are normal intensity. No murmur; no gallop  Abdomen: bowel sounds active. No tenderness, guarding, rebound, masses, hepatic or spleen enlargement  Back: no CVA tenderness. Extremities: without clubbing, cyanosis, or edema  Pulses: radial and femoral pulses are normal  Neuro: HMF intact. Cranial nerves II through XII grossly normal.  Motor: is 5 over 5 and symmetrical.   Deep tendon reflexes: +2 equal  Psych:appropriate behavior, mood, affect and judgement. Results for orders placed or performed during the hospital encounter of 01/04/22   SARS-COV-2   Result Value Ref Range    Specimen source Nasopharyngeal      SARS-CoV-2 Detected (A) NOTD     INFLUENZA A+B VIRAL AGS   Result Value Ref Range    Influenza A Antigen Negative NEG      Influenza B Antigen Negative NEG         No results found for this visit on 01/18/22. Assessment/ Plan:     1. Uncontrolled type 2 diabetes mellitus with hyperglycemia ([address]  We discussed bringing her monitor in for a nurse visit to record blood glucose and insulin. Reinforced taking 30 units at night and add back in 10 units.  - HEMOGLOBIN A1C WITH EAG; Future  - HEMOGLOBIN A1C WITH EAG      Orders Placed This Encounter    HEMOGLOBIN A1C WITH EAG     Standing Status:   Future     Number of Occurrences:   1     Standing Expiration Date:   2/18/2022         Verbal and written instructions (see AVS) provided. Patient expresses understanding of diagnosis and treatment plan.     Health Maintenance Due   Topic Date Due    Hepatitis C Screening  Never done    Colorectal Cancer Screening Combo  Never done    Shingrix Vaccine Age 50> (1 of 2) Never done    COVID-19 Vaccine (3 - Booster) 10/02/2021    Eye Exam Retinal or Dilated  12/03/2021    A1C test (Diabetic or Prediabetic)  12/28/2021               PATO Mtz

## 2022-01-19 NOTE — PROGRESS NOTES
Follow up phone call to patient, two pt identifiers verified. Discussed patient's goals:   Goals      DM  Diabetes Support and Education / Care Coordination      Referred from Be Well with DM. A1C>9    1)  General:   Patient is able to state a basic definition of diabetes including: risk factors, basic pathophysiology, complications and treatment. 2)  Diet:   Patient will be able to read a basic food label and identify role of calories, carbohydrates, protein and fats in healthy eating. 3)  Activity:   Patient will identify the potential health benefits of regular exercise:  decreased cholesterol, improved mood, decreased blood pressure, lower stress/anxiety, weight loss, decreased blood sugar. 4)  Monitoring:  Patient will be able to identify what an A1C test measures. 5)  Medications:  Patient will demonstrate knowledge of diabetes medications. 6)  Risk Reduction:  Patient is able to state goal target for A1C (<7), blood pressure (<130/70), and LDL cholesterol (<100) to reduce diabetes complications. 7)  Problem Solving:  Patient will identify s/sx and treatment for hyper- and hypoglycemia. 8)  Healthy Coping:  Patient will be able to identify two community support resources. Patient will identify two stress relieving techniques. Patient will identify health risk factors and complications of diabetes  1) Kidney disease  2) Heart disease  3) Neuropathy  4) Skin breakdown  5) Diabetic Retinopathy  6) Prevention:   Stable controlled A1C,  healthy lifestyle (diet, exercise, hydration, stress reduction, monitoring, skin care)   3/15- Pt to have BW this week/  A1C.    8/3: Patient's A1C up to 12.5 7/21 - discussed healthy food options. States she eats cheerios or corn flakes for breakfast.  Encouraged lean protein, egg whites, whole grains, yogurt.   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 Will mail nutritional information from [employer]. Encouraged drinking more water, plans to take water bottles with her to work, as they no longer offer water bottles. Has been drinking 3 sodas/day. Will cut these out. Scheduled to meet with a nutritionist tomorrow, 8/4.  8/10: Has been able to cut soda, drinks one a day. Drinking water. Trying to meal prep. Bought fruits and veggies. 8/27: Patient had visit with nurse practitioner. On some medications to help control diabetes. Still recovering from [place].  9/8: Patient states glucose has been well controlled. Complaints of blurred vision with new weekly medication. Encouraged her to discuss this with her PCP, states she will tomorrow, as she needs to go to the office to  her \"leave paperwork\". 9/23: Discussed medication issues. Back on jardiance. 10/8: Diabetes, glucose has been in lower 200s. Taking 27 units of Lantus/daily, Januvia. Working on getting glucose under better control. Has been stressed out with everything, loss of father, covid, pink eye. Has gotten STD approved and has received check. 10/25: Blood sugars are still up and down. Taking januvia and lantus 27 units. Has appointment to go to doctor tomorrow to get STD paperwork filled out for 10 days she missed due to \"pink eye\". Discussed trying to decrease stress levels to help with glucose. 11/22: Blood sugars - patient states they have been \"alright\"  Provided encouragement to make healthy choices, acknowledging that it can be difficult during the holidays. 12/20: Patient is at her provider's office right now, glucose was 400 and she was having chest pain on and off for about a week or two. The doctor is out until January, they told her she should go to the emergency room. Encouraged her to call the nurse access or call 911 due to her symptoms. She says she has had a lot of stress and been dealing with that.   Says she is waiting until tomorrow and if she still doesn't feel well she will go to the ER. Says her symptoms have been going on for weeks, it's not constant pain. Says she was told it could be reflux, says she takes medication for that. Continued to encourage her to call the [facility] or go to the ED, as this was the recommendation from her provider's office. She wants to wait until tomorrow. 12/21Harwendiamy Obed to ED, they did confirm that her chest pain was related to reflux, ordered prilosec, carafate to help relieve this pain. She had run out of her [place] and [place], has ordered it. Also needs osvaldo sensors, offered to call [facility] for her, she said she would appreciate that. Called to order, spoke with Aliza Martinez at [facility], she is requesting new script from patient's PCP and will fill. Josi is expected to arrive to patient on 12/22.  12/28: She got her sensors, and has received her Januvia. Upset, she has dropped off STD paperwork again at her doctor's office (2nd time). [facility] says they have not received it. Contacted PCP office and spoke with [facility], she checked on the paperwork and stated that because her provider is out, back on 1/3, the paperwork cannot be sent yet. Relayed this information to the patient. She was appreciative for my calling. She requested the phone number to get her password reset for my chart. Provided that to her. 1/6: Patient diagnosed with COVID, blood glucose this afternoon was 402. States she has been drinking apple juice, as she does not like water. Encouraged her to try 1/2 water, 1/2 juice. She is taking 30 units of Lantus at night. 1/7: Discussed avoiding juices to maintain hydration. States she is drinking water now. Provided praise. 1/12: Patient feeling better today, will go back to work tomorrow. Discussed issues with her manager, states she hopes she does not give her a hard time. Provided encouragement. Asked her about her glucose.   She states it's been up and down, depending on what she eats.  Provided education about this, states she has been drinking fruit juice and apple juice, reminded her of the importance of decreasing this, explained that one cup of juice is about the total amount of sugar intake she should have for one day. Reminded her she could cut juice with water. She stated she would do better. Labs to be drawn on 1/18 through PCP.  1/19: Discussed patient's A1C - 12.8. She stated she expected it to be high due to stress and illness. Her provider has increased her insulin - 10 units in AM and 30 units at night. Has f/up on 2/2. Patient's primary care provider relationship reviewed with patient and modified, as applicable. Readiness to Change: []  Pre-contemplative    []  Contemplative  []  Preparation               [x]  Action                  []  Maintenance    Barriers/Challenges to Care: []  Decline in memory    []  Language barrier     []  Emotional                  []  Limited mobility  []  Lack of motivation     [] Vision, hearing or cognitive impairment []  Knowledge [] Financial Barriers []  Lack of support  []  Pain []  Other [x]  None    Key pt activities to achieve better health:   []  Weight loss  [x]  Improved diabetic control  []  Decreased cholesterol levels  []  Decreased blood pressure  []    []    Upcoming appointments:   Future Appointments   Date Time Provider Ankush Nicholson   2/2/2022  4:00 PM Sabrina Shipley for next call: Call in two weeks, 2/3.

## 2022-01-20 RX ORDER — AMITRIPTYLINE HYDROCHLORIDE 25 MG/1
25 TABLET, FILM COATED ORAL
Qty: 30 TABLET | Refills: 0 | Status: SHIPPED | OUTPATIENT
Start: 2022-01-20 | End: 2022-03-15 | Stop reason: DRUGHIGH

## 2022-01-20 RX ORDER — PANTOPRAZOLE SODIUM 40 MG/1
40 TABLET, DELAYED RELEASE ORAL DAILY
Qty: 90 TABLET | Refills: 0 | Status: SHIPPED | OUTPATIENT
Start: 2022-01-20 | End: 2022-03-17 | Stop reason: SDUPTHER

## 2022-01-20 NOTE — PROGRESS NOTES
HGA1C Has gone up significantly . 12.8  average glucose is 321 Highest it has ever been. Recommend changing to OV  on Feb 2nd instead of nurse visit with extended time .

## 2022-01-26 ENCOUNTER — TELEPHONE (OUTPATIENT)
Dept: PHARMACY | Age: 52
End: 2022-01-26

## 2022-01-26 NOTE — TELEPHONE ENCOUNTER
For [place] in place: No   Recommendation Provided To: Patient/Caregiver: 1 via Telephone   Intervention Detail: Scheduled Appointment   Gap Closed?: Yes   Intervention Accepted By: Patient/Caregiver: 1   Time Spent (min): 10

## 2022-01-26 NOTE — TELEPHONE ENCOUNTER
Called patient to schedule 2022 Annual Pharmacist Visit to discuss medications for Diabetes Management Program.    Spoke to patient and appointment scheduled for 02.01.22 at 1:30p.

## 2022-02-01 ENCOUNTER — TELEPHONE (OUTPATIENT)
Dept: PHARMACY | Age: 52
End: 2022-02-01

## 2022-02-01 NOTE — TELEPHONE ENCOUNTER
Alisia Velez is a 74 y.o. female who presents for  evaluation of type 2 Diabetes    HPI    74 y o female with DM for more than 10 years  Complications CVA 1999  Excellent control but occasional hypo and hyperglycemia     PE  AOx3  No visible goiter  Normal Respiratory Effort  No Edema    Labs    Lab Results   Component Value Date    WBC 8.35 12/15/2020    HGB 14.4 12/15/2020    HCT 43.2 12/15/2020    MCV 95.4 12/15/2020     12/15/2020     Lab Results   Component Value Date    GLUCOSE 162 (H) 12/15/2020    BUN 11 12/15/2020    CREATININE 0.74 12/15/2020    EGFRIFNONA 77 12/15/2020    BCR 14.9 12/15/2020    K 4.1 12/15/2020    CO2 27.4 12/15/2020    CALCIUM 9.9 12/15/2020    ALBUMIN 4.40 12/15/2020    AST 33 (H) 12/15/2020    ALT 38 (H) 12/15/2020         Assessment/Plan        ICD-10-CM ICD-9-CM   1. Type 2 diabetes mellitus with diabetic polyneuropathy, with long-term current use of insulin (CMS/[facility])  E11.42 250.60    Z79.4 357.2     V58.67   2. Mixed hyperlipidemia  E78.2 272.2   3. Essential hypertension  I10 401.9          Glycemic control     Lab Results   Component Value Date    HGBA1C 7.88 (H) 12/15/2020     Karan 14 through reader upgrade to karan 2    Toujeo 64-66-69    ------------      Humalog ,  Change to 1.5-2-3 -1.3 ( cereal 0.9)  Change to 1.3-1.8 - 1.7  Spears eto 1.2-1.7-1.9 -2 - 1.9  But for cereal do 0.9      Correction , 10         during steroids  typically the carb ratio and correction need to be doubled and the basal( Lantus ) needs to be increased by 30%         Can't tolerate any orals     Criteria for upgrade to karan 2     Criteria for Approval of CGM and/or Insulin Pump     The patient has diabetes mellitus, insulin-dependent.    Our Diabetes Department has evaluated the patient in the last six months and will continue counseling on insulin adjustment.     The patient performs blood glucose testing at least four times daily with proven glucose variability from 50 to250 mg per  POPULATION HEALTH CLINICAL PHARMACY REVIEW - BE WELL WITH DIABETES  =================================================================  Bernieance Kaiden is a 46 y.o. female enrolled in the [address] [place] [address],[address] Floor Employee Diabetes Program.    Reached patient regarding scheduled telephone appointment. Pt stated she was unavailable at time of call and asked to reschedule telephone appointment.  Rescheduled for Monday 2/7/22 for 1:30pm.     Yonny Campos PharmD, [facility]  Department, toll free: [phone]     For Pharmacy Admin Tracking Only     Time Spent (min): 10 dl.    The patient is administering basal insulin and prandial insulin four times per day for more than six months.    The patient uses a home blood glucose monitor to assess blood glucose at least four times daily for more than six months.    The patient requires frequent adjustment of insulin treatment regimen based on blood glucose readings  She uses a correction of 1 unit per 10 mg per dl increase above 150       The patient has frequent variability in blood glucose readings due to activity and variability in meal content and time.     The patient has completed a diabetes education program with us.    The patient has demonstrated the ability to self-monitor her glucose.     The patient is motivated in improving diabetes control     She has hypoglycemia w unawareness.            Mixed hyperlipidemia  On atorvastatin 20 mg qhs    stroke in 1999      Essential hypertension  Only on metoprolol ho cva and svt     longterm nsaid, on diclofenac, unfortunately can't use longterm   Takes carafte         --    DXA Jan 2017 , normal                This document has been electronically signed by Omid Jules MD on June 29, 2021 15:58 CDT

## 2022-02-03 ENCOUNTER — PATIENT OUTREACH (OUTPATIENT)
Dept: OTHER | Age: 52
End: 2022-02-03

## 2022-02-03 NOTE — PROGRESS NOTES
Briefly spoke with patient. States she is doing okay. She will have to call me back, as she is still at work. Will await call back. Follow up in two weeks, 2/17.

## 2022-02-07 ENCOUNTER — TELEPHONE (OUTPATIENT)
Dept: PHARMACY | Age: 52
End: 2022-02-07

## 2022-02-07 ENCOUNTER — PATIENT MESSAGE (OUTPATIENT)
Dept: PHARMACY | Age: 52
End: 2022-02-07

## 2022-02-07 NOTE — TELEPHONE ENCOUNTER
POPULATION HEALTH CLINICAL PHARMACY REVIEW - BE WELL WITH DIABETES  =================================================================  Nazario Hughes is a 46 y.o. female enrolled in the [address] [place] [address] Diabetes Program.    Two attempts made to reach patient by telephone for pharmacist appointment. Unable to LM as VM is full. Will prepare [employer] message and send to patient.     Connie Park, PharmD, [facility]  Department, toll free: [phone]    For Pharmacy Admin Tracking Only     Time Spent (min): 10

## 2022-02-07 NOTE — LETTER
[address] [facility] [address]  [address] [facility], [address] [facility] [address]           2/7/22    Dear [place] Zam[place],     We had a telephone appointment reserved for you today and were concerned when you did not answer our call or call ahead to cancel the appointment. One of the requirements to participate in the Be Well with Diabetes program is to complete this appointment.      Please note that if you do not complete a pharmacist visit it will result in your discharge from the program.     Your health care is important to us.  Please call [phone] and select option #3 at your earliest convenience to reschedule this appointment.     Sincerely,   [facility]  Telephone [phone], Option #3  Fax [phone]

## 2022-02-17 ENCOUNTER — PATIENT OUTREACH (OUTPATIENT)
Dept: OTHER | Age: 52
End: 2022-02-17
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
## 2022-03-03 ENCOUNTER — PATIENT OUTREACH (OUTPATIENT)
Dept: OTHER | Age: 52
End: 2022-03-03

## 2022-03-03 NOTE — PROGRESS NOTES
Follow up phone call to patient, two pt identifiers verified. Discussed patient's goals:   Goals      DM  Diabetes Support and Education / Care Coordination      Referred from [facility]. A1C>9    1)  General:   Patient is able to state a basic definition of diabetes including: risk factors, basic pathophysiology, complications and treatment. 2)  Diet:   Patient will be able to read a basic food label and identify role of calories, carbohydrates, protein and fats in healthy eating. 3)  Activity:   Patient will identify the potential health benefits of regular exercise:  decreased cholesterol, improved mood, decreased blood pressure, lower stress/anxiety, weight loss, decreased blood sugar. 4)  Monitoring:  Patient will be able to identify what an A1C test measures. 5)  Medications:  Patient will demonstrate knowledge of diabetes medications. 6)  Risk Reduction:  Patient is able to state goal target for A1C (<7), blood pressure (<130/70), and LDL cholesterol (<100) to reduce diabetes complications. 7)  Problem Solving:  Patient will identify s/sx and treatment for hyper- and hypoglycemia. 8)  Healthy Coping:  Patient will be able to identify two community support resources. Patient will identify two stress relieving techniques. Patient will identify health risk factors and complications of diabetes  1) Kidney disease  2) Heart disease  3) Neuropathy  4) Skin breakdown  5) Diabetic Retinopathy  6) Prevention:   Stable controlled A1C,  healthy lifestyle (diet, exercise, hydration, stress reduction, monitoring, skin care)   3/15- Pt to have BW this week/  A1C.    8/3: Patient's A1C up to 12.5 7/21 - discussed healthy food options. States she eats cheerios or corn flakes for breakfast.  Encouraged lean protein, egg whites, whole grains, yogurt.   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 Will mail nutritional information from [employer]. Encouraged drinking more water, plans to take water bottles with her to work, as they no longer offer water bottles. Has been drinking 3 sodas/day. Will cut these out. Scheduled to meet with a nutritionist tomorrow, 8/4.  8/10: Has been able to cut soda, drinks one a day. Drinking water. Trying to meal prep. Bought fruits and veggies. 8/27: Patient had visit with nurse practitioner. On some medications to help control diabetes. Still recovering from [place].  9/8: Patient states glucose has been well controlled. Complaints of blurred vision with new weekly medication. Encouraged her to discuss this with her PCP, states she will tomorrow, as she needs to go to the office to  her \"leave paperwork\". 9/23: Discussed medication issues. Back on jardiance. 10/8: Diabetes, glucose has been in lower 200s. Taking 27 units of Lantus/daily, Januvia. Working on getting glucose under better control. Has been stressed out with everything, loss of father, covid, pink eye. Has gotten STD approved and has received check. 10/25: Blood sugars are still up and down. Taking januvia and lantus 27 units. Has appointment to go to doctor tomorrow to get STD paperwork filled out for 10 days she missed due to \"pink eye\". Discussed trying to decrease stress levels to help with glucose. 11/22: Blood sugars - patient states they have been \"alright\"  Provided encouragement to make healthy choices, acknowledging that it can be difficult during the holidays. 12/20: Patient is at her provider's office right now, glucose was 400 and she was having chest pain on and off for about a week or two. The doctor is out until January, they told her she should go to the emergency room. Encouraged her to call the nurse access or call 911 due to her symptoms. She says she has had a lot of stress and been dealing with that.   Says she is waiting until tomorrow and if she still doesn't feel well she will go to the ER. Says her symptoms have been going on for weeks, it's not constant pain. Says she was told it could be reflux, says she takes medication for that. Continued to encourage her to call the NAL or go to the ED, as this was the recommendation from her provider's office. She wants to wait until tomorrow. 12/21Sharia Alia to ED, they did confirm that her chest pain was related to reflux, ordered prilosec, carafate to help relieve this pain. She had run out of her [place], has ordered it. Also needs osvaldo sensors, offered to call [facility] for her, she said she would appreciate that. Called to order, spoke with Jimenez Cohen at [facility], she is requesting new script from patient's PCP and will fill. Josi is expected to arrive to patient on 12/22.  12/28: She got her sensors, and has received her Januvia. Upset, she has dropped off STD paperwork again at her doctor's office (2nd time). [facility] says they have not received it. [facility]CP office [facility], she checked on the paperwork and stated that because her provider is out, back on 1/3, the paperwork cannot be sent yet. Relayed this information to the patient. She was appreciative for my calling. She requested the phone number to get her password reset for my chart. Provided that to her. 1/6: Patient diagnosed with COVID, blood glucose this afternoon was 402. States she has been drinking apple juice, as she does not like water. Encouraged her to try 1/2 water, 1/2 juice. She is taking 30 units of Lantus at night. 1/7: Discussed avoiding juices to maintain hydration. States she is drinking water now. Provided praise. 1/12: Patient feeling better today, will go back to work tomorrow. Discussed issues with her manager, states she hopes she does not give her a hard time. Provided encouragement. Asked her about her glucose.   She states it's been up and down, depending on what she eats.  Provided education about this, states she has been drinking fruit juice and apple juice, reminded her of the importance of decreasing this, explained that one cup of juice is about the total amount of sugar intake she should have for one day. Reminded her she could cut juice with water. She stated she would do better. Labs to be drawn on 1/18 through PCP.  1/19: Discussed patient's A1C - 12.8. She stated she expected it to be high due to stress and illness. Her provider has increased her insulin - 10 units in AM and 30 units at night. Has f/up on 2/2.  2/17: Missed her 2/2 appointment, didn't get a reminder from her PCP office about the appointment, also mentioned the [facility] call that was missed. Patient states she has had ingrown toe nails in both big toes, had one cut out last week with Dr. Abimbola Lindsey and the other will be next week. Encouraged her to re schedule her PCP visit. States glucose has been up and down, encouraged patient to drink water. 3/3: Patient states she did not check her glucose today, encouraged her to check regularly and make sure she is eating healthy to keep levels below 130. She will call to make appt with her PCP and pharmacy to stay enrolled with [facility]. Says she has been working nights and will be finished with this in the next week or so. New co-worker is supposed to start on 3/7. Reminded her that she needs to take care of herself and make her health a priority. Patient's primary care provider relationship reviewed with patient and modified, as applicable.       Readiness to Change: []  Pre-contemplative    []  Contemplative  []  Preparation               [x]  Action                  []  Maintenance    Barriers/Challenges to Care: []  Decline in memory    []  Language barrier     []  Emotional                  []  Limited mobility  []  Lack of motivation     [] Vision, hearing or cognitive impairment []  Knowledge [] Financial Barriers []  Lack of support  []  Pain []  Other [x]  None    Key pt activities to achieve better health:   []  Weight loss  [x]  Improved diabetic control  []  Decreased cholesterol levels  []  Decreased blood pressure  []    []    Upcoming appointments: No future appointments.   Plan for next call: Call in two weeks, 3/17

## 2022-03-15 ENCOUNTER — TELEPHONE (OUTPATIENT)
Dept: PHARMACY | Age: 52
End: 2022-03-15

## 2022-03-15 RX ORDER — DULOXETIN HYDROCHLORIDE 30 MG/1
30 CAPSULE, DELAYED RELEASE ORAL
COMMUNITY
End: 2022-07-15

## 2022-03-15 RX ORDER — AMITRIPTYLINE HYDROCHLORIDE 25 MG/1
25 TABLET, FILM COATED ORAL
COMMUNITY
End: 2022-07-15

## 2022-03-15 NOTE — TELEPHONE ENCOUNTER
Per [facility] Januvia 100mg filled 3/15/22, Lantus U-100 filled 3/13/22, Antony sensors filled 3/10/22    Per [facility] lidocaine patches filled in Dec 2021, fluconazole and keflex filled in February 2022. Nothing else filled recently. Elavil and Cymbalta never filled.      Michael Bowers, PharmD  [facility]  [address]   Department: [phone]

## 2022-03-15 NOTE — TELEPHONE ENCOUNTER
[facility] CLINICAL PHARMACY REVIEW - Be Well with Diabetes    Lesia Calloway is a 46 y.o. female enrolled in the [address] [place], [place] [address] [facility] Employee Diabetes Program. Patient provided Jose Angel Biswas with verbal consent to remain in the program for this year. Patient enrolled 2020. Insurance through the following employer: [address] [place] [address]     Medications:   Current Outpatient Medications   Medication Sig    pantoprazole (Protonix) 40 mg tablet Take 1 Tablet by mouth daily for 90 days. - states she got this from ER visit, is out of refills but worked better than her omeprazole. edcuated patient to ask pcp for refills of this    SITagliptin (Januvia) 100 mg tablet TAKE 1 TABLET BY MOUTH ONE TIME A DAY    amitriptyline (ELAVIL) 25 mg tablet Take 1 Tablet by mouth nightly. Indications: neuropathic pain  - pt states does not really work but sometimes takes    flash glucose sensor (FreeStyle Antony 14 Day Sensor) kit 1 Device by SubCUTAneous route Once every 2 weeks. Apply to upper arm    lidocaine (Lidoderm) 5 % Apply patch to the affected area for 12 hours a day and remove for 12 hours a day.  insulin glargine (LANTUS,BASAGLAR) 100 unit/mL (3 mL) inpn Take  10 units in the morning and 30 units  QHS. DX E11.65  Indications: type 2 diabetes mellitus    omeprazole (PRILOSEC) 40 mg capsule Take 1 Capsule by mouth daily. (Patient not taking: Reported on 1/18/2022)    fluconazole (DIFLUCAN) 150 mg tablet TAKE 1 TABLET BY MOUTH AND THEN TAKE 1 TABLET 3 DAYS LATER, if needed repeat for 3rd dose    baclofen (LIORESAL) 10 mg tablet Take 1 Tablet by mouth three (3) times daily. 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 - states it swells her hands and feet so she does not take    diclofenac (VOLTAREN) 1 % gel Apply 4 g to affected area four (4) times daily.  ketorolac (TORADOL) 10 mg tablet Take 10 mg by mouth.  PRN (Patient not taking: Reported on 1/18/2022)  - done    fluticasone propion-salmeteroL (ADVAIR/WIXELA) 250-50 mcg/dose diskus inhaler Take 1 Puff by inhalation every twelve (12) hours. (Patient taking differently: Take 1 Puff by inhalation every twelve (12) hours. As needed)  - pt states was never told to take scheduled. Re-educated patient to take 1 puff BID consistently, reviewed inhaler technique and to rinse and spit    DULoxetine (CYMBALTA) 30 mg capsule Take 1 Cap by mouth two (2) times a day. Indications: chronic muscle or bone pain, diabetic complication causing injury to some body nerves (Patient taking differently: Take 30 mg by mouth two (2) times a day. PRN  Indications: chronic muscle or bone pain, diabetic complication causing injury to some body nerves)    albuterol (PROVENTIL HFA, VENTOLIN HFA, PROAIR HFA) 90 mcg/actuation inhaler Take 2 Puffs by inhalation every four (4) hours as needed for Wheezing or Shortness of Breath. Indications: asthma attack    TRUEplus Pen Needle 31 gauge x 3/16\" ndle     inhalational spacing device 1 Each by Does Not Apply route as needed for Wheezing. Emergency one time authorization for acute symptoms- please notify [facility] if necessary (Patient not taking: Reported on 8/26/2021)    Insulin Needles, Disposable, 31 gauge x 5/16\" ndle Use to administer insulin SQ QPM as directed.  flash glucose scanning reader ([employer] Antony 14 Day [place]) [facility] Use to check blood glucose TID and PRN.  Dx E11.9     Current Pharmacy: [facility]  Current testing supplies/frequency: Freestyle Antony 14 day  Pen needles/syringes: needs refill    Allergies:  No Known Allergies   Vitals/Labs:  BP Readings from Last 3 Encounters:   01/18/22 122/80   01/04/22 (!) 143/64   12/21/21 (!) 152/71     Lab Results   Component Value Date/Time    Microalb/Creat ratio (ug/mg creat.) 365 (H) 05/08/2020 09:25 AM     Lab Results   Component Value Date/Time    Hemoglobin A1c 12.8 (H) 01/18/2022 05:00 PM    Hemoglobin A1c 11.7 (H) 09/28/2021 05:00 PM    Hemoglobin A1c 12.5 (H) 07/13/2021 04:53 PM     Lab Results   Component Value Date/Time    Cholesterol, total 332 (H) 07/13/2021 04:53 PM    HDL Cholesterol 37 07/13/2021 04:53 PM    LDL,Direct 202 (H) 07/13/2021 04:53 PM    LDL, calculated Not calculated due to elevated triglyceride level 07/13/2021 04:53 PM    VLDL, calculated  07/13/2021 04:53 PM     Calculation not valid with this patient's other Lipid values. Triglyceride 776 (H) 07/13/2021 04:53 PM    CHOL/HDL Ratio 9.0 (H) 07/13/2021 04:53 PM     ALT (SGPT)   Date Value Ref Range Status   12/21/2021 31 12 - 78 U/L Final     AST (SGOT)   Date Value Ref Range Status   12/21/2021 12 (L) 15 - 37 U/L Final     The ASCVD Risk score (Bell Santos, et al., 2013) failed to calculate for the following reasons: The valid total cholesterol range is 130 to 320 mg/dL     Lab Results   Component Value Date/Time    Creatinine 0.87 12/21/2021 02:12 AM     Estimated Creatinine Clearance: 55 mL/min (by C-G formula based on SCr of 0.87 mg/dL).     Lab Results   Component Value Date/Time    GFR est non-AA >60 12/21/2021 02:12 AM    GFR est AA >60 12/21/2021 02:12 AM       Immunizations:  Immunization History   Administered Date(s) Administered    COVID-19, Harle Distel, Primary or Immunocompromised Series, MRNA, PF, 100mcg/0.5mL 03/31/2021, 04/02/2021    Influenza Vaccine 10/11/2018, 11/02/2020, 10/22/2021    Influenza Vaccine (Quad) PF (>6 Mo Flulaval, Fluarix, and >3 Yrs Afluria, Fluzone 20599) 11/28/2017, 10/08/2019    Pneumococcal Polysaccharide (PPSV-23) 11/03/2020    Tdap 11/04/2016      Social History:  Social History     Tobacco Use    Smoking status: Current Every Day Smoker     Packs/day: 0.50    Smokeless tobacco: Never Used   Substance Use Topics    Alcohol use: No     ASSESSMENT:  Initial Program Requirements (Y indicates has completed for the year, N indicates needs to be completed by 07/01/2022): Yes - Provider Visit for DM (1st)  Yes - A1c (1st)     Ongoing Program Requirements (Y indicates has completed for the year, N indicates needs to be completed by 12/31/2022): No - Provider Visit for DM (2nd)  No - ACC/diabetes educator visit (if A1c over 8%)- is speaking with Chris Weaver  No - A1c (2nd)  No - Lipid panel  No - Urine microalbumin  No - Pneumococcal vaccination: [id number]  No - Influenza vaccination for Fall 2022  No - Medication adherence over 70%  No - On statin or contraindication(s) Intolerance: must have had trial of at least 2 different statin medications- but states is willing to try a different one  No - On ACEi/ARB or contraindication(s) Not identified     Current medications eligible for copay waiver, up to $600, through [email or web address]:  - lantus, ozempic, jardiance, lisinopril, rosuvastatin  - Freestyle Antony     Diabetes Care:   - Glycemic Goal: <7.0% and directed by provider. Is not at blood glucose goal but is on insulin therapy. Type 2 DM under worsening control as evidenced by > 12.  - Current symptoms/problems include blurry vision  - Home blood sugar records:  fasting range: 180  - Any episodes of hypoglycemia? no  - Known diabetic complications: none  - Eye exam current (within one year): yes  - Foot exam current (within one year): yes  - Duplicate MOA: GLP-1 and DPP-4 - pt willing to re-try ozempic, has 2 boxes at home  - Appropriateness of Insulin Therapy: needs to see endo. Should be titrating insulin by 2 units per day until AM sugar at least under 150. - Therapy Optimization: failed metformin. failed ozempic per patient- says got blurry vision- discussed it is likely her uncontrolled DM causing her blurry vision and not ozempic. Patient stopped taking her insulin when she started ozempic- she said she was not told to continue taking her insulin. Patient willing to try ozempic again WITH the insulin. - Daily aspirin?  No: but prob should be, but also has issues with GERD  - Medication compliance: compliant most of the time. Is now working 230-11pm at night right now but is going to go back to days. When was on day shift was missing a lot of morning insulin doses. Is now on automatic refill. Does not always take her insulin bc she misses meals. Discussed taking lantus consistently and dont worry about when she eats. Her sugars are always high. - Diet compliance: compliant most of the time, uses her air fryer a lot. She loves pasta- educated she needs to cut down on pasta. But also love vegetables. Does eat ramen noodles as well. She does eat on the go. Other Considerations:  - Blood Pressure Goal: BP less than 140/90 mmHg due to history of DM: Unable to assess if at BP target. - Lipids: Patient has a 10-yr ASCVD risk of >20% with DM and is therefore a candidate for high-intensity statin therapy based on updated guidelines. - Smoking status: current smoker    PLAN:  - Consideration(s) for provider:   · Educated should be filling prescriptions at [facility]. I do not see fills for ozempic but pt states she has 2 boxes at home. And I dont see any fills for jardiance but chart states pt was on jardiance. Not sure if patient was getting jardiance and [place] and Ava mixed up. Patient needs a lot of education and follow ups to keep her on track. · januvia- keep taking until get jardiance then stop [place] and Ava and start jardiance  · Pen needle refill  · Restart ozempic AND cont taking insulin as prescribed and should start titrating insulin to get fasting sugars < 150  · Change januvia to jardiance  · Needs endo referral  · Discussed breathe easy   · Willing to try different statin- needs high intensity, total cholesterol > 300  · Patient needs continued education and check ins and teach back method to make sure patient is understanding regimen and recommendations.  Patient answers a lot of questions with, \"I was never told that\"  - DM program gaps identified:   · Initial requirements: Requirements met   · Ongoing requirements: Provider visit for DM (2nd), ACC/diabetes educator visit (if A1c over 8%), A1c (2nd), On statin or contraindication(s) Not identified, On ACEi/ARB or contraindication(s) Not identified, Lipid panel, Urine microalbumin, Pneumococcal vaccination: [id number], Influenza vaccination for [id number] and Medication adherence over 70%   - Education to patient: Discussed general issues about diabetes pathophysiology and management., Addressed diet and exercise, Reminded to get yearly retinal exam., Addressed medication adherence, Benefit/indication for statin in patients with diabetes, Benefit/indication for ACE/ARB in patients with diabetes and Referral for endocrinology   - Follow up: PCP for identified gaps or as scheduled below  - Upcoming appointments:   No future appointments.     Alyssa Rivas, PharmD, y [facility] Pharmacist  Department: [address] [place] [address] Ext in place: No   Recommendation Provided To: Provider: 6 via Note to Provider  and Patient/Caregiver: 1 via Telephone   Intervention Detail: Discontinued Rx: 1, reason: Duplicate Therapy, Dose Adjustment: 1, reason: Therapy Optimization, New Rx: 4, reason: Needs Additional Therapy and Refill(s) Provided   Gap Closed?: Yes   Intervention Accepted By: Provider: 6 and Patient/Caregiver: 1   Time Spent (min): 75

## 2022-03-16 ENCOUNTER — TELEPHONE (OUTPATIENT)
Dept: FAMILY MEDICINE CLINIC | Age: 52
End: 2022-03-16

## 2022-03-17 ENCOUNTER — VIRTUAL VISIT (OUTPATIENT)
Dept: FAMILY MEDICINE CLINIC | Age: 52
End: 2022-03-17
Payer: COMMERCIAL

## 2022-03-17 ENCOUNTER — PATIENT OUTREACH (OUTPATIENT)
Dept: OTHER | Age: 52
End: 2022-03-17

## 2022-03-17 DIAGNOSIS — E11.65 UNCONTROLLED TYPE 2 DIABETES MELLITUS WITH HYPERGLYCEMIA (HCC): Primary | ICD-10-CM

## 2022-03-17 PROCEDURE — 99443 PR PHYS/QHP TELEPHONE EVALUATION 21-30 MIN: CPT | Performed by: NURSE PRACTITIONER

## 2022-03-17 PROCEDURE — 3046F HEMOGLOBIN A1C LEVEL >9.0%: CPT | Performed by: NURSE PRACTITIONER

## 2022-03-17 RX ORDER — PANTOPRAZOLE SODIUM 40 MG/1
40 TABLET, DELAYED RELEASE ORAL DAILY
Qty: 90 TABLET | Refills: 3 | Status: SHIPPED | OUTPATIENT
Start: 2022-03-17 | End: 2022-09-19 | Stop reason: SDUPTHER

## 2022-03-17 NOTE — PROGRESS NOTES
Patient was at work and unable to talk. She did tell me that she spoke with the pharmacist and has gotten a few medications changed. States her blood sugars are much better now. Needs resources to help quit smoking. Requested that I call her back tomorrow morning (3/18). Will call her at that time. Resources:  1-800-Quit. Now - CDC recommended  [email or web address]  Under Be Well - Breathe Easy program

## 2022-03-17 NOTE — PROGRESS NOTES
Chief Complaint   Patient presents with    Diabetes     1. Have you been to the ER, urgent care clinic since your last visit? Hospitalized since your last visit? no    2. Have you seen or consulted any other health care providers outside of the [address] [place] [address] since your last visit? Include any pap smears or colon screening. No  Abuse Screening Questionnaire 3/17/2022   Do you ever feel afraid of your partner? N   Are you in a relationship with someone who physically or mentally threatens you? N   Is it safe for you to go home?  Y     3 most recent PHQ Screens 3/17/2022   PHQ Not Done -   Little interest or pleasure in doing things Not at all   Feeling down, depressed, irritable, or hopeless Not at all   Total Score PHQ 2 0     Learning Assessment 4/20/2021   PRIMARY LEARNER Patient   HIGHEST LEVEL OF EDUCATION - PRIMARY LEARNER  GRADUATED HIGH SCHOOL OR GED   BARRIERS PRIMARY LEARNER NONE   CO-LEARNER CAREGIVER No   PRIMARY LANGUAGE ENGLISH   LEARNER PREFERENCE PRIMARY READING   ANSWERED BY Patient   RELATIONSHIP SELF

## 2022-03-18 ENCOUNTER — PATIENT OUTREACH (OUTPATIENT)
Dept: OTHER | Age: 52
End: 2022-03-18

## 2022-03-18 PROBLEM — E11.21 TYPE 2 DIABETES WITH NEPHROPATHY (HCC): Status: ACTIVE | Noted: 2020-11-03

## 2022-03-18 PROBLEM — I10 ESSENTIAL HYPERTENSION: Status: ACTIVE | Noted: 2017-03-07

## 2022-03-18 NOTE — ACP (ADVANCE CARE PLANNING)
Discussed importance of advanced medical directives with patient. Patient is capable of making decisions.  Alexa LIEBERMANC

## 2022-03-18 NOTE — PROGRESS NOTES
Follow up phone call to patient, two pt identifiers verified. Discussed patient's goals:   Goals      DM  Diabetes Support and Education / Care Coordination      Referred from Be Well with DM. A1C>9    1)  General:   Patient is able to state a basic definition of diabetes including: risk factors, basic pathophysiology, complications and treatment. 2)  Diet:   Patient will be able to read a basic food label and identify role of calories, carbohydrates, protein and fats in healthy eating. 3)  Activity:   Patient will identify the potential health benefits of regular exercise:  decreased cholesterol, improved mood, decreased blood pressure, lower stress/anxiety, weight loss, decreased blood sugar. 4)  Monitoring:  Patient will be able to identify what an A1C test measures. 5)  Medications:  Patient will demonstrate knowledge of diabetes medications. 6)  Risk Reduction:  Patient is able to state goal target for A1C (<7), blood pressure (<130/70), and LDL cholesterol (<100) to reduce diabetes complications. 7)  Problem Solving:  Patient will identify s/sx and treatment for hyper- and hypoglycemia. 8)  Healthy Coping:  Patient will be able to identify two community support resources. Patient will identify two stress relieving techniques. Patient will identify health risk factors and complications of diabetes  1) Kidney disease  2) Heart disease  3) Neuropathy  4) Skin breakdown  5) Diabetic Retinopathy  6) Prevention:   Stable controlled A1C,  healthy lifestyle (diet, exercise, hydration, stress reduction, monitoring, skin care)   3/15- Pt to have BW this week/  A1C.    8/3: Patient's A1C up to 12.5 7/21 - discussed healthy food options. States she eats cheerios or corn flakes for breakfast.  Encouraged lean protein, egg whites, whole grains, yogurt.   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 Will mail nutritional information from [employer]. Encouraged drinking more water, plans to take water bottles with her to work, as they no longer offer water bottles. Has been drinking 3 sodas/day. Will cut these out. Scheduled to meet with a nutritionist tomorrow, 8/4.  8/10: Has been able to cut soda, drinks one a day. Drinking water. Trying to meal prep. Bought fruits and veggies. 8/27: Patient had visit with nurse practitioner. On some medications to help control diabetes. Still recovering from [place].  9/8: Patient states glucose has been well controlled. Complaints of blurred vision with new weekly medication. Encouraged her to discuss this with her PCP, states she will tomorrow, as she needs to go to the office to  her \"leave paperwork\". 9/23: Discussed medication issues. Back on jardiance. 10/8: Diabetes, glucose has been in lower 200s. Taking 27 units of Lantus/daily, Januvia. Working on getting glucose under better control. Has been stressed out with everything, loss of father, covid, pink eye. Has gotten STD approved and has received check. 10/25: Blood sugars are still up and down. Taking januvia and lantus 27 units. Has appointment to go to doctor tomorrow to get STD paperwork filled out for 10 days she missed due to \"pink eye\". Discussed trying to decrease stress levels to help with glucose. 11/22: Blood sugars - patient states they have been \"alright\"  Provided encouragement to make healthy choices, acknowledging that it can be difficult during the holidays. 12/20: Patient is at her provider's office right now, glucose was 400 and she was having chest pain on and off for about a week or two. The doctor is out until January, they told her she should go to the emergency room. Encouraged her to call the nurse access or call 911 due to her symptoms. She says she has had a lot of stress and been dealing with that.   Says she is waiting until tomorrow and if she still doesn't feel well she will go to the ER. Says her symptoms have been going on for weeks, it's not constant pain. Says she was told it could be reflux, says she takes medication for that. Continued to encourage her to call the [facility] or go to the ED, as this was the recommendation from her provider's office. She wants to wait until tomorrow. 12/21Mictori Rondon to ED, they did confirm that her chest pain was related to reflux, ordered prilosec, carafate to help relieve this pain. She had run out of her Kayode Curls, has ordered it. Also needs osvaldo sensors, offered to call [facility] for her, she said she would appreciate that. Called to order, spoke with Theresa Ayala at [facility], she is requesting new script from patient's PCP and will fill. Paddyuvia is expected to arrive to patient on 12/22.  12/28: She got her sensors, and has received her Januvia. Upset, she has dropped off STD paperwork again at her doctor's office (2nd time). [facility] says they have not received it. Contacted [facility]CP office and [facility], she checked on the paperwork and stated that because her provider is out, back on 1/3, the paperwork cannot be sent yet. Relayed this information to the patient. She was appreciative for my calling. She requested the phone number to get her password reset for my chart. Provided that to her. 1/6: Patient diagnosed with COVID, blood glucose this afternoon was 402. States she has been drinking apple juice, as she does not like water. Encouraged her to try 1/2 water, 1/2 juice. She is taking 30 units of Lantus at night. 1/7: Discussed avoiding juices to maintain hydration. States she is drinking water now. Provided praise. 1/12: Patient feeling better today, will go back to work tomorrow. Discussed issues with her manager, states she hopes she does not give her a hard time. Provided encouragement. Asked her about her glucose.   She states it's been up and down, depending on what she eats.  Provided education about this, states she has been drinking fruit juice and apple juice, reminded her of the importance of decreasing this, explained that one cup of juice is about the total amount of sugar intake she should have for one day. Reminded her she could cut juice with water. She stated she would do better. Labs to be drawn on 1/18 through PCP.  1/19: Discussed patient's A1C - 12.8. She stated she expected it to be high due to stress and illness. Her provider has increased her insulin - 10 units in AM and 30 units at night. Has f/up on 2/2.  2/17: Missed her 2/2 appointment, didn't get a reminder from her PCP office about the appointment, also mentioned the Be Well with Diabetes Pharmacy call that was missed. Patient states she has had ingrown toe nails in both big toes, had one cut out last week with Dr. Esperanza Ruiz and the other will be next week. Encouraged her to re schedule her PCP visit. States glucose has been up and down, encouraged patient to drink water. 3/3: Patient states she did not check her glucose today, encouraged her to check regularly and make sure she is eating healthy to keep levels below 130. She will call to make appt with her PCP and pharmacy to stay enrolled with Be Well. Says she has been working nights and will be finished with this in the next week or so. New co-worker is supposed to start on 3/7. Reminded her that she needs to take care of herself and make her health a priority. 3/18: Patient states she had call with Sabina Monroe, Diabetes Pharmacist and has gotten her medications corrected. She is excited that her glucose is now well managed since she has been taking her medication as Sabina Monroe instructed. She plans to see an endocrinologist for her diabetes. We discussed soda and alternatives - water with flavor enhancers, sugar free options - Stur, crystal light-lemonaide.   Provided patient with smoking cessation phone number 1-396-Quit Now and Be Well program Breathe Easy. Patient plans to quit smoking, as well. States her fiance smokes and may quit with her. Encouragement provided. Patient's primary care provider relationship reviewed with patient and modified, as applicable. Readiness to Change: []  Pre-contemplative    []  Contemplative  []  Preparation               [x]  Action                  []  Maintenance    Barriers/Challenges to Care: []  Decline in memory    []  Language barrier     []  Emotional                  []  Limited mobility  []  Lack of motivation     [] Vision, hearing or cognitive impairment []  Knowledge [] Financial Barriers []  Lack of support  []  Pain []  Other [x]  None    Key pt activities to achieve better health:   []  Weight loss  [x]  Improved diabetic control  []  Decreased cholesterol levels  []  Decreased blood pressure  []    []  Plan for next call: Call in two weeks, 4/1.

## 2022-03-18 NOTE — PROGRESS NOTES
Kerwin Silva is a 46 y.o. female, evaluated via audio-only technology on 3/17/2022 for Diabetes  . Ms. Thurman endorses working with [facility] program regarding diabetes. Seeing Dr. Collins Sunshine Males podiatrist   Requesting a referral to endocrinologist .     Assessment & Plan:   Diagnoses and all orders for this visit:    1. Uncontrolled type 2 diabetes mellitus with hyperglycemia (HCC)  -     REFERRAL TO ENDOCRINOLOGY    Other orders  -     pantoprazole (PROTONIX) 40 mg tablet; Take 1 Tablet by mouth daily. -     empagliflozin (Jardiance) 25 mg tablet; Take one tablet by mouth once daily. To replace [place]      The complexity of medical decision making for this visit is moderate         12  Subjective:       Prior to Admission medications    Medication Sig Start Date End Date Taking? Authorizing Provider   pantoprazole (PROTONIX) 40 mg tablet Take 1 Tablet by mouth daily. 3/17/22  Yes Emma Bradley NP   empagliflozin (Jardiance) 25 mg tablet Take one tablet by mouth once daily. To replace januvia 3/17/22  Yes Emma Bradley NP   amitriptyline (ELAVIL) 25 mg tablet Take 25 mg by mouth nightly as needed for Pain. Provider, Historical   DULoxetine (Cymbalta) 30 mg capsule Take 30 mg by mouth two (2) times daily as needed for Pain. Provider, Historical   semaglutide (Ozempic) 0.25 mg or 0.5 mg/dose (2 mg/1.5 ml) subq pen 0.5 mg by SubCUTAneous route every seven (7) days. 3/15/22   Emma Bradley NP   lisinopriL (PRINIVIL, ZESTRIL) 2.5 mg tablet Take 1 Tablet by mouth daily. 3/15/22   Emma Bradley NP   rosuvastatin (CRESTOR) 20 mg tablet Take 1 Tablet by mouth nightly. 3/15/22   Emma Bradley NP   Insulin Needles, Disposable, 31 gauge x 5/16\" ndle Use to administer insulin SQ twice daily as directed. 3/15/22   Emma Bradley NP   flash glucose sensor (FreeStyle Antony 14 Day Sensor) kit 1 Device by SubCUTAneous route Once every 2 weeks.  Apply to upper arm 12/22/21   Raymond Hood NP lidocaine (Lidoderm) 5 % Apply patch to the affected area for 12 hours a day and remove for 12 hours a day. 12/21/21   Negra Whitlock MD   insulin glargine (LANTUS,BASAGLAR) 100 unit/mL (3 mL) inpn Take  10 units in the morning and 30 units  QHS. DX E11.65  Indications: type 2 diabetes mellitus 10/27/21   Asiya Alexander NP   baclofen (LIORESAL) 10 mg tablet Take 1 Tablet by mouth three (3) times daily. PRN  Indications: muscle spasms caused by a spinal disease 8/27/21   Asiya Alexander NP   diclofenac (VOLTAREN) 1 % gel Apply 4 g to affected area four (4) times daily. 7/7/21   Asiya Alexander NP   fluticasone propion-salmeteroL (ADVAIR/WIXELA) 250-50 mcg/dose diskus inhaler Take 1 Puff by inhalation every twelve (12) hours. 4/20/21   Cm Voss NP   albuterol (PROVENTIL HFA, VENTOLIN HFA, PROAIR HFA) 90 mcg/actuation inhaler Take 2 Puffs by inhalation every four (4) hours as needed for Wheezing or Shortness of Breath. Indications: asthma attack 11/3/20   Helena JULIO DO   flash glucose scanning reader ([employer] Antony 14 Day [place]) [facility] Use to check blood glucose TID and PRN. Dx E11.9 6/12/20   Maryann Willoughby DO     Patient Active Problem List   Diagnosis Code    Type 2 diabetes mellitus without complication, with long-term current use of insulin ([facility]) E11.9, Z79.4    Essential hypertension I10    Mixed dyslipidemia E78.2    Type 2 diabetes with nephropathy ([facility]) E11.21     Current Outpatient Medications   Medication Sig Dispense Refill    pantoprazole (PROTONIX) 40 mg tablet Take 1 Tablet by mouth daily. 90 Tablet 3    empagliflozin (Jardiance) 25 mg tablet Take one tablet by mouth once daily. To replace januvia 90 Tablet 3    amitriptyline (ELAVIL) 25 mg tablet Take 25 mg by mouth nightly as needed for Pain.  DULoxetine (Cymbalta) 30 mg capsule Take 30 mg by mouth two (2) times daily as needed for Pain.       semaglutide (Ozempic) 0.25 mg or 0.5 mg/dose (2 mg/1.5 ml) subq pen 0.5 mg by SubCUTAneous route every seven (7) days. 3 Pen 1    lisinopriL (PRINIVIL, ZESTRIL) 2.5 mg tablet Take 1 Tablet by mouth daily. 90 Tablet 1    rosuvastatin (CRESTOR) 20 mg tablet Take 1 Tablet by mouth nightly. 90 Tablet 1    Insulin Needles, Disposable, 31 gauge x 5/16\" ndle Use to administer insulin SQ twice daily as directed. 200 Pen Needle 3    flash glucose sensor (FreeStyle Antony 14 Day Sensor) kit 1 Device by SubCUTAneous route Once every 2 weeks. Apply to upper arm 6 Kit 3    lidocaine (Lidoderm) 5 % Apply patch to the affected area for 12 hours a day and remove for 12 hours a day. 1 Each 0    insulin glargine (LANTUS,BASAGLAR) 100 unit/mL (3 mL) inpn Take  10 units in the morning and 30 units  QHS. DX E11.65  Indications: type 2 diabetes mellitus 21 mL 3    baclofen (LIORESAL) 10 mg tablet Take 1 Tablet by mouth three (3) times daily. PRN  Indications: muscle spasms caused by a spinal disease 30 Tablet 2    diclofenac (VOLTAREN) 1 % gel Apply 4 g to affected area four (4) times daily. 350 g 2    fluticasone propion-salmeteroL (ADVAIR/WIXELA) 250-50 mcg/dose diskus inhaler Take 1 Puff by inhalation every twelve (12) hours. 1 Inhaler 0    albuterol (PROVENTIL HFA, VENTOLIN HFA, PROAIR HFA) 90 mcg/actuation inhaler Take 2 Puffs by inhalation every four (4) hours as needed for Wheezing or Shortness of Breath. Indications: asthma attack 2 Inhaler 5    flash glucose scanning reader (FreeStyle Antony 14 Day [place]) [facility] Use to check blood glucose TID and PRN.  Dx E11.9 1 Each 0     No Known Allergies  Past Medical History:   Diagnosis Date    Abnormal EKG 2/27/2018    Abscess 7/24/2019    Asthma     Chest pain 2/27/2018    Chest wall contusion, left, subsequent encounter     Diabetes ([facility] [address]     Diverticular disease     Diverticulitis large intestine w/o perforation or abscess w/o bleeding 10/25/2016    Dyslipidemia     Hypertension     Lumbar radiculopathy 02/01/2021    Menopause     Pelvic pain 7/26/2016    Rib fractures     Right hand pain 7/10/2018    Sciatica of left side 02/01/2021    Uterine leiomyoma 7/26/2016    Vaginal candidiasis 7/24/2019     Past Surgical History:   Procedure Laterality Date    HX GYN      BTL    IR INJ FORAMIN EPID LUMB ANES/STER [facility]  5/11/2021     Family History   Problem Relation Age of Onset    Stroke Mother     Diabetes Mother     Heart Disease Father     Diabetes Sister     Diabetes Maternal Grandmother     Breast Cancer Maternal Aunt     Breast Cancer Paternal Aunt      Social History     Tobacco Use    Smoking status: Current Every Day Smoker     Packs/day: 0.50    Smokeless tobacco: Never Used   Substance Use Topics    Alcohol use: No       ROS  Pertinent items on the  HPI  No data recorded     Elder Shira was evaluated through a patient-initiated, synchronous (real-time) audio only encounter. She (or guardian if applicable) is aware that it is a billable service, which includes applicable co-pays, with coverage as determined by her insurance carrier. This visit was conducted with the patient's (and/or Arabella Hawking guardian's) verbal consent. She has not had a related appointment within my department in the past 7 days or scheduled within the next 24 hours. The patient was located in a state where the provider was licensed to provide care.     Total Time: minutes: 21-30 minutes    Ronald Velasquez NP

## 2022-03-19 PROBLEM — E78.2 MIXED DYSLIPIDEMIA: Status: ACTIVE | Noted: 2017-07-11

## 2022-03-21 NOTE — TELEPHONE ENCOUNTER
Spoke with patient and confirmed they received pen needles, rosuvastatin, lisinopril and Jardiance through mail order. Patient reports she will stop Januvia now that she has Jardiance on hand.      Saúl Velasquez, PharmD  [facility] Fellow  [address] [place] [address],[address]   Department: [phone]

## 2022-04-01 ENCOUNTER — PATIENT OUTREACH (OUTPATIENT)
Dept: OTHER | Age: 52
End: 2022-04-01

## 2022-04-01 NOTE — PROGRESS NOTES
Follow up phone call to patient, two pt identifiers verified. Discussed patient's goals:   Goals      DM  Diabetes Support and Education / Care Coordination      Referred from Be Well with DM. A1C>9    1)  General:   Patient is able to state a basic definition of diabetes including: risk factors, basic pathophysiology, complications and treatment. 2)  Diet:   Patient will be able to read a basic food label and identify role of calories, carbohydrates, protein and fats in healthy eating. 3)  Activity:   Patient will identify the potential health benefits of regular exercise:  decreased cholesterol, improved mood, decreased blood pressure, lower stress/anxiety, weight loss, decreased blood sugar. 4)  Monitoring:  Patient will be able to identify what an A1C test measures. 5)  Medications:  Patient will demonstrate knowledge of diabetes medications. 6)  Risk Reduction:  Patient is able to state goal target for A1C (<7), blood pressure (<130/70), and LDL cholesterol (<100) to reduce diabetes complications. 7)  Problem Solving:  Patient will identify s/sx and treatment for hyper- and hypoglycemia. 8)  Healthy Coping:  Patient will be able to identify two community support resources. Patient will identify two stress relieving techniques. Patient will identify health risk factors and complications of diabetes  1) Kidney disease  2) Heart disease  3) Neuropathy  4) Skin breakdown  5) Diabetic Retinopathy  6) Prevention:   Stable controlled A1C,  healthy lifestyle (diet, exercise, hydration, stress reduction, monitoring, skin care)   3/15- Pt to have BW this week/  A1C.    8/3: Patient's A1C up to 12.5 7/21 - discussed healthy food options. States she eats cheerios or corn flakes for breakfast.  Encouraged lean protein, egg whites, whole grains, yogurt.   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 Will mail nutritional information from [employer]. Encouraged drinking more water, plans to take water bottles with her to work, as they no longer offer water bottles. Has been drinking 3 sodas/day. Will cut these out. Scheduled to meet with a nutritionist tomorrow, 8/4.  8/10: Has been able to cut soda, drinks one a day. Drinking water. Trying to meal prep. Bought fruits and veggies. 8/27: Patient had visit with nurse practitioner. On some medications to help control diabetes. Still recovering from [place].  9/8: Patient states glucose has been well controlled. Complaints of blurred vision with new weekly medication. Encouraged her to discuss this with her PCP, states she will tomorrow, as she needs to go to the office to  her \"leave paperwork\". 9/23: Discussed medication issues. Back on jardiance. 10/8: Diabetes, glucose has been in lower 200s. Taking 27 units of Lantus/daily, Januvia. Working on getting glucose under better control. Has been stressed out with everything, loss of father, covid, pink eye. Has gotten STD approved and has received check. 10/25: Blood sugars are still up and down. Taking januvia and lantus 27 units. Has appointment to go to doctor tomorrow to get STD paperwork filled out for 10 days she missed due to \"pink eye\". Discussed trying to decrease stress levels to help with glucose. 11/22: Blood sugars - patient states they have been \"alright\"  Provided encouragement to make healthy choices, acknowledging that it can be difficult during the holidays. 12/20: Patient is at her provider's office right now, glucose was 400 and she was having chest pain on and off for about a week or two. The doctor is out until January, they told her she should go to the emergency room. Encouraged her to call the nurse access or call 911 due to her symptoms. She says she has had a lot of stress and been dealing with that.   Says she is waiting until tomorrow and if she still doesn't feel well she will go to the ER. Says her symptoms have been going on for weeks, it's not constant pain. Says she was told it could be reflux, says she takes medication for that. Continued to encourage her to call the [facility] or go to the ED, as this was the recommendation from her provider's office. She wants to wait until tomorrow. 12/21Maryue Negorn to ED, they did confirm that her chest pain was related to reflux, ordered prilosec, carafate to help relieve this pain. She had run out of her [place], has ordered it. Also needs osvaldo sensors, offered to call [facility] for her, she said she would appreciate that. Called to order, spoke with Danielle Robins at [place], she is requesting new script from patient's PCP and will fill. Josi is expected to arrive to patient on 12/22.  12/28: She got her sensors, and has received her Januvia. Upset, she has dropped off STD paperwork again at her doctor's office (2nd time). [employer] says they have not received it. Contacted PCP office and spoke with Josseline George, she checked on the paperwork and stated that because her provider is out, back on 1/3, the paperwork cannot be sent yet. Relayed this information to the patient. She was appreciative for my calling. She requested the phone number to get her password reset for my chart. Provided that to her. 1/6: Patient diagnosed with COVID, blood glucose this afternoon was 402. States she has been drinking apple juice, as she does not like water. Encouraged her to try 1/2 water, 1/2 juice. She is taking 30 units of Lantus at night. 1/7: Discussed avoiding juices to maintain hydration. States she is drinking water now. Provided praise. 1/12: Patient feeling better today, will go back to work tomorrow. Discussed issues with her manager, states she hopes she does not give her a hard time. Provided encouragement. Asked her about her glucose.   She states it's been up and down, depending on what she eats.  Provided education about this, states she has been drinking fruit juice and apple juice, reminded her of the importance of decreasing this, explained that one cup of juice is about the total amount of sugar intake she should have for one day. Reminded her she could cut juice with water. She stated she would do better. Labs to be drawn on 1/18 through PCP.  1/19: Discussed patient's A1C - 12.8. She stated she expected it to be high due to stress and illness. Her provider has increased her insulin - 10 units in AM and 30 units at night. Has f/up on 2/2.  2/17: Missed her 2/2 appointment, didn't get a reminder from her PCP office about the appointment, also mentioned the [facility] with Diabetes Pharmacy call that was missed. Patient states she has had ingrown toe nails in both big toes, had one cut out last week with Dr. Ash Samayoa and the other will be next week. Encouraged her to re schedule her PCP visit. States glucose has been up and down, encouraged patient to drink water. 3/3: Patient states she did not check her glucose today, encouraged her to check regularly and make sure she is eating healthy to keep levels below 130. She will call to make appt with her PCP and pharmacy to stay enrolled with Be Well. Says she has been working nights and will be finished with this in the next week or so. New co-worker is supposed to start on 3/7. Reminded her that she needs to take care of herself and make her health a priority. 3/18: Patient states she had call with Yesi Miller, Diabetes Pharmacist and has gotten her medications corrected. She is excited that her glucose is now well managed since she has been taking her medication as Yesi Miller instructed. She plans to see an endocrinologist for her diabetes. We discussed how uncontrolled diabetes affects all of the body. Patient acknowledged that she did not want this to happen to her.   We discussed soda and alternatives - water with flavor enhancers, sugar free options - [place], crystal light-lemonaide. Provided patient with smoking cessation phone number 2-827-Quit Now and Be Well program Breathe Easy. Patient plans to quit smoking, as well. States her fiance smokes and may quit with her. Encouragement provided. 4/1: Patient states her blood sugars have been stable, this morning 112. Stays below 150, eating better. Called NP Darrin's office to schedule 3 month f/up, not virtual visit, must be on Tuesday - 6/14/22 1 pm (15 min early); called patient to let her know. Patient states she has not had time to enroll in the Breathe Easy program.  Will continue to encourage. Patient's primary care provider relationship reviewed with patient and modified, as applicable. Readiness to Change: []  Pre-contemplative    []  Contemplative  []  Preparation               [x]  Action                  []  Maintenance    Barriers/Challenges to Care: []  Decline in memory    []  Language barrier     []  Emotional                  []  Limited mobility  []  Lack of motivation     [] Vision, hearing or cognitive impairment []  Knowledge [] Financial Barriers []  Lack of support  []  Pain []  Other [x]  None    Key pt activities to achieve better health:   []  Weight loss  [x]  Improved diabetic control  []  Decreased cholesterol levels  []  Decreased blood pressure  []    []    Upcoming appointments:   Future Appointments   Date Time Provider Ankush Nicholson   6/14/2022  1:00 PM Flip Walters NP [facility] MAIN BS AMB     Plan for next call: Call in two weeks, 4/15.

## 2022-04-05 ENCOUNTER — TELEPHONE (OUTPATIENT)
Dept: PHARMACY | Age: 52
End: 2022-04-05

## 2022-04-05 NOTE — TELEPHONE ENCOUNTER
Braxton Elizabeth NP  - patient states was unable to tolerate lisinopril (had swelling in extremities and a cough)  - trial an ARB? Thank you,  Camilo Alberto, PharmD, [facility] Pharmacist  Department: [phone]  =================================================================    POPULATION HEALTH CLINICAL PHARMACY REVIEW - BE WELL WITH DIABETES: HIGH A1C  =============================================  Shelley Osorio is a 46 y.o. female enrolled in the [facility] with Diabetes program.    Identified care gap(s): A1c > 8%    DM Program Prescriptions:  Current Outpatient Medications   Medication Sig Dispense Refill    pantoprazole (PROTONIX) 40 mg tablet Take 1 Tablet by mouth daily. 90 Tablet 3    empagliflozin (Jardiance) 25 mg tablet Take one tablet by mouth once daily. To replace januvia 90 Tablet 3    amitriptyline (ELAVIL) 25 mg tablet Take 25 mg by mouth nightly as needed for Pain.  DULoxetine (Cymbalta) 30 mg capsule Take 30 mg by mouth two (2) times daily as needed for Pain.  semaglutide (Ozempic) 0.25 mg or 0.5 mg/dose (2 mg/1.5 ml) subq pen 0.5 mg by SubCUTAneous route every seven (7) days. 3 Pen 1    lisinopriL (PRINIVIL, ZESTRIL) 2.5 mg tablet Take 1 Tablet by mouth daily. 90 Tablet 1    rosuvastatin (CRESTOR) 20 mg tablet Take 1 Tablet by mouth nightly. 90 Tablet 1    Insulin Needles, Disposable, 31 gauge x 5/16\" ndle Use to administer insulin SQ twice daily as directed. 200 Pen Needle 3    flash glucose sensor (FreeStyle Antony 14 Day Sensor) kit 1 Device by SubCUTAneous route Once every 2 weeks. Apply to upper arm 6 Kit 3    lidocaine (Lidoderm) 5 % Apply patch to the affected area for 12 hours a day and remove for 12 hours a day. 1 Each 0    insulin glargine (LANTUS,BASAGLAR) 100 unit/mL (3 mL) inpn Take  10 units in the morning and 30 units  QHS.   DX E11.65  Indications: type 2 diabetes mellitus 21 mL 3    baclofen (LIORESAL) 10 mg tablet Take 1 Tablet by mouth three (3) times daily. PRN  Indications: muscle spasms caused by a spinal disease 30 Tablet 2    diclofenac (VOLTAREN) 1 % gel Apply 4 g to affected area four (4) times daily. 350 g 2    fluticasone propion-salmeteroL (ADVAIR/WIXELA) 250-50 mcg/dose diskus inhaler Take 1 Puff by inhalation every twelve (12) hours. 1 Inhaler 0    albuterol (PROVENTIL HFA, VENTOLIN HFA, PROAIR HFA) 90 mcg/actuation inhaler Take 2 Puffs by inhalation every four (4) hours as needed for Wheezing or Shortness of Breath. Indications: asthma attack 2 Inhaler 5    flash glucose scanning reader ([employer] Antony 14 Day [place]) [facility] Use to check blood glucose TID and PRN. Dx E11.9 1 Each 0        Allergies:  No Known Allergies     Labs:  Lab Results   Component Value Date/Time    Hemoglobin A1c 12.8 (H) 01/18/2022 05:00 PM    Hemoglobin A1c 11.7 (H) 09/28/2021 05:00 PM    Hemoglobin A1c 12.5 (H) 07/13/2021 04:53 PM     Estimated Creatinine Clearance: 55 mL/min (by C-G formula based on SCr of 0.87 mg/dL). Care Team:   - Physician (PCP): irina (Last OV: 1/2022)  - ACC/RDAdakristen Estevez (Last Encounter: 4/1)  - Upcoming appointments:   Future Appointments   Date Time Provider Ankush Nicholson   6/14/2022  1:00 PM Horace Bauer NP [facility] MAIN BS AMB       Diabetes Care:  - Glycemic Goal: <7.0% and directed by provider. Is not at blood glucose goal ..  - Appropriateness of Insulin Therapy: managed by NP, but endo referral was placed last month  - Therapy Optimization: titrate ozempic  - Medication changes since last A1c: lisinopril, rosuvastatin started. jardiance and ozempic re-started  - Medication Adherence Assessment: is getting better at adherence now that there is less confusion with meds    Plan:    Reached patient for review. Has patient heard anything about endocrinology referral?  - endo office did not have any available openings that fit patient's schedule.  Patient is off on tuesdays and prefers a Tuesday. Educated to call back endo office and try to get an appt. Tolerating ozempic and jardiance? Is having nausea from ozempic, discussed this is normal and should be transient and should continue for now and see how she does. Confirm how giving lantus. 12 units in AM and 31 units in PM.     What has avg fasting been? 113    She states her hands and feet were swelling up from the lisinopril and she was coughing. Will message pcp. Is taking jardiance daily with no issues.         Reyes Gallo, PharmD, [address] Pharmacist  Department: [address] [place] [address] Ext in place: No   Recommendation Provided To: Provider: 1 via Note to Provider    Intervention Detail: New Rx: 1, reason: ELENA   Gap Closed?: Yes   Intervention Accepted By: Provider: 1   Time Spent (min): 20

## 2022-04-07 NOTE — TELEPHONE ENCOUNTER
PCP is out of the office. Lisinopril stopped and Rx for losartan sent to mail order pharmacy. Nursing, please notify the patient of this change.

## 2022-04-15 ENCOUNTER — PATIENT OUTREACH (OUTPATIENT)
Dept: OTHER | Age: 52
End: 2022-04-15

## 2022-04-15 NOTE — PROGRESS NOTES
Attempt to reach patient for follow up. Patient's voicemail box was full, unable to leave a message. Will attempt to reach her again in one week, 4/22.

## 2022-04-18 ENCOUNTER — OFFICE VISIT (OUTPATIENT)
Dept: FAMILY MEDICINE CLINIC | Age: 52
End: 2022-04-18
Payer: COMMERCIAL

## 2022-04-18 ENCOUNTER — NURSE TRIAGE (OUTPATIENT)
Dept: OTHER | Facility: CLINIC | Age: 52
End: 2022-04-18

## 2022-04-18 VITALS
HEART RATE: 99 BPM | OXYGEN SATURATION: 99 % | BODY MASS INDEX: 20.81 KG/M2 | RESPIRATION RATE: 16 BRPM | TEMPERATURE: 96.8 F | HEIGHT: 60 IN | DIASTOLIC BLOOD PRESSURE: 70 MMHG | SYSTOLIC BLOOD PRESSURE: 144 MMHG | WEIGHT: 106 LBS

## 2022-04-18 DIAGNOSIS — E11.65 UNCONTROLLED TYPE 2 DIABETES MELLITUS WITH HYPERGLYCEMIA (HCC): Primary | ICD-10-CM

## 2022-04-18 DIAGNOSIS — R07.9 CHEST PAIN, UNSPECIFIED TYPE: ICD-10-CM

## 2022-04-18 DIAGNOSIS — Z23 ENCOUNTER FOR IMMUNIZATION: ICD-10-CM

## 2022-04-18 DIAGNOSIS — Z12.11 SCREENING FOR COLON CANCER: ICD-10-CM

## 2022-04-18 DIAGNOSIS — M79.604 PAIN IN BOTH LOWER EXTREMITIES: ICD-10-CM

## 2022-04-18 DIAGNOSIS — M79.605 PAIN IN BOTH LOWER EXTREMITIES: ICD-10-CM

## 2022-04-18 DIAGNOSIS — Z11.59 ENCOUNTER FOR HEPATITIS C SCREENING TEST FOR LOW RISK PATIENT: ICD-10-CM

## 2022-04-18 DIAGNOSIS — I10 ESSENTIAL HYPERTENSION: ICD-10-CM

## 2022-04-18 PROCEDURE — 3046F HEMOGLOBIN A1C LEVEL >9.0%: CPT | Performed by: NURSE PRACTITIONER

## 2022-04-18 PROCEDURE — 90471 IMMUNIZATION ADMIN: CPT

## 2022-04-18 PROCEDURE — 99214 OFFICE O/P EST MOD 30 MIN: CPT | Performed by: NURSE PRACTITIONER

## 2022-04-18 PROCEDURE — 90732 PPSV23 VACC 2 YRS+ SUBQ/IM: CPT

## 2022-04-18 PROCEDURE — 93000 ELECTROCARDIOGRAM COMPLETE: CPT | Performed by: NURSE PRACTITIONER

## 2022-04-18 NOTE — PROGRESS NOTES
Vaccine updated. Pneumonia 23 vaccine shot given Lot #[id number] Exp 64-  right deltoid  Denies former reactions to shot and any allergies to chicken eggs or products. 1. \"Have you been to the ER, urgent care clinic since your last visit? Hospitalized since your last visit? \" No    2. \"Have you seen or consulted any other health care providers outside of the [address] [place] [address] since your last visit? \" No     3. For patients aged 39-70: Has the patient had a colonoscopy / FIT/ Cologuard? No      If the patient is female:    4. For patients aged 41-77: Has the patient had a mammogram within the past 2 years? Yes - no Care Gap present      5. For patients aged 21-65: Has the patient had a pap smear?  Yes - no Care Gap present

## 2022-04-18 NOTE — TELEPHONE ENCOUNTER
Received call from [facility] at [facility] with Red Flag Complaint. Subjective: Caller states \"I have pain in my right leg in the back of my leg and it goes all the way down to my foot. Now the same thing is happening on the left side. My sugar has been elevated. My legs feel real tight. Today it is 286. \"     Current Symptoms: bilateral lower extremity pain, hyperglycemia    Onset: 4 days ago; worsening    Associated Symptoms: NA    Pain Severity: 9/10; aching; intermittent    Temperature: none     What has been tried: nothing    LMP: NA Pregnant: NA    Recommended disposition: See HCP within 4 Hours (or PCP triage)    Care advice provided, patient verbalizes understanding; denies any other questions or concerns; instructed to call back for any new or worsening symptoms. Patient/Caller agrees with recommended disposition; writer provided warm transfer to [facility] at [facility] for appointment scheduling    Attention Provider: Thank you for allowing me to participate in the care of your patient. The patient was connected to triage in response to information provided to the [facility]. Please do not respond through this encounter as the response is not directed to a shared pool.         Reason for Disposition   [1] Thigh, calf, or ankle swelling AND [2] bilateral AND [3] 1 side is more swollen    Protocols used: LEG PAIN-ADULT-AH

## 2022-04-19 LAB
EST. AVERAGE GLUCOSE BLD GHB EST-MCNC: 252 MG/DL
HBA1C MFR BLD: 10.4 % (ref 4–5.6)
HCV AB SERPL QL IA: NONREACTIVE

## 2022-04-19 RX ORDER — ERYTHROMYCIN 5 MG/G
1 OINTMENT OPHTHALMIC EVERY 6 HOURS
Qty: 40 G | Refills: 0 | Status: SHIPPED | OUTPATIENT
Start: 2022-04-19 | End: 2022-04-29

## 2022-04-19 NOTE — PROGRESS NOTES
Good news HGBA1C is down to 10.4 from 12.8 keep up the good work keep adjusting insulin as we discussed at time of the visit.    Negative screening for Hep C

## 2022-04-20 ENCOUNTER — TELEPHONE (OUTPATIENT)
Dept: PHARMACY | Age: 52
End: 2022-04-20

## 2022-04-20 NOTE — TELEPHONE ENCOUNTER
POPULATION HEALTH CLINICAL PHARMACY REVIEW - BE WELL WITH DIABETES: HIGH A1C  =============================================  Juan Stokes is a 46 y.o. female enrolled in the [facility] AT [place] Be Well with Diabetes program.    Identified care gap(s): A1c > 10%, New med prescribed, losartan    DM Program Prescriptions:  Current Outpatient Medications   Medication Sig Dispense Refill    erythromycin (ILOTYCIN) ophthalmic ointment Administer 1 g to both eyes every six (6) hours for 10 days. Indications: an infection on the surface of the eye 40 g 0    losartan (COZAAR) 25 mg tablet Take 1 Tablet by mouth daily. (Patient not taking: Reported on 4/18/2022) 90 Tablet 1    pantoprazole (PROTONIX) 40 mg tablet Take 1 Tablet by mouth daily. 90 Tablet 3    empagliflozin (Jardiance) 25 mg tablet Take one tablet by mouth once daily. To replace januvia 90 Tablet 3    amitriptyline (ELAVIL) 25 mg tablet Take 25 mg by mouth nightly as needed for Pain. (Patient not taking: Reported on 4/18/2022)      DULoxetine (Cymbalta) 30 mg capsule Take 30 mg by mouth two (2) times daily as needed for Pain. (Patient not taking: Reported on 4/18/2022)      semaglutide (Ozempic) 0.25 mg or 0.5 mg/dose (2 mg/1.5 ml) subq pen 0.5 mg by SubCUTAneous route every seven (7) days. (Patient not taking: Reported on 4/18/2022) 3 Pen 1    rosuvastatin (CRESTOR) 20 mg tablet Take 1 Tablet by mouth nightly. (Patient not taking: Reported on 4/18/2022) 90 Tablet 1    Insulin Needles, Disposable, 31 gauge x 5/16\" ndle Use to administer insulin SQ twice daily as directed. 200 Pen Needle 3    flash glucose sensor (FreeStyle Antony 14 Day Sensor) kit 1 Device by SubCUTAneous route Once every 2 weeks. Apply to upper arm 6 Kit 3    lidocaine (Lidoderm) 5 % Apply patch to the affected area for 12 hours a day and remove for 12 hours a day.  1 Each 0    insulin glargine (LANTUS,BASAGLAR) 100 unit/mL (3 mL) inpn Take  10 units in the morning and 30 units  QHS. DX E11.65  Indications: type 2 diabetes mellitus 21 mL 3    baclofen (LIORESAL) 10 mg tablet Take 1 Tablet by mouth three (3) times daily. PRN  Indications: muscle spasms caused by a spinal disease (Patient not taking: Reported on 4/18/2022) 30 Tablet 2    diclofenac (VOLTAREN) 1 % gel Apply 4 g to affected area four (4) times daily. 350 g 2    fluticasone propion-salmeteroL (ADVAIR/WIXELA) 250-50 mcg/dose diskus inhaler Take 1 Puff by inhalation every twelve (12) hours. 1 Inhaler 0    albuterol (PROVENTIL HFA, VENTOLIN HFA, PROAIR HFA) 90 mcg/actuation inhaler Take 2 Puffs by inhalation every four (4) hours as needed for Wheezing or Shortness of Breath. Indications: asthma attack 2 Inhaler 5    flash glucose scanning reader ([employer] Antony 14 Day [place]) [facility] Use to check blood glucose TID and PRN. Dx E11.9 1 Each 0        Allergies: Allergies   Allergen Reactions    Lisinopril Other (comments)     Hands and feet swelling        Labs:  Lab Results   Component Value Date/Time    Hemoglobin A1c 10.4 (H) 04/18/2022 09:44 AM    Hemoglobin A1c 12.8 (H) 01/18/2022 05:00 PM    Hemoglobin A1c 11.7 (H) 09/28/2021 05:00 PM     Estimated Creatinine Clearance: 55 mL/min (by C-G formula based on SCr of 0.87 mg/dL). Care Team:   - Physician (PCP): Trina Otto NP (Last OV: 4/18/22)  - ACC/RD: patti (Last Encounter: attempted on 4/15/22)  - Upcoming appointments:   Future Appointments   Date Time Provider Ankush Nicholson   4/25/2022  2:30 PM [address]   4/26/2022  2:30 PM Leydi Pruett MD [id number] [facility] AMB   6/14/2022  1:00 PM Moses Bloch, NP [facility] MAIN  AMB     Diabetes Care:  - Glycemic Goal: <7.0% and directed by provider. Is not at blood glucose goal ..  - Appropriateness of Insulin Therapy: bolus per NP, endo referral was placed, not sure if patient scheduled yet  - Therapy Optimization: mealtime insulin, titrate ozempic but was having some nausea and admitted she did miss some doses.    - Medication changes since last A1c: lisinopril stopped, losartan ordered  - Medication Adherence Assessment: patient still working on adherence. Plan:    Reached patient to review. Wanted to review new medication that was prescribed, losartan 25 mg daily in place of lisinopril. Patient had reported side effects to lisinopril. - states never received med, tracking from [facility] shows delivered to her mailbox on 4/11/22, pt again states she does not have, P closed right now so I will have to follow up regarding this. Still having nausea from ozempic? Able to not miss any doses or is missing doses? - she is still having nausea, did loose 12 pounds and is now 106 pounds. She did not take it this week. But did get 4 doses in and nausea is just too bad. Patient states she did tell doctor she can not take this med anymore. States is taking rosuvastatin. pcp note states to cont to titrate insulin. Confirm how patient is giving lantus  - 14 units AM, and 34 units at night    How would patient feel about mealtime insulin?   - patient would not prefer to have more injections but not completely opposed either. What have sugar readings been? Can she read off averages from freestyle osvaldo 14 device?  - is wearing osvaldo, fasting 112-150. But was unable to read me any specific readings from her device. Get endocrinologist visit scheduled?   - she states office was suppose to call her back to schedule when they had something that fit her schedule. She states she does not remember the name of the endo office and does not have the phone number to call them. I was able to find in the chart and gave patient the number for Fany Spears, [phone]. States she will call endo office to try to get an appt.       Jacqueline Dotson, PharmD, [address] Pharmacist  Department: [address] [place] [address] Ext in place: No   Recommendation Provided To: Patient/Caregiver: 1 via Telephone   Gap Closed?: Yes   Intervention Accepted By: Patient/Caregiver: 1   Time Spent (min): 15

## 2022-04-21 ENCOUNTER — TELEPHONE (OUTPATIENT)
Dept: FAMILY MEDICINE CLINIC | Age: 52
End: 2022-04-21

## 2022-04-21 NOTE — TELEPHONE ENCOUNTER
S/w patient had last eye checkup recently at [facility].  ML with the eye center to fax her last eye checkup ,  Phone # [phone], Nicolas Donovan

## 2022-04-21 NOTE — PROGRESS NOTES
Subjective:     Viry Lopez is a 46 y.o. female who presents today with the following:  Chief Complaint   Patient presents with    Leg Pain     both started Thursday    Diabetes       Patient Active Problem List   Diagnosis Code    Type 2 diabetes mellitus without complication, with long-term current use of insulin ([facility] [address] E11.9, Z79.4    Essential hypertension I10    Mixed dyslipidemia E78.2    Type [facility] diabetes with nephropathy [facility] E11.21         COMPLIANT WITH MEDICATION:   HTN; (+) chest pain , (-)dyspnea, palpitations, headache and blurred vision. Blood pressure near  Normotensive. On hold with tax concern just prior and during our visit. She declined rescheduling her appointment. She stopped taking lisinopril due to side effects  Tolerating losartan. Chest pain; intermittent She endorses a sensation of acid reflux that sometimes feels like chest pain radiating across her chest. She denies having chest pain at the time of the visit. Taking pantoprazole as prescribed daily. daily. Bilateral lower extremity pain; x 1 week. She denies edema and  erythema . No pattern of activity identified. Recurrent styes; bilateral followed by eye care professional initially. We discussed changing from eye drops to an ointment . She is receptive     DM; Diabetes. Sugars controlled poorly She endorses her numbers were improving until thi past weekend BG from 160-200 until the weekend going back into the 300+. Compliant using free style Antony for glucose monitoring. Hypoglycemia: none  Tolerating current treatment well  Current medications include diet  Jardiance 25 mg po daily, Insulin Lantus glargine 12 units in the morning and 32 units in the evening   She stopped taking Ozempic due to weight loss.     Lab Results   Component Value Date/Time    Hemoglobin A1c 10.4 (H) 04/18/2022 09:44 AM    Hemoglobin A1c 12.8 (H) 01/18/2022 05:00 PM    Hemoglobin A1c 11.7 (H) 09/28/2021 05:00 PM    Glucose 313 (H) 12/21/2021 02:12 AM    Glucose  07/21/2014 04:03 PM    Microalb/Creat ratio (ug/mg creat.) 365 (H) 05/08/2020 09:25 AM    Microalbumin/creat ratio (POC) >300 07/07/2021 05:05 PM    LDL,Direct 202 (H) 07/13/2021 04:53 PM    LDL, calculated Not calculated due to elevated triglyceride level 07/13/2021 04:53 PM    Creatinine 0.87 12/21/2021 02:12 AM     Lab Results   Component Value Date/Time    Microalb/Creat ratio (ug/mg creat.) 365 (H) 05/08/2020 09:25 AM       Last eye exam performed  less than 1 year ago and was abnormal: having bouts of conjunctivitis and styes. Records requested. .    Last foot exam 7/11/2021 performed less than 1 year ago. warm, good capillary refill      depression screening addressed not at risk     abuse screening addressed denies    learning assessment addressed reviewed nurses notes    fall risk addressed not at risk    HM: addressed she requests Hep C testing and would like to be scheduled for a colonoscopy. Pneumonia vaccine. Encouraged to stop smoking. ROS:  Gen: denies fever, chills, fatigue, weight loss, weight gain  HEENT:denies blurry vision, nasal congestion, sore throat  Resp: denies dypsnea, cough, wheezing  CV: denies chest pain radiating to the jaws or arms, palpitations, lower extremity edema  Abd: denies nausea, vomiting, diarrhea, constipation  Neuro: denies numbness/tingling  Endo: denies polyuria, polydipsia, heat/cold intolerance  Heme: no lymphadenopathy    Allergies   Allergen Reactions    Lisinopril Other (comments)     Hands and feet swelling         Current Outpatient Medications:     erythromycin (ILOTYCIN) ophthalmic ointment, Administer 1 g to both eyes every six (6) hours for 10 days. Indications: an infection on the surface of the eye, Disp: 40 g, Rfl: 0    pantoprazole (PROTONIX) 40 mg tablet, Take 1 Tablet by mouth daily. , Disp: 90 Tablet, Rfl: 3    empagliflozin (Jardiance) 25 mg tablet, Take one tablet by mouth once daily.  To replace [place]o, Disp: 90 Tablet, Rfl: 3    Insulin Needles, Disposable, 31 gauge x 5/16\" ndle, Use to administer insulin SQ twice daily as directed., Disp: 200 Pen Needle, Rfl: 3    flash glucose sensor (FreeStyle Antony 14 Day Sensor) kit, 1 Device by SubCUTAneous route Once every 2 weeks. Apply to upper arm, Disp: 6 Kit, Rfl: 3    lidocaine (Lidoderm) 5 %, Apply patch to the affected area for 12 hours a day and remove for 12 hours a day., Disp: 1 Each, Rfl: 0    insulin glargine (LANTUS,BASAGLAR) 100 unit/mL (3 mL) inpn, Take  10 units in the morning and 30 units  QHS. DX E11.65  Indications: type 2 diabetes mellitus, Disp: 21 mL, Rfl: 3    diclofenac (VOLTAREN) 1 % gel, Apply 4 g to affected area four (4) times daily. , Disp: 350 g, Rfl: 2    fluticasone propion-salmeteroL (ADVAIR/WIXELA) 250-50 mcg/dose diskus inhaler, Take 1 Puff by inhalation every twelve (12) hours. , Disp: 1 Inhaler, Rfl: 0    albuterol (PROVENTIL HFA, VENTOLIN HFA, PROAIR HFA) 90 mcg/actuation inhaler, Take 2 Puffs by inhalation every four (4) hours as needed for Wheezing or Shortness of Breath. Indications: asthma attack, Disp: 2 Inhaler, Rfl: 5    flash glucose scanning reader (FreeStyle Antony 14 Day [place]) [facility] Use to check blood glucose TID and PRN. Dx E11.9, Disp: 1 Each, Rfl: 0    losartan (COZAAR) 25 mg tablet, Take 1 Tablet by mouth daily. (Patient not taking: Reported on 4/18/2022), Disp: 90 Tablet, Rfl: 1    amitriptyline (ELAVIL) 25 mg tablet, Take 25 mg by mouth nightly as needed for Pain. (Patient not taking: Reported on 4/18/2022), Disp: , Rfl:     DULoxetine (Cymbalta) 30 mg capsule, Take 30 mg by mouth two (2) times daily as needed for Pain. (Patient not taking: Reported on 4/18/2022), Disp: , Rfl:     semaglutide (Ozempic) 0.25 mg or 0.5 mg/dose (2 mg/1.5 ml) subq pen, 0.5 mg by SubCUTAneous route every seven (7) days.  (Patient not taking: Reported on 4/18/2022), Disp: 3 Pen, Rfl: 1    rosuvastatin (CRESTOR) 20 mg tablet, Take 1 Tablet by mouth nightly., Disp: 90 Tablet, Rfl: 1    baclofen (LIORESAL) 10 mg tablet, Take 1 Tablet by mouth three (3) times daily.  PRN  Indications: muscle spasms caused by a spinal disease (Patient not taking: Reported on 4/18/2022), Disp: 30 Tablet, Rfl: 2    Past Medical History:   Diagnosis Date    Abnormal EKG 2/27/2018    Abscess 7/24/2019    Asthma     Chest pain 2/27/2018    Chest wall contusion, left, subsequent encounter     Diabetes ([facility] [address]     Diverticular disease     Diverticulitis large intestine w/o perforation or abscess w/o bleeding 10/25/2016    Dyslipidemia     Hypertension     Lumbar radiculopathy 02/01/2021    Menopause     Pelvic pain 7/26/2016    Rib fractures     Right hand pain 7/10/2018    Sciatica of left side 02/01/2021    Uterine leiomyoma 7/26/2016    Vaginal candidiasis 7/24/2019       Past Surgical History:   Procedure Laterality Date    HX GYN      BTL    IR INJ FORAMIN EPID LUMB ANES/STER [facility]  5/11/2021       Social History     Tobacco Use   Smoking Status Current Every Day Smoker    Packs/day: 0.50   Smokeless Tobacco Never Used       Social History     Socioeconomic History    Marital status:    Tobacco Use    Smoking status: Current Every Day Smoker     Packs/day: 0.50    Smokeless tobacco: Never Used   Vaping Use    Vaping Use: Never used   Substance and Sexual Activity    Alcohol use: No    Drug use: No    Sexual activity: Yes     Partners: Male     Birth control/protection: Surgical       Family History   Problem Relation Age of Onset    Stroke Mother     Diabetes Mother     Heart Disease Father     Diabetes Sister     Diabetes Maternal Grandmother     Breast Cancer Maternal Aunt     Breast Cancer Paternal Aunt          Objective:     Visit Vitals  BP (!) 144/70 (BP 1 Location: Right upper arm, BP Patient Position: Sitting, BP Cuff Size: Adult)   Pulse 99   Temp 96.8 °F (36 °C) (Temporal)   Resp 16   Ht 5' (1.524 m)   Wt 106 lb (48.1 kg)   SpO2 99%   BMI 20.70 kg/m²     Body mass index is 20.7 kg/m². General: Alert and oriented. No acute distress. Well nourished  HEENT :  Ears:TMs are normal. Canals are clear. Eyes: pupils equal, round, react to light and accommodation. Extra ocular movements intact. Nose: patent. Mouth and throat is clear. Neck:supple full range of motion no thyromegaly. Trachea midline, No carotid bruits. No significant lymphadenopathy  Lungs[de-identified] clear to auscultation without wheezes, rales, or rhonchi. Heart :RRR, S1 & S2 are normal intensity. No murmur; no gallop  Abdomen: bowel sounds active. No tenderness, guarding, rebound, masses, hepatic or spleen enlargement  Back: no CVA tenderness. Extremities: without clubbing, cyanosis, or edema  Pulses: radial and femoral pulses are normal  Neuro: HMF intact. Cranial nerves II through XII grossly normal.  Motor: is 5 over 5 and symmetrical.   Deep tendon reflexes: +2 equal  Psych:appropriate behavior, mood, affect and judgement. EKG; NSR HR 71  Results for orders placed or performed in visit on 04/18/22   HEPATITIS C AB   Result Value Ref Range    Hep C virus Ab Interp. NONREACTIVE NONREACTIVE     HEMOGLOBIN A1C WITH EAG   Result Value Ref Range    Hemoglobin A1c 10.4 (H) 4.0 - 5.6 %    Est. average glucose 252 mg/dL       Results for orders placed or performed in visit on 04/18/22   HEPATITIS C AB   Result Value Ref Range    Hep C virus Ab Interp. NONREACTIVE NONREACTIVE     HEMOGLOBIN A1C WITH EAG   Result Value Ref Range    Hemoglobin A1c 10.4 (H) 4.0 - 5.6 %    Est. average glucose 252 mg/dL       Assessment/ Plan:     1. Uncontrolled type 2 diabetes mellitus with hyperglycemia (HCC)  We discussed increasing insulin 2 units every 3 days when BG readings are consistently above 200. Target goal between 180 and 100.   - HEMOGLOBIN A1C WITH EAG; Future  - HEMOGLOBIN A1C WITH EAG    2. Essential hypertension  BP near normotensive today. Recheck at next visit     3. Pain in both lower extremities    - DUPLEX LOWER EXT VENOUS BILAT; Future    4. Encounter for hepatitis C screening test for low risk patient    - HEPATITIS C AB; Future  - COLLECTION VENOUS BLOOD,VENIPUNCTURE; Future  - COLLECTION VENOUS BLOOD,VENIPUNCTURE  - HEPATITIS C AB    5. Screening for colon cancer    - REFERRAL TO GENERAL SURGERY    6. Encounter for immunization    - PNEUMOCOCCAL POLYSACCHARIDE VACCINE, 23-VALENT, ADULT OR IMMUNOSUPPRESSED PT DOSE,  - ADMIN PNEUMOCOCCAL VACCINE    7. Chest pain, unspecified type    - AMB POC EKG ROUTINE W/ 12 LEADS, INTER & REP  EKG within Normal limits  As noted above. Reviewed warning signs of a heart attack and how chest pain can feel differently to a diabetic. Suspect gastric reflux cause .  Continue to evaluate and treat       Orders Placed This Encounter    COLLECTION VENOUS BLOOD,VENIPUNCTURE     Standing Status:   Future     Number of Occurrences:   1     Standing Expiration Date:   5/18/2022    PNEUMOCOCCAL POLYSACCHARIDE VACCINE, 23-VALENT, ADULT OR IMMUNOSUPPRESSED PT DOSE,    HEPATITIS C AB     Standing Status:   Future     Number of Occurrences:   1     Standing Expiration Date:   5/18/2022    HEMOGLOBIN A1C WITH EAG     Standing Status:   Future     Number of Occurrences:   1     Standing Expiration Date:   5/18/2022   [facility] EMPL     Referral Priority:   Routine     Referral Type:   Consultation     Referral Reason:   Specialty Services Required     Referred to Provider:   Chip Ponce MD     Number of Visits Requested:   1    AMB POC EKG ROUTINE W/ 12 LEADS, INTER & REP     Order Specific Question:   Reason for Exam:     Answer:   chest pain    ADMIN PNEUMOCOCCAL VACCINE    DUPLEX LOWER EXT VENOUS BILAT     Check blood flow pain in lower extremities     Standing Status:   Future     Standing Expiration Date:   10/18/2022     Scheduling Instructions:      [facility]    erythromycin (ILOTYCIN) ophthalmic ointment     Sig: Administer 1 g to both eyes every six (6) hours for 10 days. Indications: an infection on the surface of the eye     Dispense:  40 g     Refill:  0         Verbal and written instructions (see AVS) provided. Patient expresses understanding of diagnosis and treatment plan. Health Maintenance Due   Topic Date Due    Colorectal Cancer Screening Combo  Never done    Shingrix Vaccine Age 50> (1 of 2) Never done    Eye Exam Retinal or Dilated  12/03/2021       Follow up in 3 months or sooner as needed.          PATO Meza

## 2022-04-21 NOTE — TELEPHONE ENCOUNTER
----- Message from Tasha Schroeder NP sent at 4/21/2022  6:58 AM EDT -----  Please call Ms Fernandes to get the information of her eye doctor to get records. She has recurrent styles,eye infection  and is diabetic. It would be very helpful to have so I may contact their office. Thanks!  DL

## 2022-04-22 ENCOUNTER — PATIENT OUTREACH (OUTPATIENT)
Dept: OTHER | Age: 52
End: 2022-04-22

## 2022-04-22 NOTE — PROGRESS NOTES
Follow up phone call to patient, two pt identifiers verified. Discussed patient's goals:   Goals      DM  Diabetes Support and Education / Care Coordination      Referred from [facility]. A1C>9    1)  General:   Patient is able to state a basic definition of diabetes including: risk factors, basic pathophysiology, complications and treatment. 2)  Diet:   Patient will be able to read a basic food label and identify role of calories, carbohydrates, protein and fats in healthy eating. 3)  Activity:   Patient will identify the potential health benefits of regular exercise:  decreased cholesterol, improved mood, decreased blood pressure, lower stress/anxiety, weight loss, decreased blood sugar. 4)  Monitoring:  Patient will be able to identify what an A1C test measures. 5)  Medications:  Patient will demonstrate knowledge of diabetes medications. 6)  Risk Reduction:  Patient is able to state goal target for A1C (<7), blood pressure (<130/70), and LDL cholesterol (<100) to reduce diabetes complications. 7)  Problem Solving:  Patient will identify s/sx and treatment for hyper- and hypoglycemia. 8)  Healthy Coping:  Patient will be able to identify two community support resources. Patient will identify two stress relieving techniques. Patient will identify health risk factors and complications of diabetes  1) Kidney disease  2) Heart disease  3) Neuropathy  4) Skin breakdown  5) Diabetic Retinopathy  6) Prevention:   Stable controlled A1C,  healthy lifestyle (diet, exercise, hydration, stress reduction, monitoring, skin care)   3/15- Pt to have BW this week/  A1C.    8/3: Patient's A1C up to 12.5 7/21 - discussed healthy food options. States she eats cheerios or corn flakes for breakfast.  Encouraged lean protein, egg whites, whole grains, yogurt.   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 Will mail nutritional information from [employer]. Encouraged drinking more water, plans to take water bottles with her to work, as they no longer offer water bottles. Has been drinking 3 sodas/day. Will cut these out. Scheduled to meet with a nutritionist tomorrow, 8/4.  8/10: Has been able to cut soda, drinks one a day. Drinking water. Trying to meal prep. Bought fruits and veggies. 8/27: Patient had visit with nurse practitioner. On some medications to help control diabetes. Still recovering from [place].  9/8: Patient states glucose has been well controlled. Complaints of blurred vision with new weekly medication. Encouraged her to discuss this with her PCP, states she will tomorrow, as she needs to go to the office to  her \"leave paperwork\". 9/23: Discussed medication issues. Back on jardiance. 10/8: Diabetes, glucose has been in lower 200s. Taking 27 units of Lantus/daily, Januvia. Working on getting glucose under better control. Has been stressed out with everything, loss of father, covid, pink eye. Has gotten STD approved and has received check. 10/25: Blood sugars are still up and down. Taking januvia and lantus 27 units. Has appointment to go to doctor tomorrow to get STD paperwork filled out for 10 days she missed due to \"pink eye\". Discussed trying to decrease stress levels to help with glucose. 11/22: Blood sugars - patient states they have been \"alright\"  Provided encouragement to make healthy choices, acknowledging that it can be difficult during the holidays. 12/20: Patient is at her provider's office right now, glucose was 400 and she was having chest pain on and off for about a week or two. The doctor is out until January, they told her she should go to the emergency room. Encouraged her to call the nurse access or call 911 due to her symptoms. She says she has had a lot of stress and been dealing with that.   Says she is waiting until tomorrow and if she still doesn't feel well she will go to the ER. Says her symptoms have been going on for weeks, it's not constant pain. Says she was told it could be reflux, says she takes medication for that. Continued to encourage her to call the [facility] or go to the ED, as this was the recommendation from her provider's office. She wants to wait until tomorrow. 12/21Angedebi Aquino to ED, they did confirm that her chest pain was related to reflux, ordered prilosec, carafate to help relieve this pain. She had run out of her AccuRev, has ordered it. Also needs osvaldo sensors, offered to call [facility] for her, she said she would appreciate that. Called to order, spoke with Yung Cisneros at [facility], she is requesting new script from patient's PCP and will fill. Josi is expected to arrive to patient on 12/22.  12/28: She got her sensors, and has received her Januvia. Upset, she has dropped off STD paperwork again at her doctor's office (2nd time). [facility] says they have not received it. Contacted PCP office and spoke with [facility], she checked on the paperwork and stated that because her provider is out, back on 1/3, the paperwork cannot be sent yet. Relayed this information to the patient. She was appreciative for my calling. She requested the phone number to get her password reset for my chart. Provided that to her. 1/6: Patient diagnosed with COVID, blood glucose this afternoon was 402. States she has been drinking apple juice, as she does not like water. Encouraged her to try 1/2 water, 1/2 juice. She is taking 30 units of Lantus at night. 1/7: Discussed avoiding juices to maintain hydration. States she is drinking water now. Provided praise. 1/12: Patient feeling better today, will go back to work tomorrow. Discussed issues with her manager, states she hopes she does not give her a hard time. Provided encouragement. Asked her about her glucose.   She states it's been up and down, depending on what she eats.  Provided education about this, states she has been drinking fruit juice and apple juice, reminded her of the importance of decreasing this, explained that one cup of juice is about the total amount of sugar intake she should have for one day. Reminded her she could cut juice with water. She stated she would do better. Labs to be drawn on 1/18 through PCP.  1/19: Discussed patient's A1C - 12.8. She stated she expected it to be high due to stress and illness. Her provider has increased her insulin - 10 units in AM and 30 units at night. Has f/up on 2/2.  2/17: Missed her 2/2 appointment, didn't get a reminder from her PCP office about the appointment, also mentioned the [facility] call that was missed. Patient states she has had ingrown toe nails in both big toes, had one cut out last week with Dr. Jh Phipps and the other will be next week. Encouraged her to re schedule her PCP visit. States glucose has been up and down, encouraged patient to drink water. 3/3: Patient states she did not check her glucose today, encouraged her to check regularly and make sure she is eating healthy to keep levels below 130. She will call to make appt with her PCP and pharmacy to stay enrolled with [facility]. Says she has been working nights and will be finished with this in the next week or so. New co-worker is supposed to start on 3/7. Reminded her that she needs to take care of herself and make her health a priority. 3/18: Patient states she had call with Kam Shearer, Diabetes Pharmacist and has gotten her medications corrected. She is excited that her glucose is now well managed since she has been taking her medication as Kam Shearer instructed. She plans to see an endocrinologist for her diabetes. We discussed how uncontrolled diabetes affects all of the body. Patient acknowledged that she did not want this to happen to her.   We discussed soda and alternatives - water with flavor enhancers, sugar free options - Stur, crystal light-lemonaide. Provided patient with smoking cessation phone number [phone]-Quit Now and [facility] Well program Breathe Easy. Patient plans to quit smoking, as well. States her fiance smokes and may quit with her. Encouragement provided. 4/1: Patient states her blood sugars have been stable, this morning 112. Stays below 150, eating better. Called LUCINDA Clifford's office to schedule 3 month f/up, not virtual visit, must be on Tuesday - 6/14/22 1 pm (15 min early); called patient to let her know. Patient states she has not had time to enroll in the Breathe Easy program.  Will continue to encourage. 4/22: Had MD appt on 4/18. Has to get ultrasound on Monday due to nerve pain in her legs, Tuesday colonoscopy, sugar has been elevated due to pain. Will have eye exam.            Patient's primary care provider relationship reviewed with patient and modified, as applicable. Discussed smoking cessation. States she is slowing down, pays $6.40/pack. Discussed friend who is ill due to smoking. Provided encouragement. Says she plans to do it on her own, not smoking much anymore.     Readiness to Change: []  Pre-contemplative    []  Contemplative  []  Preparation               [x]  Action                  []  Maintenance    Barriers/Challenges to Care: []  Decline in memory    []  Language barrier     []  Emotional                  []  Limited mobility  []  Lack of motivation     [] Vision, hearing or cognitive impairment []  Knowledge [] Financial Barriers []  Lack of support  []  Pain []  Other [x]  None    Key pt activities to achieve better health:   []  Weight loss  [x]  Improved diabetic control  []  Decreased cholesterol levels  []  Decreased blood pressure  []    []    Upcoming appointments:   Future Appointments   Date Time Provider Ankush Nicholson   4/25/2022  2:30 PM [address]   4/26/2022  2:30 PM MD KEYUR Lewis BS AMB   6/14/2022  1:00 PM LUCINDA Yusuf AMB     Plan for next call: Chart review, 4/26. Ultrasound on 4/25.

## 2022-04-25 ENCOUNTER — HOSPITAL ENCOUNTER (OUTPATIENT)
Dept: ULTRASOUND IMAGING | Age: 52
Discharge: HOME | End: 2022-04-25
Attending: NURSE PRACTITIONER
Payer: COMMERCIAL

## 2022-04-25 ENCOUNTER — TELEPHONE (OUTPATIENT)
Dept: SURGERY | Age: 52
End: 2022-04-25

## 2022-04-25 DIAGNOSIS — M79.604 PAIN IN BOTH LOWER EXTREMITIES: ICD-10-CM

## 2022-04-25 DIAGNOSIS — M79.605 PAIN IN BOTH LOWER EXTREMITIES: ICD-10-CM

## 2022-04-25 PROCEDURE — 93970 EXTREMITY STUDY: CPT

## 2022-04-26 NOTE — PROGRESS NOTES
Ms Lauren Morgan was called informed of her test result per Deisi Reddy, stated OK thanks. She was questioning in reference to dropping off a form for Deisi Reddy to sign that she had her [facility] BW done here. I stated to her that should be fine and Deisi Reddy should be able to fax it to where it needs to go, but will confirm with the front staff. Per Nevin jesus, if she had the BW done here, that should be fine. The call was lost, tried calling back no answer, VM full, so was unable to leave a VM.

## 2022-05-02 ENCOUNTER — TELEPHONE (OUTPATIENT)
Dept: FAMILY MEDICINE CLINIC | Age: 52
End: 2022-05-02

## 2022-05-03 ENCOUNTER — PATIENT OUTREACH (OUTPATIENT)
Dept: OTHER | Age: 52
End: 2022-05-03

## 2022-05-03 NOTE — PROGRESS NOTES
Attempt to reach patient for follow up. Mailbox was full, unable to leave message. Will attempt another outreach in one week, 5/10.

## 2022-05-09 ENCOUNTER — TELEPHONE (OUTPATIENT)
Dept: SURGERY | Age: 52
End: 2022-05-09

## 2022-05-10 ENCOUNTER — OFFICE VISIT (OUTPATIENT)
Dept: SURGERY | Age: 52
End: 2022-05-10

## 2022-05-10 ENCOUNTER — PATIENT OUTREACH (OUTPATIENT)
Dept: OTHER | Age: 52
End: 2022-05-10

## 2022-05-10 VITALS
SYSTOLIC BLOOD PRESSURE: 135 MMHG | HEIGHT: 60 IN | HEART RATE: 88 BPM | BODY MASS INDEX: 21.2 KG/M2 | DIASTOLIC BLOOD PRESSURE: 75 MMHG | WEIGHT: 108 LBS

## 2022-05-10 DIAGNOSIS — Z12.11 ENCOUNTER FOR SCREENING COLONOSCOPY: Primary | ICD-10-CM

## 2022-05-10 NOTE — PATIENT INSTRUCTIONS
Colonoscopy: Before Your Procedure  What is a colonoscopy? A colonoscopy is a test that lets a doctor look inside your colon. The doctor uses a thin, lighted tube called a colonoscope to look for problems. These include small growths called polyps, cancer, or bleeding. During the test, the doctor can take samples of tissue that can be checked for cancer or other problems. This is called a biopsy. The doctor can also take out polyps. Before the test, you will need to stop eating solid foods. You also will be given instructions on how to clean out your colon. This helps your doctor be able to see inside your colon during the test.  How do you prepare for the procedure? Procedures can be stressful. This information will help you understand what you can expect. And it will help you safely prepare for your procedure. Preparing for the procedure    · Be sure you have someone to take you home. Anesthesia and pain medicine will make it unsafe for you to drive or get home on your own. · Understand exactly what procedure is planned, along with the risks, benefits, and other options.     · Tell your doctor ALL the medicines, vitamins, supplements, and herbal remedies you take. Some may increase the risk of problems during your procedure. Your doctor will tell you if you should stop taking any of them before the procedure and how soon to do it.     · If you take aspirin or some other blood thinner, ask your doctor if you should stop taking it before your procedure. Make sure that you understand exactly what your doctor wants you to do. These medicines increase the risk of bleeding.     · Make sure your doctor and the hospital have a copy of your advance directive. If you don't have one, you may want to prepare one. It lets others know your health care wishes. It's a good thing to have before any type of surgery or procedure.    Before the procedure    · Follow your doctor's directions about when to stop eating solid foods and drink only clear liquids. You can drink water, clear juices, clear broths, flavored ice pops, and gelatin (such as Jell-O). Do not eat or drink anything red or purple. This includes grape juice and grape-flavored ice pops. It also includes fruit punch and cherry gelatin.     · Drink the \"colon prep\" liquid as your doctor tells you. You will want to stay home, because the liquid will make you go to the bathroom a lot. Your stools will be loose and watery. It's very important to drink all of the liquid. If you have problems drinking it, call your doctor.     · Do not eat any solid foods after you drink the colon prep.     · Stop drinking clear liquids for a few hours before the test. Your doctor will tell you how many hours this will be. What happens on the day of the procedure? · Follow the instructions exactly about when to stop eating and drinking. If you don't, your procedure may be canceled. If your doctor told you to take your medicines on the day of the procedure, take them with only a sip of water.     · Take a bath or shower before you come in for your procedure. Do not apply lotions, perfumes, deodorants, or nail polish.     · Take off all jewelry and piercings. And take out contact lenses, if you wear them. At the doctor's office or hospital   · Bring a picture ID.     · You will be kept comfortable and safe by your anesthesia provider. The anesthesia may make you sleep.     · You will lie on your back or your side with your knees drawn up toward your belly. The doctor will gently put a gloved finger into your anus. Then the doctor puts the scope in and moves it into your colon. The scope goes in easily because it is lubricated.     · The doctor may also use small tools to take tissue samples for a biopsy or to remove polyps. This does not hurt.     · The test usually takes 30 to 45 minutes. But it may take longer. It depends on what is found and what is done.    When should you call your doctor? · You have questions or concerns.     · You don't understand how to prepare for your procedure.     · You are having trouble with the bowel prep.     · You become ill before the procedure (such as fever, flu, or a cold).     · You need to reschedule or have changed your mind about having the procedure. Where can you learn more? Go to http://www.gray.com/  Enter C315 in the search box to learn more about \"Colonoscopy: Before Your Procedure. \"  Current as of: September 8, 2021               Content Version: 13.2  © 3370-0667 [employer], [employer]. Care instructions adapted under license by [employer] (which disclaims liability or warranty for this information). If you have questions about a medical condition or this instruction, always ask your healthcare professional. [address] any warranty or liability for your use of this information.

## 2022-05-10 NOTE — PROGRESS NOTES
Follow up phone call to patient, two pt identifiers verified. Discussed patient's goals:   Goals      DM  Diabetes Support and Education / Care Coordination      Referred from [facility]. A1C>9    1)  General:   Patient is able to state a basic definition of diabetes including: risk factors, basic pathophysiology, complications and treatment. 2)  Diet:   Patient will be able to read a basic food label and identify role of calories, carbohydrates, protein and fats in healthy eating. 3)  Activity:   Patient will identify the potential health benefits of regular exercise:  decreased cholesterol, improved mood, decreased blood pressure, lower stress/anxiety, weight loss, decreased blood sugar. 4)  Monitoring:  Patient will be able to identify what an A1C test measures. 5)  Medications:  Patient will demonstrate knowledge of diabetes medications. 6)  Risk Reduction:  Patient is able to state goal target for A1C (<7), blood pressure (<130/70), and LDL cholesterol (<100) to reduce diabetes complications. 7)  Problem Solving:  Patient will identify s/sx and treatment for hyper- and hypoglycemia. 8)  Healthy Coping:  Patient will be able to identify two community support resources. Patient will identify two stress relieving techniques. Patient will identify health risk factors and complications of diabetes  1) Kidney disease  2) Heart disease  3) Neuropathy  4) Skin breakdown  5) Diabetic Retinopathy  6) Prevention:   Stable controlled A1C,  healthy lifestyle (diet, exercise, hydration, stress reduction, monitoring, skin care)   3/15- Pt to have BW this week/  A1C.    8/3: Patient's A1C up to 12.5 7/21 - discussed healthy food options. States she eats cheerios or corn flakes for breakfast.  Encouraged lean protein, egg whites, whole grains, yogurt.   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 Will mail nutritional information from [employer]. Encouraged drinking more water, plans to take water bottles with her to work, as they no longer offer water bottles. Has been drinking 3 sodas/day. Will cut these out. Scheduled to meet with a nutritionist tomorrow, 8/4.  8/10: Has been able to cut soda, drinks one a day. Drinking water. Trying to meal prep. Bought fruits and veggies. 8/27: Patient had visit with nurse practitioner. On some medications to help control diabetes. Still recovering from [place].  9/8: Patient states glucose has been well controlled. Complaints of blurred vision with new weekly medication. Encouraged her to discuss this with her PCP, states she will tomorrow, as she needs to go to the office to  her \"leave paperwork\". 9/23: Discussed medication issues. Back on jardiance. 10/8: Diabetes, glucose has been in lower 200s. Taking 27 units of Lantus/daily, Januvia. Working on getting glucose under better control. Has been stressed out with everything, loss of father, covid, pink eye. Has gotten STD approved and has received check. 10/25: Blood sugars are still up and down. Taking januvia and lantus 27 units. Has appointment to go to doctor tomorrow to get STD paperwork filled out for 10 days she missed due to \"pink eye\". Discussed trying to decrease stress levels to help with glucose. 11/22: Blood sugars - patient states they have been \"alright\"  Provided encouragement to make healthy choices, acknowledging that it can be difficult during the holidays. 12/20: Patient is at her provider's office right now, glucose was 400 and she was having chest pain on and off for about a week or two. The doctor is out until January, they told her she should go to the emergency room. Encouraged her to call the nurse access or call 911 due to her symptoms. She says she has had a lot of stress and been dealing with that.   Says she is waiting until tomorrow and if she still doesn't feel well she will go to the ER. Says her symptoms have been going on for weeks, it's not constant pain. Says she was told it could be reflux, says she takes medication for that. Continued to encourage her to call the [facility] or go to the ED, as this was the recommendation from her provider's office. She wants to wait until tomorrow. 12/21Merlillie Campbell to ED, they did confirm that her chest pain was related to reflux, ordered prilosec, carafate to help relieve this pain. She had run out of her [place] [employer] [place], has ordered it. Also needs osvaldo sensors, offered to call [facility] for her, she said she would appreciate that. Called to order, spoke with Teresa Gutierrez at [facility], she is requesting new script from patient's PCP and will fill. Josi is expected to arrive to patient on 12/22.  12/28: She got her sensors, and has received her Januvia. Upset, she has dropped off STD paperwork again at her doctor's office (2nd time). [facility] says they have not received it. Contacted PCP office and [facility], she checked on the paperwork and stated that because her provider is out, back on 1/3, the paperwork cannot be sent yet. Relayed this information to the patient. She was appreciative for my calling. She requested the phone number to get her password reset for my chart. Provided that to her. 1/6: Patient diagnosed with COVID, blood glucose this afternoon was 402. States she has been drinking apple juice, as she does not like water. Encouraged her to try 1/2 water, 1/2 juice. She is taking 30 units of Lantus at night. 1/7: Discussed avoiding juices to maintain hydration. States she is drinking water now. Provided praise. 1/12: Patient feeling better today, will go back to work tomorrow. Discussed issues with her manager, states she hopes she does not give her a hard time. Provided encouragement. Asked her about her glucose.   She states it's been up and down, depending on what she eats.  Provided education about this, states she has been drinking fruit juice and apple juice, reminded her of the importance of decreasing this, explained that one cup of juice is about the total amount of sugar intake she should have for one day. Reminded her she could cut juice with water. She stated she would do better. Labs to be drawn on 1/18 through PCP.  1/19: Discussed patient's A1C - 12.8. She stated she expected it to be high due to stress and illness. Her provider has increased her insulin - 10 units in AM and 30 units at night. Has f/up on 2/2.  2/17: Missed her 2/2 appointment, didn't get a reminder from her PCP office about the appointment, also mentioned the [facility] call that was missed. Patient states she has had ingrown toe nails in both big toes, had one cut out last week with Dr. Vivi Canales and the other will be next week. Encouraged her to re schedule her PCP visit. States glucose has been up and down, encouraged patient to drink water. 3/3: Patient states she did not check her glucose today, encouraged her to check regularly and make sure she is eating healthy to keep levels below 130. She will call to make appt with her PCP and pharmacy to stay enrolled with [facility]. Says she has been working nights and will be finished with this in the next week or so. New co-worker is supposed to start on 3/7. Reminded her that she needs to take care of herself and make her health a priority. 3/18: Patient states she had call with Mariza Aguirre, Diabetes Pharmacist and has gotten her medications corrected. She is excited that her glucose is now well managed since she has been taking her medication as Mariza Aguirre instructed. She plans to see an endocrinologist for her diabetes. We discussed how uncontrolled diabetes affects all of the body. Patient acknowledged that she did not want this to happen to her.   We discussed soda and alternatives - water with flavor enhancers, sugar free options - [place], crystal light-lemonaide. Provided patient with smoking cessation phone number 2-244-Quit Now and Be Well program Breathe Easy. Patient plans to quit smoking, as well. States her fiance smokes and may quit with her. Encouragement provided. 4/1: Patient states her blood sugars have been stable, this morning 112. Stays below 150, eating better. Called NP Darrin's office to schedule 3 month f/up, not virtual visit, must be on Tuesday - 6/14/22 1 pm (15 min early); called patient to let her know. Patient states she has not had time to enroll in the Breathe Easy program.  Will continue to encourage. 4/22: Had MD appt on 4/18. Has to get ultrasound on Monday due to nerve pain in her legs, Tuesday colonoscopy, sugar has been elevated due to pain. Will have eye exam.  5/10: General Surgeon appt. Today, 5/10, states this is for colonoscopy pre check up. Still having pain in legs, encouraged her to get endocrinology appt or appt with her PCP. Discussed glucose, states it's been pretty good. This morning stated it was 210 because she ate something last night. States it goes back down when she takes her medication. Discussed good glucose control will help prevent effects of diabetes - like neuropathy. Patient's primary care provider relationship reviewed with patient and modified, as applicable.     Readiness to Change: []  Pre-contemplative    []  Contemplative  []  Preparation               []  Action                  []  Maintenance    Barriers/Challenges to Care: []  Decline in memory    []  Language barrier     []  Emotional                  []  Limited mobility  []  Lack of motivation     [] Vision, hearing or cognitive impairment []  Knowledge [] Financial Barriers []  Lack of support  []  Pain []  Other [x]  None    Key pt activities to achieve better health:   []  Weight loss  [x]  Improved diabetic control  []  Decreased cholesterol levels  []  Decreased blood pressure  [] []    Upcoming appointments:   Future Appointments   Date Time Provider Ankush Nicholson   5/10/2022  3:00 PM Primo Fischer MD [facility] BS AMB   6/14/2022  1:00 PM Flip Walters NP [facility] MAIN BS AMB     Plan for next call: Call in two weeks, 5/24

## 2022-05-10 NOTE — PROGRESS NOTES
Subjective:      Kai Porter is an 46 y.o. female who is referred for a screening colonoscopy. Patient has a history of diverticulitis in 2018 treated with oral antibiotics. She does have reflux controlled with medication. She also has a history of constipation for the last few years and is currently taking fiber supplement. She denies any blood per rectum or pain with bowel movements. She denies any weight loss or family history of colon ca. Her only surgery has been a tubal ligation. HPI     Patient Active Problem List   Diagnosis Code    Type 2 diabetes mellitus without complication, with long-term current use of insulin ([facility] [address] E11.9, Z79.4    Essential hypertension I10    Mixed dyslipidemia E78.2    Type 2 diabetes with nephropathy ([facility] [address] E11.21     Patient Active Problem List    Diagnosis Date Noted    Type 2 diabetes with nephropathy ([facility] [address] 11/03/2020    Mixed dyslipidemia 07/11/2017    Essential hypertension 03/07/2017    Type 2 diabetes mellitus without complication, with long-term current use of insulin ([facility] [address] 12/07/2016     Current Outpatient Medications   Medication Sig Dispense Refill    losartan (COZAAR) 25 mg tablet Take 1 Tablet by mouth daily. (Patient not taking: Reported on 4/22/2022) 90 Tablet 1    pantoprazole (PROTONIX) 40 mg tablet Take 1 Tablet by mouth daily. 90 Tablet 3    empagliflozin (Jardiance) 25 mg tablet Take one tablet by mouth once daily. To replace januvia 90 Tablet 3    amitriptyline (ELAVIL) 25 mg tablet Take 25 mg by mouth nightly as needed for Pain. (Patient not taking: Reported on 4/18/2022)      DULoxetine (Cymbalta) 30 mg capsule Take 30 mg by mouth two (2) times daily as needed for Pain. (Patient not taking: Reported on 4/18/2022)      semaglutide (Ozempic) 0.25 mg or 0.5 mg/dose (2 mg/1.5 ml) subq pen 0.5 mg by SubCUTAneous route every seven (7) days.  (Patient not taking: Reported on 4/18/2022) 3 Pen 1    rosuvastatin (CRESTOR) 20 mg tablet Take 1 Tablet by mouth nightly. 90 Tablet 1    Insulin Needles, Disposable, 31 gauge x 5/16\" ndle Use to administer insulin SQ twice daily as directed. 200 Pen Needle 3    flash glucose sensor (FreeStyle Antony 14 Day Sensor) kit 1 Device by SubCUTAneous route Once every 2 weeks. Apply to upper arm 6 Kit 3    lidocaine (Lidoderm) 5 % Apply patch to the affected area for 12 hours a day and remove for 12 hours a day. 1 Each 0    insulin glargine (LANTUS,BASAGLAR) 100 unit/mL (3 mL) inpn Take  10 units in the morning and 30 units  QHS. DX E11.65  Indications: type 2 diabetes mellitus 21 mL 3    baclofen (LIORESAL) 10 mg tablet Take 1 Tablet by mouth three (3) times daily. PRN  Indications: muscle spasms caused by a spinal disease (Patient not taking: Reported on 4/18/2022) 30 Tablet 2    diclofenac (VOLTAREN) 1 % gel Apply 4 g to affected area four (4) times daily. (Patient not taking: Reported on 4/22/2022) 350 g 2    fluticasone propion-salmeteroL (ADVAIR/WIXELA) 250-50 mcg/dose diskus inhaler Take 1 Puff by inhalation every twelve (12) hours. 1 Inhaler 0    albuterol (PROVENTIL HFA, VENTOLIN HFA, PROAIR HFA) 90 mcg/actuation inhaler Take 2 Puffs by inhalation every four (4) hours as needed for Wheezing or Shortness of Breath. Indications: asthma attack 2 Inhaler 5    flash glucose scanning reader (FreeStyle Antony 14 Day [place]) [facility] Use to check blood glucose TID and PRN.  Dx E11.9 1 Each 0     Allergies   Allergen Reactions    Lisinopril Other (comments)     Hands and feet swelling     Past Medical History:   Diagnosis Date    Abnormal EKG 2/27/2018    Abscess 7/24/2019    Asthma     Chest pain 2/27/2018    Chest wall contusion, left, subsequent encounter     Diabetes ([facility] [address]     Diverticular disease     Diverticulitis large intestine w/o perforation or abscess w/o bleeding 10/25/2016    Dyslipidemia     Hypertension     Lumbar radiculopathy 02/01/2021    Menopause     Pelvic pain 7/26/2016    Rib fractures     Right hand pain 7/10/2018    Sciatica of left side 02/01/2021    Uterine leiomyoma 7/26/2016    Vaginal candidiasis 7/24/2019     Past Surgical History:   Procedure Laterality Date    HX GYN      BTL    IR INJ FORAMIN EPID LUMB ANES/[facility]  5/11/2021     Family History   Problem Relation Age of Onset    Stroke Mother     Diabetes Mother     Heart Disease Father     Diabetes Sister     Diabetes Maternal Grandmother     Breast Cancer Maternal Aunt     Breast Cancer Paternal Aunt      Social History     Tobacco Use    Smoking status: Current Every Day Smoker     Packs/day: 0.50    Smokeless tobacco: Never Used   Substance Use Topics    Alcohol use: No        Review of Systems  Review of Systems   Constitutional: Negative for chills, fever, malaise/fatigue and weight loss. HENT: Positive for ear pain. Negative for congestion and sore throat. Respiratory: Negative for cough and shortness of breath. Cardiovascular: Positive for chest pain. Negative for palpitations, orthopnea, claudication and leg swelling. Gastrointestinal: Positive for constipation, heartburn and nausea. Negative for abdominal pain, blood in stool, diarrhea and vomiting. Musculoskeletal: Positive for back pain. Negative for joint pain and neck pain. Skin: Negative for itching and rash. Neurological: Positive for tingling. Negative for weakness and headaches. Objective: There were no vitals taken for this visit. Physical Exam  Constitutional:       General: She is not in acute distress. Appearance: Normal appearance. She is normal weight. She is not ill-appearing or toxic-appearing. HENT:      Head: Normocephalic and atraumatic. Mouth/Throat:      Mouth: Mucous membranes are moist.      Pharynx: Oropharynx is clear. Eyes:      General: No scleral icterus. Cardiovascular:      Rate and Rhythm: Normal rate.    Pulmonary:      Effort: Pulmonary effort is normal. No respiratory distress. Breath sounds: No wheezing. Abdominal:      General: There is no distension. Palpations: Abdomen is soft. Tenderness: There is no abdominal tenderness. There is no guarding. Musculoskeletal:         General: No swelling. Normal range of motion. Cervical back: Normal range of motion and neck supple. Lymphadenopathy:      Cervical: No cervical adenopathy. Skin:     General: Skin is warm. Coloration: Skin is not jaundiced. Neurological:      General: No focal deficit present. Mental Status: She is alert and oriented to person, place, and time. Assessment/Plan:   46year old requiring a screening colonoscopy     Colonoscopy is indicated as noted above and we will proceed to colonoscopy. The risks(bleeding, abdominal pain, perforation, etc.)  and benefits of my recommendations, as well as other treatment options were discussed with the patient today. Questions were answered. Prep is prescribed per orders. The patient was counseled at length about the risks of iban Covid-19 during their perioperative period and any recovery window from their procedure. The patient was made aware that iban Covid-19  may worsen their prognosis for recovering from their procedure and lend to a higher morbidity and/or mortality risk. All material risks, benefits, and reasonable alternatives including postponing the procedure were discussed. The patient does wish to proceed with the procedure at this time.

## 2022-05-17 ENCOUNTER — NURSE TRIAGE (OUTPATIENT)
Dept: OTHER | Facility: CLINIC | Age: 52
End: 2022-05-17

## 2022-05-17 NOTE — TELEPHONE ENCOUNTER
Received call from Lucia  at [facility] with Red Flag Complaint. 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 They gave me a splint to help but I am in extreme pain and cant work with it. When cleaning I heard a pop and it pops in and out of place now. The doc here said he thinks I messed up the tendon. \"       Phone disconnected during call and writer attempted to call pt back several times and phone was going straight to identified VM. Unable to leave a message due to mailbox being full. Triage not complete. Current Symptoms:     Onset: 5 days ago; worsening    Associated Symptoms:     Pain Severity:     Temperature:    What has been tried:      Recommended disposition:     Care advice provided, patient verbalizes understanding; denies any other questions or concerns; instructed to call back for any new or worsening symptoms. Attention Provider: Thank you for allowing me to participate in the care of your patient. The patient was connected to triage in response to information provided to the ECC. Please do not respond through this encounter as the response is not directed to a shared pool.

## 2022-05-20 ENCOUNTER — OFFICE VISIT (OUTPATIENT)
Dept: FAMILY MEDICINE CLINIC | Age: 52
End: 2022-05-20
Payer: COMMERCIAL

## 2022-05-20 VITALS
SYSTOLIC BLOOD PRESSURE: 126 MMHG | HEART RATE: 80 BPM | OXYGEN SATURATION: 98 % | HEIGHT: 60 IN | DIASTOLIC BLOOD PRESSURE: 74 MMHG | BODY MASS INDEX: 20.77 KG/M2 | RESPIRATION RATE: 18 BRPM | WEIGHT: 105.8 LBS | TEMPERATURE: 97.5 F

## 2022-05-20 DIAGNOSIS — E11.618: ICD-10-CM

## 2022-05-20 DIAGNOSIS — M65.312 TRIGGER FINGER OF LEFT THUMB: Primary | ICD-10-CM

## 2022-05-20 PROCEDURE — 99214 OFFICE O/P EST MOD 30 MIN: CPT

## 2022-05-20 RX ORDER — NAPROXEN 500 MG/1
500 TABLET ORAL 2 TIMES DAILY WITH MEALS
Qty: 60 TABLET | Refills: 0 | Status: SHIPPED | OUTPATIENT
Start: 2022-05-20 | End: 2022-06-03 | Stop reason: SDUPTHER

## 2022-05-20 RX ORDER — DICLOFENAC SODIUM 10 MG/G
4 GEL TOPICAL 4 TIMES DAILY
Qty: 350 G | Refills: 2 | Status: SHIPPED | OUTPATIENT
Start: 2022-05-20 | End: 2022-07-15

## 2022-05-20 NOTE — PROGRESS NOTES
Chief Complaint   Patient presents with    Thumb Pain     last thursday a week ago, wearing splint but doesn't seem to help started hurting while at work       HPI:     is a 46 y.o. female who presents for an acute visit. She notes left thumb pain, swelling, and clicking in the joint onset 7 days ago; she denies injury, but notes that she uses her hands a lot in her job as a  at [employer]. She has been taking APAP and Aleve with some relief; she was given a finger splint in the ER, but has not been wearing this. Allergies   Allergen Reactions    Lisinopril Other (comments)     Hands and feet swelling       Current Outpatient Medications   Medication Sig    naproxen (NAPROSYN) 500 mg tablet Take 1 Tablet by mouth two (2) times daily (with meals).  diclofenac (VOLTAREN) 1 % gel Apply 4 g to affected area four (4) times daily.  pantoprazole (PROTONIX) 40 mg tablet Take 1 Tablet by mouth daily.  empagliflozin (Jardiance) 25 mg tablet Take one tablet by mouth once daily. To replace januvia    rosuvastatin (CRESTOR) 20 mg tablet Take 1 Tablet by mouth nightly.  Insulin Needles, Disposable, 31 gauge x 5/16\" ndle Use to administer insulin SQ twice daily as directed.  flash glucose sensor (FreeStyle Antony 14 Day Sensor) kit 1 Device by SubCUTAneous route Once every 2 weeks. Apply to upper arm    lidocaine (Lidoderm) 5 % Apply patch to the affected area for 12 hours a day and remove for 12 hours a day.  insulin glargine (LANTUS,BASAGLAR) 100 unit/mL (3 mL) inpn Take  10 units in the morning and 30 units  QHS. DX E11.65  Indications: type 2 diabetes mellitus    albuterol (PROVENTIL HFA, VENTOLIN HFA, PROAIR HFA) 90 mcg/actuation inhaler Take 2 Puffs by inhalation every four (4) hours as needed for Wheezing or Shortness of Breath. Indications: asthma attack    flash glucose scanning reader (FreeStyle Antony 14 Day [place]) [facility] Use to check blood glucose TID and PRN.  Dx E11.9   [facility] losartan (COZAAR) 25 mg tablet Take 1 Tablet by mouth daily. (Patient not taking: Reported on 4/22/2022)    amitriptyline (ELAVIL) 25 mg tablet Take 25 mg by mouth nightly as needed for Pain. (Patient not taking: Reported on 4/18/2022)    DULoxetine (Cymbalta) 30 mg capsule Take 30 mg by mouth two (2) times daily as needed for Pain. (Patient not taking: Reported on 4/18/2022)    semaglutide (Ozempic) 0.25 mg or 0.5 mg/dose (2 mg/1.5 ml) subq pen 0.5 mg by SubCUTAneous route every seven (7) days. (Patient not taking: Reported on 4/18/2022)    baclofen (LIORESAL) 10 mg tablet Take 1 Tablet by mouth three (3) times daily. PRN  Indications: muscle spasms caused by a spinal disease (Patient not taking: Reported on 4/18/2022)    fluticasone propion-salmeteroL (ADVAIR/WIXELA) 250-50 mcg/dose diskus inhaler Take 1 Puff by inhalation every twelve (12) hours. (Patient not taking: Reported on 5/10/2022)     No current facility-administered medications for this visit. Past Medical History:   Diagnosis Date    Abnormal EKG 2/27/2018    Abscess 7/24/2019    Asthma     Chest pain 2/27/2018    Chest wall contusion, left, subsequent encounter     Diabetes ([facility] [address]     Diverticular disease     Diverticulitis large intestine w/o perforation or abscess w/o bleeding 10/25/2016    Dyslipidemia     Hypertension     Lumbar radiculopathy 02/01/2021    Menopause     Pelvic pain 7/26/2016    Rib fractures     Right hand pain 7/10/2018    Sciatica of left side 02/01/2021    Uterine leiomyoma 7/26/2016    Vaginal candidiasis 7/24/2019       Family History   Problem Relation Age of Onset    Stroke Mother     Diabetes Mother     Heart Disease Father     Diabetes Sister     Diabetes Maternal Grandmother     Breast Cancer Maternal Aunt     Breast Cancer Paternal Aunt        Review of Systems   Constitutional: Negative for chills, fever and malaise/fatigue. Musculoskeletal: Positive for joint pain.    Neurological: Negative. Negative for dizziness and headaches. /74 (BP 1 Location: Left upper arm, BP Patient Position: Sitting, BP Cuff Size: Adult)   Pulse 80   Temp 97.5 °F (36.4 °C) (Temporal)   Resp 18   Ht 5' (1.524 m)   Wt 105 lb 12.8 oz (48 kg)   SpO2 98%   BMI 20.66 kg/m²     Wt Readings from Last 3 Encounters:   05/20/22 105 lb 12.8 oz (48 kg)   05/10/22 108 lb (49 kg)   04/18/22 106 lb (48.1 kg)       3 most recent PHQ Screens 5/10/2022   PHQ Not Done -   Little interest or pleasure in doing things Not at all   Feeling down, depressed, irritable, or hopeless Not at all   Total Score PHQ 2 0   Trouble falling or staying asleep, or sleeping too much -   Feeling tired or having little energy -   Poor appetite, weight loss, or overeating -   Feeling bad about yourself - or that you are a failure or have let yourself or your family down -   Trouble concentrating on things such as school, work, reading, or watching TV -   Moving or speaking so slowly that other people could have noticed; or the opposite being so fidgety that others notice -   Thoughts of being better off dead, or hurting yourself in some way -   PHQ 9 Score -   How difficult have these problems made it for you to do your work, take care of your home and get along with others -       Physical Exam  Constitutional:       General: She is not in acute distress. Appearance: Normal appearance. She is normal weight. HENT:      Head: Normocephalic and atraumatic. Mouth/Throat:      Mouth: Mucous membranes are dry. Pharynx: Oropharynx is clear. Cardiovascular:      Rate and Rhythm: Normal rate. Pulses: Normal pulses. Pulmonary:      Effort: Pulmonary effort is normal.   Musculoskeletal:      Comments: Mild swelling, TTP over IP of left thumb with clicking and locking in flexed position   Skin:     General: Skin is warm and dry. Neurological:      General: No focal deficit present.       Mental Status: She is alert and oriented to person, place, and time. Psychiatric:         Mood and Affect: Mood normal.         Behavior: Behavior normal.         Thought Content: Thought content normal.         Judgment: Judgment normal.         ASSESSMENT AND PLAN:       ICD-10-CM ICD-9-CM    1. Trigger finger of left thumb  M65.312 727.03 naproxen (NAPROSYN) 500 mg tablet      diclofenac (VOLTAREN) 1 % gel   2. 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 Scheduled NSAID for next 2 weeks, continue to use splint, out of work to avoid overuse. Recommend ice 3-6x/day, may use topical diclofenac as well. Medication Side Effects and Warnings were discussed with patient:  yes  Patient Labs were reviewed:  yes  Patient Past Records were reviewed:  yes      Patient aware of plan of care and verbalized understanding. Questions answered. RTC 2 weeks or sooner if needed. On this date 05/20/2022 I have spent 30 minutes reviewing previous notes, test results and face to face with the patient discussing the diagnosis and importance of compliance with the treatment plan as well as documenting on the day of the visit.     Denzel Fall NP

## 2022-05-20 NOTE — LETTER
NOTIFICATION RETURN TO WORK / SCHOOL    5/20/2022 9:17 AM    [address] [phone]      To Whom It May Concern:    Marguerite Squires is currently under the care of Lily Enrique. She will return to work/school on: Monday, June 6, 2022    If there are questions or concerns please have the patient contact our office.         Sincerely,      Lord Barrington NP

## 2022-05-20 NOTE — PATIENT INSTRUCTIONS
Trigger Finger: Care Instructions  Overview  A trigger finger is a finger stuck in a bent position. It happens when the tendon that bends and straightens the thumb or finger can't slide smoothly under the ligaments that hold the tendon against the bones. In most cases, it's caused by a bump (nodule) that forms on the tendon. The bent finger usually straightens out on its own. A trigger finger can be painful. But it normally isn't a serious problem. Trigger fingers seem to occur more in some groups of people. These groups include:  · People who have diabetes or arthritis. · People who have injured their hands in the past.  · Musicians. · People who  tools often. Rest and exercises may help your finger relax so that it can bend. You may get a corticosteroid shot. This can reduce swelling and pain. Your doctor may put a splint on your finger. It will give your finger some rest. You may need surgery if the finger keeps locking in a bent position. Follow-up care is a key part of your treatment and safety. Be sure to make and go to all appointments, and call your doctor if you are having problems. It's also a good idea to know your test results and keep a list of the medicines you take. How can you care for yourself at home? · If your doctor put a splint on your finger, wear it as directed. Don't take it off until your doctor says you can. · You may need to change your activities to avoid movements that irritate the finger. · Take your medicines exactly as prescribed. Call your doctor if you think you are having a problem with your medicine. · Ask your doctor if you can take an over-the-counter pain medicine, such as acetaminophen (Tylenol), ibuprofen (Advil, Motrin), or naproxen (Aleve). Be safe with medicines. Read and follow all instructions on the label. · If your doctor recommends exercises, do them as directed. When should you call for help?    Call your doctor now or seek immediate medical care if:    · Your finger locks in a bent position and will not straighten. Watch closely for changes in your health, and be sure to contact your doctor if:    · You do not get better as expected. Where can you learn more? Go to http://www.yoder.com/  Enter M826 in the search box to learn more about \"Trigger Finger: Care Instructions. \"  Current as of: July 1, 2021               Content Version: 13.2  © 2006-2022 [employer]. Care instructions adapted under license by [employer] (which disclaims liability or warranty for this information). If you have questions about a medical condition or this instruction, always ask your healthcare professional. [address] any warranty or liability for your use of this information.

## 2022-05-20 NOTE — PROGRESS NOTES
Identified pt with two pt identifiers(name and ). Reviewed record in preparation for visit and have obtained necessary documentation. Chief Complaint   Patient presents with    Thumb Pain     last thursday a week ago, wearing splint but doesn't seem to help started hurting while at work      Vitals:    22 0903   BP: 126/74   Pulse: 80   Resp: 18   Temp: 97.5 °F (36.4 °C)   TempSrc: Temporal   SpO2: 98%   Weight: 105 lb 12.8 oz (48 kg)   Height: 5' (1.524 m)   PainSc:   8   PainLoc: Finger       Coordination of Care Questionnaire:  :       1. \"Have you been to the ER, urgent care clinic since your last visit? Hospitalized since your last visit? \" No    2. \"Have you seen or consulted any other health care providers outside of the [address] [place] [address] since your last visit? \" No     3. For patients aged 39-70: Has the patient had a colonoscopy / FIT/ Cologuard? no      If the patient is female:    4. For patients aged 41-77: Has the patient had a mammogram within the past 2 years? Yes - no Care Gap present      5. For patients aged 21-65: Has the patient had a pap smear?  Yes - no Care Gap present

## 2022-05-24 ENCOUNTER — PATIENT OUTREACH (OUTPATIENT)
Dept: OTHER | Age: 52
End: 2022-05-24

## 2022-06-01 ENCOUNTER — PATIENT OUTREACH (OUTPATIENT)
Dept: OTHER | Age: 52
End: 2022-06-01

## 2022-06-01 NOTE — PROGRESS NOTES
Follow up phone call to patient, two pt identifiers verified. Discussed patient's goals:   Goals      DM  Diabetes Support and Education / Care Coordination      Referred from [facility]. A1C>9    1)  General:   Patient is able to state a basic definition of diabetes including: risk factors, basic pathophysiology, complications and treatment. 2)  Diet:   Patient will be able to read a basic food label and identify role of calories, carbohydrates, protein and fats in healthy eating. 3)  Activity:   Patient will identify the potential health benefits of regular exercise:  decreased cholesterol, improved mood, decreased blood pressure, lower stress/anxiety, weight loss, decreased blood sugar. 4)  Monitoring:  Patient will be able to identify what an A1C test measures. 5)  Medications:  Patient will demonstrate knowledge of diabetes medications. 6)  Risk Reduction:  Patient is able to state goal target for A1C (<7), blood pressure (<130/70), and LDL cholesterol (<100) to reduce diabetes complications. 7)  Problem Solving:  Patient will identify s/sx and treatment for hyper- and hypoglycemia. 8)  Healthy Coping:  Patient will be able to identify two community support resources. Patient will identify two stress relieving techniques. Patient will identify health risk factors and complications of diabetes  1) Kidney disease  2) Heart disease  3) Neuropathy  4) Skin breakdown  5) Diabetic Retinopathy  6) Prevention:   Stable controlled A1C,  healthy lifestyle (diet, exercise, hydration, stress reduction, monitoring, skin care)   3/15- Pt to have BW this week/  A1C.    8/3: Patient's A1C up to 12.5 7/21 - discussed healthy food options. States she eats cheerios or corn flakes for breakfast.  Encouraged lean protein, egg whites, whole grains, yogurt.   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 Will mail nutritional information from [employer]. Encouraged drinking more water, plans to take water bottles with her to work, as they no longer offer water bottles. Has been drinking 3 sodas/day. Will cut these out. Scheduled to meet with a nutritionist tomorrow, 8/4.  8/10: Has been able to cut soda, drinks one a day. Drinking water. Trying to meal prep. Bought fruits and veggies. 8/27: Patient had visit with nurse practitioner. On some medications to help control diabetes. Still recovering from [place].  9/8: Patient states glucose has been well controlled. Complaints of blurred vision with new weekly medication. Encouraged her to discuss this with her PCP, states she will tomorrow, as she needs to go to the office to  her \"leave paperwork\". 9/23: Discussed medication issues. Back on jardiance. 10/8: Diabetes, glucose has been in lower 200s. Taking 27 units of Lantus/daily, Januvia. Working on getting glucose under better control. Has been stressed out with everything, loss of father, covid, pink eye. Has gotten STD approved and has received check. 10/25: Blood sugars are still up and down. Taking januvia and lantus 27 units. Has appointment to go to doctor tomorrow to get STD paperwork filled out for 10 days she missed due to \"pink eye\". Discussed trying to decrease stress levels to help with glucose. 11/22: Blood sugars - patient states they have been \"alright\"  Provided encouragement to make healthy choices, acknowledging that it can be difficult during the holidays. 12/20: Patient is at her provider's office right now, glucose was 400 and she was having chest pain on and off for about a week or two. The doctor is out until January, they told her she should go to the emergency room. Encouraged her to call the nurse access or call 911 due to her symptoms. She says she has had a lot of stress and been dealing with that.   Says she is waiting until tomorrow and if she still doesn't feel well she will go to the ER. Says her symptoms have been going on for weeks, it's not constant pain. Says she was told it could be reflux, says she takes medication for that. Continued to encourage her to call the [facility] or go to the ED, as this was the recommendation from her provider's office. She wants to wait until tomorrow. 12/21[place] Joni to ED, they did confirm that her chest pain was related to reflux, ordered prilosec, carafate to help relieve this pain. She had run out of her Merlinda Bogaert, has ordered it. Also needs osvaldo sensors, offered to call [facility] for her, she said she would appreciate that. Called to order, spoke with German Dahl at [facility], she is requesting new script from patient's PCP and will fill. Josi is expected to arrive to patient on 12/22.  12/28: She got her sensors, and has received her Januvia. Upset, she has dropped off STD paperwork again at her doctor's office (2nd time). [employer] says they have not received it. Contacted PCP office and spoke with [facility], she checked on the paperwork and stated that because her provider is out, back on 1/3, the paperwork cannot be sent yet. Relayed this information to the patient. She was appreciative for my calling. She requested the phone number to get her password reset for my chart. Provided that to her. 1/6: Patient diagnosed with COVID, blood glucose this afternoon was 402. States she has been drinking apple juice, as she does not like water. Encouraged her to try 1/2 water, 1/2 juice. She is taking 30 units of Lantus at night. 1/7: Discussed avoiding juices to maintain hydration. States she is drinking water now. Provided praise. 1/12: Patient feeling better today, will go back to work tomorrow. Discussed issues with her manager, states she hopes she does not give her a hard time. Provided encouragement. Asked her about her glucose.   She states it's been up and down, depending on what she eats.  Provided education about this, states she has been drinking fruit juice and apple juice, reminded her of the importance of decreasing this, explained that one cup of juice is about the total amount of sugar intake she should have for one day. Reminded her she could cut juice with water. She stated she would do better. Labs to be drawn on 1/18 through PCP.  1/19: Discussed patient's A1C - 12.8. She stated she expected it to be high due to stress and illness. Her provider has increased her insulin - 10 units in AM and 30 units at night. Has f/up on 2/2.  2/17: Missed her 2/2 appointment, didn't get a reminder from her PCP office about the appointment, also mentioned the [facility] call that was missed. Patient states she has had ingrown toe nails in both big toes, had one cut out last week with Dr. Shaun Levi and the other will be next week. Encouraged her to re schedule her PCP visit. States glucose has been up and down, encouraged patient to drink water. 3/3: Patient states she did not check her glucose today, encouraged her to check regularly and make sure she is eating healthy to keep levels below 130. She will call to make appt with her PCP and pharmacy to stay enrolled with [facility] Well. Says she has been working nights and will be finished with this in the next week or so. New co-worker is supposed to start on 3/7. Reminded her that she needs to take care of herself and make her health a priority. 3/18: Patient states she had call with Sofia Anderson, Diabetes Pharmacist and has gotten her medications corrected. She is excited that her glucose is now well managed since she has been taking her medication as Sofia Anderson instructed. She plans to see an endocrinologist for her diabetes. We discussed how uncontrolled diabetes affects all of the body. Patient acknowledged that she did not want this to happen to her.   We discussed soda and alternatives - water with flavor enhancers, sugar free options - Stur, crystal light-lemonaide. Provided patient with smoking cessation phone number [phone]-Quit Now and Be Well program Breathe Easy. Patient plans to quit smoking, as well. States her fiance smokes and may quit with her. Encouragement provided. 4/1: Patient states her blood sugars have been stable, this morning 112. Stays below 150, eating better. Called NP Darrin's office to schedule 3 month f/up, not virtual visit, must be on Tuesday - 6/14/22 1 pm (15 min early); called patient to let her know. Patient states she has not had time to enroll in the Breathe Easy program.  Will continue to encourage. 4/22: Had MD appt on 4/18. Has to get ultrasound on Monday due to nerve pain in her legs, Tuesday colonoscopy, sugar has been elevated due to pain. Will have eye exam.  5/10: General Surgeon appt. Today, 5/10, states this is for colonoscopy pre check up. Still having pain in legs, encouraged her to get endocrinology appt or appt with her PCP. Discussed glucose, states it's been pretty good. This morning stated it was 210 because she ate something last night. States it goes back down when she takes her medication. Discussed good glucose control will help prevent effects of diabetes - like neuropathy. States her work schedule has been bad, working day shift M-Th, then evening/nights Friday-Sun. Hope to get more help soon. 6/1: Out of work since 5/20/22 trigger finger. Swollen and has a lot of pain, wearing splint. Back to doctor on 6/3 and may go back to work on 6/6. Blood sugar was 216 at breakfast (just drank coke). Patient's primary care provider relationship reviewed with patient and modified, as applicable.       Readiness to Change: []  Pre-contemplative    []  Contemplative  []  Preparation               [x]  Action                  []  Maintenance    Barriers/Challenges to Care: []  Decline in memory    []  Language barrier     []  Emotional                  [] Limited mobility  []  Lack of motivation     [] Vision, hearing or cognitive impairment []  Knowledge [] Financial Barriers []  Lack of support  []  Pain []  Other [x]  None    Key pt activities to achieve better health:   []  Weight loss  [x]  Improved diabetic control  []  Decreased cholesterol levels  []  Decreased blood pressure  []    []    Upcoming appointments:   Future Appointments   Date Time Provider [place]   6/3/2022 10:10 AM Darcie Mahmood NP [facility] MAIN BS AMB   6/14/2022  1:00 PM Nestor Hill NP [facility] MAIN BS AMB   7/15/2022 10:30 AM [facility] ROOM 2 [facility]   7/28/2022  3:30 PM Kaitlynn Blum MD [address] [place] [address] Drive for next call: Call on 6/9.

## 2022-06-03 ENCOUNTER — OFFICE VISIT (OUTPATIENT)
Dept: FAMILY MEDICINE CLINIC | Age: 52
End: 2022-06-03
Payer: COMMERCIAL

## 2022-06-03 VITALS
RESPIRATION RATE: 18 BRPM | WEIGHT: 104.2 LBS | OXYGEN SATURATION: 98 % | HEIGHT: 60 IN | HEART RATE: 93 BPM | BODY MASS INDEX: 20.46 KG/M2 | SYSTOLIC BLOOD PRESSURE: 128 MMHG | TEMPERATURE: 98.1 F | DIASTOLIC BLOOD PRESSURE: 66 MMHG

## 2022-06-03 DIAGNOSIS — M65.312 TRIGGER FINGER OF LEFT THUMB: Primary | ICD-10-CM

## 2022-06-03 DIAGNOSIS — E11.618 TYPE 2 DIABETES MELLITUS WITH OTHER DIABETIC ARTHROPATHY, WITH LONG-TERM CURRENT USE OF INSULIN (HCC): ICD-10-CM

## 2022-06-03 DIAGNOSIS — L20.9 XEROSIS DUE TO ATOPIC DERMATITIS: ICD-10-CM

## 2022-06-03 DIAGNOSIS — Z79.4 TYPE 2 DIABETES MELLITUS WITH OTHER DIABETIC ARTHROPATHY, WITH LONG-TERM CURRENT USE OF INSULIN (HCC): ICD-10-CM

## 2022-06-03 PROCEDURE — 3046F HEMOGLOBIN A1C LEVEL >9.0%: CPT

## 2022-06-03 PROCEDURE — 99214 OFFICE O/P EST MOD 30 MIN: CPT

## 2022-06-03 RX ORDER — TRIAMCINOLONE ACETONIDE 1 MG/G
CREAM TOPICAL 2 TIMES DAILY
Qty: 15 G | Refills: 0 | Status: SHIPPED | OUTPATIENT
Start: 2022-06-03 | End: 2022-07-15

## 2022-06-03 RX ORDER — NAPROXEN 500 MG/1
500 TABLET ORAL 2 TIMES DAILY WITH MEALS
Qty: 60 TABLET | Refills: 0 | Status: SHIPPED | OUTPATIENT
Start: 2022-06-03 | End: 2022-07-15

## 2022-06-03 NOTE — PROGRESS NOTES
Identified pt with two pt identifiers(name and ). Reviewed record in preparation for visit and have obtained necessary documentation. Chief Complaint   Patient presents with    Finger Swelling     2 wk follow up, about the same, supposed to return to work on Monday, still popping in an out of place, and swollen       Vitals:    22 1016   BP: 128/66   Pulse: 93   Resp: 18   Temp: 98.1 °F (36.7 °C)   TempSrc: Oral   SpO2: 98%   Weight: 104 lb 3.2 oz (47.3 kg)   Height: 5' (1.524 m)   PainSc:   8       Coordination of Care Questionnaire:  :       1. \"Have you been to the ER, urgent care clinic since your last visit? Hospitalized since your last visit? \" No    2. \"Have you seen or consulted any other health care providers outside of the [address] [place] [address] since your last visit? \" No     3. For patients aged 39-70: Has the patient had a colonoscopy / FIT/ Cologuard? No      If the patient is female:    4. For patients aged 41-77: Has the patient had a mammogram within the past 2 years? Yes - no Care Gap present      5. For patients aged 21-65: Has the patient had a pap smear?  Yes - no Care Gap present

## 2022-06-09 ENCOUNTER — PATIENT OUTREACH (OUTPATIENT)
Dept: OTHER | Age: 52
End: 2022-06-09

## 2022-06-09 NOTE — PROGRESS NOTES
Attempt to reach patient for follow up. Unable to leave discreet VM, as patient's mailbox was full. Will attempt another outreach in one week, 6/16. Analia Drew

## 2022-06-13 ENCOUNTER — TELEPHONE (OUTPATIENT)
Dept: FAMILY MEDICINE CLINIC | Age: 52
End: 2022-06-13

## 2022-06-16 ENCOUNTER — PATIENT OUTREACH (OUTPATIENT)
Dept: OTHER | Age: 52
End: 2022-06-16

## 2022-06-16 NOTE — PROGRESS NOTES
Follow up phone call to patient, two pt identifiers verified. Discussed patient's goals:   Goals      DM  Diabetes Support and Education / Care Coordination      Referred from [facility]. A1C>9    1)  General:   Patient is able to state a basic definition of diabetes including: risk factors, basic pathophysiology, complications and treatment. 2)  Diet:   Patient will be able to read a basic food label and identify role of calories, carbohydrates, protein and fats in healthy eating. 3)  Activity:   Patient will identify the potential health benefits of regular exercise:  decreased cholesterol, improved mood, decreased blood pressure, lower stress/anxiety, weight loss, decreased blood sugar. 4)  Monitoring:  Patient will be able to identify what an A1C test measures. 5)  Medications:  Patient will demonstrate knowledge of diabetes medications. 6)  Risk Reduction:  Patient is able to state goal target for A1C (<7), blood pressure (<130/70), and LDL cholesterol (<100) to reduce diabetes complications. 7)  Problem Solving:  Patient will identify s/sx and treatment for hyper- and hypoglycemia. 8)  Healthy Coping:  Patient will be able to identify two community support resources. Patient will identify two stress relieving techniques. Patient will identify health risk factors and complications of diabetes  1) Kidney disease  2) Heart disease  3) Neuropathy  4) Skin breakdown  5) Diabetic Retinopathy  6) Prevention:   Stable controlled A1C,  healthy lifestyle (diet, exercise, hydration, stress reduction, monitoring, skin care)   3/15- Pt to have BW this week/  A1C.    8/3: Patient's A1C up to 12.5 7/21 - discussed healthy food options. States she eats cheerios or corn flakes for breakfast.  Encouraged lean protein, egg whites, whole grains, yogurt.   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 Will mail nutritional information from [employer]. Encouraged drinking more water, plans to take water bottles with her to work, as they no longer offer water bottles. Has been drinking 3 sodas/day. Will cut these out. Scheduled to meet with a nutritionist tomorrow, 8/4.  8/10: Has been able to cut soda, drinks one a day. Drinking water. Trying to meal prep. Bought fruits and veggies. 8/27: Patient had visit with nurse practitioner. On some medications to help control diabetes. Still recovering from Kye Passe.  9/8: Patient states glucose has been well controlled. Complaints of blurred vision with new weekly medication. Encouraged her to discuss this with her PCP, states she will tomorrow, as she needs to go to the office to  her \"leave paperwork\". 9/23: Discussed medication issues. Back on jardiance. 10/8: Diabetes, glucose has been in lower 200s. Taking 27 units of Lantus/daily, Januvia. Working on getting glucose under better control. Has been stressed out with everything, loss of father, covid, pink eye. Has gotten STD approved and has received check. 10/25: Blood sugars are still up and down. Taking januvia and lantus 27 units. Has appointment to go to doctor tomorrow to get STD paperwork filled out for 10 days she missed due to \"pink eye\". Discussed trying to decrease stress levels to help with glucose. 11/22: Blood sugars - patient states they have been \"alright\"  Provided encouragement to make healthy choices, acknowledging that it can be difficult during the holidays. 12/20: Patient is at her provider's office right now, glucose was 400 and she was having chest pain on and off for about a week or two. The doctor is out until January, they told her she should go to the emergency room. Encouraged her to call the nurse access or call 911 due to her symptoms. She says she has had a lot of stress and been dealing with that.   Says she is waiting until tomorrow and if she still doesn't feel well she will go to the ER. Says her symptoms have been going on for weeks, it's not constant pain. Says she was told it could be reflux, says she takes medication for that. Continued to encourage her to call the [facility] or go to the ED, as this was the recommendation from her provider's office. She wants to wait until tomorrow. 12/21Hedwig Knife to ED, they did confirm that her chest pain was related to reflux, ordered prilosec, carafate to help relieve this pain. She had run out of her Lewanda [place], has ordered it. Also needs osvaldo sensors, offered to call [facility] for her, she said she would appreciate that. Called to order, spoke with Robinson Almeida at [facility], she is requesting new script from patient's PCP and will fill. Elliotia is expected to arrive to patient on 12/22.  12/28: She got her sensors, and has received her Januvia. Upset, she has dropped off STD paperwork again at her doctor's office (2nd time). [facility] says they have not received it. Contacted PCP office and [facility], she checked on the paperwork and stated that because her provider is out, back on 1/3, the paperwork cannot be sent yet. Relayed this information to the patient. She was appreciative for my calling. She requested the phone number to get her password reset for my chart. Provided that to her. 1/6: Patient diagnosed with COVID, blood glucose this afternoon was 402. States she has been drinking apple juice, as she does not like water. Encouraged her to try 1/2 water, 1/2 juice. She is taking 30 units of Lantus at night. 1/7: Discussed avoiding juices to maintain hydration. States she is drinking water now. Provided praise. 1/12: Patient feeling better today, will go back to work tomorrow. Discussed issues with her manager, states she hopes she does not give her a hard time. Provided encouragement. Asked her about her glucose.   She states it's been up and down, depending on what she eats.  Provided education about this, states she has been drinking fruit juice and apple juice, reminded her of the importance of decreasing this, explained that one cup of juice is about the total amount of sugar intake she should have for one day. Reminded her she could cut juice with water. She stated she would do better. Labs to be drawn on 1/18 through PCP.  1/19: Discussed patient's A1C - 12.8. She stated she expected it to be high due to stress and illness. Her provider has increased her insulin - 10 units in AM and 30 units at night. Has f/up on 2/2.  2/17: Missed her 2/2 appointment, didn't get a reminder from her PCP office about the appointment, also mentioned the [facility] call that was missed. Patient states she has had ingrown toe nails in both big toes, had one cut out last week with Dr. Andrew Portillo and the other will be next week. Encouraged her to re schedule her PCP visit. States glucose has been up and down, encouraged patient to drink water. 3/3: Patient states she did not check her glucose today, encouraged her to check regularly and make sure she is eating healthy to keep levels below 130. She will call to make appt with her PCP and pharmacy to stay enrolled with [facility]. Says she has been working nights and will be finished with this in the next week or so. New co-worker is supposed to start on 3/7. Reminded her that she needs to take care of herself and make her health a priority. 3/18: Patient states she had call with Corie Nicole, Diabetes Pharmacist and has gotten her medications corrected. She is excited that her glucose is now well managed since she has been taking her medication as Corie Nicole instructed. She plans to see an endocrinologist for her diabetes. We discussed how uncontrolled diabetes affects all of the body. Patient acknowledged that she did not want this to happen to her.   We discussed soda and alternatives - water with flavor enhancers, sugar free options - Stur, crystal light-lemonaide. Provided patient with smoking cessation phone number 1-995-Quit Now and [facility] program Breathe Easy. Patient plans to quit smoking, as well. States her fiance smokes and may quit with her. Encouragement provided. 4/1: Patient states her blood sugars have been stable, this morning 112. Stays below 150, eating better. Called NP Darrin's office to schedule 3 month f/up, not virtual visit, must be on Tuesday - 6/14/22 1 pm (15 min early); called patient to let her know. Patient states she has not had time to enroll in the Breathe Easy program.  Will continue to encourage. 4/22: Had MD appt on 4/18. Has to get ultrasound on Monday due to nerve pain in her legs, Tuesday colonoscopy, sugar has been elevated due to pain. Will have eye exam.  5/10: General Surgeon appt. Today, 5/10, states this is for colonoscopy pre check up. Still having pain in legs, encouraged her to get endocrinology appt or appt with her PCP. Discussed glucose, states it's been pretty good. This morning stated it was 210 because she ate something last night. States it goes back down when she takes her medication. Discussed good glucose control will help prevent effects of diabetes - like neuropathy. States her work schedule has been bad, working day shift M-Th, then evening/nights Friday-Sun. Hope to get more help soon. 6/1: Out of work since 5/20/22 trigger finger. Swollen and has a lot of pain, wearing splint. Back to doctor on 6/3 and may go back to work on 6/6. Blood sugar was 216 at breakfast (just drank coke). 6/16: Back to work. Glucose is doing okay. States she has good and bad days. Encouraged her to avoid sodas and fruit juice. States she knows. Patient's primary care provider relationship reviewed with patient and modified, as applicable.       Readiness to Change: []  Pre-contemplative    []  Contemplative  [x]  Preparation               []  Action []  Maintenance    Barriers/Challenges to Care: []  Decline in memory    []  Language barrier     []  Emotional                  []  Limited mobility  []  Lack of motivation     [] Vision, hearing or cognitive impairment []  Knowledge [] Financial Barriers []  Lack of support  []  Pain []  Other [x]  None    Key pt activities to achieve better health:   []  Weight loss  [x]  Improved diabetic control  []  Decreased cholesterol levels  []  Decreased blood pressure  []    []    Upcoming appointments:   Future Appointments   Date Time Provider Ankush Nicholson   6/27/2022 11:30 AM Lis Cid NP [facility] MAIN BS AMB   7/15/2022 10:30 AM [facility] ROOM 2 [facility]   7/28/2022  3:30 PM Sussy Toledo MD [address] [place] [address] Drive for next call: Call on 7/8.

## 2022-07-06 ENCOUNTER — ANESTHESIA EVENT (OUTPATIENT)
Dept: SURGERY | Age: 52
End: 2022-07-06
Payer: COMMERCIAL

## 2022-07-06 NOTE — ANESTHESIA PREPROCEDURE EVALUATION
Relevant Problems   CARDIOVASCULAR   (+) Essential hypertension      ENDOCRINE   (+) Type 2 diabetes mellitus with diabetic arthropathy, with long-term current use of insulin (HCC)   (+) Type 2 diabetes with nephropathy (HCC)       Anesthetic History   No history of anesthetic complications     Pertinent negatives: No PONV       Review of Systems / Medical History  Patient summary reviewed, nursing notes reviewed and pertinent labs reviewed    Pulmonary          Smoker (current)  Asthma        Neuro/Psych       CVA    Pertinent negatives: No seizures   Cardiovascular    Hypertension          Hyperlipidemia  Pertinent negatives: No past MI  Exercise tolerance: >4 METS     GI/Hepatic/Renal  Within defined limits           Pertinent negatives: No GERD, liver disease and renal disease   Endo/Other    Diabetes (fbs 195): type 2, using insulin      Pertinent negatives: No obesity   Other Findings            Physical Exam    Airway  Mallampati: II  TM Distance: < 4 cm  Neck ROM: decreased range of motion   Mouth opening: Normal     Cardiovascular      Rate: normal         Dental  No notable dental hx       Pulmonary  Breath sounds clear to auscultation               Abdominal  GI exam deferred       Other Findings            Anesthetic Plan    ASA: 2  Anesthesia type: MAC            Anesthetic plan and risks discussed with: Patient      Plan monitored anesthetic care. Patient understands risks including risk of awareness and agrees with plan. All questions answered. Consent signed.

## 2022-07-08 ENCOUNTER — PATIENT OUTREACH (OUTPATIENT)
Dept: OTHER | Age: 52
End: 2022-07-08

## 2022-07-08 NOTE — PROGRESS NOTES
Attempt to reach patient for follow up. MB full, unable to leave discreet VM. To send lost to follow up letter.

## 2022-07-15 ENCOUNTER — PATIENT OUTREACH (OUTPATIENT)
Dept: OTHER | Age: 52
End: 2022-07-15

## 2022-07-15 NOTE — PERIOP NOTES
[address] [place] [address]  SURGICAL PRE-ADMISSION INSTRUCTIONS    ARRIVAL  · You will be called the day before your surgery with your expected arrival time. · Sign in at the  of the hospital.  You will be directed to the Surgical Waiting Room. · Please arrive at your scheduled appointment time. You have been scheduled to arrive for your procedure one or two hours prior to the expected start time of your procedure. · Every effort will be made to minimize your wait but please be aware that unforeseen circumstances may affect our schedule. EATING  · DO NOT EAT OR DRINK ANYTHING AFTER MIDNIGHT ON THE EVENING BEFORE YOUR SURGERY OR ON THE DAY OF YOUR SURGERY except for your medications (as instructed) with a sip of water. · Do not use gum, mints or lozenges on the morning of your surgery. · Please do not smoke or chew tobacco before your surgery. MEDICATIONS   · Take the following medications on the morning of your surgery with the smallest amount of water possible : NONE    STOP THESE MEDICATIONS AT THE TIMES LISTED BELOW  Insulin or oral diabetic medications; LANTUS ;  do not take morning of     DRIVING/TRANSPORATION  · Have a responsible adult to drive you home from the hospital and to stay with you over night. Please have them plan to remain in the hospital during your surgery. Your surgery will not be done if you do not have a responsible adult to take you home and to stay with you. · If you have arranged for public transport, you must have a responsible adult to ride with you (who is not the ). · You may not drive for 24 hours after anesthesia. PREPARATION  · If you have a Living WiIl/Advance Directive, please bring a copy with you to scan into your chart. · Please DO NOT wear makeup or nail polish  · Please leave valuables at home,  DO NOT wear jewelry. · Wear loose, comfortable clothing that is large enough to cover a bulky dressing.     SPECIAL INSTRUCTIONS:  · Follow your surgeon's instructions for preoperative bowel prep.     Reviewed above preoperative instructions and answered questions by phone interview    Patient:  Sandy Menon   Date:     July 15, 2022  Time:   10:44 AM    RN:  Dean Castro RN    Date:     July 15, 2022  Time:   10:44 AM

## 2022-07-15 NOTE — PROGRESS NOTES
Follow up phone call to patient, two pt identifiers verified. Discussed patient's goals:   Goals      DM  Diabetes Support and Education / Care Coordination      Referred from [facility]. A1C>9    1)  General:   Patient is able to state a basic definition of diabetes including: risk factors, basic pathophysiology, complications and treatment. 2)  Diet:   Patient will be able to read a basic food label and identify role of calories, carbohydrates, protein and fats in healthy eating. 3)  Activity:   Patient will identify the potential health benefits of regular exercise:  decreased cholesterol, improved mood, decreased blood pressure, lower stress/anxiety, weight loss, decreased blood sugar. 4)  Monitoring:  Patient will be able to identify what an A1C test measures. 5)  Medications:  Patient will demonstrate knowledge of diabetes medications. 6)  Risk Reduction:  Patient is able to state goal target for A1C (<7), blood pressure (<130/70), and LDL cholesterol (<100) to reduce diabetes complications. 7)  Problem Solving:  Patient will identify s/sx and treatment for hyper- and hypoglycemia. 8)  Healthy Coping:  Patient will be able to identify two community support resources. Patient will identify two stress relieving techniques. Patient will identify health risk factors and complications of diabetes  1) Kidney disease  2) Heart disease  3) Neuropathy  4) Skin breakdown  5) Diabetic Retinopathy  6) Prevention:   Stable controlled A1C,  healthy lifestyle (diet, exercise, hydration, stress reduction, monitoring, skin care)   3/15- Pt to have BW this week/  A1C.    8/3: Patient's A1C up to 12.5 7/21 - discussed healthy food options. States she eats cheerios or corn flakes for breakfast.  Encouraged lean protein, egg whites, whole grains, yogurt.   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 Will mail nutritional information from [employer] Encouraged drinking more water, plans to take water bottles with her to work, as they no longer offer water bottles. Has been drinking 3 sodas/day. Will cut these out. Scheduled to meet with a nutritionist tomorrow, 8/4.  8/10: Has been able to cut soda, drinks one a day. Drinking water. Trying to meal prep. Bought fruits and veggies. 8/27: Patient had visit with nurse practitioner. On some medications to help control diabetes. Still recovering from [place].  9/8: Patient states glucose has been well controlled. Complaints of blurred vision with new weekly medication. Encouraged her to discuss this with her PCP, states she will tomorrow, as she needs to go to the office to  her \"leave paperwork\". 9/23: Discussed medication issues. Back on jardiance. 10/8: Diabetes, glucose has been in lower 200s. Taking 27 units of Lantus/daily, Januvia. Working on getting glucose under better control. Has been stressed out with everything, loss of father, covid, pink eye. Has gotten STD approved and has received check. 10/25: Blood sugars are still up and down. Taking januvia and lantus 27 units. Has appointment to go to doctor tomorrow to get STD paperwork filled out for 10 days she missed due to \"pink eye\". Discussed trying to decrease stress levels to help with glucose. 11/22: Blood sugars - patient states they have been \"alright\"  Provided encouragement to make healthy choices, acknowledging that it can be difficult during the holidays. 12/20: Patient is at her provider's office right now, glucose was 400 and she was having chest pain on and off for about a week or two. The doctor is out until January, they told her she should go to the emergency room. Encouraged her to call the nurse access or call 911 due to her symptoms. She says she has had a lot of stress and been dealing with that.   Says she is waiting until tomorrow and if she still doesn't feel well she will go to the ER. Says her symptoms have been going on for weeks, it's not constant pain. Says she was told it could be reflux, says she takes medication for that. Continued to encourage her to call the [facility] or go to the ED, as this was the recommendation from her provider's office. She wants to wait until tomorrow. 12/21Currariel Granados to ED, they did confirm that her chest pain was related to reflux, ordered prilosec, carafate to help relieve this pain. She had run out of her LevRaise [place], has ordered it. Also needs osvaldo sensors, offered to call [facility] for her, she said she would appreciate that. Called to order, spoke with Baudilio Schumacher at [facility], she is requesting new script from patient's PCP and will fill. Paddyuvia is expected to arrive to patient on 12/22.  12/28: She got her sensors, and has received her Januvia. Upset, she has dropped off STD paperwork again at her doctor's office (2nd time). [facility] says they have not received it. Contacted PCP office and spoke with Kaumakani SOPHIA BEARD, she checked on the paperwork and stated that because her provider is out, back on 1/3, the paperwork cannot be sent yet. Relayed this information to the patient. She was appreciative for my calling. She requested the phone number to get her password reset for my chart. Provided that to her. 1/6: Patient diagnosed with COVID, blood glucose this afternoon was 402. States she has been drinking apple juice, as she does not like water. Encouraged her to try 1/2 water, 1/2 juice. She is taking 30 units of Lantus at night. 1/7: Discussed avoiding juices to maintain hydration. States she is drinking water now. Provided praise. 1/12: Patient feeling better today, will go back to work tomorrow. Discussed issues with her manager, states she hopes she does not give her a hard time. Provided encouragement. Asked her about her glucose.   She states it's been up and down, depending on what she eats.  Provided education about this, states she has been drinking fruit juice and apple juice, reminded her of the importance of decreasing this, explained that one cup of juice is about the total amount of sugar intake she should have for one day. Reminded her she could cut juice with water. She stated she would do better. Labs to be drawn on 1/18 through PCP.  1/19: Discussed patient's A1C - 12.8. She stated she expected it to be high due to stress and illness. Her provider has increased her insulin - 10 units in AM and 30 units at night. Has f/up on 2/2.  2/17: Missed her 2/2 appointment, didn't get a reminder from her PCP office about the appointment, also mentioned the [facility] call that was missed. Patient states she has had ingrown toe nails in both big toes, had one cut out last week with Dr. Dwight Hong and the other will be next week. Encouraged her to re schedule her PCP visit. States glucose has been up and down, encouraged patient to drink water. 3/3: Patient states she did not check her glucose today, encouraged her to check regularly and make sure she is eating healthy to keep levels below 130. She will call to make appt with her PCP and pharmacy to stay enrolled with [facility]. Says she has been working nights and will be finished with this in the next week or so. New co-worker is supposed to start on 3/7. Reminded her that she needs to take care of herself and make her health a priority. 3/18: Patient states she had call with Anglea Gómez, Diabetes Pharmacist and has gotten her medications corrected. She is excited that her glucose is now well managed since she has been taking her medication as Angela Gómez instructed. She plans to see an endocrinologist for her diabetes. We discussed how uncontrolled diabetes affects all of the body. Patient acknowledged that she did not want this to happen to her.   We discussed soda and alternatives - water with flavor enhancers, sugar free options - Stur, crystal light-lemonaide. Provided patient with smoking cessation phone number [phone]-Quit Now and Be Well program Breathe Easy. Patient plans to quit smoking, as well. States her fiance smokes and may quit with her. Encouragement provided. 4/1: Patient states her blood sugars have been stable, this morning 112. Stays below 150, eating better. Called NP Darrin's office to schedule 3 month f/up, not virtual visit, must be on Tuesday - 6/14/22 1 pm (15 min early); called patient to let her know. Patient states she has not had time to enroll in the Breathe Easy program.  Will continue to encourage. 4/22: Had MD appt on 4/18. Has to get ultrasound on Monday due to nerve pain in her legs, Tuesday colonoscopy, sugar has been elevated due to pain. Will have eye exam.  5/10: General Surgeon appt. Today, 5/10, states this is for colonoscopy pre check up. Still having pain in legs, encouraged her to get endocrinology appt or appt with her PCP. Discussed glucose, states it's been pretty good. This morning stated it was 210 because she ate something last night. States it goes back down when she takes her medication. Discussed good glucose control will help prevent effects of diabetes - like neuropathy. States her work schedule has been bad, working day shift M-Th, then evening/nights Friday-Sun. Hope to get more help soon. 6/1: Out of work since 5/20/22 trigger finger. Swollen and has a lot of pain, wearing splint. Back to doctor on 6/3 and may go back to work on 6/6. Blood sugar was 216 at breakfast (just drank coke). 6/16: Back to work. Glucose is doing okay. States she has good and bad days. Encouraged her to avoid sodas and fruit juice. States she knows. 7/15: Patient states she has taken herself off of the Jardiance because it was causing her to lose weight. She states her sugars are up and down, depending on what she eats. Trying to eat better.   Knows to avoid carb loaded foods/drinks. Has appt with new endocrinologist on 8/2, provided address of the office to patient. She is scheduled for colonoscopy on 7/22 and f/up visit with doctor on 7/28. Patient's primary care provider relationship reviewed with patient and modified, as applicable.     Readiness to Change: []  Pre-contemplative    [x]  Contemplative  []  Preparation               []  Action                  []  Maintenance    Barriers/Challenges to Care: []  Decline in memory    []  Language barrier     []  Emotional                  []  Limited mobility  [x]  Lack of motivation     [] Vision, hearing or cognitive impairment []  Knowledge [] Financial Barriers []  Lack of support  []  Pain []  Other []  None    Key pt activities to achieve better health:   []  Weight loss  [x]  Improved diabetic control  []  Decreased cholesterol levels  []  Decreased blood pressure  []    []    Upcoming appointments:   Future Appointments   Date Time Provider Ankush Nicholson   7/15/2022 10:30 AM [facility] PAT ROOM 2 [facility]   7/28/2022  3:30 PM Jose Martin Post MD [facility] BS AMB   8/2/2022  2:30 PM Terry Alvarado MD [facility] [address] [facility] AMB     Plan for next call: call in three weeks, 8/5

## 2022-07-16 DIAGNOSIS — E11.65 UNCONTROLLED TYPE 2 DIABETES MELLITUS WITH HYPERGLYCEMIA (HCC): ICD-10-CM

## 2022-07-18 RX ORDER — INSULIN GLARGINE 100 [IU]/ML
INJECTION, SOLUTION SUBCUTANEOUS
Qty: 21 ML | Refills: 3 | Status: SHIPPED | OUTPATIENT
Start: 2022-07-18 | End: 2022-09-27

## 2022-07-22 ENCOUNTER — HOSPITAL ENCOUNTER (OUTPATIENT)
Age: 52
Setting detail: OUTPATIENT SURGERY
Discharge: HOME | End: 2022-07-22
Attending: SURGERY | Admitting: SURGERY
Payer: COMMERCIAL

## 2022-07-22 ENCOUNTER — ANESTHESIA (OUTPATIENT)
Dept: SURGERY | Age: 52
End: 2022-07-22
Payer: COMMERCIAL

## 2022-07-22 VITALS
TEMPERATURE: 97.7 F | RESPIRATION RATE: 16 BRPM | HEART RATE: 85 BPM | HEIGHT: 60 IN | DIASTOLIC BLOOD PRESSURE: 68 MMHG | OXYGEN SATURATION: 100 % | WEIGHT: 108 LBS | BODY MASS INDEX: 21.2 KG/M2 | SYSTOLIC BLOOD PRESSURE: 124 MMHG

## 2022-07-22 LAB
GLUCOSE BLD STRIP.AUTO-MCNC: 195 MG/DL (ref 65–117)
SERVICE CMNT-IMP: ABNORMAL

## 2022-07-22 PROCEDURE — 74011000250 HC RX REV CODE- 250: Performed by: ANESTHESIOLOGY

## 2022-07-22 PROCEDURE — 88305 TISSUE EXAM BY PATHOLOGIST: CPT

## 2022-07-22 PROCEDURE — 2709999900 HC NON-CHARGEABLE SUPPLY: Performed by: SURGERY

## 2022-07-22 PROCEDURE — 45380 COLONOSCOPY AND BIOPSY: CPT | Performed by: SURGERY

## 2022-07-22 PROCEDURE — 76210000063 HC OR PH I REC FIRST 0.5 HR: Performed by: SURGERY

## 2022-07-22 PROCEDURE — 76010000138 HC OR TIME 0.5 TO 1 HR: Performed by: SURGERY

## 2022-07-22 PROCEDURE — 74011250636 HC RX REV CODE- 250/636: Performed by: ANESTHESIOLOGY

## 2022-07-22 PROCEDURE — 77030037041 HC FCPS HOT BIOP ENDOSC RAD JAW DISP BSC -B: Performed by: SURGERY

## 2022-07-22 PROCEDURE — 76060000032 HC ANESTHESIA 0.5 TO 1 HR: Performed by: SURGERY

## 2022-07-22 PROCEDURE — 82962 GLUCOSE BLOOD TEST: CPT

## 2022-07-22 RX ORDER — PROPOFOL 10 MG/ML
INJECTION, EMULSION INTRAVENOUS AS NEEDED
Status: DISCONTINUED | OUTPATIENT
Start: 2022-07-22 | End: 2022-07-22 | Stop reason: HOSPADM

## 2022-07-22 RX ORDER — SODIUM CHLORIDE, SODIUM LACTATE, POTASSIUM CHLORIDE, CALCIUM CHLORIDE 600; 310; 30; 20 MG/100ML; MG/100ML; MG/100ML; MG/100ML
100 INJECTION, SOLUTION INTRAVENOUS CONTINUOUS
Status: DISCONTINUED | OUTPATIENT
Start: 2022-07-22 | End: 2022-07-22 | Stop reason: HOSPADM

## 2022-07-22 RX ORDER — SODIUM CHLORIDE 0.9 % (FLUSH) 0.9 %
5-40 SYRINGE (ML) INJECTION EVERY 8 HOURS
Status: DISCONTINUED | OUTPATIENT
Start: 2022-07-22 | End: 2022-07-22 | Stop reason: HOSPADM

## 2022-07-22 RX ORDER — MIDAZOLAM HYDROCHLORIDE 1 MG/ML
INJECTION, SOLUTION INTRAMUSCULAR; INTRAVENOUS AS NEEDED
Status: DISCONTINUED | OUTPATIENT
Start: 2022-07-22 | End: 2022-07-22 | Stop reason: HOSPADM

## 2022-07-22 RX ORDER — SODIUM CHLORIDE 0.9 % (FLUSH) 0.9 %
5-40 SYRINGE (ML) INJECTION AS NEEDED
Status: DISCONTINUED | OUTPATIENT
Start: 2022-07-22 | End: 2022-07-22 | Stop reason: HOSPADM

## 2022-07-22 RX ORDER — LIDOCAINE HYDROCHLORIDE 20 MG/ML
INJECTION, SOLUTION EPIDURAL; INFILTRATION; INTRACAUDAL; PERINEURAL AS NEEDED
Status: DISCONTINUED | OUTPATIENT
Start: 2022-07-22 | End: 2022-07-22 | Stop reason: HOSPADM

## 2022-07-22 RX ORDER — DIPHENHYDRAMINE HYDROCHLORIDE 50 MG/ML
12.5 INJECTION, SOLUTION INTRAMUSCULAR; INTRAVENOUS
Status: DISCONTINUED | OUTPATIENT
Start: 2022-07-22 | End: 2022-07-22 | Stop reason: HOSPADM

## 2022-07-22 RX ORDER — SODIUM CHLORIDE, SODIUM LACTATE, POTASSIUM CHLORIDE, CALCIUM CHLORIDE 600; 310; 30; 20 MG/100ML; MG/100ML; MG/100ML; MG/100ML
125 INJECTION, SOLUTION INTRAVENOUS CONTINUOUS
Status: DISCONTINUED | OUTPATIENT
Start: 2022-07-22 | End: 2022-07-22 | Stop reason: HOSPADM

## 2022-07-22 RX ADMIN — LIDOCAINE HYDROCHLORIDE 10 MG: 20 INJECTION, SOLUTION EPIDURAL; INFILTRATION; INTRACAUDAL; PERINEURAL at 09:42

## 2022-07-22 RX ADMIN — PROPOFOL 20 MG: 10 INJECTION, EMULSION INTRAVENOUS at 09:39

## 2022-07-22 RX ADMIN — PROPOFOL 10 MG: 10 INJECTION, EMULSION INTRAVENOUS at 09:24

## 2022-07-22 RX ADMIN — PROPOFOL 20 MG: 10 INJECTION, EMULSION INTRAVENOUS at 09:36

## 2022-07-22 RX ADMIN — PROPOFOL 20 MG: 10 INJECTION, EMULSION INTRAVENOUS at 09:50

## 2022-07-22 RX ADMIN — LIDOCAINE HYDROCHLORIDE 10 MG: 20 INJECTION, SOLUTION EPIDURAL; INFILTRATION; INTRACAUDAL; PERINEURAL at 09:54

## 2022-07-22 RX ADMIN — PROPOFOL 20 MG: 10 INJECTION, EMULSION INTRAVENOUS at 10:01

## 2022-07-22 RX ADMIN — LIDOCAINE HYDROCHLORIDE 5 MG: 20 INJECTION, SOLUTION EPIDURAL; INFILTRATION; INTRACAUDAL; PERINEURAL at 09:24

## 2022-07-22 RX ADMIN — PROPOFOL 40 MG: 10 INJECTION, EMULSION INTRAVENOUS at 09:21

## 2022-07-22 RX ADMIN — PROPOFOL 20 MG: 10 INJECTION, EMULSION INTRAVENOUS at 09:57

## 2022-07-22 RX ADMIN — PROPOFOL 20 MG: 10 INJECTION, EMULSION INTRAVENOUS at 09:54

## 2022-07-22 RX ADMIN — PROPOFOL 20 MG: 10 INJECTION, EMULSION INTRAVENOUS at 10:05

## 2022-07-22 RX ADMIN — LIDOCAINE HYDROCHLORIDE 10 MG: 20 INJECTION, SOLUTION EPIDURAL; INFILTRATION; INTRACAUDAL; PERINEURAL at 09:39

## 2022-07-22 RX ADMIN — LIDOCAINE HYDROCHLORIDE 10 MG: 20 INJECTION, SOLUTION EPIDURAL; INFILTRATION; INTRACAUDAL; PERINEURAL at 09:36

## 2022-07-22 RX ADMIN — LIDOCAINE HYDROCHLORIDE 5 MG: 20 INJECTION, SOLUTION EPIDURAL; INFILTRATION; INTRACAUDAL; PERINEURAL at 09:27

## 2022-07-22 RX ADMIN — PROPOFOL 10 MG: 10 INJECTION, EMULSION INTRAVENOUS at 09:30

## 2022-07-22 RX ADMIN — SODIUM CHLORIDE, POTASSIUM CHLORIDE, SODIUM LACTATE AND CALCIUM CHLORIDE: 600; 310; 30; 20 INJECTION, SOLUTION INTRAVENOUS at 08:45

## 2022-07-22 RX ADMIN — PROPOFOL 20 MG: 10 INJECTION, EMULSION INTRAVENOUS at 09:46

## 2022-07-22 RX ADMIN — LIDOCAINE HYDROCHLORIDE 20 MG: 20 INJECTION, SOLUTION EPIDURAL; INFILTRATION; INTRACAUDAL; PERINEURAL at 09:21

## 2022-07-22 RX ADMIN — LIDOCAINE HYDROCHLORIDE 10 MG: 20 INJECTION, SOLUTION EPIDURAL; INFILTRATION; INTRACAUDAL; PERINEURAL at 09:50

## 2022-07-22 RX ADMIN — PROPOFOL 10 MG: 10 INJECTION, EMULSION INTRAVENOUS at 09:33

## 2022-07-22 RX ADMIN — PROPOFOL 10 MG: 10 INJECTION, EMULSION INTRAVENOUS at 09:27

## 2022-07-22 RX ADMIN — MIDAZOLAM 2 MG: 1 INJECTION INTRAMUSCULAR; INTRAVENOUS at 09:15

## 2022-07-22 RX ADMIN — LIDOCAINE HYDROCHLORIDE 5 MG: 20 INJECTION, SOLUTION EPIDURAL; INFILTRATION; INTRACAUDAL; PERINEURAL at 09:33

## 2022-07-22 RX ADMIN — LIDOCAINE HYDROCHLORIDE 5 MG: 20 INJECTION, SOLUTION EPIDURAL; INFILTRATION; INTRACAUDAL; PERINEURAL at 09:30

## 2022-07-22 RX ADMIN — PROPOFOL 20 MG: 10 INJECTION, EMULSION INTRAVENOUS at 09:42

## 2022-07-22 RX ADMIN — LIDOCAINE HYDROCHLORIDE 10 MG: 20 INJECTION, SOLUTION EPIDURAL; INFILTRATION; INTRACAUDAL; PERINEURAL at 09:46

## 2022-07-22 NOTE — ANESTHESIA POSTPROCEDURE EVALUATION
Post-Anesthesia Evaluation and Assessment    Cardiovascular Function/Vital Signs  Visit Vitals  /68   Pulse 85   Temp 36.5 °C (97.7 °F)   Resp 16   Ht 5' (1.524 m)   Wt 49 kg (108 lb)   SpO2 100%   BMI 21.09 kg/m²       Patient is status post Procedure(s):  COLONOSCOPY WITH POSSIBLE POLYPECTOMY with cold forcep polypectomy. Nausea/Vomiting: Controlled. Postoperative hydration reviewed and adequate. Pain:  Pain Scale 1: Numeric (0 - 10) (07/22/22 1025)  Pain Intensity 1: 0 (07/22/22 1025)   Managed. Neurological Status:   Neuro (WDL): Within Defined Limits (07/22/22 1025)   At baseline. Mental Status and Level of Consciousness: Arousable. Pulmonary Status:   O2 Device: None (Room air) (07/22/22 1025)   Adequate oxygenation and airway patent. Complications related to anesthesia: None    Post-anesthesia assessment completed. No concerns. Patient has met all discharge requirements. Signed By: Silvia Ventura MD    July 22, 2022             Procedure(s):  COLONOSCOPY WITH POSSIBLE POLYPECTOMY with cold forcep polypectomy.     MAC    <BSHSIANPOST>    INITIAL Post-op Vital signs:   Vitals Value Taken Time   /68 07/22/22 1027   Temp 36.5 °C (97.7 °F) 07/22/22 1013   Pulse 85 07/22/22 1027   Resp 16 07/22/22 1027   SpO2 100 % 07/22/22 1027

## 2022-07-22 NOTE — H&P
History and Physical    Steve Brooke is an 46 y.o. female who is referred for a screening colonoscopy. Patient has a history of diverticulitis in 2018 treated with oral antibiotics. She does have reflux controlled with medication. She also has a history of constipation for the last few years and is currently taking fiber supplement. She denies any blood per rectum or pain with bowel movements. She denies any weight loss or family history of colon ca. Her only surgery has been a tubal ligation. HPI           Patient Active Problem List   Diagnosis Code    Type 2 diabetes mellitus without complication, with long-term current use of insulin ([facility] E11.9, Z79.4    Essential hypertension I10    Mixed dyslipidemia E78.2    Type 2 diabetes with nephropathy ([facility] E11.21           Patient Active Problem List     Diagnosis Date Noted    [facility] nephropathy [facility] 11/03/2020    Mixed dyslipidemia 07/11/2017    Essential hypertension 03/07/2017    Type 2 diabetes mellitus without complication, with long-term current use of insulin ([facility] 12/07/2016             Current Outpatient Medications   Medication Sig Dispense Refill    losartan (COZAAR) 25 mg tablet Take 1 Tablet by mouth daily. (Patient not taking: Reported on 4/22/2022) 90 Tablet 1    pantoprazole (PROTONIX) 40 mg tablet Take 1 Tablet by mouth daily. 90 Tablet 3    empagliflozin (Jardiance) 25 mg tablet Take one tablet by mouth once daily. To replace januvia 90 Tablet 3    amitriptyline (ELAVIL) 25 mg tablet Take 25 mg by mouth nightly as needed for Pain. (Patient not taking: Reported on 4/18/2022)        DULoxetine (Cymbalta) 30 mg capsule Take 30 mg by mouth two (2) times daily as needed for Pain. (Patient not taking: Reported on 4/18/2022)        semaglutide (Ozempic) 0.25 mg or 0.5 mg/dose (2 mg/1.5 ml) subq pen 0.5 mg by SubCUTAneous route every seven (7) days.  (Patient not taking: Reported on 4/18/2022) 3 Pen 1    rosuvastatin (CRESTOR) 20 mg tablet Take 1 Tablet by mouth nightly. 90 Tablet 1    Insulin Needles, Disposable, 31 gauge x 5/16\" ndle Use to administer insulin SQ twice daily as directed. 200 Pen Needle 3    flash glucose sensor (FreeStyle Antony 14 Day Sensor) kit 1 Device by SubCUTAneous route Once every 2 weeks. Apply to upper arm 6 Kit 3    lidocaine (Lidoderm) 5 % Apply patch to the affected area for 12 hours a day and remove for 12 hours a day. 1 Each 0    insulin glargine (LANTUS,BASAGLAR) 100 unit/mL (3 mL) inpn Take  10 units in the morning and 30 units  QHS. DX E11.65  Indications: type 2 diabetes mellitus 21 mL 3    baclofen (LIORESAL) 10 mg tablet Take 1 Tablet by mouth three (3) times daily. PRN  Indications: muscle spasms caused by a spinal disease (Patient not taking: Reported on 4/18/2022) 30 Tablet 2    diclofenac (VOLTAREN) 1 % gel Apply 4 g to affected area four (4) times daily. (Patient not taking: Reported on 4/22/2022) 350 g 2    fluticasone propion-salmeteroL (ADVAIR/WIXELA) 250-50 mcg/dose diskus inhaler Take 1 Puff by inhalation every twelve (12) hours. 1 Inhaler 0    albuterol (PROVENTIL HFA, VENTOLIN HFA, PROAIR HFA) 90 mcg/actuation inhaler Take 2 Puffs by inhalation every four (4) hours as needed for Wheezing or Shortness of Breath. Indications: asthma attack 2 Inhaler 5    flash glucose scanning reader (FreeStyle Antony 14 Day [place]) [facility] Use to check blood glucose TID and PRN.  Dx E11.9 1 Each 0            Allergies   Allergen Reactions    Lisinopril Other (comments)       Hands and feet swelling           Past Medical History:   Diagnosis Date    Abnormal EKG 2/27/2018    Abscess 7/24/2019    Asthma      Chest pain 2/27/2018    Chest wall contusion, left, subsequent encounter      Diabetes [facility])      Diverticular disease      Diverticulitis large intestine w/o perforation or abscess w/o bleeding 10/25/2016    Dyslipidemia      Hypertension      Lumbar radiculopathy 02/01/2021    Menopause Pelvic pain 7/26/2016    Rib fractures      Right hand pain 7/10/2018    Sciatica of left side 02/01/2021    Uterine leiomyoma 7/26/2016    Vaginal candidiasis 7/24/2019            Past Surgical History:   Procedure Laterality Date    HX GYN         BTL    IR INJ FORAMIN EPID LUMB ANES/STER SNGL   5/11/2021            Family History   Problem Relation Age of Onset    Stroke Mother      Diabetes Mother      Heart Disease Father      Diabetes Sister      Diabetes Maternal Grandmother      Breast Cancer Maternal Aunt      Breast Cancer Paternal Aunt        Social History            Tobacco Use    Smoking status: Current Every Day Smoker       Packs/day: 0.50    Smokeless tobacco: Never Used   Substance Use Topics    Alcohol use: No         Review of Systems  Review of Systems  Constitutional: Negative for chills, fever, malaise/fatigue and weight loss. HENT: Positive for ear pain. Negative for congestion and sore throat. Respiratory: Negative for cough and shortness of breath. Cardiovascular: Positive for chest pain. Negative for palpitations, orthopnea, claudication and leg swelling. Gastrointestinal: Positive for constipation, heartburn and nausea. Negative for abdominal pain, blood in stool, diarrhea and vomiting. Musculoskeletal: Positive for back pain. Negative for joint pain and neck pain. Skin: Negative for itching and rash. Neurological: Positive for tingling. Negative for weakness and headaches. Objective: There were no vitals taken for this visit. Physical Exam  Constitutional:       General: She is not in acute distress. Appearance: Normal appearance. She is normal weight. She is not ill-appearing or toxic-appearing. HENT:     Head: Normocephalic and atraumatic. Mouth/Throat:      Mouth: Mucous membranes are moist.      Pharynx: Oropharynx is clear. Eyes:     General: No scleral icterus. Cardiovascular:     Rate and Rhythm: Normal rate.    Pulmonary:     Effort: Pulmonary effort is normal. No respiratory distress. Breath sounds: No wheezing. Abdominal:      General: There is no distension. Palpations: Abdomen is soft. Tenderness: There is no abdominal tenderness. There is no guarding. Musculoskeletal:         General: No swelling. Normal range of motion. Cervical back: Normal range of motion and neck supple. Lymphadenopathy:     Cervical: No cervical adenopathy. Skin:     General: Skin is warm. Coloration: Skin is not jaundiced. Neurological:     General: No focal deficit present. Mental Status: She is alert and oriented to person, place, and time. Assessment/Plan:   46year old requiring a screening colonoscopy     Colonoscopy is indicated as noted above and we will proceed to colonoscopy. The risks(bleeding, abdominal pain, perforation, etc.)  and benefits of my recommendations, as well as other treatment options were discussed with the patient today. Questions were answered. Prep is prescribed per orders. The patient was counseled at length about the risks of iban Covid-19 during their perioperative period and any recovery window from their procedure. The patient was made aware that iban Covid-19  may worsen their prognosis for recovering from their procedure and lend to a higher morbidity and/or mortality risk. All material risks, benefits, and reasonable alternatives including postponing the procedure were discussed. The patient does wish to proceed with the procedure at this time. No change clinically or with plan.

## 2022-07-22 NOTE — DISCHARGE INSTRUCTIONS
- resume home diet     - call if you develop abdominal pain, N/V, or fevers     - follow up in office after one week

## 2022-07-22 NOTE — BRIEF OP NOTE
Brief Postoperative Note    Patient: Kimmy Peck  YOB: 1970  MRN: [id number]    Date of Procedure: 7/22/2022     Pre-Op Diagnosis: ENCOUNTER FOR COLON CANCER SCREENING    Post-Op Diagnosis: 1. Diverticulosis                                    2. Polyp in descending colon    Procedure(s):  COLONOSCOPY WITH POSSIBLE POLYPECTOMY with cold forcep polypectomy    Surgeon(s):  Tong Santos MD    Surgical Assistant: None    Anesthesia: MAC     Estimated Blood Loss (mL): Minimal    Complications: None    Specimens:   ID Type Source Tests Collected by Time Destination   1 : descending colon polyp Preservative Colon, Descending  Tong Santos MD 7/22/2022 1000 Pathology        Implants: * No implants in log *    Drains: * No LDAs found *    Findings: 1. Withdraw time over 6 min                  2. Prep adequate                   3. Diverticulosis                   4. Polyp in descending colon     Patient will follow up in office after one week to review pathology report.     Electronically Signed by Stephanie Escamilla MD on 7/22/2022 at 10:11 AM

## 2022-07-25 ENCOUNTER — PATIENT OUTREACH (OUTPATIENT)
Dept: OTHER | Age: 52
End: 2022-07-25

## 2022-07-25 NOTE — PROGRESS NOTES
Follow up phone call to patient, two pt identifiers verified. Discussed patient's goals:    Goals        DM  Diabetes Support and Education / Care Coordination      Referred from [facility]. A1C>9    1)  General:   Patient is able to state a basic definition of diabetes including: risk factors, basic pathophysiology, complications and treatment. 2)  Diet:   Patient will be able to read a basic food label and identify role of calories, carbohydrates, protein and fats in healthy eating. 3)  Activity:   Patient will identify the potential health benefits of regular exercise:  decreased cholesterol, improved mood, decreased blood pressure, lower stress/anxiety, weight loss, decreased blood sugar. 4)  Monitoring:  Patient will be able to identify what an A1C test measures. 5)  Medications:  Patient will demonstrate knowledge of diabetes medications. 6)  Risk Reduction:  Patient is able to state goal target for A1C (<7), blood pressure (<130/70), and LDL cholesterol (<100) to reduce diabetes complications. 7)  Problem Solving:  Patient will identify s/sx and treatment for hyper- and hypoglycemia. 8)  Healthy Coping:  Patient will be able to identify two community support resources. Patient will identify two stress relieving techniques. Patient will identify health risk factors and complications of diabetes  Kidney disease  Heart disease  Neuropathy  Skin breakdown  Diabetic Retinopathy  Prevention:   Stable controlled A1C,  healthy lifestyle (diet, exercise, hydration, stress reduction, monitoring, skin care)   3/15- Pt to have BW this week/  A1C.    8/3: Patient's A1C up to 12.5 7/21 - discussed healthy food options. States she eats cheerios or corn flakes for breakfast.  Encouraged lean protein, egg whites, whole grains, yogurt.   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 Will mail nutritional information from [employer]. Encouraged drinking more water, plans to take water bottles with her to work, as they no longer offer water bottles. Has been drinking 3 sodas/day. Will cut these out. Scheduled to meet with a nutritionist tomorrow, 8/4.  8/10: Has been able to cut soda, drinks one a day. Drinking water. Trying to meal prep. Bought fruits and veggies. 8/27: Patient had visit with nurse practitioner. On some medications to help control diabetes. Still recovering from [place].  9/8: Patient states glucose has been well controlled. Complaints of blurred vision with new weekly medication. Encouraged her to discuss this with her PCP, states she will tomorrow, as she needs to go to the office to  her \"leave paperwork\". 9/23: Discussed medication issues. Back on jardiance. 10/8: Diabetes, glucose has been in lower 200s. Taking 27 units of Lantus/daily, Januvia. Working on getting glucose under better control. Has been stressed out with everything, loss of father, covid, pink eye. Has gotten STD approved and has received check. 10/25: Blood sugars are still up and down. Taking januvia and lantus 27 units. Has appointment to go to doctor tomorrow to get STD paperwork filled out for 10 days she missed due to \"pink eye\". Discussed trying to decrease stress levels to help with glucose. 11/22: Blood sugars - patient states they have been \"alright\"  Provided encouragement to make healthy choices, acknowledging that it can be difficult during the holidays. 12/20: Patient is at her provider's office right now, glucose was 400 and she was having chest pain on and off for about a week or two. The doctor is out until January, they told her she should go to the emergency room. Encouraged her to call the nurse access or call 911 due to her symptoms. She says she has had a lot of stress and been dealing with that.   Says she is waiting until tomorrow and if she still doesn't feel well she will go to the ER. Says her symptoms have been going on for weeks, it's not constant pain. Says she was told it could be reflux, says she takes medication for that. Continued to encourage her to call the [facility] or go to the ED, as this was the recommendation from her provider's office. She wants to wait until tomorrow. 12/21Currariel Granados to ED, they did confirm that her chest pain was related to reflux, ordered prilosec, carafate to help relieve this pain. She had run out of her Levester Rodney, has ordered it. Also needs osvaldo sensors, offered to call [facility] for her, she said she would appreciate that. Called to order, spoke with Baudilio Schumacher at [place], she is requesting new script from patient's PCP and will fill. Januvia is expected to arrive to patient on 12/22.  12/28: She got her sensors, and has received her Januvia. Upset, she has dropped off STD paperwork again at her doctor's office (2nd time). [facility] says they have not received it. Contacted PCP office and spoke with [facility], she checked on the paperwork and stated that because her provider is out, back on 1/3, the paperwork cannot be sent yet. Relayed this information to the patient. She was appreciative for my calling. She requested the phone number to get her password reset for my chart. Provided that to her. 1/6: Patient diagnosed with COVID, blood glucose this afternoon was 402. States she has been drinking apple juice, as she does not like water. Encouraged her to try 1/2 water, 1/2 juice. She is taking 30 units of Lantus at night. 1/7: Discussed avoiding juices to maintain hydration. States she is drinking water now. Provided praise. 1/12: Patient feeling better today, will go back to work tomorrow. Discussed issues with her manager, states she hopes she does not give her a hard time. Provided encouragement. Asked her about her glucose. She states it's been up and down, depending on what she eats.   Provided education about this, states she has been drinking fruit juice and apple juice, reminded her of the importance of decreasing this, explained that one cup of juice is about the total amount of sugar intake she should have for one day. Reminded her she could cut juice with water. She stated she would do better. Labs to be drawn on 1/18 through PCP.  1/19: Discussed patient's A1C - 12.8. She stated she expected it to be high due to stress and illness. Her provider has increased her insulin - 10 units in AM and 30 units at night. Has f/up on 2/2.  2/17: Missed her 2/2 appointment, didn't get a reminder from her PCP office about the appointment, also mentioned the [facility] call that was missed. Patient states she has had ingrown toe nails in both big toes, had one cut out last week with Dr. Dwight Hong and the other will be next week. Encouraged her to re schedule her PCP visit. States glucose has been up and down, encouraged patient to drink water. 3/3: Patient states she did not check her glucose today, encouraged her to check regularly and make sure she is eating healthy to keep levels below 130. She will call to make appt with her PCP and pharmacy to stay enrolled with [facility]. Says she has been working nights and will be finished with this in the next week or so. New co-worker is supposed to start on 3/7. Reminded her that she needs to take care of herself and make her health a priority. 3/18: Patient states she had call with Angela Gómez, Diabetes Pharmacist and has gotten her medications corrected. She is excited that her glucose is now well managed since she has been taking her medication as Angeal Gómez instructed. She plans to see an endocrinologist for her diabetes. We discussed how uncontrolled diabetes affects all of the body. Patient acknowledged that she did not want this to happen to her.   We discussed soda and alternatives - water with flavor enhancers, sugar free options - Stur, crystal light-lemonaide. Provided patient with smoking cessation phone number [phone]-Quit Now and [facility] Well program Breathe Easy. Patient plans to quit smoking, as well. States her fiance smokes and may quit with her. Encouragement provided. 4/1: Patient states her blood sugars have been stable, this morning 112. Stays below 150, eating better. Called NP Darrin's office to schedule 3 month f/up, not virtual visit, must be on Tuesday - 6/14/22 1 pm (15 min early); called patient to let her know. Patient states she has not had time to enroll in the Breathe Easy program.  Will continue to encourage. 4/22: Had MD appt on 4/18. Has to get ultrasound on Monday due to nerve pain in her legs, Tuesday colonoscopy, sugar has been elevated due to pain. Will have eye exam.  5/10: General Surgeon appt. Today, 5/10, states this is for colonoscopy pre check up. Still having pain in legs, encouraged her to get endocrinology appt or appt with her PCP. Discussed glucose, states it's been pretty good. This morning stated it was 210 because she ate something last night. States it goes back down when she takes her medication. Discussed good glucose control will help prevent effects of diabetes - like neuropathy. States her work schedule has been bad, working day shift M-Th, then evening/nights Friday-Sun. Hope to get more help soon. 6/1: Out of work since 5/20/22 trigger finger. Swollen and has a lot of pain, wearing splint. Back to doctor on 6/3 and may go back to work on 6/6. Blood sugar was 216 at breakfast (just drank coke). 6/16: Back to work. Glucose is doing okay. States she has good and bad days. Encouraged her to avoid sodas and fruit juice. States she knows. 7/15: Patient states she has taken herself off of the Jardiance because it was causing her to lose weight. She states her sugars are up and down, depending on what she eats. Trying to eat better. Knows to avoid carb loaded foods/drinks.  Has appt with new endocrinologist on 8/2, provided address of the office to patient. She is scheduled for colonoscopy on 7/22 and f/up visit with doctor on 7/28.  7/25: Patient has been under a lot of stress. Had a few deaths, glucose is up. Endocrinology appt on 8/2. Patient's primary care provider relationship reviewed with patient and modified, as applicable. Readiness to Change: []  Pre-contemplative    []  Contemplative  [x]  Preparation               []  Action                  []  Maintenance    Barriers/Challenges to Care: []  Decline in memory    []  Language barrier     []  Emotional                  []  Limited mobility  []  Lack of motivation     [] Vision, hearing or cognitive impairment []  Knowledge [] Financial Barriers []  Lack of support  []  Pain []  Other [x]  None    Key pt activities to achieve better health:   []  Weight loss  [x]  Improved diabetic control  []  Decreased cholesterol levels  []  Decreased blood pressure  []    []    Upcoming appointments:   Future Appointments   Date Time Provider Ankush Bañuelosi   7/28/2022  3:30 PM Caitlin Cutler MD [facility]   8/2/2022  2:30 PM Sherrill Newton MD [facility] [address] [facility]     Plan for next call:  Call on 8/5, discuss endocrinology appt.

## 2022-07-25 NOTE — OP NOTES
Patient: Chaparrita Sol  YOB: 1970  MRN: [id number]     Date of Procedure: 7/22/2022      Pre-Op Diagnosis: COLON CANCER SCREENING     Post-Op Diagnosis: 1. Diverticulosis                                     2. Polyp in descending colon     Procedure(s):  COLONOSCOPY with cold forcep polypectomy     Surgeon(s):  Kate Palomo MD    Anesthesia/Sedation: monitored sedation by anesthesia department      Procedure in Detail:  Informed consent was obtained for the procedure, including sedation. Risks of perforation, hemorrhage, adverse drug reaction, and aspiration were discussed. The patient was placed in the left lateral decubitus position. After undergoing monitored sedation by the anesthesia department, a time out was taken to identify correct patient, diagnosis, and procedure. A rectal examination was performed that was unremarkable for hemorrhoids, fissure, skin tags, or fistula. . The colonoscope was inserted into the rectum and advanced under direct vision and insufflated. As soon as the rectal wall was visualized, the scope was advanced with direct visualization into the sigmoid colon. There was diverticular disease without inflammation. The scope was then further advanced through the colon until the cecum was reached. The cecum was identified by the cecal straps and the appendiceal orifice. The scope was slowly withdraw to meticulously inspect the mucosal wall. A polyp was noted in the descending colon and removed with cold forceps method. Appropriate photodocumentation of the cecum and polyp were obtained. The patient tolerated the procedure well and was transferred to the PACU in good condition. The withdraw time was over 6 mins. And the prep was adequate. Findings:   Diverticulosis with a polyp in the descending colon    Specimens: Polyp removed in descending colon     EBL: min. Complications: None; patient tolerated the procedure well.   .    Recommendations: Patient will follow up in surgery clinic after one week to review pathology.

## 2022-08-04 ENCOUNTER — OFFICE VISIT (OUTPATIENT)
Dept: SURGERY | Age: 52
End: 2022-08-04
Payer: COMMERCIAL

## 2022-08-04 VITALS
HEART RATE: 82 BPM | WEIGHT: 111 LBS | HEIGHT: 60 IN | BODY MASS INDEX: 21.79 KG/M2 | SYSTOLIC BLOOD PRESSURE: 133 MMHG | DIASTOLIC BLOOD PRESSURE: 60 MMHG

## 2022-08-04 DIAGNOSIS — D12.6 TUBULAR ADENOMA OF COLON: Primary | ICD-10-CM

## 2022-08-04 PROCEDURE — 99024 POSTOP FOLLOW-UP VISIT: CPT | Performed by: SURGERY

## 2022-08-04 NOTE — PROGRESS NOTES
SUBJECTIVE: Darrion Mcelroy is a 46 y.o. female is seen for a routine post colonoscopy visit. Reports developing right lower and flack pain over the last 24 hrs. Patient states pain in constant and with a 6/10 intensity. She has been able to perform her activities, tolerate a diet and bowels are normal.    OBJECTIVE: Appears well. Abd: soft, non-distended TTP in right lower quadrant without peritonitis     Path: Colon polyp, descending        Tubular adenoma. ASSESSMENT:  46year old with right lower quadrant pain     PLAN:   - I recommended repeating colonoscopy in 5 years     - Abdominal pain: the right side of her colon was unremarkable during colonoscopy: I recommended if the pain continues or progress to go to ED for lab work and a CT scan.

## 2022-08-05 ENCOUNTER — PATIENT OUTREACH (OUTPATIENT)
Dept: OTHER | Age: 52
End: 2022-08-05

## 2022-08-05 NOTE — PROGRESS NOTES
Attempt to reach patient for follow up. Unable to leave discreet VM, as patient's mailbox was full. Per chart review, patient missed her endocrinology appointment on 8/2. She did complete a follow up with Dr. Charlie Cline on 8/4. Recommends another colonoscopy in 5 years.

## 2022-08-12 ENCOUNTER — PATIENT OUTREACH (OUTPATIENT)
Dept: OTHER | Age: 52
End: 2022-08-12

## 2022-08-12 NOTE — PROGRESS NOTES
Follow up phone call to patient, two pt identifiers verified. Discussed patient's goals:    Goals        DM  Diabetes Support and Education / Care Coordination      Referred from [facility]. A1C>9    1)  General:   Patient is able to state a basic definition of diabetes including: risk factors, basic pathophysiology, complications and treatment. 2)  Diet:   Patient will be able to read a basic food label and identify role of calories, carbohydrates, protein and fats in healthy eating. 3)  Activity:   Patient will identify the potential health benefits of regular exercise:  decreased cholesterol, improved mood, decreased blood pressure, lower stress/anxiety, weight loss, decreased blood sugar. 4)  Monitoring:  Patient will be able to identify what an A1C test measures. 5)  Medications:  Patient will demonstrate knowledge of diabetes medications. 6)  Risk Reduction:  Patient is able to state goal target for A1C (<7), blood pressure (<130/70), and LDL cholesterol (<100) to reduce diabetes complications. 7)  Problem Solving:  Patient will identify s/sx and treatment for hyper- and hypoglycemia. 8)  Healthy Coping:  Patient will be able to identify two community support resources. Patient will identify two stress relieving techniques. Patient will identify health risk factors and complications of diabetes  Kidney disease  Heart disease  Neuropathy  Skin breakdown  Diabetic Retinopathy  Prevention:   Stable controlled A1C,  healthy lifestyle (diet, exercise, hydration, stress reduction, monitoring, skin care)   3/15- Pt to have BW this week/  A1C.    8/3: Patient's A1C up to 12.5 7/21 - discussed healthy food options. States she eats cheerios or corn flakes for breakfast.  Encouraged lean protein, egg whites, whole grains, yogurt.   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 Will mail nutritional information from [employer]. Encouraged drinking more water, plans to take water bottles with her to work, as they no longer offer water bottles. Has been drinking 3 sodas/day. Will cut these out. Scheduled to meet with a nutritionist tomorrow, 8/4.  8/10: Has been able to cut soda, drinks one a day. Drinking water. Trying to meal prep. Bought fruits and veggies. 8/27: Patient had visit with nurse practitioner. On some medications to help control diabetes. Still recovering from [place].  9/8: Patient states glucose has been well controlled. Complaints of blurred vision with new weekly medication. Encouraged her to discuss this with her PCP, states she will tomorrow, as she needs to go to the office to  her \"leave paperwork\". 9/23: Discussed medication issues. Back on jardiance. 10/8: Diabetes, glucose has been in lower 200s. Taking 27 units of Lantus/daily, Januvia. Working on getting glucose under better control. Has been stressed out with everything, loss of father, covid, pink eye. Has gotten STD approved and has received check. 10/25: Blood sugars are still up and down. Taking januvia and lantus 27 units. Has appointment to go to doctor tomorrow to get STD paperwork filled out for 10 days she missed due to \"pink eye\". Discussed trying to decrease stress levels to help with glucose. 11/22: Blood sugars - patient states they have been \"alright\"  Provided encouragement to make healthy choices, acknowledging that it can be difficult during the holidays. 12/20: Patient is at her provider's office right now, glucose was 400 and she was having chest pain on and off for about a week or two. The doctor is out until January, they told her she should go to the emergency room. Encouraged her to call the nurse access or call 911 due to her symptoms. She says she has had a lot of stress and been dealing with that.   Says she is waiting until tomorrow and if she still doesn't feel well she will go to the ER. Says her symptoms have been going on for weeks, it's not constant pain. Says she was told it could be reflux, says she takes medication for that. Continued to encourage her to call the NAL or go to the ED, as this was the recommendation from her provider's office. She wants to wait until tomorrow. 12/21Rosey Krystle to ED, they did confirm that her chest pain was related to reflux, ordered prilosec, carafate to help relieve this pain. She had run out of her [place] [employer] [place], has ordered it. Also needs osvaldo sensors, offered to call [facility] for her, she said she would appreciate that. Called to order, spoke with Adalberto Ahumada at [place], she is requesting new script from patient's PCP and will fill. Paddyuvia is expected to arrive to patient on 12/22.  12/28: She got her sensors, and has received her Januvia. Upset, she has dropped off STD paperwork again at her doctor's office (2nd time). [facility] says they have not received it. Contacted PCP office and spoke with [facility], she checked on the paperwork and stated that because her provider is out, back on 1/3, the paperwork cannot be sent yet. Relayed this information to the patient. She was appreciative for my calling. She requested the phone number to get her password reset for my chart. Provided that to her. 1/6: Patient diagnosed with COVID, blood glucose this afternoon was 402. States she has been drinking apple juice, as she does not like water. Encouraged her to try 1/2 water, 1/2 juice. She is taking 30 units of Lantus at night. 1/7: Discussed avoiding juices to maintain hydration. States she is drinking water now. Provided praise. 1/12: Patient feeling better today, will go back to work tomorrow. Discussed issues with her manager, states she hopes she does not give her a hard time. Provided encouragement. Asked her about her glucose. She states it's been up and down, depending on what she eats.   Provided education about this, states she has been drinking fruit juice and apple juice, reminded her of the importance of decreasing this, explained that one cup of juice is about the total amount of sugar intake she should have for one day. Reminded her she could cut juice with water. She stated she would do better. Labs to be drawn on 1/18 through PCP.  1/19: Discussed patient's A1C - 12.8. She stated she expected it to be high due to stress and illness. Her provider has increased her insulin - 10 units in AM and 30 units at night. Has f/up on 2/2.  2/17: Missed her 2/2 appointment, didn't get a reminder from her PCP office about the appointment, also mentioned the [facility] call that was missed. Patient states she has had ingrown toe nails in both big toes, had one cut out last week with Dr. Padilla Land and the other will be next week. Encouraged her to re schedule her PCP visit. States glucose has been up and down, encouraged patient to drink water. 3/3: Patient states she did not check her glucose today, encouraged her to check regularly and make sure she is eating healthy to keep levels below 130. She will call to make appt with her PCP and pharmacy to stay enrolled with [facility]. Says she has been working nights and will be finished with this in the next week or so. New co-worker is supposed to start on 3/7. Reminded her that she needs to take care of herself and make her health a priority. 3/18: Patient states she had call with Yeyo Ambrosio, Diabetes Pharmacist and has gotten her medications corrected. She is excited that her glucose is now well managed since she has been taking her medication as Yeyo Ambrosio instructed. She plans to see an endocrinologist for her diabetes. We discussed how uncontrolled diabetes affects all of the body. Patient acknowledged that she did not want this to happen to her.   We discussed soda and alternatives - water with flavor enhancers, sugar free options - [place], crystal light-lemonaide. Provided patient with smoking cessation phone number 1-512-Quit Now and Be Well program Breathe Easy. Patient plans to quit smoking, as well. States her fiance smokes and may quit with her. Encouragement provided. 4/1: Patient states her blood sugars have been stable, this morning 112. Stays below 150, eating better. Called NP Darrin's office to schedule 3 month f/up, not virtual visit, must be on Tuesday - 6/14/22 1 pm (15 min early); called patient to let her know. Patient states she has not had time to enroll in the Breathe Easy program.  Will continue to encourage. 4/22: Had MD appt on 4/18. Has to get ultrasound on Monday due to nerve pain in her legs, Tuesday colonoscopy, sugar has been elevated due to pain. Will have eye exam.  5/10: General Surgeon appt. Today, 5/10, states this is for colonoscopy pre check up. Still having pain in legs, encouraged her to get endocrinology appt or appt with her PCP. Discussed glucose, states it's been pretty good. This morning stated it was 210 because she ate something last night. States it goes back down when she takes her medication. Discussed good glucose control will help prevent effects of diabetes - like neuropathy. States her work schedule has been bad, working day shift M-Th, then evening/nights Friday-Sun. Hope to get more help soon. 6/1: Out of work since 5/20/22 trigger finger. Swollen and has a lot of pain, wearing splint. Back to doctor on 6/3 and may go back to work on 6/6. Blood sugar was 216 at breakfast (just drank coke). 6/16: Back to work. Glucose is doing okay. States she has good and bad days. Encouraged her to avoid sodas and fruit juice. States she knows. 7/15: Patient states she has taken herself off of the Jardiance because it was causing her to lose weight. She states her sugars are up and down, depending on what she eats. Trying to eat better. Knows to avoid carb loaded foods/drinks.  Has appt with new endocrinologist on 8/2, provided address of the office to patient. She is scheduled for colonoscopy on 7/22 and f/up visit with doctor on 7/28.  7/25: Patient has been under a lot of stress. Had a few deaths, glucose is up. Endocrinology appt on 8/2.    8/12: Needs to reschedule her appt with endocrinology. Was unable to go to her endocrinology appt because her phone broke. Glucose has been doing good, high with eating certain things-encouraged to cut the sweet tea. She knows the difference in healthy carbs and unhealthy carbs. Encouraged her to take care of herself. Patient's primary care provider relationship reviewed with patient and modified, as applicable. Readiness to Change: []  Pre-contemplative    []  Contemplative  []  Preparation               [x]  Action                  []  Maintenance    Barriers/Challenges to Care: []  Decline in memory    []  Language barrier     []  Emotional                  []  Limited mobility  []  Lack of motivation     [] Vision, hearing or cognitive impairment []  Knowledge [] Financial Barriers []  Lack of support  []  Pain []  Other [x]  None    Plan for next call:  Call in two weeks, 8/26. Appt with endo?

## 2022-08-26 ENCOUNTER — PATIENT OUTREACH (OUTPATIENT)
Dept: OTHER | Age: 52
End: 2022-08-26

## 2022-08-26 NOTE — PROGRESS NOTES
Follow up phone call to patient, two pt identifiers verified. Discussed patient's goals:    Goals        DM  Diabetes Support and Education / Care Coordination      Referred from [facility] with DM. A1C>9    1)  General:   Patient is able to state a basic definition of diabetes including: risk factors, basic pathophysiology, complications and treatment. 2)  Diet:   Patient will be able to read a basic food label and identify role of calories, carbohydrates, protein and fats in healthy eating. 3)  Activity:   Patient will identify the potential health benefits of regular exercise:  decreased cholesterol, improved mood, decreased blood pressure, lower stress/anxiety, weight loss, decreased blood sugar. 4)  Monitoring:  Patient will be able to identify what an A1C test measures. 5)  Medications:  Patient will demonstrate knowledge of diabetes medications. 6)  Risk Reduction:  Patient is able to state goal target for A1C (<7), blood pressure (<130/70), and LDL cholesterol (<100) to reduce diabetes complications. 7)  Problem Solving:  Patient will identify s/sx and treatment for hyper- and hypoglycemia. 8)  Healthy Coping:  Patient will be able to identify two community support resources. Patient will identify two stress relieving techniques. Patient will identify health risk factors and complications of diabetes  Kidney disease  Heart disease  Neuropathy  Skin breakdown  Diabetic Retinopathy  Prevention:   Stable controlled A1C,  healthy lifestyle (diet, exercise, hydration, stress reduction, monitoring, skin care)   3/15- Pt to have BW this week/  A1C.    8/3: Patient's A1C up to 12.5 7/21 - discussed healthy food options. States she eats cheerios or corn flakes for breakfast.  Encouraged lean protein, egg whites, whole grains, yogurt.   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 Will mail nutritional information from "[employer]. Encouraged drinking more water, plans to take water bottles with her to work, as they no longer offer water bottles. Has been drinking 3 sodas/day. Will cut these out. Scheduled to meet with a nutritionist tomorrow, 8/4.  8/10: Has been able to cut soda, drinks one a day. Drinking water. Trying to meal prep. Bought fruits and veggies. 8/27: Patient had visit with nurse practitioner. On some medications to help control diabetes. Still recovering from [place].  9/8: Patient states glucose has been well controlled. Complaints of blurred vision with new weekly medication. Encouraged her to discuss this with her PCP, states she will tomorrow, as she needs to go to the office to  her \"leave paperwork\". 9/23: Discussed medication issues. Back on jardiance. 10/8: Diabetes, glucose has been in lower 200s. Taking 27 units of Lantus/daily, Januvia. Working on getting glucose under better control. Has been stressed out with everything, loss of father, covid, pink eye. Has gotten STD approved and has received check. 10/25: Blood sugars are still up and down. Taking januvia and lantus 27 units. Has appointment to go to doctor tomorrow to get STD paperwork filled out for 10 days she missed due to \"pink eye\". Discussed trying to decrease stress levels to help with glucose. 11/22: Blood sugars - patient states they have been \"alright\"  Provided encouragement to make healthy choices, acknowledging that it can be difficult during the holidays. 12/20: Patient is at her provider's office right now, glucose was 400 and she was having chest pain on and off for about a week or two. The doctor is out until January, they told her she should go to the emergency room. Encouraged her to call the nurse access or call 911 due to her symptoms. She says she has had a lot of stress and been dealing with that.   Says she is waiting until tomorrow and if she still doesn't feel well she will go to the ER. Says her symptoms have been going on for weeks, it's not constant pain. Says she was told it could be reflux, says she takes medication for that. Continued to encourage her to call the NAL or go to the ED, as this was the recommendation from her provider's office. She wants to wait until tomorrow. 12/21Glorinzenia Hao to ED, they did confirm that her chest pain was related to reflux, ordered prilosec, carafate to help relieve this pain. She had run out of her [place], has ordered it. Also needs osvaldo sensors, offered to call [facility] for her, she said she would appreciate that. Called to order, spoke with Eduardo Bundy at [place], she is requesting new script from patient's PCP and will fill. Paddyuvia is expected to arrive to patient on 12/22.  12/28: She got her sensors, and has received her Januvia. Upset, she has dropped off STD paperwork again at her doctor's office (2nd time). [facility] says they have not received it. Contacted PCP office and spoke with [facility], she checked on the paperwork and stated that because her provider is out, back on 1/3, the paperwork cannot be sent yet. Relayed this information to the patient. She was appreciative for my calling. She requested the phone number to get her password reset for my chart. Provided that to her. 1/6: Patient diagnosed with COVID, blood glucose this afternoon was 402. States she has been drinking apple juice, as she does not like water. Encouraged her to try 1/2 water, 1/2 juice. She is taking 30 units of Lantus at night. 1/7: Discussed avoiding juices to maintain hydration. States she is drinking water now. Provided praise. 1/12: Patient feeling better today, will go back to work tomorrow. Discussed issues with her manager, states she hopes she does not give her a hard time. Provided encouragement. Asked her about her glucose. She states it's been up and down, depending on what she eats.   Provided education about this, states she has been drinking fruit juice and apple juice, reminded her of the importance of decreasing this, explained that one cup of juice is about the total amount of sugar intake she should have for one day. Reminded her she could cut juice with water. She stated she would do better. Labs to be drawn on 1/18 through PCP.  1/19: Discussed patient's A1C - 12.8. She stated she expected it to be high due to stress and illness. Her provider has increased her insulin - 10 units in AM and 30 units at night. Has f/up on 2/2.  2/17: Missed her 2/2 appointment, didn't get a reminder from her PCP office about the appointment, also mentioned the [facility] call that was missed. Patient states she has had ingrown toe nails in both big toes, had one cut out last week with Dr. Jennifer Prakash and the other will be next week. Encouraged her to re schedule her PCP visit. States glucose has been up and down, encouraged patient to drink water. 3/3: Patient states she did not check her glucose today, encouraged her to check regularly and make sure she is eating healthy to keep levels below 130. She will call to make appt with her PCP and pharmacy to stay enrolled with [facility]. Says she has been working nights and will be finished with this in the next week or so. New co-worker is supposed to start on 3/7. Reminded her that she needs to take care of herself and make her health a priority. 3/18: Patient states she had call with Marilyn Miller, Diabetes Pharmacist and has gotten her medications corrected. She is excited that her glucose is now well managed since she has been taking her medication as Mrailyn Miller instructed. She plans to see an endocrinologist for her diabetes. We discussed how uncontrolled diabetes affects all of the body. Patient acknowledged that she did not want this to happen to her.   We discussed soda and alternatives - water with flavor enhancers, sugar free options - [place], crystal light-lemonaide. Provided patient with smoking cessation phone number 3-981-Quit Now and Be Well program Breathe Easy. Patient plans to quit smoking, as well. States her fiance smokes and may quit with her. Encouragement provided. 4/1: Patient states her blood sugars have been stable, this morning 112. Stays below 150, eating better. Called NP Darrin's office to schedule 3 month f/up, not virtual visit, must be on Tuesday - 6/14/22 1 pm (15 min early); called patient to let her know. Patient states she has not had time to enroll in the Breathe Easy program.  Will continue to encourage. 4/22: Had MD appt on 4/18. Has to get ultrasound on Monday due to nerve pain in her legs, Tuesday colonoscopy, sugar has been elevated due to pain. Will have eye exam.  5/10: General Surgeon appt. Today, 5/10, states this is for colonoscopy pre check up. Still having pain in legs, encouraged her to get endocrinology appt or appt with her PCP. Discussed glucose, states it's been pretty good. This morning stated it was 210 because she ate something last night. States it goes back down when she takes her medication. Discussed good glucose control will help prevent effects of diabetes - like neuropathy. States her work schedule has been bad, working day shift M-Th, then evening/nights Friday-Sun. Hope to get more help soon. 6/1: Out of work since 5/20/22 trigger finger. Swollen and has a lot of pain, wearing splint. Back to doctor on 6/3 and may go back to work on 6/6. Blood sugar was 216 at breakfast (just drank coke). 6/16: Back to work. Glucose is doing okay. States she has good and bad days. Encouraged her to avoid sodas and fruit juice. States she knows. 7/15: Patient states she has taken herself off of the Jardiance because it was causing her to lose weight. She states her sugars are up and down, depending on what she eats. Trying to eat better. Knows to avoid carb loaded foods/drinks.  Has appt with new endocrinologist on 8/2, provided address of the office to patient. She is scheduled for colonoscopy on 7/22 and f/up visit with doctor on 7/28.  7/25: Patient has been under a lot of stress. Had a few deaths, glucose is up. Endocrinology appt on 8/2.    8/12: Needs to reschedule her appt with endocrinology. Was unable to go to her endocrinology appt because her phone broke. Glucose has been doing good, high with eating certain things-encouraged to cut the sweet tea. She knows the difference in healthy carbs and unhealthy carbs. Encouraged her to take care of herself.    8/26: Patient states glucose is up and down. Let her know that I am worried about her and hope she can have consistency. She states she is working on it. Encouraged her to get appointment with endocrinologist, she states her ride has been sick. Discussed Be Well screenings. Can't go to screenings at work. Encouraged her to go to her PCP to get screening completed, let her know the deadline is 9/30. She is aware of the importance of this and knows monetary benefits toward prescriptions. Patient's primary care provider relationship reviewed with patient and modified, as applicable. Readiness to Change: []  Pre-contemplative    []  Contemplative  []  Preparation               [x]  Action                  []  Maintenance    Barriers/Challenges to Care: []  Decline in memory    []  Language barrier     []  Emotional                  []  Limited mobility  []  Lack of motivation     [] Vision, hearing or cognitive impairment []  Knowledge [] Financial Barriers []  Lack of support  []  Pain []  Other [x]  None    Key pt activities to achieve better health:   []  Weight loss  [x]  Improved diabetic control  []  Decreased cholesterol levels  []  Decreased blood pressure  []    []    Upcoming appointments: No future appointments. Plan for next call:  Call in two weeks, 9/9.

## 2022-09-09 ENCOUNTER — PATIENT OUTREACH (OUTPATIENT)
Dept: OTHER | Age: 52
End: 2022-09-09

## 2022-09-09 NOTE — PROGRESS NOTES
Follow up phone call to patient, two pt identifiers verified. Discussed patient's goals:    Goals        DM  Diabetes Support and Education / Care Coordination      Referred from [facility]. A1C>9    1)  General:   Patient is able to state a basic definition of diabetes including: risk factors, basic pathophysiology, complications and treatment. 2)  Diet:   Patient will be able to read a basic food label and identify role of calories, carbohydrates, protein and fats in healthy eating. 3)  Activity:   Patient will identify the potential health benefits of regular exercise:  decreased cholesterol, improved mood, decreased blood pressure, lower stress/anxiety, weight loss, decreased blood sugar. 4)  Monitoring:  Patient will be able to identify what an A1C test measures. 5)  Medications:  Patient will demonstrate knowledge of diabetes medications. 6)  Risk Reduction:  Patient is able to state goal target for A1C (<7), blood pressure (<130/70), and LDL cholesterol (<100) to reduce diabetes complications. 7)  Problem Solving:  Patient will identify s/sx and treatment for hyper- and hypoglycemia. 8)  Healthy Coping:  Patient will be able to identify two community support resources. Patient will identify two stress relieving techniques. Patient will identify health risk factors and complications of diabetes  Kidney disease  Heart disease  Neuropathy  Skin breakdown  Diabetic Retinopathy  Prevention:   Stable controlled A1C,  healthy lifestyle (diet, exercise, hydration, stress reduction, monitoring, skin care)   3/15- Pt to have BW this week/  A1C.    8/3: Patient's A1C up to 12.5 7/21 - discussed healthy food options. States she eats cheerios or corn flakes for breakfast.  Encouraged lean protein, egg whites, whole grains, yogurt.   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 Will mail nutritional information from [employer]. Encouraged drinking more water, plans to take water bottles with her to work, as they no longer offer water bottles. Has been drinking 3 sodas/day. Will cut these out. Scheduled to meet with a nutritionist tomorrow, 8/4.  8/10: Has been able to cut soda, drinks one a day. Drinking water. Trying to meal prep. Bought fruits and veggies. 8/27: Patient had visit with nurse practitioner. On some medications to help control diabetes. Still recovering from [place].  9/8: Patient states glucose has been well controlled. Complaints of blurred vision with new weekly medication. Encouraged her to discuss this with her PCP, states she will tomorrow, as she needs to go to the office to  her \"leave paperwork\". 9/23: Discussed medication issues. Back on jardiance. 10/8: Diabetes, glucose has been in lower 200s. Taking 27 units of Lantus/daily, Januvia. Working on getting glucose under better control. Has been stressed out with everything, loss of father, covid, pink eye. Has gotten STD approved and has received check. 10/25: Blood sugars are still up and down. Taking januvia and lantus 27 units. Has appointment to go to doctor tomorrow to get STD paperwork filled out for 10 days she missed due to \"pink eye\". Discussed trying to decrease stress levels to help with glucose. 11/22: Blood sugars - patient states they have been \"alright\"  Provided encouragement to make healthy choices, acknowledging that it can be difficult during the holidays. 12/20: Patient is at her provider's office right now, glucose was 400 and she was having chest pain on and off for about a week or two. The doctor is out until January, they told her she should go to the emergency room. Encouraged her to call the nurse access or call 911 due to her symptoms. She says she has had a lot of stress and been dealing with that.   Says she is waiting until tomorrow and if she still doesn't feel well she will go to the ER. Says her symptoms have been going on for weeks, it's not constant pain. Says she was told it could be reflux, says she takes medication for that. Continued to encourage her to call the [facility] or go to the ED, as this was the recommendation from her provider's office. She wants to wait until tomorrow. 12/21[facility] to ED, they did confirm that her chest pain was related to reflux, ordered prilosec, carafate to help relieve this pain. She had run out of her Anthonette Clover, has ordered it. Also needs osvaldo sensors, offered to call [facility] for her, she said she would appreciate that. Called to order, spoke with Tuan Tracey at [employer], she is requesting new script from patient's PCP and will fill. Januvia is expected to arrive to patient on 12/22.  12/28: She got her sensors, and has received her Januvia. Upset, she has dropped off STD paperwork again at her doctor's office (2nd time). [employer] says they have not received it. Contacted PCP office and spoke with [address], she checked on the paperwork and stated that because her provider is out, back on 1/3, the paperwork cannot be sent yet. Relayed this information to the patient. She was appreciative for my calling. She requested the phone number to get her password reset for my chart. Provided that to her. 1/6: Patient diagnosed with COVID, blood glucose this afternoon was 402. States she has been drinking apple juice, as she does not like water. Encouraged her to try 1/2 water, 1/2 juice. She is taking 30 units of Lantus at night. 1/7: Discussed avoiding juices to maintain hydration. States she is drinking water now. Provided praise. 1/12: Patient feeling better today, will go back to work tomorrow. Discussed issues with her manager, states she hopes she does not give her a hard time. Provided encouragement. Asked her about her glucose. She states it's been up and down, depending on what she eats.   Provided education about this, states she has been drinking fruit juice and apple juice, reminded her of the importance of decreasing this, explained that one cup of juice is about the total amount of sugar intake she should have for one day. Reminded her she could cut juice with water. She stated she would do better. Labs to be drawn on 1/18 through PCP.  1/19: Discussed patient's A1C - 12.8. She stated she expected it to be high due to stress and illness. Her provider has increased her insulin - 10 units in AM and 30 units at night. Has f/up on 2/2.  2/17: Missed her 2/2 appointment, didn't get a reminder from her PCP office about the appointment, also mentioned the [facility] call that was missed. Patient states she has had ingrown toe nails in both big toes, had one cut out last week with Dr. Steve Ramirez and the other will be next week. Encouraged her to re schedule her PCP visit. States glucose has been up and down, encouraged patient to drink water. 3/3: Patient states she did not check her glucose today, encouraged her to check regularly and make sure she is eating healthy to keep levels below 130. She will call to make appt with her PCP and pharmacy to stay enrolled with [facility]. Says she has been working nights and will be finished with this in the next week or so. New co-worker is supposed to start on 3/7. Reminded her that she needs to take care of herself and make her health a priority. 3/18: Patient states she had call with Martita Bryant, Diabetes Pharmacist and has gotten her medications corrected. She is excited that her glucose is now well managed since she has been taking her medication as Martita Has instructed. She plans to see an endocrinologist for her diabetes. We discussed how uncontrolled diabetes affects all of the body. Patient acknowledged that she did not want this to happen to her.   We discussed soda and alternatives - water with flavor enhancers, sugar free options - [place], crystal light-lemonaide. Provided patient with smoking cessation phone number [phone]-Quit Now and Be Well program Breathe Easy. Patient plans to quit smoking, as well. States her fiance smokes and may quit with her. Encouragement provided. 4/1: Patient states her blood sugars have been stable, this morning 112. Stays below 150, eating better. Called NP Darrin's office to schedule 3 month f/up, not virtual visit, must be on Tuesday - 6/14/22 1 pm (15 min early); called patient to let her know. Patient states she has not had time to enroll in the Breathe Easy program.  Will continue to encourage. 4/22: Had MD appt on 4/18. Has to get ultrasound on Monday due to nerve pain in her legs, Tuesday colonoscopy, sugar has been elevated due to pain. Will have eye exam.  5/10: General Surgeon appt. Today, 5/10, states this is for colonoscopy pre check up. Still having pain in legs, encouraged her to get endocrinology appt or appt with her PCP. Discussed glucose, states it's been pretty good. This morning stated it was 210 because she ate something last night. States it goes back down when she takes her medication. Discussed good glucose control will help prevent effects of diabetes - like neuropathy. States her work schedule has been bad, working day shift M-Th, then evening/nights Friday-Sun. Hope to get more help soon. 6/1: Out of work since 5/20/22 trigger finger. Swollen and has a lot of pain, wearing splint. Back to doctor on 6/3 and may go back to work on 6/6. Blood sugar was 216 at breakfast (just drank coke). 6/16: Back to work. Glucose is doing okay. States she has good and bad days. Encouraged her to avoid sodas and fruit juice. States she knows. 7/15: Patient states she has taken herself off of the Jardiance because it was causing her to lose weight. She states her sugars are up and down, depending on what she eats. Trying to eat better. Knows to avoid carb loaded foods/drinks.  Has appt with new endocrinologist on 8/2, provided address of the office to patient. She is scheduled for colonoscopy on 7/22 and f/up visit with doctor on 7/28.  7/25: Patient has been under a lot of stress. Had a few deaths, glucose is up. Endocrinology appt on 8/2.    8/12: Needs to reschedule her appt with endocrinology. Was unable to go to her endocrinology appt because her phone broke. Glucose has been doing good, high with eating certain things-encouraged to cut the sweet tea. She knows the difference in healthy carbs and unhealthy carbs. Encouraged her to take care of herself.    8/26: Patient states glucose is up and down. Let her know that I am worried about her and hope she can have consistency. She states she is working on it. Encouraged her to get appointment with endocrinologist, she states her ride has been sick. Discussed Be Well screenings. Can't go to screenings at work. Encouraged her to go to her PCP to get screening completed, let her know the deadline is 9/30. She is aware of the importance of this and knows monetary benefits toward prescriptions. 9/9: Patient states Mick Espinoza made her lose weight, glucose has been. States glucose was 50 this morning, states that she had one cookie. Stongly encouraged her to make appointment for follow up. She plans to call her PCP. Patient's primary care provider relationship reviewed with patient and modified, as applicable.       Readiness to Change: []  Pre-contemplative    []  Contemplative  []  Preparation               [x]  Action                  []  Maintenance    Barriers/Challenges to Care: []  Decline in memory    []  Language barrier     []  Emotional                  []  Limited mobility  []  Lack of motivation     [] Vision, hearing or cognitive impairment []  Knowledge [] Financial Barriers []  Lack of support  []  Pain []  Other [x]  None    Key pt activities to achieve better health:   []  Weight loss  [x]  Improved diabetic control  []  Decreased cholesterol levels  []  Decreased blood pressure  []    []    Plan for next call:  Call 9/12 to remind her to make appt with her PCP.

## 2022-09-12 ENCOUNTER — PATIENT OUTREACH (OUTPATIENT)
Dept: OTHER | Age: 52
End: 2022-09-12

## 2022-09-12 NOTE — PROGRESS NOTES
Called patient today, per her request, to remind her to make her appointment with her provider for labs. Patient states she will call.

## 2022-09-16 ENCOUNTER — PATIENT OUTREACH (OUTPATIENT)
Dept: OTHER | Age: 52
End: 2022-09-16

## 2022-09-16 ENCOUNTER — TELEPHONE (OUTPATIENT)
Dept: PHARMACY | Age: 52
End: 2022-09-16

## 2022-09-16 NOTE — TELEPHONE ENCOUNTER
As of 9/7/22 patient has used $550 of the maximum of $600 a year in waived co pays for specific medications and pharmacy-related supplies through the [address] [facility] for the DM Program.    Patient currently enrolled in the DM Program and is nearing the maximum of $600 a year in waived co pays for specific medications and pharmacy-related supplies through the [address] [facility]. Courtesy call placed to patient to advise them of the above information. Spoke to patient and advised them of the above information. Patient verified understanding.       Satish Borden, [address] [place] [address]   Phone: [phone]     For Pharmacy [facility] in place: No  Time Spent (min): 10

## 2022-09-16 NOTE — PROGRESS NOTES
Attempt to reach patient for follow up. Mailbox full, unable to leave VM left with contact information. Per chart review, patient has not made her follow up appointment. Will continue to monitor.

## 2022-09-19 RX ORDER — PANTOPRAZOLE SODIUM 40 MG/1
40 TABLET, DELAYED RELEASE ORAL DAILY
Qty: 90 TABLET | Refills: 3 | Status: SHIPPED | OUTPATIENT
Start: 2022-09-19

## 2022-09-19 NOTE — TELEPHONE ENCOUNTER
----- Message from Leydi Yo sent at 9/19/2022  9:11 AM EDT -----  Subject: Refill Request    QUESTIONS  Name of Medication? pantoprazole (PROTONIX) 40 mg tablet  Patient-reported dosage and instructions? every day as needed  How many days do you have left? 0  Preferred [facility]acy? [id number]  Pharmacy phone number (if available)? [phone]  ---------------------------------------------------------------------------  --------------  CALL BACK INFO  What is the best way for the office to contact you? OK to respond with   electronic message via [facility] portal (only for patients who have   registered [facility] account)  Preferred Call Back Phone Number? [phone]  ---------------------------------------------------------------------------  --------------  SCRIPT ANSWERS  Relationship to Patient?  Self

## 2022-09-20 DIAGNOSIS — E11.65 UNCONTROLLED TYPE 2 DIABETES MELLITUS WITH HYPERGLYCEMIA (HCC): ICD-10-CM

## 2022-09-20 RX ORDER — FLASH GLUCOSE SENSOR
KIT MISCELLANEOUS
Qty: 1 KIT | Refills: 3 | Status: SHIPPED | OUTPATIENT
Start: 2022-09-20 | End: 2022-09-21 | Stop reason: SDUPTHER

## 2022-09-21 ENCOUNTER — PATIENT OUTREACH (OUTPATIENT)
Dept: OTHER | Age: 52
End: 2022-09-21

## 2022-09-21 DIAGNOSIS — E11.65 UNCONTROLLED TYPE 2 DIABETES MELLITUS WITH HYPERGLYCEMIA (HCC): ICD-10-CM

## 2022-09-22 ENCOUNTER — TELEPHONE (OUTPATIENT)
Dept: FAMILY MEDICINE CLINIC | Age: 52
End: 2022-09-22

## 2022-09-22 RX ORDER — FLASH GLUCOSE SENSOR
KIT MISCELLANEOUS
Qty: 1 KIT | Refills: 3 | Status: SHIPPED | OUTPATIENT
Start: 2022-09-22

## 2022-09-22 NOTE — TELEPHONE ENCOUNTER
EDILSON Dominguez, transferred to [facility] / Pharmacist, per Vianney De León for change. So verbal Rx given for the Free Style Antony 2, # 90 day supply with 1 refill, Use as directed, sensor change as before every 2 weeks. Orogastric; Peptide Based High Protein; Continuous; 20; Yes; 10; Q 4 hours; 35; 100; Q 4 hours; Protein;  Four proteinex daily      OBJECTIVE:   Patient Active Problem List   Diagnosis    Partial bowel obstruction (HCC)    Hiatal hernia    Acute respiratory failure with hypoxia and hypercapnia (HCC)    SBO (small bowel obstruction) (HCC)    Aspiration into airway    Acute respiratory failure with hypoxia (HCC)    Small bowel obstruction (HCC)    Diastolic heart failure (HCC)    History of atrial fibrillation    Supraventricular tachycardia (HCC)    Arterial hypotension       Allergies  Codeine    Medications    Scheduled Meds:   acetaZOLAMIDE  250 mg IntraVENous Daily    gabapentin  400 mg Oral TID    docusate  100 mg Oral Daily    senna  10 mL Oral Nightly    polyethylene glycol  17 g Oral Daily    metoprolol tartrate  25 mg Oral BID    meropenem  1,000 mg IntraVENous Q8H    famotidine (PEPCID) injection  20 mg IntraVENous BID    chlorhexidine  15 mL Mouth/Throat BID    ipratropium-albuterol  1 ampule Inhalation Q4H WA    sodium chloride flush  5-40 mL IntraVENous 2 times per day    levothyroxine  200 mcg Oral Daily    [Held by provider] spironolactone  50 mg Oral Daily    enoxaparin  40 mg SubCUTAneous BID     Continuous Infusions:   furosemide 20 mg/hr (08/21/22 1610)    fentaNYL 75 mcg/hr (08/19/22 0514)    propofol 25 mcg/kg/min (08/21/22 1746)    sodium chloride       PRN Meds:  fentanNYL, midazolam, artificial tears, acetaminophen, potassium chloride, fentaNYL **AND** fentaNYL, acetaminophen, sodium chloride flush, sodium chloride, ondansetron **OR** ondansetron, diazePAM    Vitals   Vitals /wt Patient Vitals for the past 8 hrs:   BP Temp Temp src Pulse Resp SpO2   08/21/22 1800 97/70 -- -- 85 18 92 %   08/21/22 1700 119/81 -- -- 90 17 93 %   08/21/22 1600 111/69 98.6 °F (37 °C) Axillary 89 19 95 %   08/21/22 1547 -- -- -- 87 17 92 %   08/21/22 1500 115/76 -- -- 85 18 93 %   08/21/22 1400 111/76 -- -- 83 18 93 %   08/21/22 1300 104/72 -- -- 85 22 92 %   08/21/22 1200 111/75 98.3 °F (36.8 °C) Axillary 84 18 (!) 89 %   08/21/22 1132 -- -- -- 84 18 93 %   08/21/22 1100 108/78 -- -- 85 18 (!) 89 %        72HR INTAKE/OUTPUT:    Intake/Output Summary (Last 24 hours) at 8/21/2022 1853  Last data filed at 8/21/2022 1840  Gross per 24 hour   Intake 771.47 ml   Output 5000 ml   Net -4228.53 ml       Exam:    Gen:  intubated but awake, following commands, obese  Eyes: PERRL. No sclera icterus. No conjunctival injection. ENT: No discharge. Pharynx clear. External appearance of ears and nose normal.  Neck: Trachea midline. No obvious mass. Resp: No accessory muscle use. No crackles. No wheezes. No rhonchi. CV: Regular rate. Regular rhythm. No murmur or rub. No edema. GI: Non-tender. Non-distended. No hernia. Skin: Warm, dry, normal texture and turgor. Lymph: No cervical LAD. No supraclavicular LAD. M/S: / Ext. No cyanosis. No clubbing. No joint deformity. Neuro: CN 2-12 are intact,  no neurologic deficits noted. PT/INR:   No results for input(s): PROTIME, INR in the last 72 hours. APTT: No results for input(s): APTT in the last 72 hours. CBC:   Recent Labs     08/19/22  0421 08/20/22  0430 08/21/22  0435   WBC 8.0 9.0 9.7   HGB 9.1* 9.3* 9.2*   HCT 27.2* 28.3* 27.5*   MCV 95.0 95.0 94.5   * 459* 478*       BMP:   Recent Labs     08/19/22  0421 08/19/22  1430 08/20/22  0430 08/20/22  1417 08/20/22  2200 08/21/22  0435 08/21/22  1430      < > 143 145 146* 145 147*   K 3.6   < > 3.7 3.3* 3.8 3.6 3.2*      < > 102 106 105 104 107   CO2 28   < > 28 31 31 30 29   PHOS 2.7  --  3.2  --   --  3.6  --    BUN 52*   < > 49* 46* 44* 41* 37*   CREATININE 1.2   < > 1.1 1.3 1.5* 1.3 1.4*    < > = values in this interval not displayed. LIVER PROFILE:   No results for input(s): ALKPHOS, AST, ALT, ALB, BILIDIR, BILITOT, ALKPHOS in the last 72 hours. No results for input(s):  AMYLASE in the last 72 hours. No results for input(s): LIPASE in the last 72 hours. UA:  No results for input(s): NITRITE, LABCAST, WBCUA, RBCUA, MUCUS in the last 72 hours. TROPONIN:   No results for input(s): Adonica Hoose in the last 72 hours. Lab Results   Component Value Date/Time    URRFLXCULT Not Indicated 08/15/2022 09:16 AM       No results for input(s): TSHREFLEX in the last 72 hours. No components found for: [id number]  POC GLUCOSE:    Recent Labs     08/20/22  2341 08/21/22  0436 08/21/22  0744 08/21/22  1221 08/21/22  1613   POCGLU 100* 98 91 91 100*     No results for input(s): LABA1C in the last 72 hours. No results found for: LABA1C    Echocardiogram shows normal ejection fraction  Grade 1 diastolic dysfunction      ASSESSMENT AND PLAN    Acute respiratory failure  Intubated 8/8  Patient remains on the ventilator with FiO2 70%  Patient not making any progress for several days    Aspiration pna  Status post Zosyn  Now on meropenem only  Still has low-grade temperature    Acute on chronic diastolic CHF  Echo (4/0/07) LVEF 60-65%, Grade II diastolic dysfunction.   Fluid overload status  Continue 3 times daily IV Lasix  Continue to monitor creatinine      Partial small Bowel obstruction  CT scan of the abdomen with contrast shows no obstruction  Og placed per GI 8/10  BM 8/9  Tolerating tube feed  Patient can be started on oral intake once extubated    SVT  better  H/o atrial fibrillation  HR controled    CHARLENE  Renal function improved      Hypotention  Blood pressure is stable    Hiatal hernia  - CT chest/abd/pelv: Large hiatal hernia containing much of the stomach which is distended       Atrial Fibrillation  Patient developed the rapid ventricular rate  IV Lopressor  Consult to cardiology     HTN  - monitor blood pressure  - restart home meds when able    Morbid obesity BMI 55  -Complicates overall care      Code Status: Full Code      Dispo - pending clinical improvement    The patient and / or the family were informed of the results of any tests, a time was given to answer questions, a plan was proposed and they agreed with plan.     Irving Quezada MD

## 2022-09-22 NOTE — TELEPHONE ENCOUNTER
PC from [place], to confirm if OK to switch from . [employer] 14 day system phasing out soon to Ankush Major 2 with patient. Will call back with confirmation from [facility].

## 2022-09-27 ENCOUNTER — OFFICE VISIT (OUTPATIENT)
Dept: FAMILY MEDICINE CLINIC | Age: 52
End: 2022-09-27
Payer: COMMERCIAL

## 2022-09-27 VITALS
DIASTOLIC BLOOD PRESSURE: 68 MMHG | BODY MASS INDEX: 21.91 KG/M2 | HEIGHT: 60 IN | WEIGHT: 111.6 LBS | TEMPERATURE: 98.2 F | OXYGEN SATURATION: 98 % | RESPIRATION RATE: 19 BRPM | SYSTOLIC BLOOD PRESSURE: 120 MMHG | HEART RATE: 81 BPM

## 2022-09-27 DIAGNOSIS — E11.21 TYPE 2 DIABETES WITH NEPHROPATHY (HCC): Primary | ICD-10-CM

## 2022-09-27 DIAGNOSIS — L24.5 IRRITANT CONTACT DERMATITIS DUE TO OTHER CHEMICAL PRODUCTS: ICD-10-CM

## 2022-09-27 DIAGNOSIS — F17.200 SMOKER: ICD-10-CM

## 2022-09-27 DIAGNOSIS — E11.65 UNCONTROLLED TYPE 2 DIABETES MELLITUS WITH HYPERGLYCEMIA (HCC): ICD-10-CM

## 2022-09-27 DIAGNOSIS — Z23 ENCOUNTER FOR IMMUNIZATION: ICD-10-CM

## 2022-09-27 DIAGNOSIS — E78.2 MIXED DYSLIPIDEMIA: ICD-10-CM

## 2022-09-27 DIAGNOSIS — I10 ESSENTIAL HYPERTENSION: ICD-10-CM

## 2022-09-27 LAB — HBA1C MFR BLD HPLC: 10.6 %

## 2022-09-27 PROCEDURE — 99214 OFFICE O/P EST MOD 30 MIN: CPT

## 2022-09-27 PROCEDURE — 3046F HEMOGLOBIN A1C LEVEL >9.0%: CPT

## 2022-09-27 PROCEDURE — 90686 IIV4 VACC NO PRSV 0.5 ML IM: CPT

## 2022-09-27 PROCEDURE — 36415 COLL VENOUS BLD VENIPUNCTURE: CPT

## 2022-09-27 PROCEDURE — 90471 IMMUNIZATION ADMIN: CPT

## 2022-09-27 PROCEDURE — 83036 HEMOGLOBIN GLYCOSYLATED A1C: CPT

## 2022-09-27 RX ORDER — INSULIN GLARGINE 100 [IU]/ML
INJECTION, SOLUTION SUBCUTANEOUS
Qty: 21 ML | Refills: 0 | Status: SHIPPED | OUTPATIENT
Start: 2022-09-27 | End: 2022-09-29 | Stop reason: SDUPTHER

## 2022-09-27 NOTE — PROGRESS NOTES
1. \"Have you been to the ER, urgent care clinic since your last visit? Hospitalized since your last visit? \" No    2. \"Have you seen or consulted any other health care providers outside of the [address] [place] [address] since your last visit? \" No     3. For patients aged 39-70: Has the patient had a colonoscopy / FIT/ Cologuard? YES      If the patient is female:    4. For patients aged 41-77: Has the patient had a mammogram within the past 2 years? YES      5. For patients aged 21-65: Has the patient had a pap smear? Yes - no Care Gap present    Chief Complaint   Patient presents with    Diabetes     Blood work and flu     Visit Vitals  /68 (BP 1 Location: Left upper arm, BP Patient Position: Sitting, BP Cuff Size: Large adult)   Pulse 81   Temp 98.2 °F (36.8 °C) (Temporal)   Resp 19   Ht 5' (1.524 m)   Wt 111 lb 9.6 oz (50.6 kg)   SpO2 98%   BMI 21.80 kg/m²     Labs drawn in left arm per [place]'s orders. Patient tolerated well. Flu injection given in right arm.

## 2022-09-27 NOTE — PROGRESS NOTES
Chief Complaint   Patient presents with    Diabetes     Blood work and flu       HPI:     is a 46 y.o. female who presents for chronic follow-up. T2DM:  Poor control, A1C 10.4<--12.8<--11.7; fastings sugars 130s-200s, n episodes of hypoglycemia. Stopped Ozempic because it gave her blurry vision and stopped Jardiance because it caused weight loss; now using Lantus 15 units in the morning and 15-30 units at bedtime. Was referred to endocrinology but was unable to make her appt because of work. HTN:  Well controlled on losartan. Does not check at home. HLD:  Compliant with rosuvastatin. Smoker:  Continues to smoke about 1/2ppd; no desire to quit. New issues:  She notes a scattered itchy rash on her arms and chest, worse after working; she uses heavy cream after showering which seems to help. Allergies   Allergen Reactions    Lisinopril Other (comments)     Hands and feet swelling       Current Outpatient Medications   Medication Sig    insulin glargine (Lantus Solostar U-100 Insulin) 100 unit/mL (3 mL) inpn Take 15 units in the morning and 30 units at bedtime    SITagliptin (JANUVIA) 100 mg tablet Take 1 Tablet by mouth daily. flash glucose sensor (FreeStyle Antony 14 Day Sensor) kit APPLY TO UPPER ARM & CHANGE EVERY 2 WEEKS. pantoprazole (PROTONIX) 40 mg tablet Take 1 Tablet by mouth daily. losartan (COZAAR) 25 mg tablet TAKE 1 TABLET BY MOUTH ONE TIME A DAY. STOP LISINOPRIL 2.5MG    rosuvastatin (CRESTOR) 20 mg tablet TAKE ONE TABLET BY MOUTH NIGHTLY    Insulin Needles, Disposable, 31 gauge x 5/16\" ndle Use to administer insulin SQ twice daily as directed. fluticasone propion-salmeteroL (ADVAIR/WIXELA) 250-50 mcg/dose diskus inhaler Take 1 Puff by inhalation every twelve (12) hours. albuterol (PROVENTIL HFA, VENTOLIN HFA, PROAIR HFA) 90 mcg/actuation inhaler Take 2 Puffs by inhalation every four (4) hours as needed for Wheezing or Shortness of Breath.  Indications: asthma attack    flash glucose scanning reader ([email or web address] Antony 14 Day [place]) [facility] Use to check blood glucose TID and PRN. Dx E11.9     No current facility-administered medications for this visit. Past Medical History:   Diagnosis Date    Abnormal EKG 2/27/2018    Abscess 7/24/2019    Asthma     Chest pain 2/27/2018    Chest wall contusion, left, subsequent encounter     Diabetes ([address]     Diverticular disease     Diverticulitis large intestine w/o perforation or abscess w/o bleeding 10/25/2016    Dyslipidemia     Hypertension     Lumbar radiculopathy 02/01/2021    Menopause     Pelvic pain 7/26/2016    Rib fractures     Right hand pain 7/10/2018    Sciatica of left side 02/01/2021    Uterine leiomyoma 7/26/2016    Vaginal candidiasis 7/24/2019       Family History   Problem Relation Age of Onset    Stroke Mother     Diabetes Mother     Heart Disease Father     Diabetes Sister     Diabetes Maternal Grandmother     Breast Cancer Maternal Aunt     Breast Cancer Paternal Aunt        Review of Systems   Constitutional: Negative. Negative for chills, fever and malaise/fatigue. HENT: Negative. Eyes: Negative. Respiratory: Negative. Negative for cough and shortness of breath. Cardiovascular: Negative. Negative for chest pain, palpitations and leg swelling. Gastrointestinal: Negative. Negative for abdominal pain, nausea and vomiting. Genitourinary: Negative. Musculoskeletal: Negative. Skin:  Positive for itching and rash. Neurological: Negative. Negative for dizziness and headaches. Endo/Heme/Allergies: Negative. Psychiatric/Behavioral: Negative. Negative for depression. The patient is not nervous/anxious.        /68 (BP 1 Location: Left upper arm, BP Patient Position: Sitting, BP Cuff Size: Large adult)   Pulse 81   Temp 98.2 °F (36.8 °C) (Temporal)   Resp 19   Ht 5' (1.524 m)   Wt 111 lb 9.6 oz (50.6 kg)   SpO2 98%   BMI 21.80 kg/m²     Wt Readings from Last 3 Encounters:   09/27/22 111 lb 9.6 oz (50.6 kg)   08/04/22 111 lb (50.3 kg)   07/22/22 108 lb (49 kg)       3 most recent PHQ Screens 9/27/2022   PHQ Not Done -   Little interest or pleasure in doing things Several days   Feeling down, depressed, irritable, or hopeless Several days   Total Score PHQ 2 2   Trouble falling or staying asleep, or sleeping too much -   Feeling tired or having little energy -   Poor appetite, weight loss, or overeating -   Feeling bad about yourself - or that you are a failure or have let yourself or your family down -   Trouble concentrating on things such as school, work, reading, or watching TV -   Moving or speaking so slowly that other people could have noticed; or the opposite being so fidgety that others notice -   Thoughts of being better off dead, or hurting yourself in some way -   PHQ 9 Score -   How difficult have these problems made it for you to do your work, take care of your home and get along with others -       Physical Exam  Vitals and nursing note reviewed. Constitutional:       General: She is not in acute distress. Appearance: Normal appearance. She is normal weight. HENT:      Head: Normocephalic and atraumatic. Eyes:      Extraocular Movements: Extraocular movements intact. Conjunctiva/sclera: Conjunctivae normal.      Pupils: Pupils are equal, round, and reactive to light. Neck:      Vascular: No carotid bruit. Cardiovascular:      Rate and Rhythm: Normal rate and regular rhythm. Pulses: Normal pulses. Heart sounds: Normal heart sounds. No murmur heard. No friction rub. No gallop. Pulmonary:      Effort: Pulmonary effort is normal.      Breath sounds: Normal breath sounds. No wheezing, rhonchi or rales. Abdominal:      General: Bowel sounds are normal.      Palpations: Abdomen is soft. Musculoskeletal:         General: Normal range of motion. Cervical back: Normal range of motion and neck supple.    Lymphadenopathy: Cervical: No cervical adenopathy. Skin:     General: Skin is warm and dry. Comments: Dry skin on forearms and chest with scattered erythematous papules     Neurological:      General: No focal deficit present. Mental Status: She is alert and oriented to person, place, and time. Mental status is at baseline. Psychiatric:         Mood and Affect: Mood normal.         Behavior: Behavior normal.         Thought Content: Thought content normal.         Judgment: Judgment normal.       ASSESSMENT AND PLAN:       ICD-10-CM ICD-9-CM    1. Type 2 diabetes with nephropathy (HCC)  E11.21 250.40 MICROALBUMIN, UR, RAND W/ MICROALB/CREAT RATIO     583.81 COLLECTION VENOUS BLOOD,VENIPUNCTURE      METABOLIC PANEL, COMPREHENSIVE      TSH 3RD GENERATION      AMB POC HEMOGLOBIN A1C      HM DIABETES FOOT EXAM      TSH 3RD GENERATION      METABOLIC PANEL, COMPREHENSIVE      MICROALBUMIN, UR, RAND W/ MICROALB/CREAT RATIO      REFERRAL TO ENDOCRINOLOGY      SITagliptin (JANUVIA) 100 mg tablet      2. Essential hypertension  I10 401.9 COLLECTION VENOUS BLOOD,VENIPUNCTURE      METABOLIC PANEL, COMPREHENSIVE      TSH 3RD GENERATION      TSH 3RD GENERATION      METABOLIC PANEL, COMPREHENSIVE      3. Smoker  F17.200 305.1       4. Mixed dyslipidemia  E78.2 272.2 COLLECTION VENOUS BLOOD,VENIPUNCTURE      LIPID PANEL      LIPID PANEL      5. Uncontrolled type 2 diabetes mellitus with hyperglycemia (HCC)  E11.65 250.02 insulin glargine (Lantus Solostar U-100 Insulin) 100 unit/mL (3 mL) inpn      6. Encounter for immunization  Z23 V03.89 SD IMMUNIZ ADMIN,1 SINGLE/COMB VAC/TOXOID      INFLUENZA, FLUARIX, FLULAVAL, FLUZONE (AGE 6 MO+), AFLURIA(AGE 3Y+) IM, PF, 0.5 ML      7. Irritant contact dermatitis due to other chemical products  L24.5 692.4           A1C 10.6 today. Discussed importance of glycemic control; restart Januvia, continue Lantus at increased dose. Refer back to endocrinology and continue diabetes CM.   Normotensive today; continue current regimen without changes. 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 Flu vaccine today. Medication Side Effects and Warnings were discussed with patient:  yes  Patient Labs were reviewed:  yes  Patient Past Records were reviewed:  yes      Patient aware of plan of care and verbalized understanding. Questions answered. RTC 3 months or sooner if needed. On this date 09/27/2022 I have spent 30 minutes reviewing previous notes, test results and face to face with the patient discussing the diagnosis and importance of compliance with the treatment plan as well as documenting on the day of the visit.     [facility], NP

## 2022-09-27 NOTE — PROGRESS NOTES
Please call. A1C 10.6, up slightly from April. Hopefully we will see a difference with adding back the Januvia.   Really needs to get in to see the endocrinologist.

## 2022-09-28 ENCOUNTER — TELEPHONE (OUTPATIENT)
Dept: FAMILY MEDICINE CLINIC | Age: 52
End: 2022-09-28

## 2022-09-28 ENCOUNTER — PATIENT OUTREACH (OUTPATIENT)
Dept: OTHER | Age: 52
End: 2022-09-28

## 2022-09-28 DIAGNOSIS — E11.21 TYPE 2 DIABETES WITH NEPHROPATHY (HCC): ICD-10-CM

## 2022-09-28 DIAGNOSIS — E78.2 MIXED DYSLIPIDEMIA: Primary | ICD-10-CM

## 2022-09-28 LAB
ALBUMIN SERPL-MCNC: 3.7 G/DL (ref 3.5–5)
ALBUMIN/GLOB SERPL: 1.1 {RATIO} (ref 1.1–2.2)
ALP SERPL-CCNC: 135 U/L (ref 45–117)
ALT SERPL-CCNC: 28 U/L (ref 12–78)
ANION GAP SERPL CALC-SCNC: 8 MMOL/L (ref 5–15)
AST SERPL-CCNC: 10 U/L (ref 15–37)
BILIRUB SERPL-MCNC: 0.3 MG/DL (ref 0.2–1)
BUN SERPL-MCNC: 18 MG/DL (ref 6–20)
BUN/CREAT SERPL: 21 (ref 12–20)
CALCIUM SERPL-MCNC: 9.6 MG/DL (ref 8.5–10.1)
CHLORIDE SERPL-SCNC: 104 MMOL/L (ref 97–108)
CHOLEST SERPL-MCNC: 229 MG/DL
CO2 SERPL-SCNC: 28 MMOL/L (ref 21–32)
CREAT SERPL-MCNC: 0.87 MG/DL (ref 0.55–1.02)
CREAT UR-MCNC: 111 MG/DL
GLOBULIN SER CALC-MCNC: 3.5 G/DL (ref 2–4)
GLUCOSE SERPL-MCNC: 213 MG/DL (ref 65–100)
HDLC SERPL-MCNC: 51 MG/DL
HDLC SERPL: 4.5 {RATIO} (ref 0–5)
LDLC SERPL CALC-MCNC: 150 MG/DL (ref 0–100)
MICROALBUMIN UR-MCNC: 197 MG/DL
MICROALBUMIN/CREAT UR-RTO: 1775 MG/G (ref 0–30)
POTASSIUM SERPL-SCNC: 4 MMOL/L (ref 3.5–5.1)
PROT SERPL-MCNC: 7.2 G/DL (ref 6.4–8.2)
SODIUM SERPL-SCNC: 140 MMOL/L (ref 136–145)
TRIGL SERPL-MCNC: 140 MG/DL (ref ?–150)
TSH SERPL DL<=0.05 MIU/L-ACNC: 2.96 UIU/ML (ref 0.36–3.74)
VLDLC SERPL CALC-MCNC: 28 MG/DL

## 2022-09-28 RX ORDER — ROSUVASTATIN CALCIUM 40 MG/1
40 TABLET, COATED ORAL
Qty: 90 TABLET | Refills: 3 | Status: SHIPPED | OUTPATIENT
Start: 2022-09-28

## 2022-09-28 NOTE — PROGRESS NOTES
Follow up phone call to patient, two pt identifiers verified. Patient's primary care provider relationship reviewed with patient and modified, as applicable. Discussed A1C result of 10. Patient states she knows she needs to decrease carb intake. We have reviewed this in the past.  Patient has re scheduled her visit with endocrinologist for 1/11/23. Readiness to Change: []  Pre-contemplative    []  Contemplative  []  Preparation               [x]  Action                  []  Maintenance    Barriers/Challenges to Care: []  Decline in memory    []  Language barrier     []  Emotional                  []  Limited mobility  []  Lack of motivation     [] Vision, hearing or cognitive impairment []  Knowledge [] Financial Barriers []  Lack of support  []  Pain []  Other [x]  None    Key pt activities to achieve better health:   []  Weight loss  [x]  Improved diabetic control  []  Decreased cholesterol levels  []  Decreased blood pressure  []    []    Upcoming appointments:   Future Appointments   Date Time Provider Ankush Lyn   12/27/2022  8:10 AM Vaughn Irving NP [facility] BS AMB   1/11/2023  9:30 AM Carmelo Alvarado MD [facility] [place] [id number] BS AMB     Plan for next call:  Call in three weeks, 10/19.
Unknown

## 2022-09-28 NOTE — PROGRESS NOTES
Your \"bad\" cholesterol continues to be elevated; I recommend increasing your dose of rosuvastatin to help reduce your risk for heart disease and stroke. I will send a prescription with the higher dose to [facility] and you can take 2 pills of your current pills to equal the new dose to use up what you have at home. Your urine test shows that you spilling a lot of protein in your urine; it is really important that we get your diabetes under control to prevent further damage to your kidneys. No concerns with your other labs.

## 2022-09-28 NOTE — PROGRESS NOTES
Patient verified stating name and date of birth. Thu Julian informed of lab results and states understanding.

## 2022-09-29 ENCOUNTER — TELEPHONE (OUTPATIENT)
Dept: FAMILY MEDICINE CLINIC | Age: 52
End: 2022-09-29

## 2022-09-29 DIAGNOSIS — E11.65 UNCONTROLLED TYPE 2 DIABETES MELLITUS WITH HYPERGLYCEMIA (HCC): ICD-10-CM

## 2022-09-29 RX ORDER — INSULIN GLARGINE 100 [IU]/ML
INJECTION, SOLUTION SUBCUTANEOUS
Qty: 45 ML | Refills: 0 | Status: SHIPPED | OUTPATIENT
Start: 2022-09-29

## 2022-09-29 NOTE — TELEPHONE ENCOUNTER
----- Message from Fabi Richards sent at 9/29/2022  4:03 PM EDT -----  Subject: Message to Provider    QUESTIONS  Information for Provider? Lisa Dewey from "[employer] is calling in   regards of prescription Lantus Solostar U-100 Insulin) 100 unit/mL (3 mL)   inpn, is needing the quantity to be 45ml which will add up to a 90 day   supply. Any customer srv agent cant assist please call [phone].   ---------------------------------------------------------------------------  --------------  [address]  [phone]; Do not leave any message, patient will call back for answer  ---------------------------------------------------------------------------  --------------  SCRIPT ANSWERS  Relationship to Patient? Third Party  Third Party Type? Pharmacy? Representative Name?  Lisa Dewey

## 2022-10-19 ENCOUNTER — PATIENT OUTREACH (OUTPATIENT)
Dept: OTHER | Age: 52
End: 2022-10-19

## 2022-10-19 NOTE — PROGRESS NOTES
Resolving current episode of case management. Patient has met patient stated and/or medical goals. Patient consistenly demonstrates understanding of the medical action plan through execution of plan. Appointments with key providers are scheduled and attended. Plan of care is modified and updated to address new challenges and barriers with minimal support from the CM team(proactively uses physicians and community resources) The support system remains current and has been modified as needed. Patient continues to acquire needed resources from the current support system established. Teach back demonstrates that patient understands education for self management of chronic conditions. Patient consistenly communicates understanding of signs,symptoms and complications for all major diagnoses. Patient modifies his/her lifestyle toreduce or avoid risk factors to his/her health. ECM will follow as needed and patient has ECM contact information for future needs. Patient states she is aware of dietary guidelines for improving her blood sugar, through out work over the last years. Last A1C was 10.6 on 9/27/22. Patient has an appointment to see an endocrinologist on 1/11/23. Patient is open to having [facility] department reach out to her should she need follow up for diabetes in the future.

## 2022-10-20 ENCOUNTER — NURSE TRIAGE (OUTPATIENT)
Dept: OTHER | Facility: CLINIC | Age: 52
End: 2022-10-20

## 2022-10-20 ENCOUNTER — HOSPITAL ENCOUNTER (EMERGENCY)
Age: 52
Discharge: HOME | End: 2022-10-20
Attending: EMERGENCY MEDICINE
Payer: COMMERCIAL

## 2022-10-20 ENCOUNTER — APPOINTMENT (OUTPATIENT)
Dept: GENERAL RADIOLOGY | Age: 52
End: 2022-10-20
Attending: EMERGENCY MEDICINE
Payer: COMMERCIAL

## 2022-10-20 VITALS
BODY MASS INDEX: 22.58 KG/M2 | WEIGHT: 115 LBS | RESPIRATION RATE: 18 BRPM | HEIGHT: 60 IN | HEART RATE: 90 BPM | SYSTOLIC BLOOD PRESSURE: 156 MMHG | DIASTOLIC BLOOD PRESSURE: 75 MMHG | OXYGEN SATURATION: 97 % | TEMPERATURE: 97.9 F

## 2022-10-20 DIAGNOSIS — R07.89 CHEST WALL PAIN: ICD-10-CM

## 2022-10-20 DIAGNOSIS — M54.6 ACUTE LEFT-SIDED THORACIC BACK PAIN: Primary | ICD-10-CM

## 2022-10-20 DIAGNOSIS — J20.9 ACUTE BRONCHITIS, UNSPECIFIED ORGANISM: ICD-10-CM

## 2022-10-20 PROCEDURE — 94664 DEMO&/EVAL PT USE INHALER: CPT

## 2022-10-20 PROCEDURE — 74011250637 HC RX REV CODE- 250/637: Performed by: EMERGENCY MEDICINE

## 2022-10-20 PROCEDURE — 99283 EMERGENCY DEPT VISIT LOW MDM: CPT

## 2022-10-20 PROCEDURE — 71046 X-RAY EXAM CHEST 2 VIEWS: CPT

## 2022-10-20 PROCEDURE — 94640 AIRWAY INHALATION TREATMENT: CPT

## 2022-10-20 RX ORDER — DIAZEPAM 5 MG/1
5 TABLET ORAL
Qty: 15 TABLET | Refills: 0 | Status: SHIPPED | OUTPATIENT
Start: 2022-10-20

## 2022-10-20 RX ORDER — TIZANIDINE 2 MG/1
4 TABLET ORAL
Status: COMPLETED | OUTPATIENT
Start: 2022-10-20 | End: 2022-10-20

## 2022-10-20 RX ORDER — ALBUTEROL SULFATE 90 UG/1
1 AEROSOL, METERED RESPIRATORY (INHALATION)
Status: COMPLETED | OUTPATIENT
Start: 2022-10-20 | End: 2022-10-20

## 2022-10-20 RX ADMIN — TIZANIDINE 4 MG: 2 TABLET ORAL at 15:46

## 2022-10-20 RX ADMIN — Medication 1 PUFF: at 15:31

## 2022-10-20 NOTE — Clinical Note
[address] [place] [address] EMERGENCY DEP  [address] [facility]   [place] [id number] [phone]    Work/School Note    Date: 10/20/2022    To Whom It May concern:    Tad Louis was seen and treated today in the emergency room by the following provider(s):  Attending Provider: Gisel Bailey DO. Tad Louis is excused from work/school on 10/20/22 and 10/21/22. She is medically clear to return to work/school on 10/22/2022.        Sincerely,          Jessica Lowe DO
[address] [place] [address] EMERGENCY DEP  [address] [facility] [address] [place] [id number]  [phone]    Work/School Note    Date: 10/20/2022    To Whom It May concern:      Montez Parks was seen and treated today in the emergency room by the following provider(s):  Attending Provider: Danilo Ramsey DO. Montez Parks is excused from work/school on 10/20/22. She is clear to return to work/school on 10/21/22.         Sincerely,          Jorge Morrison DO
none

## 2022-10-20 NOTE — ED TRIAGE NOTES
Pt arrived by POV for cough. Pt reports non productive cough with headache and pain .   Pt is awake alert and oriented X4, pt educated on ER flow

## 2022-10-20 NOTE — DISCHARGE INSTRUCTIONS
Use albuterol 2 puffs every 4 hours for the next 2 days then as needed     Valium as muscle relaxer, do not work 4 hours after taking, you can take dose tonight if Tizanidine has not started to help by the time you get home     Lidocaine patches over the counter on  12 hours off 12 hours

## 2022-10-20 NOTE — TELEPHONE ENCOUNTER
[facility] [place] [facility] [facility]    Subjective: Caller states \"I am at the hospital now, can I go to the ED? I am having the SOB and it hurts in my back every time I breathe and I am coughing as well. \"     Current Symptoms: dry cough. Pain in back with deep breath. SOB at rest. Pt speaking in complete sentences. Intermittent wheezing, states wheezing at this time. Able to get deep breaths. Breathing worse at night. Onset: 1 day ago; worsening today    Associated Symptoms: no chest pain    Pain Severity: 8/10; sharp and stabbing pain  with deep breaths;     Temperature: denies     What has been tried: Tylenol    LMP: NA Pregnant: NA    Recommended disposition: Go to ED Now    Care advice provided, patient verbalizes understanding; denies any other questions or concerns; instructed to call back for any new or worsening symptoms. Patient/caller agrees to proceed to nearest  Emergency Department    Attention Provider: Thank you for allowing me to participate in the care of your patient. The patient was connected to triage in response to symptoms provided. Please do not respond through this encounter as the response is not directed to a shared pool.         Reason for Disposition   Wheezing can be heard across the room    Protocols used: Breathing Difficulty-ADULT-AH

## 2022-10-21 ENCOUNTER — PATIENT OUTREACH (OUTPATIENT)
Dept: OTHER | Age: 52
End: 2022-10-21

## 2022-10-21 NOTE — ED PROVIDER NOTES
EMERGENCY DEPARTMENT HISTORY AND PHYSICAL EXAM      Date: 10/20/2022  Patient Name: Froilan Zhao    History of Presenting Illness     Chief Complaint   Patient presents with    Cough       History Provided By: Patient    HPI: Froilan Zhao, 46 y.o. female presents to the ED with cc of cough, pain in the upper back. Pt states cough for several days. 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 She states pain moderate in severity sharp in nature worsened with movements, cough, deep breaths. She denies any fever or chills. She denies any nasal congestion, post nasal drip, sore throat. SHe denies any anterior CP. She denies any SOA. She denies abd pain, n/v/d. She denies any leg pain or swelling. She denies any headache, stiffneck. There are no other complaints, changes, or physical findings at this time. PCP: Malcolm Barnes NP    No current facility-administered medications on file prior to encounter. Current Outpatient Medications on File Prior to Encounter   Medication Sig Dispense Refill    insulin glargine (Lantus Solostar U-100 Insulin) 100 unit/mL (3 mL) inpn Take 15 units in the morning and 30 units at bedtime 45 mL 0    rosuvastatin (CRESTOR) 40 mg tablet Take 1 Tablet by mouth nightly. 90 Tablet 3    SITagliptin (JANUVIA) 100 mg tablet Take 1 Tablet by mouth daily. 90 Tablet 0    flash glucose sensor (FreeStyle Antony 14 Day Sensor) kit APPLY TO UPPER ARM & CHANGE EVERY 2 WEEKS. 1 Kit 3    pantoprazole (PROTONIX) 40 mg tablet Take 1 Tablet by mouth daily. 90 Tablet 3    losartan (COZAAR) 25 mg tablet TAKE 1 TABLET BY MOUTH ONE TIME A DAY. STOP LISINOPRIL 2.5MG 90 Tablet 1    Insulin Needles, Disposable, 31 gauge x 5/16\" ndle Use to administer insulin SQ twice daily as directed. 200 Pen Needle 3    fluticasone propion-salmeteroL (ADVAIR/WIXELA) 250-50 mcg/dose diskus inhaler Take 1 Puff by inhalation every twelve (12) hours.  1 Inhaler 0    albuterol (PROVENTIL HFA, VENTOLIN HFA, PROAIR HFA) 90 mcg/actuation inhaler Take 2 Puffs by inhalation every four (4) hours as needed for Wheezing or Shortness of Breath. Indications: asthma attack 2 Inhaler 5    flash glucose scanning reader ([employer] Antony 14 Day [place]) misc Use to check blood glucose TID and PRN. Dx E11.9 1 Each 0       Past History     Past Medical History:  Past Medical History:   Diagnosis Date    Abnormal EKG 2/27/2018    Abscess 7/24/2019    Asthma     Chest pain 2/27/2018    Chest wall contusion, left, subsequent encounter     Diabetes ([facility] [address]     Diverticular disease     Diverticulitis large intestine w/o perforation or abscess w/o bleeding 10/25/2016    Dyslipidemia     Hypertension     Lumbar radiculopathy 02/01/2021    Menopause     Pelvic pain 7/26/2016    Rib fractures     Right hand pain 7/10/2018    Sciatica of left side 02/01/2021    Uterine leiomyoma 7/26/2016    Vaginal candidiasis 7/24/2019       Past Surgical History:  Past Surgical History:   Procedure Laterality Date    COLONOSCOPY N/A 7/22/2022    COLONOSCOPY WITH POSSIBLE POLYPECTOMY with cold forcep polypectomy performed by Lakia Dominguez MD at [place] [id number]    HX GYN      BTL    IR INJ FORAMIN EPID LUMB ANES/STER SNGL  5/11/2021       Family History:  Family History   Problem Relation Age of Onset    Stroke Mother     Diabetes Mother     Heart Disease Father     Diabetes Sister     Diabetes Maternal Grandmother     Breast Cancer Maternal Aunt     Breast Cancer Paternal Aunt        Social History:  Social History     Tobacco Use    Smoking status: Every Day     Packs/day: 0.50     Types: Cigarettes    Smokeless tobacco: Never   Vaping Use    Vaping Use: Never used   Substance Use Topics    Alcohol use: No    Drug use: No       Allergies: Allergies   Allergen Reactions    Lisinopril Other (comments)     Hands and feet swelling         Review of Systems   Review of Systems   Constitutional: Negative. Negative for appetite change, chills, fatigue and fever. HENT: Negative. Negative for congestion, rhinorrhea, sinus pressure and sore throat. Eyes: Negative. Respiratory:  Positive for cough. Negative for choking, chest tightness, shortness of breath and wheezing. Cardiovascular: Negative. Negative for chest pain, palpitations and leg swelling. Gastrointestinal:  Negative for abdominal pain, constipation, diarrhea, nausea and vomiting. Endocrine: Negative. Genitourinary: Negative. Negative for difficulty urinating, dysuria, flank pain and urgency. Musculoskeletal:  Positive for back pain (upper between shoulder blades). Skin: Negative. Neurological: Negative. Negative for dizziness, speech difficulty, weakness, light-headedness, numbness and headaches. Psychiatric/Behavioral: Negative. All other systems reviewed and are negative. Physical Exam   Physical Exam  Vitals and nursing note reviewed. Constitutional:       General: She is not in acute distress. Appearance: Normal appearance. She is well-developed. She is not ill-appearing or diaphoretic. HENT:      Head: Normocephalic and atraumatic. Mouth/Throat:      Mouth: Mucous membranes are moist.      Pharynx: No oropharyngeal exudate. Eyes:      Extraocular Movements: Extraocular movements intact. Conjunctiva/sclera: Conjunctivae normal.      Pupils: Pupils are equal, round, and reactive to light. Neck:      Vascular: No JVD. Trachea: No tracheal deviation. Cardiovascular:      Rate and Rhythm: Normal rate and regular rhythm. Heart sounds: Normal heart sounds. No murmur heard. Pulmonary:      Effort: Pulmonary effort is normal. No respiratory distress. Breath sounds: Normal breath sounds. No stridor. No wheezing or rales. Comments: Diminished no insp/exp wheeze   Abdominal:      General: There is no distension. Palpations: Abdomen is soft. Tenderness: There is no abdominal tenderness. There is no guarding or rebound. Musculoskeletal:         General: Normal range of motion. Cervical back: Normal range of motion and neck supple. Right lower leg: No edema. Left lower leg: No edema. Skin:     General: Skin is warm and dry. Capillary Refill: Capillary refill takes less than 2 seconds. Neurological:      Mental Status: She is alert and oriented to person, place, and time. Cranial Nerves: No cranial nerve deficit. Comments: No gross motor or sensory deficits    Psychiatric:         Mood and Affect: Mood normal.         Behavior: Behavior normal.       Diagnostic Study Results     Labs -  None    Radiologic Studies -   XR CHEST PA LAT   Final Result   Clear lungs. CT Results  (Last 48 hours)      None          CXR Results  (Last 48 hours)                 10/20/22 1514  XR CHEST PA LAT Final result    Impression:  Clear lungs. Narrative:  PA AND LATERAL CHEST RADIOGRAPHS: 10/20/2022 3:14 PM       INDICATION: Cough, back pain. COMPARISON: 12/21/2021, 4/13/2018. TECHNIQUE: Frontal and lateral radiographs of the chest.       FINDINGS:   The lungs are clear. The central airways are patent. The heart size is normal.   No pneumothorax or pleural effusion. There are multiple, old, posterior, left   rib fractures. Medical Decision Making   I am the first provider for this patient. I reviewed the vital signs, available nursing notes, past medical history, past surgical history, family history and social history. Vital Signs-Reviewed the patient's vital signs. Patient Vitals for the past 12 hrs:   Temp Pulse Resp BP SpO2   10/20/22 1531 -- -- -- -- 97 %   10/20/22 1519 -- -- -- -- 97 %   10/20/22 1432 97.9 °F (36.6 °C) 90 18 (!) 156/75 97 %       Records Reviewed: Nursing Notes    Provider Notes (Medical Decision Making):   DDx- Thoracic strain, pneumonia, bronchitis    ED Course:   Initial assessment performed.  The patients presenting problems have been discussed, and they are in agreement with the care plan formulated and outlined with them. I have encouraged them to ask questions as they arise throughout their visit. CXR unremarkable, SOA improved with inhaler, discussed 2 puffs every 4 hours for 2 days then as needed. Rx for muscle relaxer        Disposition:    DC- Adult Discharges: All of the diagnostic tests were reviewed and questions answered. Diagnosis, care plan and treatment options were discussed. The patient understands the instructions and will follow up as directed. The patients results have been reviewed with them. They have been counseled regarding their diagnosis. The patient verbally convey understanding and agreement of the signs, symptoms, diagnosis, treatment and prognosis and additionally agrees to follow up as recommended with their PCP in 24 - 48 hours. They also agree with the care-plan and convey that all of their questions have been answered. I have also put together some discharge instructions for them that include: 1) educational information regarding their diagnosis, 2) how to care for their diagnosis at home, as well a 3) list of reasons why they would want to return to the ED prior to their follow-up appointment, should their condition change. DISCHARGE PLAN:  1. Discharge Medication List as of 10/20/2022  4:01 PM        START taking these medications    Details   diazePAM (Valium) 5 mg tablet Take 1 Tablet by mouth three (3) times daily as needed for Anxiety (spasm). Max Daily Amount: 15 mg., Normal, Disp-15 Tablet, R-0           CONTINUE these medications which have NOT CHANGED    Details   insulin glargine (Lantus Solostar U-100 Insulin) 100 unit/mL (3 mL) inpn Take 15 units in the morning and 30 units at bedtime, Normal, Disp-45 mL, R-0      rosuvastatin (CRESTOR) 40 mg tablet Take 1 Tablet by mouth nightly., Normal, Disp-90 Tablet, R-3      SITagliptin (JANUVIA) 100 mg tablet Take 1 Tablet by mouth daily. , Normal, Disp-90 Tablet, R-0      flash glucose sensor (FreeStyle Antony 14 Day Sensor) kit APPLY TO UPPER ARM & CHANGE EVERY 2 WEEKS., Normal, Disp-1 Kit, R-3      pantoprazole (PROTONIX) 40 mg tablet Take 1 Tablet by mouth daily. , Normal, Disp-90 Tablet, R-3      losartan (COZAAR) 25 mg tablet TAKE 1 TABLET BY MOUTH ONE TIME A DAY. STOP LISINOPRIL 2.5MG, Normal, Disp-90 Tablet, R-1      Insulin Needles, Disposable, 31 gauge x 5/16\" ndle Use to administer insulin SQ twice daily as directed., Normal, Disp-200 Pen Needle, R-3      fluticasone propion-salmeteroL (ADVAIR/WIXELA) 250-50 mcg/dose diskus inhaler Take 1 Puff by inhalation every twelve (12) hours. , Normal, Disp-1 Inhaler, R-0      albuterol (PROVENTIL HFA, VENTOLIN HFA, PROAIR HFA) 90 mcg/actuation inhaler Take 2 Puffs by inhalation every four (4) hours as needed for Wheezing or Shortness of Breath. Indications: asthma attack, Normal, Disp-2 Inhaler,R-5      flash glucose scanning reader (FreeStyle Antony 14 Day [place]) [facility] Use to check blood glucose TID and PRN. Dx E11.9, Normal, Disp-1 Each, R-0           2. Follow-up Information       Follow up With Specialties Details Why Contact Info    Nayeli Dockery NP Nurse Practitioner  As needed [address] [employer] [address] [phone]            3. Return to ED if worse     Diagnosis     Clinical Impression:   1. Acute left-sided thoracic back pain    2. Chest wall pain    3. Acute bronchitis, unspecified organism        Attestations:    Tara Martins, DO        Please note that this dictation was completed with [employer], the [employer] voice recognition software. Quite often unanticipated grammatical, syntax, homophones, and other interpretive errors are inadvertently transcribed by the computer software. Please disregard these errors. Please excuse any errors that have escaped final proofreading. Thank you.

## 2022-10-21 NOTE — PROGRESS NOTES
[facility] progress note    Patient eligible for [address] care management    Received notification of discharge from [facility] on 10/20 for Shortness of breath, pain. Patient states she was on her way to work and began having difficulty breathing. She went to her supervisor and was advised to go to the ED. She did call the [facility] and received referral to the ED. Diagnosis was acute bronchitis. Advised to use her inhaler and valium, to follow up with her pcp if symptoms worsen. Contacted patient to discuss post discharge needs and offer care management services. Two patient identifiers verified. Discussed the care management program.  Patient agrees to care management services at this time.      PMH:   Past Medical History:   Diagnosis Date    Abnormal EKG 2/27/2018    Abscess 7/24/2019    Asthma     Chest pain 2/27/2018    Chest wall contusion, left, subsequent encounter     Diabetes ([facility] [address]     Diverticular disease     Diverticulitis large intestine w/o perforation or abscess w/o bleeding 10/25/2016    Dyslipidemia     Hypertension     Lumbar radiculopathy 02/01/2021    Menopause     Pelvic pain 7/26/2016    Rib fractures     Right hand pain 7/10/2018    Sciatica of left side 02/01/2021    Uterine leiomyoma 7/26/2016    Vaginal candidiasis 7/24/2019       Social History:   Social History     Socioeconomic History    Marital status:      Spouse name: Not on file    Number of children: Not on file    Years of education: Not on file    Highest education level: Not on file   Occupational History    Not on file   Tobacco Use    Smoking status: Every Day     Packs/day: 0.50     Types: Cigarettes    Smokeless tobacco: Never   Vaping Use    Vaping Use: Never used   Substance and Sexual Activity    Alcohol use: No    Drug use: No    Sexual activity: Yes     Partners: Male     Birth control/protection: Surgical   Other Topics Concern    Not on file   Social History Narrative    Not on file     Social Determinants [facility]     Financial Resource Strain: Not on file   Food Insecurity: Not on file   Transportation Needs: Not on file   Physical Activity: Not on file   Stress: Not on file   Social Connections: Not on file   Intimate Partner Violence: Not on file   Housing Stability: Not on file         Patient's primary care provider relationship reviewed with patient and modified, as applicable. Care management assessment completed:    Respiratory Condition Focused Assessment  Supplemental oxygen use? no   Clean and working equipment? no   Oxygen settings- n/a  Cough no - dry cough, has trouble stopping    Any change in activity level?  no  Any of the following? Shortness of breath or difficulty breathing no - inhaler   Dizziness/lightheadedness no   Fever no   Low body temperature no   Chills or shaking no   Sweating no    Dyspnea on exertion no     Fatigue yes     Anxiety yes    Confusionno   Unexplained change in weight loss no   Sleep disturbance yes     Does patient have action plan? no  She was told to use inhaler for next few days and follow up PCP if does not get better. Red Flags:  Call 911: You have chest pain or pressure. This may occur with:  Sweating. Shortness of breath. Nausea or vomiting. Pain that spreads from the chest to the neck, jaw, or one or both shoulders or arms. Dizziness or lightheadedness. A fast or uneven pulse. Call your doctor now or seek immediate medical care if:  You have any trouble breathing. Your chest pain gets worse. Your chest pain occurs consistently with exercise and is relieved by rest.    Medications:  New Medications at Discharge: valium 5 mg, take 1 tablet by mouth three times a day, can use OTC lidocaine patches for pain  Changed Medications at Discharge: none  Discontinued Medications at Discharge: none  Current Outpatient Medications   Medication Sig    diazePAM (Valium) 5 mg tablet Take 1 Tablet by mouth three (3) times daily as needed for Anxiety (spasm).  Max Daily Amount: 15 mg.    insulin glargine (Lantus Solostar U-100 Insulin) 100 unit/mL (3 mL) inpn Take 15 units in the morning and 30 units at bedtime    rosuvastatin (CRESTOR) 40 mg tablet Take 1 Tablet by mouth nightly. SITagliptin (JANUVIA) 100 mg tablet Take 1 Tablet by mouth daily. flash glucose sensor (FreeStyle Antony 14 Day Sensor) kit APPLY TO UPPER ARM & CHANGE EVERY 2 WEEKS. pantoprazole (PROTONIX) 40 mg tablet Take 1 Tablet by mouth daily. losartan (COZAAR) 25 mg tablet TAKE 1 TABLET BY MOUTH ONE TIME A DAY. STOP LISINOPRIL 2.5MG    Insulin Needles, Disposable, 31 gauge x 5/16\" ndle Use to administer insulin SQ twice daily as directed. albuterol (PROVENTIL HFA, VENTOLIN HFA, PROAIR HFA) 90 mcg/actuation inhaler Take 2 Puffs by inhalation every four (4) hours as needed for Wheezing or Shortness of Breath. Indications: asthma attack    flash glucose scanning reader (FreeStyle Antony 14 Day [place]) [facility] Use to check blood glucose TID and PRN. Dx E11.9    fluticasone propion-salmeteroL (ADVAIR/WIXELA) 250-50 mcg/dose diskus inhaler Take 1 Puff by inhalation every twelve (12) hours. (Patient not taking: Reported on 10/21/2022)     No current facility-administered medications for this visit. There are no discontinued medications. Performed medication reconciliation with patient, and patient verbalizes understanding of administration of home medications. There were no barriers to obtaining medications identified at this time.     Preventive Care     Health Maintenance   Topic Date Due    Hepatitis B Vaccine (1 of 3 - Risk 3-dose series) Never done    Shingrix Vaccine Age 49> (1 of 2) Never done    Eye Exam Retinal or Dilated  12/03/2021    COVID-19 Vaccine (4 - Booster) 04/02/2022    Cervical cancer screen  07/11/2022    Breast Cancer Screen Mammogram  07/17/2022    A1C test (Diabetic or Prediabetic)  12/27/2022    Pneumococcal 0-64 years (2 - PCV) 04/18/2023    Depression Screen 09/27/2023    Foot Exam Q1  09/27/2023    MICROALBUMIN Q1  09/27/2023    Lipid Screen  09/27/2023    DTaP/Tdap/Td series (2 - Td or Tdap) 11/04/2026    Colorectal Cancer Screening Combo  07/22/2032    Hepatitis C Screening  Completed    Flu Vaccine  Completed     Healthy Habits    Nutrition: Diabetic diet    Movement: active at work    CM Identified  Problems   1. Potential Knowledge Deficit  2. Pain    Barriers/Support system:  patient and boyfriend    Barriers/Challenges to Care: []  Decline in memory    []  Language barrier     []  Emotional                  []  Limited mobility  []  Lack of motivation     [] Vision, hearing or cognitive impairment []  Knowledge [] Financial Barriers []  Lack of support  []  Pain []  Other [x]  None    PCP/Specialist follow up: Patient scheduled to follow up with Laureano Blakely NP on 12/27/22, will call for a new appt if symptoms worsen. Future Appointments   Date Time Provider Ankush Nicholson   12/27/2022  8:10 AM Laureano Blakely NP [facility] MAIN BS AMB   1/11/2023  9:30 AM Terry Alvarado MD RDE [place] [id number] BS AMB      Reviewed red flags with patient, and patient verbalizes understanding. Patient given an opportunity to ask questions. No other clinical/social/functional needs noted. The patient agrees to contact the PCP office for questions related to their healthcare. The patient expressed thanks, offered no additional questions and ended the call. Plan for next call: Call on 10/26 to check in.

## 2022-10-25 ENCOUNTER — TELEPHONE (OUTPATIENT)
Dept: PHARMACY | Age: 52
End: 2022-10-25

## 2022-10-25 NOTE — TELEPHONE ENCOUNTER
POPULATION HEALTH CLINICAL PHARMACY REVIEW - BE WELL WITH DIABETES: HIGH A1C  =============================================  Gary Bonner is a 46 y.o. female enrolled in the [facility] [place] Be Well with Diabetes program.    Identified care gap(s): A1c >  10 %    DM Program Prescriptions:  Current Outpatient Medications   Medication Sig Dispense Refill    diazePAM (Valium) 5 mg tablet Take 1 Tablet by mouth three (3) times daily as needed for Anxiety (spasm). Max Daily Amount: 15 mg. 15 Tablet 0    insulin glargine (Lantus Solostar U-100 Insulin) 100 unit/mL (3 mL) inpn Take 15 units in the morning and 30 units at bedtime 45 mL 0    rosuvastatin (CRESTOR) 40 mg tablet Take 1 Tablet by mouth nightly. 90 Tablet 3    SITagliptin (JANUVIA) 100 mg tablet Take 1 Tablet by mouth daily. 90 Tablet 0    flash glucose sensor (FreeStyle Antony 14 Day Sensor) kit APPLY TO UPPER ARM & CHANGE EVERY 2 WEEKS. 1 Kit 3    pantoprazole (PROTONIX) 40 mg tablet Take 1 Tablet by mouth daily. 90 Tablet 3    losartan (COZAAR) 25 mg tablet TAKE 1 TABLET BY MOUTH ONE TIME A DAY. STOP LISINOPRIL 2.5MG 90 Tablet 1    Insulin Needles, Disposable, 31 gauge x 5/16\" ndle Use to administer insulin SQ twice daily as directed. 200 Pen Needle 3    fluticasone propion-salmeteroL (ADVAIR/WIXELA) 250-50 mcg/dose diskus inhaler Take 1 Puff by inhalation every twelve (12) hours. (Patient not taking: Reported on 10/21/2022) 1 Inhaler 0    albuterol (PROVENTIL HFA, VENTOLIN HFA, PROAIR HFA) 90 mcg/actuation inhaler Take 2 Puffs by inhalation every four (4) hours as needed for Wheezing or Shortness of Breath. Indications: asthma attack 2 Inhaler 5    flash glucose scanning reader (FreeStyle Antony 14 Day [place]) [facility] Use to check blood glucose TID and PRN. Dx E11.9 1 Each 0        Allergies:   Allergies   Allergen Reactions    Lisinopril Other (comments)     Hands and feet swelling        Labs:  Lab Results   Component Value Date/Time    Hemoglobin A1c 10.4 (H) 04/18/2022 09:44 AM    Hemoglobin A1c 12.8 (H) 01/18/2022 05:00 PM    Hemoglobin A1c 11.7 (H) 09/28/2021 05:00 PM    Hemoglobin A1c (POC) 10.6 09/27/2022 09:00 AM     Estimated Creatinine Clearance: 55 mL/min (by C-G formula based on SCr of 0.87 mg/dL). eGFR: > 60 mL/min/1.73 m^2    - Upcoming appointments:   Future Appointments   Date Time Provider Ankush Lyn   12/27/2022  8:10 AM Paresh Ricketts NP [facility] MAIN BS AMB   1/11/2023  9:30 AM Terry Alvarado MD RDE [id number] BS AMB     Diabetes Care:  - Glycemic Goal: not at goal, but down from 12 to 10.4  - Appropriateness of Insulin Therapy: may need mealtime insulin but is doing lantus BID. Has endo visit in Jan  - Therapy Optimization: failed ozempic- lost weight and did not feel well. Could consider toujeo since patient states she does have lows sometimes. - Medication changes since last A1c: restarted [place] and [place]    Plan:    Reached patient for review. States she is working on getting her DM under better control. Diet: states she is trying to change her diet. She does drink apple juice in the AM and sweet tea for lunch. Discussed cutting out all sugary drinks, drinking water, using stevia if have to geovani something. Educated on importance of going to endo visit.  has endo appt Jan 11th and she did request off work for this. States she did have eye exam this year and does have bleeding on retina. Educated this is being caused from her uncontrolled DM as well as her kidney spilling protein and having inflammation in the kidney. Patient states does give lantus 15 units in AM and 30 in PM but admits to skipping AM dose if sugar is normal in the morning like 130 bc she states she will drop too low if she gives it. Educated patient to eat small snack and do not skip her morning dose of insulin if her sugar is 130. Educated this is her long acting insulin not a mealtime insulin.  Takes a little while to work and she will have time to get to work and get a snack, should not skip the dose. Patient agreeable to stop skipping dose when she thinks it her sugar is normal range. States she always gives her evening dose of lantus. Educated we also cover toujeo.  Patient will discuss with endo and she what endo wants to do in Jan.      Jyotsna Givens, PharmD, [address] Pharmacist  Department: [address] [place] [address] in place: No  Recommendation Provided To: Patient/Caregiver: 1 via Telephone  Gap Closed?: Yes  Intervention Accepted By: Patient/Caregiver: 1  Time Spent (min): 20

## 2022-10-26 ENCOUNTER — PATIENT OUTREACH (OUTPATIENT)
Dept: OTHER | Age: 52
End: 2022-10-26

## 2022-10-26 NOTE — PROGRESS NOTES
Follow up phone call to patient, two pt identifiers verified. Discussed patient's goals:    Goals        Demonstrates no return to ED or red flags      Asssess patient knowledge of how to contact the provider for questions if needed;  Educate patient on importance of monitoring for any red flags; Review importance of reporting any changes, red flags to the provider and/or PCP; Assess patient's knowledge of discharge instructions and any restrictions;  Educate patient when to call 911;  Assess for any barriers with safety at home  Discuss use of nurse access line and location of number on front of insurance card. Patient used NAL for this ED visit  10/26: Worried about getting her [place] and [place]. [address] to inquire about Januvia, was sent out on 10/24. Patient appreciative for me assisting with calling [phone] [address]. Supportive resources in place to maintain patient in the community (ie. Home Health, DME equipment, refer to, medication assistant plan, etc.)      [facility] 24/7 (virtual visits 24/7)  Life Matters # [phone] PW: [facility] associates  Nurse Access line 24/7 # [phone]  Be Well ([email or web address])  HR Service Now - Iris   BS Workday  IT - [phone]  [facility] Help - 1- [phone]  Associate Services for advice and direction, by calling [phone] and pressing 1              Patient's primary care provider relationship reviewed with patient and modified, as applicable. Patient states she is feeling better. Concerned about getting her Januvia. Östanlid 36 on her behalf, was sent out on 10/24.     Readiness to Change: []  Pre-contemplative    []  Contemplative  []  Preparation               [x]  Action                  []  Maintenance    Barriers/Challenges to Care: []  Decline in memory    []  Language barrier     []  Emotional                  []  Limited mobility  []  Lack of motivation     [] Vision, hearing or cognitive impairment []  Knowledge [] Financial Barriers []  Lack of support  []  Pain []  Other [x]  None    Key pt activities to achieve better health:   []  Weight loss  [x]  Improved diabetic control  []  Decreased cholesterol levels  []  Decreased blood pressure  []    []    Upcoming appointments:   Future Appointments   Date Time Provider Ankush Nicholson   12/27/2022  8:10 AM Pramod Roldan NP [facility]PR MAIN BS AMB   1/11/2023  9:30 AM Arnie Alvarado MD RDE [facility] 332 BS AMB     Plan for next call:  Call in two weeks, 11/9.

## 2022-11-09 ENCOUNTER — PATIENT OUTREACH (OUTPATIENT)
Dept: OTHER | Age: 52
End: 2022-11-09

## 2022-11-09 NOTE — PROGRESS NOTES
Follow up phone call to patient, two pt identifiers verified. Discussed patient's goals:    Goals        Demonstrates no return to ED or red flags      Asssess patient knowledge of how to contact the provider for questions if needed;  Educate patient on importance of monitoring for any red flags; Review importance of reporting any changes, red flags to the provider and/or PCP; Assess patient's knowledge of discharge instructions and any restrictions;  Educate patient when to call 911;  Assess for any barriers with safety at home  Discuss use of nurse access line and location of number on front of insurance card. Patient used NAL for this ED visit  10/26: Worried about getting her [place] and [place]. Östanl[address] 36 to inquire about Januvia, was sent out on 10/24. Patient appreciative for me assisting with calling [employer]. 11/9: states glucose was 67 this morning, states she felt funny, discussed evening snack may help avoid this. States she is doing better with diet, baked, air fryer, broiled. States she is not drinking as many sweetened drinks. Discussed importance of healthy diet. Supportive resources in place to maintain patient in the community (ie. Home Health, DME equipment, refer to, medication assistant plan, etc.)      [facility] 24/7 (virtual visits 24/7)  Life Matters # [phone] PW: [facility] associates  Nurse Access line 24/7 # [phone]  Be Well ([email or web address])  HR Service Now - [facility] Workday  IT - [phone]  [facility] Help - 1- [phone]  Associate Services for advice and direction, by calling [phone] and pressing 1              Patient's primary care provider relationship reviewed with patient and modified, as applicable.       Readiness to Change: []  Pre-contemplative    []  Contemplative  []  Preparation               [x]  Action                  []  Maintenance    Barriers/Challenges to Care: []  Decline in memory    []  Language barrier     [] Emotional                  []  Limited mobility  []  Lack of motivation     [] Vision, hearing or cognitive impairment []  Knowledge [] Financial Barriers []  Lack of support  []  Pain []  Other [x]  None    Key pt activities to achieve better health:   []  Weight loss  [x]  Improved diabetic control  []  Decreased cholesterol levels  []  Decreased blood pressure  []    []    Upcoming appointments:   Future Appointments   Date Time Provider Ankush Lyn   12/27/2022  8:10 AM María Maya NP HFPR MAIN BS AMB   1/11/2023  9:30 AM Remy Alvarado MD RDE [facility] 332 BS AMB     Plan for next call:  Call in four weeks, 12/7.

## 2022-11-14 ENCOUNTER — TELEPHONE (OUTPATIENT)
Dept: PHARMACY | Age: 52
End: 2022-11-14

## 2022-11-14 NOTE — TELEPHONE ENCOUNTER
As of 11/4/22 patient has used $730 of the maximum of $600 a year in waived co pays for specific medications and pharmacy-related supplies through the [address] for the DM Program.    Patient currently enrolled in the DM Program and has used all of the maximum of $600 a year in waived co pays for specific medications and pharmacy-related supplies through the [address] [employer] [employer]. Courtesy call placed to patient to advise them of the above information. Spoke to patient and advised them of the above information. Patient verified understanding. Patient also asked about missing requirements for 2022. Advised that urine microalbumin, lipid panel, and flu shot were all documented and to disregard [employer] message sent.          Jared Reese, [address] [employer]   Phone: [phone]       For Pharmacy [facility] in place: No  Time Spent (min): 10

## 2022-11-23 DIAGNOSIS — E11.65 UNCONTROLLED TYPE 2 DIABETES MELLITUS WITH HYPERGLYCEMIA (HCC): ICD-10-CM

## 2022-11-23 RX ORDER — INSULIN GLARGINE 100 [IU]/ML
INJECTION, SOLUTION SUBCUTANEOUS
Qty: 45 ML | Refills: 0 | Status: SHIPPED | OUTPATIENT
Start: 2022-11-23

## 2022-12-07 ENCOUNTER — PATIENT OUTREACH (OUTPATIENT)
Dept: OTHER | Age: 52
End: 2022-12-07

## 2022-12-07 NOTE — PROGRESS NOTES
Follow up phone call to patient, two pt identifiers verified. Discussed patient's goals:    Goals        Demonstrates no return to ED or red flags      Asssess patient knowledge of how to contact the provider for questions if needed;  Educate patient on importance of monitoring for any red flags; Review importance of reporting any changes, red flags to the provider and/or PCP; Assess patient's knowledge of discharge instructions and any restrictions;  Educate patient when to call 911;  Assess for any barriers with safety at home  Discuss use of nurse access line and location of number on front of insurance card. Patient used NAL for this ED visit  10/26: Worried about getting her [place] and [place]. [address] to inquire about Januvia, was sent out on 10/24. Patient appreciative for me assisting with calling [address] 11/9: states glucose was 67 this morning, states she felt funny, discussed evening snack may help avoid this. States she is doing better with diet, baked, air fryer, broiled. States she is not drinking as many sweetened drinks. Discussed importance of healthy diet. 12/7: patient states she had the flu over the weekend. States glucose average has been good, states today it was 169. Doing better with hydration, not drinking as much sweet tea, but still drinking it some, encouraged her to dilute with water. States she will do this, has been avoiding sodas. Provided encouragement. Supportive resources in place to maintain patient in the community (ie.  Home Health, DME equipment, refer to, medication assistant plan, etc.)      [employer] 24/7 (virtual visits 24/7)  Life Matters # [phone] PW: [facility] PISTIS Consult  Nurse Access line 24/7 # [phone]  Be Well ([email or web address].[email or web address])  HR Service Now - Iris   [facility] Workday  IT - [phone]  [facility] Help - 1- [phone]  Associate Services for advice and direction, by calling [phone] and pressing 1 Patient's primary care provider relationship reviewed with patient and modified, as applicable. Readiness to Change: []  Pre-contemplative    []  Contemplative  []  Preparation               [x]  Action                  []  Maintenance    Barriers/Challenges to Care: []  Decline in memory    []  Language barrier     []  Emotional                  []  Limited mobility  []  Lack of motivation     [] Vision, hearing or cognitive impairment []  Knowledge [] Financial Barriers []  Lack of support  []  Pain []  Other [x]  None    Key pt activities to achieve better health:   []  Weight loss  [x]  Improved diabetic control  []  Decreased cholesterol levels  []  Decreased blood pressure  []    []    Upcoming appointments:   Future Appointments   Date Time Provider Anuksh Nicholson   12/27/2022  8:10 AM Chandra Avery NP HFPR MAIN BS AMB   1/11/2023  9:30 AM Ana Alvarado MD RDE [place] [address] BS AMB     Plan for next call:  Call in three weeks, 12/28 (PCP visit on 12/27).

## 2022-12-13 ENCOUNTER — VIRTUAL VISIT (OUTPATIENT)
Dept: FAMILY MEDICINE CLINIC | Age: 52
End: 2022-12-13
Payer: COMMERCIAL

## 2022-12-13 DIAGNOSIS — J40 BRONCHITIS: Primary | ICD-10-CM

## 2022-12-13 PROCEDURE — 99213 OFFICE O/P EST LOW 20 MIN: CPT

## 2022-12-13 RX ORDER — BENZONATATE 100 MG/1
100 CAPSULE ORAL
Qty: 21 CAPSULE | Refills: 0 | Status: SHIPPED | OUTPATIENT
Start: 2022-12-13 | End: 2022-12-20

## 2022-12-13 RX ORDER — DOXYCYCLINE 100 MG/1
100 CAPSULE ORAL 2 TIMES DAILY
Qty: 14 CAPSULE | Refills: 0 | Status: SHIPPED | OUTPATIENT
Start: 2022-12-13 | End: 2022-12-20

## 2022-12-13 NOTE — PROGRESS NOTES
Brady Richey (: 1970) is a 46 y.o. female, established patient, here for evaluation of the following chief complaint(s):   Cough (Congestion, headache, no appetite or energy. Going on for over a week.)       ASSESSMENT/PLAN:  Below is the assessment and plan developed based on review of pertinent history, labs, studies, and medications. 1. Bronchitis  -     doxycycline (MONODOX) 100 mg capsule; Take 1 Capsule by mouth two (2) times a day for 7 days. , Normal, Disp-14 Capsule, R-0  -     benzonatate (TESSALON) 100 mg capsule; Take 1 Capsule by mouth three (3) times daily as needed for Cough for up to 7 days. , Normal, Disp-21 Capsule, R-0    She is a smoker so will treat with ABX, avoid steroids due to uncontrolled DM. Discussed red flags for which to seek immediate care. Return if symptoms worsen or fail to improve. SUBJECTIVE/OBJECTIVE:  Melissa Thurman endorses cough, nasal congestion, sinus pressure, headache, fatigue, and chills onset about 8 days ago; she has been taking diabetic Tussin and APAP with some relief of her symptoms. She denies known sick contacts, but works in [employer] at the [facility] to is at risk for exposure. Review of Systems   Constitutional:  Positive for chills and fatigue. Negative for fever. HENT:  Positive for congestion and sinus pressure. Respiratory:  Positive for cough and chest tightness. Negative for wheezing. Cardiovascular:  Negative for chest pain and palpitations. Skin:  Negative for rash. Neurological:  Positive for headaches. Negative for dizziness.       [INSTRUCTIONS:  \"[x]\" Indicates a positive item  \"[]\" Indicates a negative item  -- DELETE ALL ITEMS NOT EXAMINED]    Constitutional: [x] Appears well-developed and well-nourished [x] No apparent distress      [] Abnormal -     Mental status: [x] Alert and awake  [x] Oriented to person/place/time [x] Able to follow commands    [] Abnormal -     Eyes:   EOM    [x]  Normal    [] Abnormal -   Sclera  [x]  Normal    [] Abnormal -          Discharge [x]  None visible   [] Abnormal -     HENT: [x] Normocephalic, atraumatic  [] Abnormal -   [x] Mouth/Throat: Mucous membranes are moist    External Ears [x] Normal  [] Abnormal -    Neck: [x] No visualized mass [] Abnormal -     Pulmonary/Chest: [x] Respiratory effort normal   [x] No visualized signs of difficulty breathing or respiratory distress        [x] Abnormal - audible upper airway noise with cough     Musculoskeletal:   [x] Normal gait with no signs of ataxia         [x] Normal range of motion of neck        [] Abnormal -     Neurological:        [x] No Facial Asymmetry (Cranial nerve 7 motor function) (limited exam due to video visit)          [x] No gaze palsy        [] Abnormal -          Skin:        [x] No significant exanthematous lesions or discoloration noted on facial skin         [] Abnormal -            Psychiatric:       [x] Normal Affect [] Abnormal -        [x] No Hallucinations    Other pertinent observable physical exam findings:-    On this date 12/13/2022 I have spent 15 minutes reviewing previous notes, test results and face to face (virtual) with the patient discussing the diagnosis and importance of compliance with the treatment plan as well as documenting on the day of the visitAshley Castillo Noah, was evaluated through a synchronous (real-time) audio-video encounter. The patient (or guardian if applicable) is aware that this is a billable service, which includes applicable co-pays. This Virtual Visit was conducted with patient's (and/or legal guardian's) consent. The visit was conducted pursuant to the emergency declaration under the [address] [place] [address], [address] [place] [address] and the [employer] General Act. Patient identification was verified, and a caregiver was present when appropriate.   The patient was located at: Home: [place] [address]  [address] [facility] [address]  The provider was located at: Facility (Appt Department): [address] [address]       An electronic signature was used to authenticate this note.   -- Carlos Hicks NP

## 2022-12-13 NOTE — PROGRESS NOTES
21. \"Have you been to the ER, urgent care clinic since your last visit? Hospitalized since your last visit? \" No    2. \"Have you seen or consulted any other health care providers outside of the [address] [place] [address] since your last visit? \" No     3. For patients aged 39-70: Has the patient had a colonoscopy / FIT/ Cologuard? No      If the patient is female:    4. For patients aged 41-77: Has the patient had a mammogram within the past 2 years? No      5. For patients aged 21-65: Has the patient had a pap smear? No    Chief Complaint   Patient presents with    Cough     Congestion, headache, no appetite or energy. Going on for over a week.

## 2022-12-28 ENCOUNTER — PATIENT OUTREACH (OUTPATIENT)
Dept: OTHER | Age: 52
End: 2022-12-28

## 2022-12-28 NOTE — PROGRESS NOTES
Follow up phone call to patient, two pt identifiers verified. Discussed patient's goals:    Goals        Demonstrates no return to ED or red flags      Asssess patient knowledge of how to contact the provider for questions if needed;  Educate patient on importance of monitoring for any red flags; Review importance of reporting any changes, red flags to the provider and/or PCP; Assess patient's knowledge of discharge instructions and any restrictions;  Educate patient when to call 911;  Assess for any barriers with safety at home  Discuss use of nurse access line and location of number on front of insurance card. Patient used NAL for this ED visit  10/26: Worried about getting her [place] and [place]. [address] to inquire about Paddyuvia, was sent out on 10/24. Patient appreciative for me assisting with calling [phone] 11/9: states glucose was 67 this morning, states she felt funny, discussed evening snack may help avoid this. States she is doing better with diet, baked, air fryer, broiled. States she is not drinking as many sweetened drinks. Discussed importance of healthy diet. 12/7: patient states she had the flu over the weekend. States glucose average has been good, states today it was 169. Doing better with hydration, not drinking as much sweet tea, but still drinking it some, encouraged her to dilute with water. States she will do this, has been avoiding sodas. Provided encouragement. 12/28: Patient states he is doing okay. Reminded her about her endocrinology appt on 1/22/23 at 9:30. She confirmed that she remembered. Supportive resources in place to maintain patient in the community (ie.  Home Health, DME equipment, refer to, medication assistant plan, etc.)      [facility] 24/7 (virtual visits 24/7)  Life Matters # [phone] PW: [facility] dVisit [facility]  Nurse Access line 24/7 # [phone]  Be Well ([email or web address])  HR Service Now - [facility] Workday  IT - [phone]  Replaced by [facility] - 1- [phone]  Associate Services for advice and direction, by calling [phone] and pressing 1              Patient's primary care provider relationship reviewed with patient and modified, as applicable. Readiness to Change: []  Pre-contemplative    []  Contemplative  []  Preparation               [x]  Action                  []  Maintenance    Barriers/Challenges to Care: []  Decline in memory    []  Language barrier     []  Emotional                  []  Limited mobility  []  Lack of motivation     [] Vision, hearing or cognitive impairment []  Knowledge [] Financial Barriers []  Lack of support  []  Pain []  Other [x]  None    Key pt activities to achieve better health:   []  Weight loss  [x]  Improved diabetic control  []  Decreased cholesterol levels  []  Decreased blood pressure  []    []    Upcoming appointments:   Future Appointments   Date Time Provider Ankush Nicholson   1/11/2023  9:30 AM Arnie Alvarado MD RDE [facility] [address] BS AMB     Plan for next call:  Call in two weeks.

## 2023-01-03 DIAGNOSIS — E11.65 UNCONTROLLED TYPE 2 DIABETES MELLITUS WITH HYPERGLYCEMIA (HCC): ICD-10-CM

## 2023-01-03 DIAGNOSIS — E11.21 TYPE 2 DIABETES WITH NEPHROPATHY (HCC): ICD-10-CM

## 2023-01-03 RX ORDER — FLASH GLUCOSE SCANNING READER
EACH MISCELLANEOUS
Qty: 1 EACH | Refills: 0 | Status: SHIPPED | OUTPATIENT
Start: 2023-01-03

## 2023-01-03 NOTE — TELEPHONE ENCOUNTER
Pt requesting refill of sitagliptin. Also, needs new flash glucose scanning reader to go with freestyle osvaldo 2. States she feels like she can use phone but needs code.   Using [employer]fire

## 2023-01-09 DIAGNOSIS — E11.65 UNCONTROLLED TYPE 2 DIABETES MELLITUS WITH HYPERGLYCEMIA (HCC): ICD-10-CM

## 2023-01-11 ENCOUNTER — OFFICE VISIT (OUTPATIENT)
Dept: ENDOCRINOLOGY | Age: 53
End: 2023-01-11
Payer: COMMERCIAL

## 2023-01-11 ENCOUNTER — PATIENT OUTREACH (OUTPATIENT)
Dept: OTHER | Age: 53
End: 2023-01-11

## 2023-01-11 VITALS
WEIGHT: 111.2 LBS | BODY MASS INDEX: 21.83 KG/M2 | HEART RATE: 95 BPM | DIASTOLIC BLOOD PRESSURE: 71 MMHG | SYSTOLIC BLOOD PRESSURE: 136 MMHG | HEIGHT: 60 IN

## 2023-01-11 DIAGNOSIS — Z79.4 TYPE 2 DIABETES MELLITUS WITH HYPERGLYCEMIA, WITH LONG-TERM CURRENT USE OF INSULIN (HCC): Primary | ICD-10-CM

## 2023-01-11 DIAGNOSIS — E78.2 MIXED HYPERLIPIDEMIA: ICD-10-CM

## 2023-01-11 DIAGNOSIS — R80.9 MICROALBUMINURIA DUE TO TYPE 2 DIABETES MELLITUS (HCC): ICD-10-CM

## 2023-01-11 DIAGNOSIS — E11.65 TYPE 2 DIABETES MELLITUS WITH HYPERGLYCEMIA, WITH LONG-TERM CURRENT USE OF INSULIN (HCC): Primary | ICD-10-CM

## 2023-01-11 DIAGNOSIS — G62.9 PERIPHERAL POLYNEUROPATHY: ICD-10-CM

## 2023-01-11 DIAGNOSIS — E11.29 MICROALBUMINURIA DUE TO TYPE 2 DIABETES MELLITUS (HCC): ICD-10-CM

## 2023-01-11 LAB — HBA1C MFR BLD HPLC: 10.2 %

## 2023-01-11 PROCEDURE — 83036 HEMOGLOBIN GLYCOSYLATED A1C: CPT | Performed by: GENERAL ACUTE CARE HOSPITAL

## 2023-01-11 PROCEDURE — 3078F DIAST BP <80 MM HG: CPT | Performed by: GENERAL ACUTE CARE HOSPITAL

## 2023-01-11 PROCEDURE — 99204 OFFICE O/P NEW MOD 45 MIN: CPT | Performed by: GENERAL ACUTE CARE HOSPITAL

## 2023-01-11 PROCEDURE — 3075F SYST BP GE 130 - 139MM HG: CPT | Performed by: GENERAL ACUTE CARE HOSPITAL

## 2023-01-11 RX ORDER — IBUPROFEN 200 MG
1 TABLET ORAL EVERY 24 HOURS
Qty: 14 PATCH | Refills: 0 | Status: SHIPPED | OUTPATIENT
Start: 2023-01-11 | End: 2023-02-10

## 2023-01-11 RX ORDER — NICOTINE 7MG/24HR
1 PATCH, TRANSDERMAL 24 HOURS TRANSDERMAL EVERY 24 HOURS
Qty: 30 PATCH | Refills: 0 | Status: SHIPPED | OUTPATIENT
Start: 2023-01-11 | End: 2023-02-10

## 2023-01-11 NOTE — PROGRESS NOTES
Attempt to reach patient for follow up. Unable to leave voicemail message, mailbox was full. Patient had visit with endocrinologist, Dr. Chaitanya Lopez. Follow up scheduled for 4/11/23.

## 2023-01-11 NOTE — PROGRESS NOTES
REFERRED BY: Karolina Urias NP     REASON:  Uncontrolled type 2 diabetes mellitus    CHIEF COMPLAINT: evaluation of type 2 diabetes mellitus    HISTORY OF PRESENT ILLNESS:   Montez Parks is a 46 y.o. female with a PMHx as noted below who presents for evaluation of uncontrolled type 2 diabetes.       Diabetes History:  Diabetes was diagnosed: ***  Family History of diabetes is {Positive/Negative:19253}   Hb A1c : ***%      Diabetes-related Hospitalizations:***      Current Home Regimen:  ***      Review of home glucose:  AM***  Lunch***  Dinner***  Bedtime***    Hypoglycemia:{yes/no:09863}    Diet:  -***      Physical Activity:  -***      Complications:    MI or CVA:{Yes / No:21834:o}  PVD: {Yes / No:45769:o}  Retinopathy:{Yes / No:87399:o}     Last Ophthalm:***  Nephropathy:{Yes / No:02934:o}  Neuropathy:{Yes / No:70164:o}      Last Podiatry:***  Gastropathy: {Yes / No:03982:o}  Amputations: {Yes / No:20974:o}      On a Statin:{Yes / No:73433:o}  On an ACEI/ARB:{Yes / No:69371:o}  On Aspirin:{Yes / No:29088:o}  Smoker:{Yes / No:05980:o}    Diabetes Education:***        Review of most recent diabetes-related labs:  Lab Results   Component Value Date    HBA1C 10.4 (H) 04/18/2022    HBA1C 12.8 (H) 01/18/2022    HBA1C 11.7 (H) 09/28/2021    GDR3KITZ 10.6 09/27/2022    CHOL 229 (H) 09/27/2022    LDLC 150 (H) 09/27/2022     (H) 07/13/2021    GFRAA >60 09/27/2022    GFRNA >60 09/27/2022    MCACR 1,775 (H) 09/27/2022    TSH 2.96 09/27/2022    VITD3 10.9 (L) 11/03/2020     Lab Key:  [id number] = IA-2 pancreatic islet cell autoantibody  CPEPL = C-peptide level  :EXT = External Lab  GADLT = ALEXA-65 autoantibody   INSUL = Insulin level  MCACR (or MALBEXT) = Urine Microalbumin (or External UM)  B12LT = B12 level    PAST MEDICAL/SURGICAL HISTORY:   Past Medical History:   Diagnosis Date    Abnormal EKG 2/27/2018    Abscess 7/24/2019    Asthma     Chest pain 2/27/2018    Chest wall contusion, left, subsequent encounter Diabetes ([facility] [address]     Diverticular disease     Diverticulitis large intestine w/o perforation or abscess w/o bleeding 10/25/2016    Dyslipidemia     Hypertension     Lumbar radiculopathy 02/01/2021    Menopause     Pelvic pain 7/26/2016    Rib fractures     Right hand pain 7/10/2018    Sciatica of left side 02/01/2021    Uterine leiomyoma 7/26/2016    Vaginal candidiasis 7/24/2019     Past Surgical History:   Procedure Laterality Date    COLONOSCOPY N/A 7/22/2022    COLONOSCOPY WITH POSSIBLE POLYPECTOMY with cold forcep polypectomy performed by Kat Massey MD at [address] [facility]      BTL    IR INJ [facility] EPID LUMB ANES/STER SNGL  5/11/2021       ALLERGIES:   Allergies   Allergen Reactions    Lisinopril Other (comments)     Hands and feet swelling       MEDICATIONS ON ADMISSION:     Current Outpatient Medications:     flash glucose scanning reader (FreeStyle Antony 14 Day [place]) misc, Use to check blood glucose TID and PRN. Dx E11.9, Disp: 1 Each, Rfl: 0    SITagliptin phosphate (JANUVIA) 100 mg tablet, Take 1 Tablet by mouth daily. , Disp: 90 Tablet, Rfl: 0    Insulin Needles, Disposable, (TRUEplus Pen Needle) 31 gauge x 5/16\" ndle, USE TO ADMINISTER INSULIN UNDER THE SKIN TWO TIMES A DAY AS DIRECTED, Disp: 200 Each, Rfl: 3    insulin glargine (Lantus Solostar U-100 Insulin) 100 unit/mL (3 mL) inpn, INJECT UNDER THE SKIN, 15 UNITS IN THE MORNING AND 30 UNITS AT BEDTIME, Disp: 45 mL, Rfl: 0    diazePAM (Valium) 5 mg tablet, Take 1 Tablet by mouth three (3) times daily as needed for Anxiety (spasm). Max Daily Amount: 15 mg., Disp: 15 Tablet, Rfl: 0    rosuvastatin (CRESTOR) 40 mg tablet, Take 1 Tablet by mouth nightly., Disp: 90 Tablet, Rfl: 3    flash glucose sensor (FreeStyle Antony 14 Day Sensor) kit, APPLY TO UPPER ARM & CHANGE EVERY 2 WEEKS., Disp: 1 Kit, Rfl: 3    pantoprazole (PROTONIX) 40 mg tablet, Take 1 Tablet by mouth daily. , Disp: 90 Tablet, Rfl: 3    losartan (COZAAR) 25 mg tablet, TAKE 1 TABLET BY MOUTH ONE TIME A DAY. STOP LISINOPRIL 2.5MG, Disp: 90 Tablet, Rfl: 1    fluticasone propion-salmeteroL (ADVAIR/WIXELA) 250-50 mcg/dose diskus inhaler, Take 1 Puff by inhalation every twelve (12) hours. , Disp: 1 Inhaler, Rfl: 0    albuterol (PROVENTIL HFA, VENTOLIN HFA, PROAIR HFA) 90 mcg/actuation inhaler, Take 2 Puffs by inhalation every four (4) hours as needed for Wheezing or Shortness of Breath. Indications: asthma attack, Disp: 2 Inhaler, Rfl: 5    SOCIAL HISTORY:   Social History     Socioeconomic History    Marital status:      Spouse name: Not on file    Number of children: Not on file    Years of education: Not on file    Highest education level: Not on file   Occupational History    Not on file   Tobacco Use    Smoking status: Every Day     Packs/day: 0.50     Types: Cigarettes    Smokeless tobacco: Never   Vaping Use    Vaping Use: Never used   Substance and Sexual Activity    Alcohol use: No    Drug use: No    Sexual activity: Yes     Partners: Male     Birth control/protection: Surgical   Other Topics Concern    Not on file   Social History Narrative    Not on file     Social Determinants of Health     Financial Resource Strain: Not on file   Food Insecurity: Not on file   Transportation Needs: Not on file   Physical Activity: Not on file   Stress: Not on file   Social Connections: Not on file   Intimate Partner Violence: Not on file   Housing Stability: Not on file       FAMILY HISTORY:  Family History   Problem Relation Age of Onset    Stroke Mother     Diabetes Mother     Heart Disease Father     Diabetes Sister     Diabetes Maternal Grandmother     Breast Cancer Maternal Aunt     Breast Cancer Paternal Aunt        REVIEW OF SYSTEMS: Complete ROS assessed and noted for that which is described above, all else are negative.     CONSTITUTIONAL: no fevers, chills, weight loss  EYES: no blurry vision or double vision  CARDIOVASCULAR: no chest pain or palpitations  RESPIRATORY: no cough or shortness of breath  GASTROINTESTINAL: no dysphagia or abdominal pain  MUSCULOSKELETAL: no joint pains or weakness  SKIN: no rashes  NEUROLOGICAL: no numbness, tingling, or headaches  PSYCHIATRIC: no depression or anxiety  ENDOCRINE: no heat or cold intolerance, no polyuria or polydipsia      PHYSICAL EXAMINATION:  VITAL SIGNS:  There were no vitals taken for this visit. There were no vitals filed for this visit. GENERAL: NCAT, Sitting comfortably, NAD  EYES: EOMI, non-icteric, no proptosis  Ear/Nose/Throat: NCAT, no inflammation, no masses  LYMPH NODES: No LAD  CARDIOVASCULAR: S1 S2, RRR, No murmur, 2+ radial pulses  RESPIRATORY: CTA b/l, no wheeze/rales  GASTROINTESTINAL: NT, ND  MUSCULOSKELETAL: Normal ROM, no atrophy  SKIN: warm, no edema/rash/ or other skin changes  NEUROLOGIC: 5/5 power all extremities, no tremor, AAOx3  PSYCHIATRIC: Normal affect, Normal insight and judgement    Diabetic foot exam:     Left Foot:   Visual Exam: normal    Pulse DP: 2+ (normal)   Filament test: normal sensation    Vibratory sensation:  normal       Right Foot:   Visual Exam: normal    Pulse DP: 2+ (normal)   Filament test: normal sensation    Vibratory sensation: normal      REVIEW OF LABORATORY AND RADIOLOGY DATA:   Labs and documentation have been reviewed as described above. ASSESSMENT AND PLAN:   Nazario Hughes is a 46 y.o. female with a PMHx as noted above who presents for evaluation of uncontrolled type 2 diabetes. Problems:  Type 2 diabetes Uncontrolled  Hyperlipidemia  Hypertension    We had the pleasure of reviewing together the basics of diabetes including basic pathophysiology and diabetes care. We further discussed the importance of checking home glucose regularly and takin all of their scheduled medications in order to have the best possible outcome. I was able to answer any questions they had in clinic today and they are invited to reach me if they have any further questions.  Based upon our discussion together today we have decided to make the following changes:      PLAN  Type 2 Diabetes  Medications: ***    Advised to check glucose ***  Provided with glucose log sheets for later review. HTN: BP stable on current medications, no changes today. HLD: Fasting lipids to be obtained/reviewed. Lab Results   Component Value Date/Time    Cholesterol, total 229 (H) 09/27/2022 08:46 AM    HDL Cholesterol 51 09/27/2022 08:46 AM    LDL,Direct 202 (H) 07/13/2021 04:53 PM    LDL, calculated 150 (H) 09/27/2022 08:46 AM    VLDL, calculated 28 09/27/2022 08:46 AM    Triglyceride 140 09/27/2022 08:46 AM    CHOL/HDL Ratio 4.5 09/27/2022 08:46 AM        We discussed the expected course, resolution and complications of the diagnosis(es) in detail. Medication risks, benefits, costs, interactions, and alternatives were discussed as indicated. I advised Enedina Glynn to contact the office if her condition worsens, changes or fails to improve as anticipated. Patient expressed understanding with the diagnosis(es) and plan. Stephanie Norris MD   [facility]    Please see patient instructions.

## 2023-01-11 NOTE — PATIENT INSTRUCTIONS
PLAN FOR TODAY    We will plan to make the following changes to your diabetes medications:  Please continue taking Januvia 100mg daily and take Lantus 45 units dialy and complete the lab results and based on it will decide on the next steps in management. Please see Ophthalmologist and Nephrologist (Kidney doctor)  Please see Diabetes Education program  Please complete the LUPILLO test  Please use nicotine patches to stop smoking        It will be important to continue checking your glucose just as you did previously. I would like you at the very least to check you glucose during:     + AM fasting before breakfast   + Dinner time   + Bedtime   + Any other time that you are not feeling well. Always provide a glucose log that is completed at every visit so that we can review the results of your home glucose together. Without this, it is not possible to make accurate changes to your insulin doses. Diabetes Education referral made: Call them for Appt  [facility] for [address] [facility], [place], [address] [address]  Phone [phone]  Fax [phone]     Plan to repeat your diabetes eye exam in the near future. Please be sure to have the eye clinic / office fax us the report of your latest eye exam to our clinic to fax # [phone]. This is very important for us to keep track of any effect of diabetes on your vision as part of our wholesome diabetes care that we provide. INFORMATION FOR NEW DIABETES PATIENTS NOT ON INSULIN:    I would like to welcome you to our Diabetes & Endocrinology clinic. We want to do our best to help you take the best care of your diabetes. I would like for you to read this fact sheet which will have some important information for you regarding your treatment. What is my HbA1c (hemoglobin A1c) ? Blood sugar is very sticky and if left elevated for long enough, it will stick to just about everything in your body.  This includes enzymes which can no longer function properly and the lining of your blood vessels which can get damaged and result in damaged organs. It also sticks to your hemoglobin when your red blood cells are being produced and measuring this can provide us with a good idea of what your blood sugar average has been over the past 3 months. Most of the time we aim for a HbA1c value of 7% but this can be different for certain people under certain circumstances. Why do I need to keep a glucose log ? It is not possible to properly make changes to your insulin dose unless we know what your glucose values are at home. Using your HbA1c we can only have a general idea of whether your glucose has been controlled or uncontrolled, but it will not inform us on your day-to-day and [id number] blood sugar control. If you present to clinic without your glucose log, you may be wasting your visit rather than having a more meaningful visit since medication adjustments will be limited. What other things should I do to always be prepared ? Glucose tablets. Thats right, if you are on a medication that can potentially cause low blood sugars, you need to be prepared just in case since it can cause you to have very uncomfortable symptoms. Generally it is a good idea to keep some in your car, in your bedroom, and at work/school just in case. If your not sure if your diabetes medication can cause low blood sugar, be sure to ask your doctor. Diabetes ID. It is important for others to know that you are diabetic (especially if you are using in insulin) in case for some reason you are not able to communicate with others. This can happen if you pass out due to severely low blood sugar for example. IDs come as bracelets, necklaces, and dog tags. Check with your pharmacy about obtaining an ID and wear it wherever you go.     I look forward to working with you,    Penny Strauss MD   [address] [place] [address] System    . ........................................................................................................................................... [facility] Diabetes and Meal Planning    Meal planning can be a key part of managing diabetes. Planning meals and snacks with the right balance of carbohydrate, protein, and fat can help you keep your blood sugar at the target level. You don't have to eat special foods. You can eat what your family eats, including sweets once in a while. But you do have to pay attention to how often you eat and how much you eat of certain foods. Your plate  The plate format is a simple way to help you manage how you eat. You plan meals by learning how much space each food should take on a plate. It can make it easier to keep your blood sugar level within your target range. It also helps you see if you're eating healthy portion sizes. To use the plate format, you put non-starchy vegetables on half your plate. Add lean protein foods, such as fish, lean meats and poultry, or soy products, on one-quarter of the plate. Put a grain or starchy vegetable (such as brown rice or a potato) on the final quarter of the plate. You can add a small piece of fruit and some low-fat or fat-free milk or yogurt, depending on your carbohydrate goal for each meal.  Make sure that you are not using an oversized plate. A 9-inch plate is best.    Carbohydrates  Carbohydrate raises blood sugar higher and more quickly than any other nutrient. It is found in desserts, breads and cereals, and fruit. It's also found in starchy vegetables such as potatoes and corn, grains such as rice and pasta, and milk and yogurt. You can help keep your blood sugar levels within your target range by planning how much carbohydrate to have at meals and snacks. The amount you need depends on several things.  These include your weight, how active you are, which diabetes medicines you take, and what your goals are for your blood sugar levels. An example of a carbohydrate counting plan is:  45 to 60 grams at each meal. That's about the same as 3 to 4 carbohydrate servings. 15 to 20 grams at each snack. That's about the same as 1 carbohydrate serving. The Nutrition Facts label on packaged foods tells you how much carbohydrate is in a serving of the food. First, look at the serving size on the food label. All of the nutrition information on a food label is based on that serving size. For foods that don't come with labels, such as fresh fruits and vegetables, you'll need a guide that lists carbohydrate in these foods. You may use an mary alice on your smart phone called [employer]. How can you plan healthy meals? Here are some tips to get started:  Plan your meals a week at a time. Don't forget to include snacks too. Use cookbooks or online recipes to plan several main meals. Plan some quick meals for busy nights. You also can double some recipes that freeze well. Then you can save half for other busy nights when you don't have time to cook. Make sure you have the ingredients you need for your recipes. If you're running low on basic items, put these items on your shopping list too. List foods that you use to make breakfasts, lunches, and snacks. List plenty of fruits and vegetables. Post this list on the refrigerator. Add to it as you think of more things you need. Take the list to the store to do your weekly shopping. Follow-up care is a key part of your treatment and safety. Be sure to make and go to all appointments, and call your doctor if you are having problems.  It's also a good idea to know your test results and keep a list of the medicines you take.    --------------------------------------------------------------------------------------------------------------------------------------------------------------------------------  Diabetes Dental Care  When you have diabetes, managing blood sugar levels and taking good care of your teeth and gums are both important. When blood sugar levels are high, there's a greater risk for Gum (periodontal) disease. Tooth decay. Fungal infections in the mouth, like thrush. Dry mouth. Keeping your blood sugar levels in your target range can help prevent problems with the teeth and gums. If you have any problems with your teeth or gums, it is important see your dentist.  How do you care for your teeth and gums when you have diabetes? Brush your teeth twice a day. Floss daily. Make sure to press the floss against your teeth and not your gums. Check each day for areas where your gums might be red or painful. Be sure to let your dentist know of any sores in your mouth. See your dentist regularly for professional cleaning of your teeth and to look for gum problems. Many dentists recommend getting checkups twice a year. Remind your dentist that you have diabetes before any work is done. Don't smoke or use smokeless tobacco.    --------------------------------------------------------------------------------------------------------------------------------------------------------------------------------  Diabetes Foot Care    When you have diabetes, your feet need extra care and attention. Diabetes can damage the nerve endings and blood vessels in your feet, making you less likely to notice when your feet are injured. Diabetes also limits your body's ability to fight infection. If you get a minor foot injury, it could become an ulcer or a serious infection. With good foot care, you can prevent most of these problems. Caring for your feet can be quick and easy. Most of the care can be done when you are bathing or getting ready for bed. Keep your blood sugar close to normal by watching what and how much you eat, monitoring blood sugar, taking medicines if prescribed, and getting regular exercise. Do not smoke. Smoking affects blood flow and can make foot problems worse. Eat a diet that is low in fats.  High fat intake can cause fat to build up in your blood vessels and decrease blood flow. Inspect your feet daily for blisters, cuts, cracks, or sores. If you cannot see well, use a mirror or have someone help you. Take care of your feet:  Wash your feet every day. Use warm (not hot) water. Check the water temperature with your wrists or other part of your body, not your feet. Dry your feet well. If the skin on your feet stays moist, bacteria or a fungus can grow, which can lead to infection. Use moisturizing skin cream to keep the skin on your feet soft and prevent calluses and cracks. Stop using any cream that causes a rash. Clean underneath your toenails carefully. Do not use a sharp object to clean underneath your toenails. Change socks daily. Look inside your shoes every day for things like gravel or torn linings, which could cause blisters or sores. Buy shoes that fit well but not too tightly to prevent bunions and blisters. Shoes should be flexible and breathable but prevent from injury. Do not go barefoot, especially at night, to prevent injury. Do not try to treat an early foot problem at home. Home remedies or treatments that you can buy without a prescription (such as corn removers) can be harmful. Seek immediate help if:   You have a foot sore, an ulcer or break in the skin that is not healing after 4 days, bleeding corns or calluses, or an ingrown toenail. You have blue or black areas. You have peeling skin or tiny blisters between your toes or cracking or oozing of the skin. You have a fever for more than 24 hours and a foot sore. You have new numbness or tingling in your feet that does not go away after you move your feet or change positions. You have new unexplained or unusual swelling of the foot or ankle.

## 2023-01-11 NOTE — PROGRESS NOTES
REFERRED BY: Gilma Navarro NP     REASON:  Uncontrolled type 2 diabetes mellitus    CHIEF COMPLAINT: evaluation of type 2 diabetes mellitus    HISTORY OF PRESENT ILLNESS:   Demian Ambriz is a 46 y.o. female with a PMHx as noted below who presents for evaluation of uncontrolled type 2 diabetes. Ms. Marianne Waggoner has been following up with her PCP for her diabetes control but due to the continued blood sugar being uncontrolled she was referred to us for further management. Today she indicates having polydipsia polyuria and blurred vision.     Diabetes History:    Diabetes was diagnosed: 39 yrs old  Family History of diabetes is Positive  mother father DM2  Hb A1c : 10.5% 09/2022, 01/11/2023  10.2%    Diabetes-related Hospitalizations:denies    Current Home Regimen:  Lantus 15 units and 30 units   Januvia 100mg daily    Past treatments:  Jardiance 10mg caused weight loss and nausea  Ozempic lost weight with that and stopped it      Review of home glucose:  3 days did not check, before that was using freestyle 14-day, indicates her fasting was usually 130s to 140s occasionally 70s but she does not check with a fingerstick    Hypoglycemia:no    Diet:  -works at over night  Eats 2-3 meals  -chicken, pork chicken, beef, lamb, seafood fish  - loves pasta mac and cheese ramen noodles, fetaccunie chicken natalie corn and beans, and potatoes and fries, uses air fryer  Snacks = potato chips occass, starburst and skittles  Nakita = sweet tea , soda occass not drinking diet drinks      Physical Activity:  -Works in [employer], works 2:30 AM to 11 am, night shift      Complications:    MI or CVA:No  PVD: No, has symptoms of PVD pain in legs when walking after 10 mins and after rest of 5 mins resolves  Retinopathy:Yes     Last Ophthalm:11/2022 having \"bleeding behind the eyes\"   Nephropathy:Yes, CKD2 and microalbumin  Neuropathy:Yes  , intermittent     Last Podiatry:last year had ingrown toe nail  Gastropathy: No  Amputations: No      On a Statin:Yes rosuvastatin 40mg   On an ACEI/ARB:Yes losartan 25mg daily, but she stopped taking it for few months now  On Aspirin:No  Smoker:Yes, half pack per day    Diabetes Education: Did not go recently        Review of most recent diabetes-related labs:  Lab Results   Component Value Date    HBA1C 10.4 (H) 04/18/2022    HBA1C 12.8 (H) 01/18/2022    HBA1C 11.7 (H) 09/28/2021    GAA1KNOL 10.6 09/27/2022    CHOL 229 (H) 09/27/2022    LDLC 150 (H) 09/27/2022     (H) 07/13/2021    GFRAA >60 09/27/2022    GFRNA >60 09/27/2022    MCACR 1,775 (H) 09/27/2022    TSH 2.96 09/27/2022    VITD3 10.9 (L) 11/03/2020     Lab Key:  [id number] = IA-2 pancreatic islet cell autoantibody  CPEPL = C-peptide level  :EXT = External Lab  GADLT = ALEXA-65 autoantibody   INSUL = Insulin level  MCACR (or MALBEXT) = Urine Microalbumin (or External UM)  B12LT = B12 level    PAST MEDICAL/SURGICAL HISTORY:   Past Medical History:   Diagnosis Date    Abnormal EKG 2/27/2018    Abscess 7/24/2019    Asthma     Chest pain 2/27/2018    Chest wall contusion, left, subsequent encounter     Diabetes ([facility] [address]     Diverticular disease     Diverticulitis large intestine w/o perforation or abscess w/o bleeding 10/25/2016    Dyslipidemia     Hypertension     Lumbar radiculopathy 02/01/2021    Menopause     Pelvic pain 7/26/2016    Rib fractures     Right hand pain 7/10/2018    Sciatica of left side 02/01/2021    Uterine leiomyoma 7/26/2016    Vaginal candidiasis 7/24/2019     Past Surgical History:   Procedure Laterality Date    COLONOSCOPY N/A 7/22/2022    COLONOSCOPY WITH POSSIBLE POLYPECTOMY with cold forcep polypectomy performed by Winsome Valentino MD at [facility]    HX GYN      BTL    IR INJ FORAMIN EPID LUMB ANES/STER SNGL  5/11/2021       ALLERGIES:   Allergies   Allergen Reactions    Lisinopril Other (comments)     Hands and feet swelling       MEDICATIONS ON ADMISSION:     Current Outpatient Medications:     nicotine (NICODERM CQ) 14 mg/24 hr patch, 1 Patch by TransDERmal route every twenty-four (24) hours for 30 days. Use for first 2 weeks then take the 7 mg patch, Disp: 14 Patch, Rfl: 0    nicotine (NICODERM CQ) 7 mg/24 hr, 1 Patch by TransDERmal route every twenty-four (24) hours for 30 days. Use after completing 2 weeks on the 14 mg patch, Disp: 30 Patch, Rfl: 0    flash glucose scanning reader (FreeStyle Antony 14 Day [place]) [facility], Use to check blood glucose TID and PRN. Dx E11.9, Disp: 1 Each, Rfl: 0    SITagliptin phosphate (JANUVIA) 100 mg tablet, Take 1 Tablet by mouth daily. , Disp: 90 Tablet, Rfl: 0    Insulin Needles, Disposable, (TRUEplus Pen Needle) 31 gauge x 5/16\" ndle, USE TO ADMINISTER INSULIN UNDER THE SKIN TWO TIMES A DAY AS DIRECTED, Disp: 200 Each, Rfl: 3    insulin glargine (Lantus Solostar U-100 Insulin) 100 unit/mL (3 mL) inpn, INJECT UNDER THE SKIN, 15 UNITS IN THE MORNING AND 30 UNITS AT BEDTIME, Disp: 45 mL, Rfl: 0    diazePAM (Valium) 5 mg tablet, Take 1 Tablet by mouth three (3) times daily as needed for Anxiety (spasm). Max Daily Amount: 15 mg., Disp: 15 Tablet, Rfl: 0    rosuvastatin (CRESTOR) 40 mg tablet, Take 1 Tablet by mouth nightly., Disp: 90 Tablet, Rfl: 3    flash glucose sensor (FreeStyle Antony 14 Day Sensor) kit, APPLY TO UPPER ARM & CHANGE EVERY 2 WEEKS., Disp: 1 Kit, Rfl: 3    pantoprazole (PROTONIX) 40 mg tablet, Take 1 Tablet by mouth daily. , Disp: 90 Tablet, Rfl: 3    losartan (COZAAR) 25 mg tablet, TAKE 1 TABLET BY MOUTH ONE TIME A DAY. STOP LISINOPRIL 2.5MG, Disp: 90 Tablet, Rfl: 1    fluticasone propion-salmeteroL (ADVAIR/WIXELA) 250-50 mcg/dose diskus inhaler, Take 1 Puff by inhalation every twelve (12) hours. , Disp: 1 Inhaler, Rfl: 0    albuterol (PROVENTIL HFA, VENTOLIN HFA, PROAIR HFA) 90 mcg/actuation inhaler, Take 2 Puffs by inhalation every four (4) hours as needed for Wheezing or Shortness of Breath.  Indications: asthma attack, Disp: 2 Inhaler, Rfl: 5    SOCIAL HISTORY:   Social History     Socioeconomic History    Marital status:      Spouse name: Not on file    Number of children: Not on file    Years of education: Not on file    Highest education level: Not on file   Occupational History    Not on file   Tobacco Use    Smoking status: Every Day     Packs/day: 0.50     Types: Cigarettes    Smokeless tobacco: Never   Vaping Use    Vaping Use: Never used   Substance and Sexual Activity    Alcohol use: No    Drug use: No    Sexual activity: Yes     Partners: Male     Birth control/protection: Surgical   Other Topics Concern    Not on file   Social History Narrative    Not on file     Social Determinants of Health     Financial Resource Strain: Not on file   Food Insecurity: Not on file   Transportation Needs: Not on file   Physical Activity: Not on file   Stress: Not on file   Social Connections: Not on file   Intimate Partner Violence: Not on file   Housing Stability: Not on file       FAMILY HISTORY:  Family History   Problem Relation Age of Onset    Stroke Mother     Diabetes Mother     Heart Disease Father     Diabetes Sister     Diabetes Maternal Grandmother     Breast Cancer Maternal Aunt     Breast Cancer Paternal Aunt        REVIEW OF SYSTEMS: Complete ROS assessed and noted for that which is described above, all else are negative.     CONSTITUTIONAL: no fevers, chills, weight loss  EYES: no blurry vision or double vision  CARDIOVASCULAR: no chest pain or palpitations  RESPIRATORY: no cough or shortness of breath  GASTROINTESTINAL: no dysphagia or abdominal pain  MUSCULOSKELETAL: no joint pains or weakness  SKIN: no rashes  NEUROLOGICAL: no numbness, tingling, or headaches  PSYCHIATRIC: no depression or anxiety  ENDOCRINE: no heat or cold intolerance, no polyuria or polydipsia      PHYSICAL EXAMINATION:  VITAL SIGNS:  Visit Vitals  /71   Pulse 95   Ht 5' (1.524 m)   Wt 111 lb 3.2 oz (50.4 kg)   BMI 21.72 kg/m²     Last 3 Recorded Weights in this Encounter    01/11/23 1001   Weight: 111 lb 3.2 oz (50.4 kg)        GENERAL: NCAT, Sitting comfortably, NAD  EYES: EOMI, non-icteric, no proptosis  Ear/Nose/Throat: NCAT, no inflammation, no masses  LYMPH NODES: No LAD  CARDIOVASCULAR: S1 S2, RRR, No murmur, 2+ radial pulses  RESPIRATORY: CTA b/l, no wheeze/rales  GASTROINTESTINAL: NT, ND  MUSCULOSKELETAL: Normal ROM, no atrophy  SKIN: warm, no edema/rash/ or other skin changes  NEUROLOGIC: 5/5 power all extremities, no tremor, AAOx3  PSYCHIATRIC: Normal affect, Normal insight and judgement    Diabetic foot exam 01/2023:     Left Foot:   Visual Exam: dry feet, first toe discolored nail   Pulse DP: 2+ (normal)   Filament test: normal sensation    Vibratory sensation:  normal       Right Foot:   Visual Exam: dry feet, first toe discolored nail   Pulse DP: 2+ (normal)   Filament test: normal sensation    Vibratory sensation: normal      REVIEW OF LABORATORY AND RADIOLOGY DATA:   Labs and documentation have been reviewed as described above. ASSESSMENT AND PLAN:   Martha Siegel is a 46 y.o. female with a PMHx as noted above who presents for evaluation of uncontrolled type 2 diabetes. Problems:  Type 2 diabetes Uncontrolled  Hyperlipidemia  Hypertension    We had the pleasure of reviewing together the basics of diabetes including basic pathophysiology and diabetes care. We further discussed the importance of checking home glucose regularly and takin all of their scheduled medications in order to have the best possible outcome. I was able to answer any questions they had in clinic today and they are invited to reach me if they have any further questions.  Based upon our discussion together today we have decided to make the following changes:    Given her body habitus and not having a lot of improvement with pills over the years there is a question of partial or complete insulin insufficiency, will obtain C-peptide and antibody levels to evaluate for that further      PLAN  Type 2 Diabetes  Medications: Plan to continue the same until lab results are back  To continue Januvia 100 mg  Lantus 45 units once a day    Advised to check glucose 4-5 times with CGM, she will be receiving the freestyle osvaldo 2 reader soon  Referred to diabetes education  Referred to ophthalmology and podiatry and given instructions on foot self checks and foot care    Having PAD symptoms: To obtain LUPILLO    HTN: BP stable on current medications, given worsening microalbuminuria recommend for ms Thurman to resume losartan 25 mg and to follow-up with nephrology  HLD: Fasting lipids reviewed, she does not take her rosuvastatin 40 mg regularly, advised to take it regularly and to obtain her blood work 1 week before her next appointment  Lab Results   Component Value Date/Time    Cholesterol, total 229 (H) 09/27/2022 08:46 AM    HDL Cholesterol 51 09/27/2022 08:46 AM    LDL,Direct 202 (H) 07/13/2021 04:53 PM    LDL, calculated 150 (H) 09/27/2022 08:46 AM    VLDL, calculated 28 09/27/2022 08:46 AM    Triglyceride 140 09/27/2022 08:46 AM    CHOL/HDL Ratio 4.5 09/27/2022 08:46 AM        We discussed the expected course, resolution and complications of the diagnosis(es) in detail. Medication risks, benefits, costs, interactions, and alternatives were discussed as indicated. I advised Gary Bonner to contact the office if her condition worsens, changes or fails to improve as anticipated. Patient expressed understanding with the diagnosis(es) and plan. Zee Sotelo MD   [facility]    Please see patient instructions.

## 2023-01-11 NOTE — LETTER
1/11/2023    Patient: Shivani Ramos   YOB: 1970   Date of Visit: 1/11/2023     [address]  [address]  [address]  [address] [facility] [address]  Via In [facility] [address]    Dear LUCINDA Hayden NP,      Thank you for referring Ms. Melissa Thurman to [facility] for evaluation. My notes for this consultation are attached. If you have questions, please do not hesitate to call me. I look forward to following your patient along with you.       Sincerely,    Tena Iverson MD

## 2023-01-18 ENCOUNTER — PATIENT OUTREACH (OUTPATIENT)
Dept: OTHER | Age: 53
End: 2023-01-18

## 2023-01-18 NOTE — PROGRESS NOTES
Follow up phone call to patient, two pt identifiers verified. Discussed patient's goals:    Goals        Supportive resources in place to maintain patient in the community (ie. Home Health, DME equipment, refer to, medication assistant plan, etc.)      [address] 24/7 (virtual visits 24/7)  [employer] # [phone] PW: [facility]  Nurse Access line 24/7 # [phone]  [employer] ([email or web address])  HR Service Now - [facility] Workday  IT - [phone]  [facility] Help - 1- [phone]  Associate Services for advice and direction, by calling [phone] and pressing 1              Patient's primary care provider relationship reviewed with patient and modified, as applicable. Spoke with patient. States her appointment with new endocrinologist went well. She has been referred to a nutritionist (appt on 1/31), nephrology and neurology. Provided patient with phone number to nephrologist closer to [facility]. Patient states her A1C was down to 10 (from 12). She seemed mostly concerned with seeing nephrology. Did state that kidney function was good but high proteins. Added reference material regarding effects of diabetes on kidneys. Discussed the fact that high glucose effects the whole body.        Key pt activities to achieve better health:   []  Weight loss  [x]  Improved diabetic control  []  Decreased cholesterol levels  []  Decreased blood pressure  []    []    Upcoming appointments:   Future Appointments   Date Time Provider Ankush Nicholson   1/31/2023  9:30 AM Kaylynn Klein RN PDHT BS AMB   4/11/2023  2:10 PM Terry Alvarado MD RDE [address] BS AMB     Plan for next call:  Call in two weeks, 2/1

## 2023-01-23 ENCOUNTER — TELEPHONE (OUTPATIENT)
Dept: PHARMACY | Age: 53
End: 2023-01-23

## 2023-01-30 NOTE — TELEPHONE ENCOUNTER
[facility] CLINICAL PHARMACY REVIEW - BE WELL WITH DIABETES  =================================================================  Rancho Zamudio is a 46 y.o. female enrolled in the [address] [place] [address] Diabetes Program.    Two attempts made to reach patient by telephone for pharmacist appointment. No answer and mailbox full, so unable to leave message. MyChart message sent. If patient returns call today or tomorrow, you can see if I am available to complete at that time vs rescheduling.     Radha Rodriguez, PharmD, [facility]  Department, toll free: [phone], option 3

## 2023-01-31 ENCOUNTER — TELEPHONE (OUTPATIENT)
Dept: PHARMACY | Age: 53
End: 2023-01-31

## 2023-01-31 RX ORDER — PANTOPRAZOLE SODIUM 40 MG/1
40 TABLET, DELAYED RELEASE ORAL DAILY
Qty: 90 TABLET | Refills: 3 | Status: SHIPPED | OUTPATIENT
Start: 2023-01-31

## 2023-01-31 NOTE — TELEPHONE ENCOUNTER
Sherron Yu NP, patient asking for rx to be redirected to [address]  Rx pended for your signature/modification as appropriate    LOV: 9/27/22  Next: to be scheduled    Thank you,  Temitope Rodriguez, PharmD, [facility]  Department, toll free: [phone], option 3

## 2023-01-31 NOTE — TELEPHONE ENCOUNTER
[facility] CLINICAL PHARMACY REVIEW - Be Well with Diabetes    Tad Louis is a 46 y.o. female enrolled in the [address] [place] [address] Employee Diabetes Program. Patient provided Norman Moore with verbal consent to remain in the program for this year. Patient enrolled 2021. Insurance through the following employer: [address] [place] [address]    Medications:   Current Outpatient Medications   Medication Sig    nicotine (NICODERM CQ) 14 mg/24 hr patch 1 Patch by TransDERmal route every twenty-four (24) hours for 30 days. Use for first 2 weeks then take the 7 mg patch    nicotine (NICODERM CQ) 7 mg/24 hr 1 Patch by TransDERmal route every twenty-four (24) hours for 30 days. Use after completing 2 weeks on the 14 mg patch    flash glucose scanning reader (FreeStyle Antony 14 Day [place]) [facility]    *now [facility] - updated   Use to check blood glucose TID and PRN. Dx E11.9    SITagliptin phosphate (JANUVIA) 100 mg tablet Take 1 Tablet by mouth daily. Insulin Needles, Disposable, (TRUEplus Pen Needle) 31 gauge x 5/16\" ndle    *Leader / $0   USE TO ADMINISTER INSULIN UNDER THE SKIN TWO TIMES A DAY AS DIRECTED    insulin glargine (Lantus Solostar U-100 Insulin) 100 unit/mL (3 mL) inpn INJECT UNDER THE SKIN, 15 UNITS IN THE MORNING AND 30 UNITS AT BEDTIME    diazePAM (Valium) 5 mg tablet Take 1 Tablet by mouth three (3) times daily as needed for Anxiety (spasm). Max Daily Amount: 15 mg.    rosuvastatin (CRESTOR) 40 mg tablet Take 1 Tablet by mouth nightly. flash glucose sensor (FreeStyle Antony 14 Day Sensor) kit APPLY TO UPPER ARM & CHANGE EVERY 2 WEEKS. pantoprazole (PROTONIX) 40 mg tablet Take 1 Tablet by mouth daily. losartan (COZAAR) 25 mg tablet TAKE 1 TABLET BY MOUTH ONE TIME A DAY.  STOP LISINOPRIL 2.5MG    fluticasone propion-salmeteroL (ADVAIR/WIXELA) 250-50 mcg/dose diskus inhaler    *sts albuterol prn sufficient (and no noted albuterol fills in past year); rx from 2021 - removed   Take 1 Puff by inhalation every twelve (12) hours. albuterol (PROVENTIL HFA, VENTOLIN HFA, PROAIR HFA) 90 mcg/actuation inhaler Take 2 Puffs by inhalation every four (4) hours as needed for Wheezing or Shortness of Breath. Indications: asthma attack     Current Pharmacy: [facility]  Current testing supplies/frequency: Freestyle Antony     Allergies:   Allergies   Allergen Reactions    Lisinopril Other (comments)     Hands and feet swelling     Vitals/Labs:  BP Readings from Last 3 Encounters:   01/11/23 136/71   10/20/22 (!) 156/75   09/27/22 120/68     Lab Results   Component Value Date/Time    Microalbumin/Creat ratio (mg/g creat) 1,775 (H) 09/27/2022 08:46 AM    Microalbumin,urine random 197.00 09/27/2022 08:46 AM     Lab Results   Component Value Date/Time    Hemoglobin A1c 10.4 (H) 04/18/2022 09:44 AM    Hemoglobin A1c 12.8 (H) 01/18/2022 05:00 PM    Hemoglobin A1c 11.7 (H) 09/28/2021 05:00 PM    Hemoglobin A1c (POC) 10.2 01/11/2023 02:54 PM    Hemoglobin A1c (POC) 10.6 09/27/2022 09:00 AM     Lab Results   Component Value Date/Time    Cholesterol, total 229 (H) 09/27/2022 08:46 AM    HDL Cholesterol 51 09/27/2022 08:46 AM    LDL,Direct 202 (H) 07/13/2021 04:53 PM    LDL, calculated 150 (H) 09/27/2022 08:46 AM    VLDL, calculated 28 09/27/2022 08:46 AM    Triglyceride 140 09/27/2022 08:46 AM    CHOL/HDL Ratio 4.5 09/27/2022 08:46 AM     ALT (SGPT)   Date Value Ref Range Status   09/27/2022 28 12 - 78 U/L Final     AST (SGOT)   Date Value Ref Range Status   09/27/2022 10 (L) 15 - 37 U/L Final     The 10-year ASCVD risk score (Tray MARK, et al., 2019) is: 26.5%    Values used to calculate the score:      Age: 46 years      Sex: Female      Is Non- : Yes      Diabetic: Yes      Tobacco smoker: Yes      Systolic Blood Pressure: 031 mmHg      Is BP treated: Yes      HDL Cholesterol: 51 MG/DL      Total Cholesterol: 229 MG/DL     Lab Results   Component Value Date/Time Creatinine 0.87 09/27/2022 08:46 AM     Estimated Creatinine Clearance: 54.3 mL/min (by C-G formula based on SCr of 0.87 mg/dL). Immunizations:  Immunization History   Administered Date(s) Administered    COVID-19, MODERNA ROXANNE border, Primary or Immunocompromised, (age 18y+), IM, 100 mcg/0.5mL 03/31/2021, 04/02/2021    COVID-19, MODERNA Booster BLUE border, (age 18y+), IM, 50mcg/0.25mL 12/02/2021    Influenza Vaccine 10/11/2018, 11/02/2020, 10/22/2021    Influenza, FLUARIX, Rozella Meth (age 10 mo+) AND AFLURIA, (age 1 y+), PF, 0.5mL 11/28/2017, 10/08/2019, 09/27/2022    Pneumococcal Polysaccharide (PPSV-23) 11/03/2020, 04/18/2022    Tdap 11/04/2016     Social History:  Social History     Tobacco Use    Smoking status: Every Day     Packs/day: 0.50     Types: Cigarettes    Smokeless tobacco: Never   Substance Use Topics    Alcohol use: No     ASSESSMENT:  Initial Program Requirements (Y indicates has completed for the year, N indicates needs to be completed by 07/01/2023):  YES - Provider Visit for DM (1st)  YES - A1c (1st)    Ongoing Program Requirements (Y indicates has completed for the year, N indicates needs to be completed by 12/31/2023):  NO - Provider Visit for DM (2nd)  NO - ACC/diabetes educator visit (if A1c over 8%) - has Diabetes Ser appt 2/7/23  NO - A1c (2nd)  NO - Lipid panel  NO - Urine microalbumin  NO - Pneumococcal vaccination: Karis Ortega may be given  NO - Influenza vaccination for Fall 2023  N/A - Medication adherence over 70% (insulin)  YES - On statin or contraindication(s) - prescribed rosuvastatin  YES - On ACEi/ARB or contraindication(s) - prescribed losartan     Formulary Medication Review:  Non-formulary or medications with cost-effective alternatives: n/a.      Current medications eligible for copay waiver, up to $600, through [employer]:  - Januvia, Lantus, losartan, rosuvastatin  - Freestyle Antony and generic pen needles and syringes    Diabetes Care: - Glycemic Goal: <7.0% and directed by provider. Type 2 DM under poor control as evidenced by A1c consistently >10, although did show improvement from Sept to most recent a few weeks ago - also established with endocrinology 1/11/23. Taking Januvia 100mg daily and Lantus 45u today daily (has split to 15u qAM and 30u qPM)  - Home blood sugar records:  Has switched from Cohen 14-day to Moody in the past week or 2; did not have average to provide, but states they are \"staying good - \"  - Any episodes of hypoglycemia? no per patient; reports when 60 she is sure to eat   - Eye exam current (within one year): no  - Foot exam current (within one year): yes    - Therapy Optimization: patient thinks her BG is improving, with seeing her Libre2 readings and knowing she is making lifestyle changes. Is excited to meet with nutritionist to learn about more changes she can make. - Medications/Classes already tried/failed: Jardiance (nausea, weight loss);  Ozempic (weight loss - not trying to lose weight); metformin (nausea, diarrhea)  - Medication compliance: compliant most of the time  - Diet compliance: states has been baking foods and using air fryer, avoiding fried/greasy foods    Other Considerations:  - Blood Pressure Goal: BP less than 130/80 mmHg due to history of DM: Is at blood pressure goal.   - Lipids: taking high-intensity statin (rosuva 40mg) - was increased from rosuvastatin 20mg daily in September, following last lipid panel  - Smoking status: smoking @7 cigarettes/day - has not started nicotine patches yet because she wasn't sure how to use  - Other: patient inquiring about pantoprazole - last rx sent to [facility], rather than [facility]    PLAN:  - Consideration(s) for provider:   Pantoprazole rx to [facility] - see refill encounter to PCP  Consider [id number]  - DM program gaps identified:   Initial requirements: Requirements met   Ongoing requirements: Provider visit for DM (2nd), ACC/diabetes educator visit (if A1c over 8%), A1c (2nd), Lipid panel, Urine microalbumin, Pneumococcal vaccination: [id number], and Influenza vaccination for [id number]   - Education to patient:   Reminded to get yearly retinal exam - sts is trying to gradually schedule all of her referral and follow-up appts (difficulty with specialty copays and gas for car to get there)  Benefit/indication for pneumonia vaccine in patients with diabetes  Reminder A1c and lipid panel can be completed for free at Be Well screenings  Smoking cessation - reviewed starting with 14mg patches - per provider's rx, recommended using for 2 weeks, then switching to 7mg patches. Advised to contact provider if needing 14mg patch for longer, but may be sufficient since smoking @7 cigarettes/day. Apply new patch to nonhairy, clean, dry skin on the upper body or upper outer arm; each patch should be applied to a different site. Patch may be worn for 16 or 24 hours. If cigarette cravings occur upon awakening, wear for 24 hours; if vivid dreams or other sleep disturbances occur, remove the patch at bedtime and apply a new patch in the morning. Do not wear more than 1 patch at a time; do not leave patch on for more than 24 hours (may irritate skin).    - Patient asks that address be updated in this EMR and pharmacy system:  Now: [address], [place], [address] [facility] Jordan Ramirez  [facility] [address] [place], [place] [id number]   Updated in [facility] [place]    - Upcoming appointments:   Future Appointments   Date Time Provider Ankush Nicholson   2/7/2023 10:45 AM [address]   2/7/2023 2:30 PM Brie King RN PDHT BS AMB   4/11/2023 2:10 PM Valentín Alvarado MD RDE [address] BS AMB       Omega Rodriguez, PharmD, [facility]  Department, toll free: [phone], option 3     =========================================================   For Pharmacy Admin Tracking Only    Program: [address] in place: No  Recommendation Provided To: Provider: 1 via Note to Provider   Intervention Detail: Refill(s) Provided  Intervention Accepted By: Provider: 1  Gap Closed?: Yes  Time Spent (min): 60

## 2023-02-01 ENCOUNTER — PATIENT OUTREACH (OUTPATIENT)
Dept: OTHER | Age: 53
End: 2023-02-01

## 2023-02-01 NOTE — PROGRESS NOTES
ACM follow up  Patient states she did speak with the pharmacy yesterday regarding Be Well with Diabetes benefits. She had to move her appointment with nutritionist to 2/7. No needs at this time. Discussed resolving episode once she is established with nutritionist for diabetic education. Hopeful for good engagement. Will reach out in the next few weeks to discuss attainment of goals.

## 2023-02-06 ENCOUNTER — DOCUMENTATION ONLY (OUTPATIENT)
Dept: FAMILY MEDICINE CLINIC | Age: 53
End: 2023-02-06

## 2023-02-06 ENCOUNTER — NURSE TRIAGE (OUTPATIENT)
Dept: OTHER | Facility: CLINIC | Age: 53
End: 2023-02-06

## 2023-02-06 ENCOUNTER — HOSPITAL ENCOUNTER (EMERGENCY)
Age: 53
Discharge: HOME | End: 2023-02-06
Attending: EMERGENCY MEDICINE
Payer: COMMERCIAL

## 2023-02-06 VITALS
BODY MASS INDEX: 21.6 KG/M2 | OXYGEN SATURATION: 97 % | DIASTOLIC BLOOD PRESSURE: 94 MMHG | HEART RATE: 94 BPM | TEMPERATURE: 99.2 F | RESPIRATION RATE: 18 BRPM | SYSTOLIC BLOOD PRESSURE: 168 MMHG | HEIGHT: 60 IN | WEIGHT: 110 LBS

## 2023-02-06 DIAGNOSIS — R30.0 DYSURIA: Primary | ICD-10-CM

## 2023-02-06 DIAGNOSIS — N30.00 ACUTE CYSTITIS WITHOUT HEMATURIA: ICD-10-CM

## 2023-02-06 LAB
APPEARANCE UR: CLEAR
BACTERIA URNS QL MICRO: ABNORMAL /HPF
BILIRUB UR QL: NEGATIVE
COLOR UR: ABNORMAL
EPITH CASTS URNS QL MICRO: ABNORMAL /LPF
GLUCOSE UR STRIP.AUTO-MCNC: NEGATIVE MG/DL
HGB UR QL STRIP: ABNORMAL
KETONES UR QL STRIP.AUTO: NEGATIVE MG/DL
LEUKOCYTE ESTERASE UR QL STRIP.AUTO: NEGATIVE
NITRITE UR QL STRIP.AUTO: NEGATIVE
PH UR STRIP: 5.5 (ref 5–8)
PROT UR STRIP-MCNC: 30 MG/DL
RBC #/AREA URNS HPF: ABNORMAL /HPF (ref 0–5)
SP GR UR REFRACTOMETRY: 1.01 (ref 1–1.03)
UA: UC IF INDICATED,UAUC: ABNORMAL
UROBILINOGEN UR QL STRIP.AUTO: 0.2 EU/DL (ref 0.2–1)
WBC URNS QL MICRO: ABNORMAL /HPF (ref 0–4)

## 2023-02-06 PROCEDURE — 99283 EMERGENCY DEPT VISIT LOW MDM: CPT

## 2023-02-06 PROCEDURE — 81001 URINALYSIS AUTO W/SCOPE: CPT

## 2023-02-06 PROCEDURE — 74011250637 HC RX REV CODE- 250/637: Performed by: EMERGENCY MEDICINE

## 2023-02-06 RX ORDER — PHENAZOPYRIDINE HYDROCHLORIDE 100 MG/1
200 TABLET, FILM COATED ORAL
Status: COMPLETED | OUTPATIENT
Start: 2023-02-06 | End: 2023-02-06

## 2023-02-06 RX ORDER — CEPHALEXIN 500 MG/1
500 CAPSULE ORAL 3 TIMES DAILY
Qty: 15 CAPSULE | Refills: 0 | Status: SHIPPED | OUTPATIENT
Start: 2023-02-06

## 2023-02-06 RX ORDER — PHENAZOPYRIDINE HYDROCHLORIDE 200 MG/1
200 TABLET, FILM COATED ORAL 3 TIMES DAILY
Qty: 6 TABLET | Refills: 0 | Status: SHIPPED | OUTPATIENT
Start: 2023-02-06 | End: 2023-02-08

## 2023-02-06 RX ORDER — CEPHALEXIN 250 MG/1
500 CAPSULE ORAL
Status: COMPLETED | OUTPATIENT
Start: 2023-02-06 | End: 2023-02-06

## 2023-02-06 RX ADMIN — CEPHALEXIN 500 MG: 250 CAPSULE ORAL at 11:48

## 2023-02-06 RX ADMIN — PHENAZOPYRIDINE 200 MG: 100 TABLET ORAL at 11:48

## 2023-02-06 NOTE — PROGRESS NOTES
Pt called in for apt 9:48  for apt pt states now lives in [facility] / offered 3:20 apt and pt refused.

## 2023-02-06 NOTE — ED TRIAGE NOTES
Pt arrived by POV for urinary frequency. Pt reports she went to Urgent care but they do not take her insurance and her PMD can not get her in till this afternoon. Pt reports bilateral ear pain with right worse then left and urinary pain, frequency with odor.   Pt is awake alert and oriented x 4, pt educated on ER flow

## 2023-02-06 NOTE — Clinical Note
[address] [place] [address] EMERGENCY DEP  [address] [facility] [address] Jennifer Anthony [address]  [phone]    Work/School Note    Date: 2/6/2023    To Whom It May concern:      Gosia Benjamin was seen and treated today in the emergency room by the following provider(s):  Attending Provider: Francis Scales DO. Gosia Benjamin is excused from work/school on 02/06/23. She is clear to return to work/school on 02/07/23.         Sincerely,          Jerris Gottron, DO

## 2023-02-06 NOTE — ED NOTES
I have reviewed discharge instructions with the patient. The patient verbalized understanding. Medications given to pt. Pt jackie well.

## 2023-02-06 NOTE — ED PROVIDER NOTES
[facility] EMERGENCY DEP  EMERGENCY DEPARTMENT ENCOUNTER       Pt Name: Ameya Granados  MRN: [id number]  [place] 1970  Date of evaluation: 2/6/2023  Provider: Rebekah Marie DO   PCP: Jackson Fowler NP  Note Started: 1:32 PM 2/6/23     CHIEF COMPLAINT       Chief Complaint   Patient presents with    Urinary Pain        HISTORY OF PRESENT ILLNESS: 1 or more elements      History From: Patient, History limited by: none     Ameya Granados is a 46 y.o. female arrives by private vehicle for evaluation of dysuria. Patient states of the past 2 days that she has had pain and discomfort with voiding. She states the pain is in the suprapubic region, burning in nature. In addition to that she has also had urinary frequency and urgency. She states there has been some malodorous urine. She denies any nausea vomiting or diarrhea. She denies any fever or chills. She denies any cough or cold. Please See MDM for Additional Details of the HPI/PMH  Nursing Notes were all reviewed and agreed with or any disagreements were addressed in the HPI. REVIEW OF SYSTEMS        Positives and Pertinent negatives as per HPI.     PAST HISTORY     Past Medical History:  Past Medical History:   Diagnosis Date    Abnormal EKG 2/27/2018    Abscess 7/24/2019    Asthma     Chest pain 2/27/2018    Chest wall contusion, left, subsequent encounter     Diabetes ([address]     Diverticular disease     Diverticulitis large intestine w/o perforation or abscess w/o bleeding 10/25/2016    Dyslipidemia     Hypertension     Lumbar radiculopathy 02/01/2021    Menopause     Pelvic pain 7/26/2016    Rib fractures     Right hand pain 7/10/2018    Sciatica of left side 02/01/2021    Uterine leiomyoma 7/26/2016    Vaginal candidiasis 7/24/2019       Past Surgical History:  Past Surgical History:   Procedure Laterality Date    COLONOSCOPY N/A 7/22/2022    COLONOSCOPY WITH POSSIBLE POLYPECTOMY with cold forcep polypectomy performed by Karan Johnson MD at [facility] MAIN OR    HX GYN      BTL    IR INJ FORAMIN EPID LUMB ANES/STER SNGL  5/11/2021       Family History:  Family History   Problem Relation Age of Onset    Stroke Mother     Diabetes Mother     Heart Disease Father     Diabetes Sister     Diabetes Maternal Grandmother     Breast Cancer Maternal Aunt     Breast Cancer Paternal Aunt        Social History:  Social History     Tobacco Use    Smoking status: Every Day     Packs/day: 0.50     Types: Cigarettes    Smokeless tobacco: Never   Vaping Use    Vaping Use: Never used   Substance Use Topics    Alcohol use: No    Drug use: No       Allergies: Allergies   Allergen Reactions    Lisinopril Other (comments)     Hands and feet swelling       CURRENT MEDICATIONS      Discharge Medication List as of 2/6/2023 11:39 AM        CONTINUE these medications which have NOT CHANGED    Details   flash glucose sensor (FREESTYLE MARGRET 2 SENSOR) by Does Not Apply route., Historical Med      pantoprazole (PROTONIX) 40 mg tablet Take 1 Tablet by mouth daily. , Normal, Disp-90 Tablet, R-3      nicotine (NICODERM CQ) 14 mg/24 hr patch 1 Patch by TransDERmal route every twenty-four (24) hours for 30 days. Use for first 2 weeks then take the 7 mg patch, Normal, Disp-14 Patch, R-0      nicotine (NICODERM CQ) 7 mg/24 hr 1 Patch by TransDERmal route every twenty-four (24) hours for 30 days. Use after completing 2 weeks on the 14 mg patch, Normal, Disp-30 Patch, R-0      SITagliptin phosphate (JANUVIA) 100 mg tablet Take 1 Tablet by mouth daily. , Normal, Disp-90 Tablet, R-0      Insulin Needles, Disposable, (TRUEplus Pen Needle) 31 gauge x 5/16\" ndle USE TO ADMINISTER INSULIN UNDER THE SKIN TWO TIMES A DAY AS DIRECTED, Normal, Disp-200 Each, R-3      insulin glargine (Lantus Solostar U-100 Insulin) 100 unit/mL (3 mL) inpn INJECT UNDER THE SKIN, 15 UNITS IN THE MORNING AND 30 UNITS AT BEDTIME, Normal, Disp-45 mL, R-0      diazePAM (Valium) 5 mg tablet Take 1 Tablet by mouth three (3) times daily as needed for Anxiety (spasm). Max Daily Amount: 15 mg., Normal, Disp-15 Tablet, R-0      rosuvastatin (CRESTOR) 40 mg tablet Take 1 Tablet by mouth nightly., Normal, Disp-90 Tablet, R-3      losartan (COZAAR) 25 mg tablet TAKE 1 TABLET BY MOUTH ONE TIME A DAY. STOP LISINOPRIL 2.5MG, Normal, Disp-90 Tablet, R-1      albuterol (PROVENTIL HFA, VENTOLIN HFA, PROAIR HFA) 90 mcg/actuation inhaler Take 2 Puffs by inhalation every four (4) hours as needed for Wheezing or Shortness of Breath. Indications: asthma attack, Normal, Disp-2 Inhaler,R-5             SCREENINGS               No data recorded         PHYSICAL EXAM      ED Triage Vitals [02/06/23 1059]   ED Encounter Vitals Group      BP (!) 168/94      Pulse (Heart Rate) 94      Resp Rate 18      Temp 99.2 °F (37.3 °C)      Temp src       O2 Sat (%) 97 %      Weight 110 lb      Height 5'        Physical Exam  Vitals and nursing note reviewed. Constitutional:       General: She is not in acute distress. Appearance: She is well-developed. She is not diaphoretic. HENT:      Head: Normocephalic and atraumatic. Mouth/Throat:      Mouth: Mucous membranes are moist.      Pharynx: No oropharyngeal exudate. Eyes:      Extraocular Movements: Extraocular movements intact. Conjunctiva/sclera: Conjunctivae normal.      Pupils: Pupils are equal, round, and reactive to light. Neck:      Vascular: No JVD. Trachea: No tracheal deviation. Cardiovascular:      Rate and Rhythm: Normal rate and regular rhythm. Heart sounds: Normal heart sounds. No murmur heard. Pulmonary:      Effort: Pulmonary effort is normal. No respiratory distress. Breath sounds: Normal breath sounds. No stridor. No wheezing or rales. Chest:      Chest wall: No tenderness. Abdominal:      General: There is no distension. Palpations: Abdomen is soft. Tenderness: There is no abdominal tenderness. Musculoskeletal:         General: Normal range of motion. Cervical back: Normal range of motion and neck supple. Right lower leg: No edema. Left lower leg: No edema. Skin:     General: Skin is warm and dry. Capillary Refill: Capillary refill takes less than 2 seconds. Neurological:      Mental Status: She is alert and oriented to person, place, and time. Cranial Nerves: No cranial nerve deficit. Comments: No gross motor or sensory deficits    Psychiatric:         Mood and Affect: Mood normal.         Behavior: Behavior normal.        DIAGNOSTIC RESULTS   LABS:     Recent Results (from the past 12 hour(s))   URINALYSIS W/ REFLEX CULTURE    Collection Time: 02/06/23 10:55 AM    Specimen: Urine   Result Value Ref Range    Color YELLOW/STRAW      Appearance CLEAR CLEAR      Specific gravity 1.015 1.003 - 1.030      pH (UA) 5.5 5.0 - 8.0      Protein 30 (A) NEG mg/dL    Glucose Negative NEG mg/dL    Ketone Negative NEG mg/dL    Bilirubin Negative NEG      Blood TRACE (A) NEG      Urobilinogen 0.2 0.2 - 1.0 EU/dL    Nitrites Negative NEG      Leukocyte Esterase Negative NEG      WBC 0-4 0 - 4 /hpf    RBC 0-5 0 - 5 /hpf    Epithelial cells FEW FEW /lpf    Bacteria 1+ (A) NEG /hpf    UA:UC IF INDICATED CULTURE NOT INDICATED BY UA RESULT CNI          EKG: none     RADIOLOGY:  none     PROCEDURES   Unless otherwise noted below, none  Procedures     CRITICAL CARE TIME       EMERGENCY DEPARTMENT COURSE and DIFFERENTIAL DIAGNOSIS/MDM   Vitals:    Vitals:    02/06/23 1059   BP: (!) 168/94   Pulse: 94   Resp: 18   Temp: 99.2 °F (37.3 °C)   SpO2: 97%   Weight: 49.9 kg (110 lb)   Height: 5' (1.524 m)        Patient was given the following medications:  Medications   phenazopyridine (PYRIDIUM) tablet 200 mg (200 mg Oral Given 2/6/23 1148)   cephALEXin (KEFLEX) capsule 500 mg (500 mg Oral Given 2/6/23 1148)       Medical Decision Making  Amount and/or Complexity of Data Reviewed  Labs: ordered. Risk  Prescription drug management.     Patient with no complaints of vaginal bleeding or discharge. Patient's urinalysis not significant for leuk esterase nitrites or white blood cells however the patient does have 1+ bacteria and given her current symptoms we will electively treat. Patient given a dose of Keflex and Pyridium here. FINAL IMPRESSION     1. Dysuria    2. Acute cystitis without hematuria          DISPOSITION/PLAN   Melissa Thurman's  results have been reviewed with her. She has been counseled regarding her diagnosis, treatment, and plan. She verbally conveys understanding and agreement of the signs, symptoms, diagnosis, treatment and prognosis and additionally agrees to follow up as discussed. She also agrees with the care-plan and conveys that all of her questions have been answered. I have also provided discharge instructions for her that include: educational information regarding their diagnosis and treatment, and list of reasons why they would want to return to the ED prior to their follow-up appointment, should her condition change. CLINICAL IMPRESSION    Discharge Note: The patient is stable for discharge home. The signs, symptoms, diagnosis, and discharge instructions have been discussed, understanding conveyed, and agreed upon. The patient is to follow up as recommended or return to ER should their symptoms worsen. PATIENT REFERRED TO:  Follow-up Information       Follow up With Specialties Details Why Contact Info    Rancho Rain NP Nurse Practitioner  As needed [address]  [phone]                DISCHARGE MEDICATIONS:  Discharge Medication List as of 2/6/2023 11:39 AM        START taking these medications    Details   phenazopyridine (Pyridium) 200 mg tablet Take 1 Tablet by mouth three (3) times daily for 2 days. , Normal, Disp-6 Tablet, R-0      cephALEXin (Keflex) 500 mg capsule Take 1 Capsule by mouth three (3) times daily. , Normal, Disp-15 Capsule, R-0           CONTINUE these medications which have NOT CHANGED    Details   flash glucose sensor (FREESTYLE MARGRET 2 SENSOR) by Does Not Apply route., Historical Med      pantoprazole (PROTONIX) 40 mg tablet Take 1 Tablet by mouth daily. , Normal, Disp-90 Tablet, R-3      nicotine (NICODERM CQ) 14 mg/24 hr patch 1 Patch by TransDERmal route every twenty-four (24) hours for 30 days. Use for first 2 weeks then take the 7 mg patch, Normal, Disp-14 Patch, R-0      nicotine (NICODERM CQ) 7 mg/24 hr 1 Patch by TransDERmal route every twenty-four (24) hours for 30 days. Use after completing 2 weeks on the 14 mg patch, Normal, Disp-30 Patch, R-0      SITagliptin phosphate (JANUVIA) 100 mg tablet Take 1 Tablet by mouth daily. , Normal, Disp-90 Tablet, R-0      Insulin Needles, Disposable, (TRUEplus Pen Needle) 31 gauge x 5/16\" ndle USE TO ADMINISTER INSULIN UNDER THE SKIN TWO TIMES A DAY AS DIRECTED, Normal, Disp-200 Each, R-3      insulin glargine (Lantus Solostar U-100 Insulin) 100 unit/mL (3 mL) inpn INJECT UNDER THE SKIN, 15 UNITS IN THE MORNING AND 30 UNITS AT BEDTIME, Normal, Disp-45 mL, R-0      diazePAM (Valium) 5 mg tablet Take 1 Tablet by mouth three (3) times daily as needed for Anxiety (spasm). Max Daily Amount: 15 mg., Normal, Disp-15 Tablet, R-0      rosuvastatin (CRESTOR) 40 mg tablet Take 1 Tablet by mouth nightly., Normal, Disp-90 Tablet, R-3      losartan (COZAAR) 25 mg tablet TAKE 1 TABLET BY MOUTH ONE TIME A DAY. STOP LISINOPRIL 2.5MG, Normal, Disp-90 Tablet, R-1      albuterol (PROVENTIL HFA, VENTOLIN HFA, PROAIR HFA) 90 mcg/actuation inhaler Take 2 Puffs by inhalation every four (4) hours as needed for Wheezing or Shortness of Breath. Indications: asthma attack, Normal, Disp-2 Inhaler,R-5               DISCONTINUED MEDICATIONS:  Discharge Medication List as of 2/6/2023 11:39 AM          I am the Primary Clinician of Record.    Ning Horvath DO (electronically signed)    (Please note that parts of this dictation were completed with voice recognition software. Quite often unanticipated grammatical, syntax, homophones, and other interpretive errors are inadvertently transcribed by the computer software. Please disregards these errors.  Please excuse any errors that have escaped final proofreading.)

## 2023-02-06 NOTE — TELEPHONE ENCOUNTER
Location of patient: [place]    Subjective: Caller states \"I have a bad ear ache, and when I go to the bathroom my stomach hurts\"     Dinora is in [facility] ED now waiting to be seen     Current Symptoms: bilateral ear pain, abdominal pain, constipation - last BM yesterday, headache    States blood sugar has been fluctuating     Onset: 5 days ago;       Pain Severity: 10/10 abdominal pain    Temperature: denies     What has been tried: cotton in ears, ibuprofen      Denies - vomiting / diarrhea / bloody  black or tarry stool    Recommended disposition: Go to ED Now    Care advice provided, patient verbalizes understanding; denies any other questions or concerns; instructed to call back for any new or worsening symptoms. Patient was advised to stay at the ED an be seen by a provider. Attention Provider: Thank you for allowing me to participate in the care of your patient. The patient was connected to triage in response to information provided to the ECC. Please do not respond through this encounter as the response is not directed to a shared pool.     Reason for Disposition   SEVERE abdominal pain (e.g., excruciating)    Protocols used: Abdominal Pain - Female-ADULT-[place]

## 2023-02-07 ENCOUNTER — HOSPITAL ENCOUNTER (OUTPATIENT)
Dept: ULTRASOUND IMAGING | Age: 53
Discharge: HOME | End: 2023-02-07
Attending: GENERAL ACUTE CARE HOSPITAL
Payer: COMMERCIAL

## 2023-02-07 ENCOUNTER — PATIENT OUTREACH (OUTPATIENT)
Dept: OTHER | Age: 53
End: 2023-02-07

## 2023-02-07 DIAGNOSIS — Z79.4 TYPE 2 DIABETES MELLITUS WITH HYPERGLYCEMIA, WITH LONG-TERM CURRENT USE OF INSULIN (HCC): ICD-10-CM

## 2023-02-07 DIAGNOSIS — E11.65 TYPE 2 DIABETES MELLITUS WITH HYPERGLYCEMIA, WITH LONG-TERM CURRENT USE OF INSULIN (HCC): ICD-10-CM

## 2023-02-07 LAB
LEFT ABI: 0.69
LEFT ARM BP: 186 MMHG
LEFT POSTERIOR TIBIAL: 91 MMHG
RIGHT ABI: 0.81
RIGHT POSTERIOR TIBIAL: 150 MMHG
VAS LEFT DORSALIS PEDIS BP: 128 MMHG
VAS RIGHT DORSALIS PEDIS BP: 142 MMHG

## 2023-02-07 PROCEDURE — 93922 UPR/L XTREMITY ART 2 LEVELS: CPT

## 2023-02-07 NOTE — PROGRESS NOTES
Follow up phone call to patient, two pt identifiers verified. Discussed patient's goals:    Goals        Supportive resources in place to maintain patient in the community (ie. Home Health, DME equipment, refer to, medication assistant plan, etc.)      [address] 24/7 (virtual visits 24/7)  [facility]ters # [phone] PW: [facility]  Nurse Access line 24/7 # [phone]  Be Well ([email or web address])  HR Service Now - [facility]   BS Workday  IT - [phone]  [facility] Help - 1- [phone]  Associate Services for advice and direction, by calling [phone] and pressing 1              Patient's primary care provider relationship reviewed with patient and modified, as applicable. Patient states she went to the ED yesterday due to pain in her \"bottom\" and pain with urination. Called NAL while at the ED. States she was told she has a UTI. Was given an antibiotic and pyridium. States her urine is orange. Advised her this is a side effect of the pyridium, nothing to be alarmed about. Added instructions for patient to review. Patient states she is getting ready to go to Ultrasound today at hospitals to rule out DVT. States she had to cancel her visit with the nutritionist, rescheduled for 2/28.       Upcoming appointments:   Future Appointments   Date Time Provider Ankush Nicholson   2/7/2023 10:45 AM hospitals [facility] hospitals   2/28/2023  1:00 PM Sejal Rivers RN PDHT BS AMB   4/11/2023  2:10 PM Terry Alvarado MD RDE [facility] [address] BS AMB     Plan for next call:  Check in - 2/9

## 2023-02-09 ENCOUNTER — TELEPHONE (OUTPATIENT)
Dept: ENDOCRINOLOGY | Age: 53
End: 2023-02-09

## 2023-02-09 ENCOUNTER — PATIENT OUTREACH (OUTPATIENT)
Dept: OTHER | Age: 53
End: 2023-02-09

## 2023-02-09 NOTE — PROGRESS NOTES
Follow up phone call to patient, two pt identifiers verified. Discussed patient's goals:    Goals        Supportive resources in place to maintain patient in the community (ie. Home Health, DME equipment, refer to, medication assistant plan, etc.)      [facility] 24/7 (virtual visits 24/7)  Life Matters # [phone] PW: [facility] [facility]  Nurse Access line 24/7 # [phone]  Be Well ([email or web address])  HR Service Now - [employer]   [facility] Workday  IT - [phone]  [facility] Help - 1- [phone]  Associate Services for advice and direction, by calling [phone] and pressing 1              Patient's primary care provider relationship reviewed with patient and modified, as applicable. Patient is feeling better, almost finished her antibiotics. Having trouble with drops in glucose in the evenings. Encouraged her to contact her endocrinologist.  Provided her with the phone so she can call to get further instructions. Patient states she will call to let them know what has been going on. States she has not taken her medications due to low glucose. Reminded her of the nurse access line and ability to use [facility] for urgent care needs. States she went to an urgent care in [place] and was told they don't participate with [employer], so that's why she went to the ED over the weekend. She did call the NAL while in the ED. Key pt activities to achieve better health:   []  Weight loss  [x]  Improved diabetic control  []  Decreased cholesterol levels  []  Decreased blood pressure  []    []    Upcoming appointments:   Future Appointments   Date Time Provider Ankush Nicholson   2/28/2023  1:00 PM Jennifer Scott RN PDHT BS AMB   4/11/2023  2:10 PM Christel Alvarado MD RDE [facility] [address] BS AMB     Plan for next call:  Call in two weeks, 2/23.

## 2023-02-09 NOTE — TELEPHONE ENCOUNTER
Pt called 2/9 @ 10:52 AM    Pt stated she went to the er and has a UTI. She has two days left of her antibiotics. At night time, her sugars drop to around 50 and it is a big concern to her.     Pt# [phone]

## 2023-02-09 NOTE — TELEPHONE ENCOUNTER
Please have her cut back from Lantus 45 to 20 units per day til her infection is resolved and she is eating better

## 2023-02-09 NOTE — TELEPHONE ENCOUNTER
Patient stated that she had been taking two shots of lantus per day which consisted of 15 units in the am and 30 units at bed time. She stated that she did not take any last night because her sugar dropped to low. Per Dr. Seun Lam patient was notified to only take units in the evening until her infections clears up and she is eating better. Patient  voiced okay and understanding to these instructions.

## 2023-02-09 NOTE — TELEPHONE ENCOUNTER
I spoke with patient and she stated that 3 times this week she has had low sugar readings and this morning at 1:00 am it was 55. She stated that she was diagnosed with a UTI on Monday and currently she is on Cephalexin. 500  mg tid  She stated that she had not been eating a lot due to she was not feeling well however she still took all her dm medications/ insulin as directed. Patient would like to know what she should do?

## 2023-02-23 ENCOUNTER — PATIENT OUTREACH (OUTPATIENT)
Dept: OTHER | Age: 53
End: 2023-02-23

## 2023-02-23 NOTE — PROGRESS NOTES
Follow up phone call to patient, two pt identifiers verified. Discussed patient's goals:    Goals        Supportive resources in place to maintain patient in the community (ie. Home Health, DME equipment, refer to, medication assistant plan, etc.)      [employer] 24/7 (virtual visits 24/7)  Life Matters # [phone] PW: [facility]  Nurse Access line 24/7 # [phone]  Be Well ([email or web address])  HR Service Now - [facility] Workday  IT - [phone]  [facility] Help - 1- [phone]  Associate Services for advice and direction, by calling [phone] and pressing 1              Patient's primary care provider relationship reviewed with patient and modified, as applicable. Patient states she is doing better. Sugars are back to normal.  She is at work, not able to talk. She could not identify any needs at this time. Encouraged her to reach out to me should she have any needs. She was appreciative. Readiness to Change: []  Pre-contemplative    []  Contemplative  []  Preparation               [x]  Action                  []  Maintenance      Key pt activities to achieve better health:   []  Weight loss  [x]  Improved diabetic control  []  Decreased cholesterol levels  []  Decreased blood pressure  []    []    Upcoming appointments:   Future Appointments   Date Time Provider Ankush Nicholson   2/28/2023  1:00 PM Addy Johnson, RN PDHT BS AMB   4/11/2023  2:10 PM Reinaldo Alvarado MD RDE [address] [address] BS AMB     Plan for next call:  Check in, 3/16.

## 2023-02-28 ENCOUNTER — CLINICAL SUPPORT (OUTPATIENT)
Dept: DIABETES SERVICES | Age: 53
End: 2023-02-28
Payer: COMMERCIAL

## 2023-02-28 DIAGNOSIS — Z79.4 TYPE 2 DIABETES MELLITUS WITH HYPERGLYCEMIA, WITH LONG-TERM CURRENT USE OF INSULIN (HCC): Primary | ICD-10-CM

## 2023-02-28 DIAGNOSIS — E11.65 UNCONTROLLED TYPE 2 DIABETES MELLITUS WITH HYPERGLYCEMIA (HCC): ICD-10-CM

## 2023-02-28 DIAGNOSIS — E11.65 TYPE 2 DIABETES MELLITUS WITH HYPERGLYCEMIA, WITH LONG-TERM CURRENT USE OF INSULIN (HCC): Primary | ICD-10-CM

## 2023-02-28 PROCEDURE — G0108 DIAB MANAGE TRN  PER INDIV: HCPCS

## 2023-02-28 RX ORDER — INSULIN GLARGINE 100 [IU]/ML
INJECTION, SOLUTION SUBCUTANEOUS
Qty: 45 ML | Refills: 0 | Status: SHIPPED | OUTPATIENT
Start: 2023-02-28

## 2023-02-28 NOTE — PROGRESS NOTES
[facility]  Diabetes Self-Management Education & Support Program    Reason for Referral: T2DM  Referral Source: Chandu Mcbride MD  Services requested: DSMES, MNT       ASSESSMENT    From my perspective, the participant would benefit from [facility] SYSTEM specifically related to reducing risks, healthy eating, monitoring, taking medications, physical activity, healthy coping, and problem solving. Will adapt DSMES program to build on participant's strengths in skills score, strengths in confidence score, and strengths in preparedness score as noted in the Diabetes Skills, Confidence, and Preparedness Index. During the program, we will focus on providing DSMES that specifically addresses participant's interest in healthy eating, as shown by their reported readiness to change. The participant would be best served by attending weekly individual sessions due to work schedule and at participant request.     Ms. Tom Abdi reported taking Lantus insulin 16 units in the am and 30 units in the pm. She stated she has missed taking Januvia and is waiting for pharmacy to deliver prescription. She stated recent UTI diagnosis and that she is still feeling \"sick\" and \"something just isn't right\". Participant reported missed work Friday and today because of nausea and fatigue. BG during appointment today per [facility] 2  mg/dL. Per CGM: 7 day Average B mg/dL; Time in Target: 56% Above Target: 44% Below Target 0%. Scans per day 4; Data captured: 55%. She stated she experienced low blood glucose event, feeling \"jittery\" on , which she treated with hard candy and orange juice. She stated she likes drinking sweet tea. Encouraged participant to avoid sugar sweetened beverages and increase water intake. Assisted participant with scheduling provider appointment for 3/1/2023. Diabetes Self-Management Education Follow-up Visit: 3/1/2023       Clinical Presentation  Ryan Acuña is a 46 y.o.  female referred for diabetes self-management education. Participant has Type 2 DM on insulin for 11-20 years. Family history positivefor diabetes. Patient reports not receiving DSMES services in the past. Most recent A1c value:   Lab Results   Component Value Date/Time    Hemoglobin A1c 10.4 (H) 04/18/2022 09:44 AM    Hemoglobin A1c (POC) 10.2 01/11/2023 02:54 PM       Diabetes-related medical history:  Acute complications  recurrent hypoglycemia per participant  Microvascular disease  retinopathy and nephropathy  Macrovascular disease  foot wounds-ingrown/toenails per participant  Other associated conditions     Essential hypertension, mixed dyslipidemia     Diabetes-related medications:  Current dosing:   Key Antihyperglycemic Medications               insulin glargine (Lantus Solostar U-100 Insulin) 100 unit/mL (3 mL) inpn INJECT 15 UNITS UNDER THE SKIN IN THE MORNING AND INJECT 30 UNITS UNDER THE SKIN AT BEDTIME    SITagliptin phosphate (JANUVIA) 100 mg tablet Take 1 Tablet by mouth daily. insulin glargine (Lantus Solostar U-100 Insulin) 100 unit/mL (3 mL) inpn (Discontinued) INJECT UNDER THE SKIN, 15 UNITS IN THE MORNING AND 30 UNITS AT BEDTIME            Blood Pressure Management  Key ACE/ARB Medications               losartan (COZAAR) 25 mg tablet TAKE 1 TABLET BY MOUTH ONE TIME A DAY            Lipid Management  Key Antihyperlipidemia Meds               rosuvastatin (CRESTOR) 40 mg tablet Take 1 Tablet by mouth nightly. Clot Prevention  Key Anti-Platelet Anticoagulant Meds       The patient is on no antiplatelet meds or anticoagulants. Learning Assessment  Learning objectives Educator assessment (2/28/2023)   Diabetes Disease Process  The participant can   A) describe diabetes in basic terms;   B) state the type of diabetes they have; &   C) state accepted blood glucose targets.      Healthy Eating  The participant can   A) identify carbohydrate foods; &   B) accurately read food labels. Being Active  The participant can  A) state the benefits of physical activity;  B) report their current PA practices;  C) identify PA they would consider incorporating in their lives; &  D) develop an implementation plan. Monitoring  The participant can  A) operate their blood glucose meter; &  B) describe how they log their blood glucoses to share with their provider. Taking Medications  The participant can  A) name their diabetes medications;  B) state the purpose and dose;  C) note side effects; &  D) describe proper storage, disposal & transport (if appropriate). Healthy Coping  The participant can    A) describe their response to diabetes diagnosis; B) describe their specific coping mechanisms;  C) identify supportive people and/or other resources that positively support their diabetes self-care and health. Reducing Risks  The participant can describe the preventive measures used by providers to promote health and prevent diabetes complications. Problem Solving  The participant can   A) identify signs, symptoms & treatment of hypoglycemia;    B) identify signs, symptoms & treatment of hyperglycemia;  C) describe their sick day plan; &  D) identify BG patterns to discuss with their provider.        No  No  No        No  No        No  No  No  No        No  No        Yes  Yes  Yes  Yes        Yes  No  No        No          No  No  No  No     Characteristics to Learning   Barriers to Learning      Transportation and work schedule per participant     Favorite Ways to Learn   [] Lecture  [] Slides  [] Reading [x] Video-Internet  [] Cassettes/CDs/MP3's  [] Interactive Small Groups [] Other       Behavioral Assessment  Current self-care practices  Educator assessment (2/28/2023)   Healthy Eating   Current practices    24-hour Dietary Recall:  Breakfast: varies-leftovers from previous dinners  Lunch: beef and shrimp fried yellow rice with vegetables  Dinner: pork neck bones with greens and potato salad  Beverages: sweet tea, water  Alcohol: occasionally 1 glass wine       Would benefit from DSMES related to Healthy Eating: Yes    Eats a carbohydrate controlled diet: No    Stage of change: Preparation      Being Active  Current practices  How many days during the past week have you performed physical activity where your heart beats faster and your breathing is harder than normal for 30 minutes or more?  0 day(s)    How many days in a typical week do you perform activity such as this?  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 Monitoring  Current practices  Do you monitor your blood sugar? Yes per [employer] 2 CGM     How often do you monitor? 4x/day    Last 7 Days Per CGM reader:  TIR: 56%  Time above target: 44%  Time below target: 0%  Avg B mg/dL  Scans/day: 4  Data Captured: 55%    Do you know your last A1c measurement? Yes    Do you know the meaning of the A1c? No     Would benefit from [facility] related to Monitoring: Yes      Uses BG readings to establish trends and understand BG patterns: No      Stage of change: Pre-contemplation        Participant stated she does not have a BG meter and testing supplies. Discussed alarms, false alarms, and the need for confirming hypoglycemia results with meter. She stated she does not want to stick herself, that's why she has a CGM. Taking Medication  Current practices  Do you understand what your diabetes medications do? Yes    How often do you miss doses of your diabetes medications? Not currently taking Januvia for past 2 days-waiting for pharmacy    Can you afford your diabetes medications?  Yes   Would benefit from [facility] related to Taking Medication: Yes      Takes medications consistently to receive full benefit: No      Stage of change: Pre-contemplation       Healthy Coping   Current state  Diabetes Skills, Confidence and Preparedness Index: Total score: 6.6  Skills: 6.6  Confidence: 6.1  Preparedness: 7.0       Would benefit from DSMES related to Healthy Coping: Yes      Identifies specific people, organizations,etc, that actively support their diabetes self-care efforts: No      Stage of change: Pre-contemplation     Reducing Risks  Current state  Vaccines:  Influenza:   Immunization History   Administered Date(s) Administered    Influenza Vaccine 10/11/2018, 11/02/2020, 10/22/2021    Influenza, FLUARIX, FLULAVAL, FLUZONE (age 10 mo+) AND AFLURIA, (age 1 y+), PF, 0.5mL 11/28/2017, 10/08/2019, 09/27/2022       Pneumococcal:   Immunization History   Administered Date(s) Administered    Pneumococcal Polysaccharide (PPSV-23) 11/03/2020, 04/18/2022        Hepatitis: There is no immunization history for the selected administration types on file for this patient. Examinations:  Diabetic Foot and Eye Exam HM Status   Topic Date Due    Diabetic Foot Care  09/27/2023    Eye Exam  10/12/2024      Eye exam: done on: 2022 per participant  Dental exam: due    Foot exam: done on: 2022 per participant    Heart Protection:  BP Readings from Last 2 Encounters:   02/06/23 (!) 168/94   01/11/23 136/71        Lab Results   Component Value Date/Time    LDL,Direct 202 (H) 07/13/2021 04:53 PM    LDL, calculated 150 (H) 09/27/2022 08:46 AM        Kidney Protection:  Lab Results   Component Value Date/Time    Microalbumin/Creat ratio (mg/g creat) 1,775 (H) 09/27/2022 08:46 AM    Microalbumin,urine random 197.00 09/27/2022 08:46 AM        Would benefit from DSMES related to Reducing Risks: Yes      Actively participates in decision-making with provider regarding secondary prevention:  No      Stage of change: Pre-contemplation    Participant reported smoking tobacco about 5-6 cigarettes daily most days.      Participant stated provider recommended [facility] kidney, and foot appointments\" but she did not schedule them due to high specialty co-pays. Encouraged participant to communicate with provider regarding recommended exams. May benefit from /resources. Problem Solving  Current state  Hypoglycemia Management:  What are signs and symptoms of hypoglycemia that you experience: Dizziness/light-headedness    How do you prevent hypoglycemia:  patient is unaware of how to prevent hypoglycemia    How do you treat hypoglycemia:  hard candy and orange juice    Hyperglycemia Management:  What are signs and symptoms of hyperglycemia that you experience: Extreme thirst, Frequent urination    How can you prevent hyperglycemia: patient is unaware of how to prevent high blood sugars    Sick Day Management:  What do you do differently on sick days:  Pt reported being unaware of self-management on sick days    Pattern Management:  Do you notice blood glucose patterns when you look at the readings in your meter or logbook? No    How do you use the blood glucose readings from your meter or logbook? Unaware of patterns/trends     Would benefit from [facility] SYSTEM related to Problem Solving: Yes      Articulates appropriate strategies to address hypoglycemia, hyperglycemia, sick day care and BG pattern: No      Stage of change: Pre-contemplation       Note: Content derived from the American Association of Diabetes Educators' Diabetes Education Curriculum: A Guide to Successful Self-Management (3rd edition)      Gayathri Barbour RN, [facility] [place] on 2/28/2023 at 2:12 PM    I have personally reviewed the health record, including provider notes, laboratory data and current medications before making these care and education recommendations. The time spent in this effort is included in the total time. Total minutes: 45    Diabetes Skills, Confidence & Preparedness Index (SCPI) ©  Thank you for completing the Skills, Confidence & Preparedness Index! Below are your scores.   All scales and questions are out of 7. If you would like these results emailed, please enter your email address along with some identifying patient information. Email:    Patient Identifier:   Overall SCPI score: 6.6 Skills Score: 6.6  Low: Healthy Eating(Q1) Confidence Score: 6.1  Low: Reducing Risks(Q3) Preparedness Score: 7.0  Low: Healthy Eating(Q1),Being Active(Q2),Healthy Coping(Q3),Reducing Risks(Q4),Taking Medication(Q5),Blood Sugar Monitoring(Q6),Problem Solving(Q7)  Healthy Eating Score: 6.0  Low: Skills(Q1) Taking Medication Score: 7.0  Low: Skills(Q2),Preparedness(Q5) Blood Sugar Monitoring Score: 6.8  Low: Confidence(Q6) Reducing Risks Score: 6.3  Low: Confidence(Q3)  Problem Solving Score: 6.7  Low: Skills(Q6) Healthy Coping Score: 6.7  Low: Confidence(Q2) Being Active Score: 7.0  Low: Confidence(Q5),Preparedness(Q2)  Skills/Knowledge Questions  1. I know how to plan meals that have the best balance between carbohydrates, proteins and vegetables. 5  2. I know how my diabetes medications (pills, injectables and/or insulin) work in my body. 7  3. I know when to check my blood sugar if I want to see how my body responded to a meal. 7  4. I know when to check my blood sugars to determine if my medication or insulin doses are correct. 7  5. I know what to do to prevent a low blood sugar when I exercise (either before, during, or after). 6  6. When I am sick, I know what to do differently with my diabetes management. 6  7. I know how stress can affect my diabetes management. 7  8. When I look at my blood sugars over a given week, I can explain what my blood sugar pattern is. 7  9. I know what my target levels are for A1c, blood pressure and cholesterol. 7  Confidence Questions  1. I am confident that I can plan balanced meals and snacks. 6  2. I am confident that I can manage my stress. 6  3. I am confident that I can prevent a low blood sugar during or after exercise. 5  4.  I am confident that the next time I eat out, I will be able to choose foods that best keep my blood sugars in target. 6  5. I am confident I can include exercise into my schedule. 7  6. I am confident that I can use my daily blood sugars to adjust my diet, my activity, and/or my insulin. 6  7. When something out of my normal routine happens, I am confident that I can problem-solve and keep my diabetes on track. 7  Preparedness Questions  1. Within the next month, I will begin to eat more balanced meals and snacks. 7  2. Within the next month, I will choose an exercise activity and I will start fitting it into my schedule. 7  3. Within the next month, I will make a list of stress management options that work for me. 7  4. Within the next month, I will consistently plan ahead to prevent low blood sugars. 7  5. Within the next month, I will start adjusting my insulin doses on my own. 7  6. Within the next month, I will begin making changes to my diabetes management based on my daily blood sugars (eg - eating, activity and/or insulin). 7  7. Within the next month, I will begin making changes to my diabetes management to meet my overall goals (eg - eating, activity and/or insulin).  7

## 2023-03-01 ENCOUNTER — OFFICE VISIT (OUTPATIENT)
Dept: FAMILY MEDICINE CLINIC | Age: 53
End: 2023-03-01
Payer: COMMERCIAL

## 2023-03-01 VITALS
DIASTOLIC BLOOD PRESSURE: 80 MMHG | OXYGEN SATURATION: 98 % | BODY MASS INDEX: 22.38 KG/M2 | HEART RATE: 92 BPM | WEIGHT: 114 LBS | HEIGHT: 60 IN | SYSTOLIC BLOOD PRESSURE: 136 MMHG | TEMPERATURE: 97.6 F | RESPIRATION RATE: 18 BRPM

## 2023-03-01 DIAGNOSIS — Z13.828 SCREENING FOR RHEUMATOID ARTHRITIS: ICD-10-CM

## 2023-03-01 DIAGNOSIS — E11.21 TYPE 2 DIABETES WITH NEPHROPATHY (HCC): ICD-10-CM

## 2023-03-01 DIAGNOSIS — I10 ESSENTIAL HYPERTENSION: ICD-10-CM

## 2023-03-01 DIAGNOSIS — E78.2 MIXED DYSLIPIDEMIA: ICD-10-CM

## 2023-03-01 DIAGNOSIS — N30.01 ACUTE CYSTITIS WITH HEMATURIA: Primary | ICD-10-CM

## 2023-03-01 DIAGNOSIS — R30.0 DYSURIA: ICD-10-CM

## 2023-03-01 DIAGNOSIS — E55.9 VITAMIN D DEFICIENCY: ICD-10-CM

## 2023-03-01 LAB
ALBUMIN UR QL STRIP: 150 MG/L
BILIRUB UR QL STRIP: NEGATIVE
CREATININE, URINE POC: 100 MG/DL
GLUCOSE UR-MCNC: NEGATIVE MG/DL
KETONES P FAST UR STRIP-MCNC: NEGATIVE MG/DL
MICROALBUMIN/CREAT RATIO POC: >300 MG/G
PH UR STRIP: 5 [PH] (ref 4.6–8)
PROT UR QL STRIP: POSITIVE
SP GR UR STRIP: 1.01 (ref 1–1.03)
UA UROBILINOGEN AMB POC: NORMAL (ref 0.2–1)
URINALYSIS CLARITY POC: NORMAL
URINALYSIS COLOR POC: YELLOW
URINE BLOOD POC: NORMAL
URINE LEUKOCYTES POC: NEGATIVE
URINE NITRITES POC: POSITIVE

## 2023-03-01 PROCEDURE — 3074F SYST BP LT 130 MM HG: CPT | Performed by: NURSE PRACTITIONER

## 2023-03-01 PROCEDURE — 3052F HG A1C>EQUAL 8.0%<EQUAL 9.0%: CPT | Performed by: NURSE PRACTITIONER

## 2023-03-01 PROCEDURE — 81003 URINALYSIS AUTO W/O SCOPE: CPT | Performed by: NURSE PRACTITIONER

## 2023-03-01 PROCEDURE — 82044 UR ALBUMIN SEMIQUANTITATIVE: CPT | Performed by: NURSE PRACTITIONER

## 2023-03-01 PROCEDURE — 3078F DIAST BP <80 MM HG: CPT | Performed by: NURSE PRACTITIONER

## 2023-03-01 PROCEDURE — 99214 OFFICE O/P EST MOD 30 MIN: CPT | Performed by: NURSE PRACTITIONER

## 2023-03-01 RX ORDER — PHENAZOPYRIDINE HYDROCHLORIDE 100 MG/1
100 TABLET, FILM COATED ORAL
Qty: 9 TABLET | Refills: 0 | Status: SHIPPED | OUTPATIENT
Start: 2023-03-01 | End: 2023-03-04

## 2023-03-01 RX ORDER — NITROFURANTOIN 25; 75 MG/1; MG/1
100 CAPSULE ORAL 2 TIMES DAILY
Qty: 14 CAPSULE | Refills: 0 | Status: SHIPPED | OUTPATIENT
Start: 2023-03-01 | End: 2023-03-08

## 2023-03-01 NOTE — PROGRESS NOTES
Chief Complaint   Patient presents with    UTI     Follow up ER [facility] last     YES Answers must have Comments  1. \"Have you been to the ER, urgent care clinic since your last visit? Hospitalized since your last visit? \"    [] YES   [x] NO       2. Have you seen or consulted any other health care providers outside of [address] [place] [address] since your last visit?     [x] YES  last week UTI [facility]  [] NO       3. For patients aged 39-70: Have you had a colorectal cancer screening such as a colonoscopy/FIT/Cologuard? Nurse/CMA to request records if not in chart   [x] YES  7-2022, [facility]  [] NO   [] NA, based on age    If the patient is female:      4. For female patients aged 41-77: Vishal Brazilian you had a mammogram in the last two years?  Nurse/CMA to request records if not in chart   [] YES 7- [facility]  [] NO   [] NA, based on age    11. For female patients aged 21-65: Vishal Brazilian you had a pap smear?   Nurse/CMA to request records if not in chart   [] YES  7- [facility]  [] NO  [] NA, based on age

## 2023-03-01 NOTE — LETTER
NOTIFICATION RETURN TO WORK / SCHOOL    3/1/2023 11:05 AM    Ms. [address]  [address]  [address] [facility]      To Whom It May Concern:    Stephania Grubbs is currently under the care of Lily Enrique. Please excuse for illness from 2/24/2023- 03/02/2023. She will return to work on: March 3, 2023     If there are questions or concerns please have the patient contact our office.         Sincerely,      Kathi Cardozo NP

## 2023-03-02 LAB
25(OH)D3 SERPL-MCNC: <9 NG/ML (ref 30–100)
ALBUMIN SERPL-MCNC: 3.4 G/DL (ref 3.5–5)
ALBUMIN/GLOB SERPL: 1 (ref 1.1–2.2)
ALP SERPL-CCNC: 133 U/L (ref 45–117)
ALT SERPL-CCNC: 28 U/L (ref 12–78)
ANION GAP SERPL CALC-SCNC: 5 MMOL/L (ref 5–15)
AST SERPL-CCNC: 15 U/L (ref 15–37)
BASOPHILS # BLD: 0.1 K/UL (ref 0–0.1)
BASOPHILS NFR BLD: 1 % (ref 0–1)
BILIRUB SERPL-MCNC: 0.4 MG/DL (ref 0.2–1)
BUN SERPL-MCNC: 16 MG/DL (ref 6–20)
BUN/CREAT SERPL: 20 (ref 12–20)
CALCIUM SERPL-MCNC: 9.5 MG/DL (ref 8.5–10.1)
CHLORIDE SERPL-SCNC: 106 MMOL/L (ref 97–108)
CHOLEST SERPL-MCNC: 233 MG/DL
CO2 SERPL-SCNC: 28 MMOL/L (ref 21–32)
CREAT SERPL-MCNC: 0.8 MG/DL (ref 0.55–1.02)
CRP SERPL-MCNC: <0.29 MG/DL (ref 0–0.6)
DIFFERENTIAL METHOD BLD: ABNORMAL
EOSINOPHIL # BLD: 0.1 K/UL (ref 0–0.4)
EOSINOPHIL NFR BLD: 1 % (ref 0–7)
ERYTHROCYTE [DISTWIDTH] IN BLOOD BY AUTOMATED COUNT: 13.9 % (ref 11.5–14.5)
EST. AVERAGE GLUCOSE BLD GHB EST-MCNC: 212 MG/DL
GLOBULIN SER CALC-MCNC: 3.4 G/DL (ref 2–4)
GLUCOSE SERPL-MCNC: 153 MG/DL (ref 65–100)
HBA1C MFR BLD: 9 % (ref 4–5.6)
HCT VFR BLD AUTO: 38.2 % (ref 35–47)
HDLC SERPL-MCNC: 67 MG/DL
HDLC SERPL: 3.5 (ref 0–5)
HGB BLD-MCNC: 13.2 G/DL (ref 11.5–16)
IMM GRANULOCYTES # BLD AUTO: 0 K/UL (ref 0–0.04)
IMM GRANULOCYTES NFR BLD AUTO: 0 % (ref 0–0.5)
LDLC SERPL CALC-MCNC: 141.8 MG/DL (ref 0–100)
LYMPHOCYTES # BLD: 3.1 K/UL (ref 0.8–3.5)
LYMPHOCYTES NFR BLD: 24 % (ref 12–49)
MCH RBC QN AUTO: 27.8 PG (ref 26–34)
MCHC RBC AUTO-ENTMCNC: 34.6 G/DL (ref 30–36.5)
MCV RBC AUTO: 80.6 FL (ref 80–99)
MONOCYTES # BLD: 0.9 K/UL (ref 0–1)
MONOCYTES NFR BLD: 7 % (ref 5–13)
NEUTS SEG # BLD: 8.6 K/UL (ref 1.8–8)
NEUTS SEG NFR BLD: 67 % (ref 32–75)
NRBC # BLD: 0 K/UL (ref 0–0.01)
NRBC BLD-RTO: 0 PER 100 WBC
PLATELET # BLD AUTO: 298 K/UL (ref 150–400)
PMV BLD AUTO: 11.6 FL (ref 8.9–12.9)
POTASSIUM SERPL-SCNC: 3.7 MMOL/L (ref 3.5–5.1)
PROT SERPL-MCNC: 6.8 G/DL (ref 6.4–8.2)
RBC # BLD AUTO: 4.74 M/UL (ref 3.8–5.2)
SODIUM SERPL-SCNC: 139 MMOL/L (ref 136–145)
TRIGL SERPL-MCNC: 121 MG/DL (ref ?–150)
VLDLC SERPL CALC-MCNC: 24.2 MG/DL
WBC # BLD AUTO: 12.8 K/UL (ref 3.6–11)

## 2023-03-03 LAB — ANA SER QL: NEGATIVE

## 2023-03-03 NOTE — ACP (ADVANCE CARE PLANNING)
Discussed importance of advanced medical directives with patient. Patient is capable of making decisions.  Tobias Eric NP-C

## 2023-03-03 NOTE — PROGRESS NOTES
Subjective:      Patient : Quyen Garcia is a 46 y.o. female that presents today with a chief complaint of (UTI/dysuria). The patient admits to accompanying frequency, back-flank pain, and burning on urination dysuria. Ms. Gen Madison has not history of UTI's. Seen at [facility] ER on 2/26 and treated for UTI. She requests a repeat check of her urine since symptoms have  returned. I reviewed the ER encounter as part of this visit. Labs for Dr. Jesus Walden her endocrinologist. I reviewed Dr. Velma Carrillo notes as part of this visit.     Objective:     Visit Vitals  /80 (BP 1 Location: Left upper arm, BP Patient Position: Sitting, BP Cuff Size: Adult)   Pulse 92   Temp 97.6 °F (36.4 °C) (Temporal)   Resp 18   Ht 5' (1.524 m)   Wt 114 lb (51.7 kg)   SpO2 98%   BMI 22.26 kg/m²      Abdomen to include suprapubic tenderness No  Inguinal adenopathy or CVA tenderness   Skin:  warm and normal turgor  HEENT:  ROSY, TMI: , and Neck  Heart regular rhythm and rate S1,S2  Lungs: clear to auscultation and percussion throughout both lung fields  CV:normal carotid, radial , femerol, pedal    Abdomen soft, tender, (suprapubic region)and no guarding   Extremeties:no clubbing, cyanosis, edema and full ROM  Neuro alert, oriented x 3, no focal deficits, reflexes 2/4, cranial nerves 2-12 intact, no sensory deficits, and no cerebella dysfunction       Past Medical History:   Diagnosis Date    Abnormal EKG 2/27/2018    Abscess 7/24/2019    Asthma     Chest pain 2/27/2018    Chest wall contusion, left, subsequent encounter     Diabetes ([facility] [address]     Diverticular disease     Diverticulitis large intestine w/o perforation or abscess w/o bleeding 10/25/2016    Dyslipidemia     Hypertension     Lumbar radiculopathy 02/01/2021    Menopause     Pelvic pain 7/26/2016    Rib fractures     Right hand pain 7/10/2018    Sciatica of left side 02/01/2021    Uterine leiomyoma 7/26/2016    Vaginal candidiasis 7/24/2019     Family History   Problem Relation Age of Onset Stroke Mother     Diabetes Mother     Heart Disease Father     Diabetes Sister     Diabetes Maternal Grandmother     Breast Cancer Maternal Aunt     Breast Cancer Paternal Aunt      Current Outpatient Medications   Medication Sig Dispense Refill    nitrofurantoin, macrocrystal-monohydrate, (Macrobid) 100 mg capsule Take 1 Capsule by mouth two (2) times a day for 7 days. 14 Capsule 0    phenazopyridine (PYRIDIUM) 100 mg tablet Take 1 Tablet by mouth three (3) times daily (after meals) for 3 days. 9 Tablet 0    insulin glargine (Lantus Solostar U-100 Insulin) 100 unit/mL (3 mL) inpn INJECT 15 UNITS UNDER THE SKIN IN THE MORNING AND INJECT 30 UNITS UNDER THE SKIN AT BEDTIME 45 mL 0    losartan (COZAAR) 25 mg tablet TAKE 1 TABLET BY MOUTH ONE TIME A DAY 90 Tablet 1    flash glucose sensor (FREESTYLE MARGRET 2 SENSOR) by Does Not Apply route. pantoprazole (PROTONIX) 40 mg tablet Take 1 Tablet by mouth daily. 90 Tablet 3    SITagliptin phosphate (JANUVIA) 100 mg tablet Take 1 Tablet by mouth daily. 90 Tablet 0    Insulin Needles, Disposable, (TRUEplus Pen Needle) 31 gauge x 5/16\" ndle USE TO ADMINISTER INSULIN UNDER THE SKIN TWO TIMES A DAY AS DIRECTED 200 Each 3    diazePAM (Valium) 5 mg tablet Take 1 Tablet by mouth three (3) times daily as needed for Anxiety (spasm). Max Daily Amount: 15 mg. 15 Tablet 0    rosuvastatin (CRESTOR) 40 mg tablet Take 1 Tablet by mouth nightly. 90 Tablet 3    albuterol (PROVENTIL HFA, VENTOLIN HFA, PROAIR HFA) 90 mcg/actuation inhaler Take 2 Puffs by inhalation every four (4) hours as needed for Wheezing or Shortness of Breath.  Indications: asthma attack 2 Inhaler 5     Allergies   Allergen Reactions    Lisinopril Other (comments)     Hands and feet swelling     Social History     Socioeconomic History    Marital status:      Spouse name: Not on file    Number of children: Not on file    Years of education: Not on file    Highest education level: Not on file   Occupational History    Not on file   Tobacco Use    Smoking status: Every Day     Packs/day: 0.50     Years: 40.00     Pack years: 20.00     Types: Cigarettes    Smokeless tobacco: Never   Vaping Use    Vaping Use: Never used   Substance and Sexual Activity    Alcohol use: No    Drug use: No    Sexual activity: Yes     Partners: Male     Birth control/protection: Surgical   Other Topics Concern    Not on file   Social History Narrative    Not on file     Social Determinants of Health     Financial Resource Strain: Medium Risk    Difficulty of Paying Living Expenses: Somewhat hard   Food Insecurity: Not on file   Transportation Needs: No Transportation Needs    Lack of Transportation (Medical): No    Lack of Transportation (Non-Medical): No   Physical Activity: Not on file   Stress: No Stress Concern Present    Feeling of Stress : Only a little   Social Connections: Moderately Integrated    Frequency of Communication with Friends and Family: More than three times a week    Frequency of Social Gatherings with Friends and Family: Never    Attends [employer] Services: More than 4 times per year    Active Member of [employer] or Organizations: Yes    Attends Club or Organization Meetings: More than 4 times per year    Marital Status:    Intimate Partner Violence: Not At Risk    Fear of Current or Ex-Partner: No    Emotionally Abused: No    Physically Abused: No    Sexually Abused: No   Housing Stability: High Risk    Unable to Pay for Housing in the Last Year: Yes    Number of [place] in the Last Year: 2    Unstable Housing in the Last Year: No           Assessment:   UTI    ICD-10-CM ICD-9-CM    1. Acute cystitis with hematuria  N30.01 595.0 nitrofurantoin, macrocrystal-monohydrate, (Macrobid) 100 mg capsule      phenazopyridine (PYRIDIUM) 100 mg tablet      2.  Dysuria  R30.0 788.1 AMB POC URINALYSIS DIP STICK AUTO W/O MICRO      CULTURE, URINE      CULTURE, URINE      AMB POC URINE, MICROALBUMIN, SEMIQUANT (3 RESULTS) CBC WITH AUTOMATED DIFF      HEMOGLOBIN A1C WITH EAG      LIPID PANEL      METABOLIC PANEL, COMPREHENSIVE      VITAMIN D, 25 HYDROXY      CBC WITH AUTOMATED DIFF      HEMOGLOBIN A1C WITH EAG      LIPID PANEL      METABOLIC PANEL, COMPREHENSIVE      VITAMIN D, 25 HYDROXY      3. Type 2 diabetes with nephropathy (HCC)  E11.21 250.40 AMB POC URINE, MICROALBUMIN, SEMIQUANT (3 RESULTS)     583.81 CBC WITH AUTOMATED DIFF      HEMOGLOBIN A1C WITH EAG      LIPID PANEL      METABOLIC PANEL, COMPREHENSIVE      CBC WITH AUTOMATED DIFF      HEMOGLOBIN A1C WITH EAG      LIPID PANEL      METABOLIC PANEL, COMPREHENSIVE      4. Essential hypertension  I10 401.9 AMB POC URINE, MICROALBUMIN, SEMIQUANT (3 RESULTS)      CBC WITH AUTOMATED DIFF      HEMOGLOBIN A1C WITH EAG      LIPID PANEL      METABOLIC PANEL, COMPREHENSIVE      CBC WITH AUTOMATED DIFF      HEMOGLOBIN A1C WITH EAG      LIPID PANEL      METABOLIC PANEL, COMPREHENSIVE      5. Mixed dyslipidemia  E78.2 272.2 AMB POC URINE, MICROALBUMIN, SEMIQUANT (3 RESULTS)      CBC WITH AUTOMATED DIFF      HEMOGLOBIN A1C WITH EAG      LIPID PANEL      METABOLIC PANEL, COMPREHENSIVE      CBC WITH AUTOMATED DIFF      HEMOGLOBIN A1C WITH EAG      LIPID PANEL      METABOLIC PANEL, COMPREHENSIVE      6.  Vitamin D deficiency  E55.9 268.9 VITAMIN D, 25 HYDROXY      VITAMIN D, 25 HYDROXY      7. Screening for rheumatoid arthritis  Z13.828 V82.1 RHEUMASSURE      SHMUEL, DIRECT, W/REFLEX      C REACTIVE PROTEIN, QT      RHEUMASSURE      SHMUEL, DIRECT, W/REFLEX      C REACTIVE PROTEIN, QT           Plan:   UA positive Nitrites with hematuria    Orders Placed This Encounter    CULTURE, URINE     Standing Status:   Future     Number of Occurrences:   1     Standing Expiration Date:   9/1/2023    CBC WITH AUTOMATED DIFF     Standing Status:   Future     Number of Occurrences:   1     Standing Expiration Date:   4/1/2023    HEMOGLOBIN A1C WITH EAG     Standing Status:   Future     Number of Occurrences: 1     Standing Expiration Date:   4/1/2023    LIPID PANEL     Standing Status:   Future     Number of Occurrences:   1     Standing Expiration Date:   9/5/0793    METABOLIC PANEL, COMPREHENSIVE     Standing Status:   Future     Number of Occurrences:   1     Standing Expiration Date:   4/1/2023    VITAMIN D, 25 HYDROXY     Standing Status:   Future     Number of Occurrences:   1     Standing Expiration Date:   3/1/2024    RHEUMASSURE     Standing Status:   Future     Number of Occurrences:   1     Standing Expiration Date:   3/1/2024    SHMUEL, DIRECT, W/REFLEX     Standing Status:   Future     Number of Occurrences:   1     Standing Expiration Date:   3/1/2024    C REACTIVE PROTEIN, QT     Standing Status:   Future     Number of Occurrences:   1     Standing Expiration Date:   3/1/2024    AMB POC URINALYSIS DIP STICK AUTO W/O MICRO     AMB POC URINALYSIS DIP STICK AUTO W/O    AMB POC URINE, MICROALBUMIN, SEMIQUANT (3 RESULTS)    nitrofurantoin, macrocrystal-monohydrate, (Macrobid) 100 mg capsule     Sig: Take 1 Capsule by mouth two (2) times a day for 7 days. Dispense:  14 Capsule     Refill:  0    phenazopyridine (PYRIDIUM) 100 mg tablet     Sig: Take 1 Tablet by mouth three (3) times daily (after meals) for 3 days.      Dispense:  9 Tablet     Refill:  0    Savi Carlos NP-C

## 2023-03-04 DIAGNOSIS — E11.21 TYPE 2 DIABETES WITH NEPHROPATHY (HCC): ICD-10-CM

## 2023-03-04 LAB
BACTERIA SPEC CULT: ABNORMAL
CC UR VC: ABNORMAL
SERVICE CMNT-IMP: ABNORMAL

## 2023-03-04 RX ORDER — SITAGLIPTIN 100 MG/1
TABLET, FILM COATED ORAL
Qty: 90 TABLET | Refills: 0 | Status: SHIPPED | OUTPATIENT
Start: 2023-03-04

## 2023-03-05 NOTE — PROGRESS NOTES
UC Klebsiella (Enterobacter) aerogenes   Macrobid susceptible complete course of antibiotic.   Recheck UA after completion antibiotic

## 2023-03-08 NOTE — Clinical Note
Addended by: JOHN MCDANIELS on: 3/8/2023 08:15 AM     Modules accepted: Level of Service     Follow up as soon as practical for diabetes and back pain.  Father ill in hospital with COVID

## 2023-03-20 ENCOUNTER — PATIENT OUTREACH (OUTPATIENT)
Dept: OTHER | Age: 53
End: 2023-03-20

## 2023-03-20 NOTE — PROGRESS NOTES
Follow up phone call to patient, two pt identifiers verified. Discussed patient's goals:    Goals        Supportive resources in place to maintain patient in the community (ie. Home Health, DME equipment, refer to, medication assistant plan, etc.)      [facility] 24/7 (virtual visits 24/7)  [employer] Matters # [phone] PW: [facility] associates  Nurse Access line 24/7 # [phone]  Be Well ([email or web address])  HR Service Now - [facility] Workday  IT - [phone]  [facility] Help - 1- [phone]  Associate Services for advice and direction, by calling [phone] and pressing 1              Patient's primary care provider relationship reviewed with patient and modified, as applicable. Patient states she is doing well. Has visit with nutritionist tomorrow, 3/21. States she didn't go to her last appointment because it was cold. States glucose has been doing fine. She is stressed out right now, did not want to elaborate. Offered my assistance but patient states no help needed, just needs to get herself straight. Readiness to Change: []  Pre-contemplative    []  Contemplative  []  Preparation               [x]  Action                  []  Maintenance    Barriers/Challenges to Care: []  Decline in memory    []  Language barrier     []  Emotional                  []  Limited mobility  []  Lack of motivation     [] Vision, hearing or cognitive impairment []  Knowledge [] Financial Barriers []  Lack of support  []  Pain []  Other [x]  None    Key pt activities to achieve better health:   []  Weight loss  [x]  Improved diabetic control  []  Decreased cholesterol levels  []  Decreased blood pressure  []    []    Upcoming appointments:   Future Appointments   Date Time Provider Ankush Nicholson   3/21/2023  3:45 PM Florentin Turk RN PDHT BS AMB   4/11/2023  2:10 PM Terry Alvarado MD RDE [address] BS AMB     Plan for next call:  Call in two weeks,4/3.

## 2023-03-21 ENCOUNTER — CLINICAL SUPPORT (OUTPATIENT)
Dept: DIABETES SERVICES | Age: 53
End: 2023-03-21
Payer: COMMERCIAL

## 2023-03-21 ENCOUNTER — CLINICAL SUPPORT (OUTPATIENT)
Dept: FAMILY MEDICINE CLINIC | Age: 53
End: 2023-03-21
Payer: COMMERCIAL

## 2023-03-21 DIAGNOSIS — Z87.440 HISTORY OF UTI: Primary | ICD-10-CM

## 2023-03-21 DIAGNOSIS — Z79.4 TYPE 2 DIABETES MELLITUS WITH HYPERGLYCEMIA, WITH LONG-TERM CURRENT USE OF INSULIN (HCC): Primary | ICD-10-CM

## 2023-03-21 DIAGNOSIS — R82.90 ABNORMAL URINE FINDING: ICD-10-CM

## 2023-03-21 DIAGNOSIS — E11.65 TYPE 2 DIABETES MELLITUS WITH HYPERGLYCEMIA, WITH LONG-TERM CURRENT USE OF INSULIN (HCC): Primary | ICD-10-CM

## 2023-03-21 LAB
BILIRUB UR QL STRIP: NEGATIVE
GLUCOSE UR-MCNC: NORMAL MG/DL
KETONES P FAST UR STRIP-MCNC: NEGATIVE MG/DL
PH UR STRIP: 5 [PH] (ref 4.6–8)
PROT UR QL STRIP: NORMAL
SP GR UR STRIP: 1.02 (ref 1–1.03)
UA UROBILINOGEN AMB POC: NORMAL (ref 0.2–1)
URINALYSIS CLARITY POC: NORMAL
URINALYSIS COLOR POC: YELLOW
URINE BLOOD POC: NORMAL
URINE LEUKOCYTES POC: NEGATIVE
URINE NITRITES POC: POSITIVE

## 2023-03-21 PROCEDURE — G0108 DIAB MANAGE TRN  PER INDIV: HCPCS

## 2023-03-21 PROCEDURE — 81001 URINALYSIS AUTO W/SCOPE: CPT | Performed by: NURSE PRACTITIONER

## 2023-03-21 NOTE — PROGRESS NOTES
[facility]  Diabetes Self-Management Education & Support Program  Encounter Note    SUMMARY  Diabetes self-care management training was completed related to healthy eating. The participant will return on April 11 to continue DSMES related to reducing risks. The participant did identify SMART Goal(s) and will practice knowledge and skills related to healthy eating to improve diabetes self-management. EVALUATION:  Ms. Warren Pandya is able to recognize carbohydrates, proteins, and fats. She is able to verbalize reading a food label. She re-demonstrated healthy plate method utilizing food models. RECOMMENDATIONS:  Ms. Warren Pandya is willing to work on her SMART goal over the next two weeks. TOPICS DISCUSSED TODAY:  WHAT CAN I EAT? 60    Next provider visit is scheduled for 4/11/2023       SMART GOAL(S)  Practice healthy eating self-care behavior by using non-starchy vegetables for snacks, 2 days, over the next week. ACHIEVEMENT OF GOAL(S) : 0-24%       DATE DSMES TOPIC EVALUATION     3/21/2023 WHAT CAN I EAT? General principles   Determining a healthy weight   Nutritional terms & tools   Healthy Plate method   Carbohydrate Counting   Reading food labels   Free apps        The participant   Uses Healthy Plate principles in constructing meals: Yes  Reads food labels in choosing acceptable foods: Yes    The participant needs to address:  Practice reading food labels. Perry Cohen RN, [facility] on 3/21/2023 at 4:52 PM    I have personally reviewed the health record, including provider notes, laboratory data and current medications before making these care and education recommendations. The time spent in this effort is included in the total time.   Total minutes: 65

## 2023-03-21 NOTE — PROGRESS NOTES
Chief Complaint   Patient presents with    Labs Only     UA     Pt presented to do a urine specimen for a UA, pos +, so sent for UC.

## 2023-03-23 RX ORDER — CIPROFLOXACIN 250 MG/1
250 TABLET, FILM COATED ORAL 2 TIMES DAILY
Qty: 20 TABLET | Refills: 0 | Status: SHIPPED | OUTPATIENT
Start: 2023-03-23 | End: 2023-04-02

## 2023-03-24 ENCOUNTER — TELEPHONE (OUTPATIENT)
Dept: FAMILY MEDICINE CLINIC | Age: 53
End: 2023-03-24

## 2023-03-24 LAB
BACTERIA SPEC CULT: ABNORMAL
CC UR VC: ABNORMAL
SERVICE CMNT-IMP: ABNORMAL

## 2023-03-24 NOTE — PROGRESS NOTES
Patient verified stating name and date of birth. Ninetta Age informed of lab results and states understanding.

## 2023-04-03 ENCOUNTER — PATIENT OUTREACH (OUTPATIENT)
Dept: OTHER | Age: 53
End: 2023-04-03

## 2023-04-03 NOTE — PROGRESS NOTES
Follow up phone call to patient, two pt identifiers verified. Discussed patient's goals:    Goals        Supportive resources in place to maintain patient in the community (ie. Home Health, DME equipment, refer to, medication assistant plan, etc.)      [facility] "[employer] [facility] 24/7 (virtual visits 24/7)  Life Matters # [phone] PW: [facility]  Nurse Access line 24/7 # [phone]  Be Well ([email or web address])  HR Service Now - [employer]   BS Workday  IT - [phone]  [facility] Help - 1- [phone]  Associate Services for advice and direction, by calling [phone] and pressing 1              Patient's primary care provider relationship reviewed with patient and modified, as applicable. Sugars have okay. Going well with nutritionist, states she is reading the book she got that provides education regarding carb intake/limitations. Patient states she is on second round of antibiotics for UTI. States she will take the full course and go back to have urine re checked. She continues to work night shift, which she is not happy about. Encouraged her to discuss this with her manager. States she is frustrated with her 45 minute drive to and from work, now that she has moved. No needs at this time.     Readiness to Change: []  Pre-contemplative    []  Contemplative  []  Preparation               [x]  Action                  []  Maintenance    Barriers/Challenges to Care: []  Decline in memory    []  Language barrier     []  Emotional                  []  Limited mobility  []  Lack of motivation     [] Vision, hearing or cognitive impairment []  Knowledge [] Financial Barriers []  Lack of support  []  Pain []  Other [x]  None    Key pt activities to achieve better health:   []  Weight loss  [x]  Improved diabetic control  []  Decreased cholesterol levels  []  Decreased blood pressure  []    []    Upcoming appointments:   Future Appointments   Date Time Provider Ankush Nicholson   4/11/2023  2:10 PM Tami Polk MD [facility] [address] BS AMB   4/11/2023  3:45 PM Galo Tamayo RN PDHT BS AMB   4/12/2023  3:00 PM [address] for next call:  Call in two weeks, 4/17.

## 2023-04-17 ENCOUNTER — PATIENT OUTREACH (OUTPATIENT)
Dept: OTHER | Age: 53
End: 2023-04-17

## 2023-04-17 NOTE — PROGRESS NOTES
Attempt to reach patient for follow up. Discreet VM left with contact information. Will attempt another outreach in one week, 4/24.

## 2023-04-23 DIAGNOSIS — Z12.31 VISIT FOR SCREENING MAMMOGRAM: Primary | ICD-10-CM

## 2023-04-24 ENCOUNTER — PATIENT OUTREACH (OUTPATIENT)
Dept: OTHER | Age: 53
End: 2023-04-24

## 2023-04-24 NOTE — PROGRESS NOTES
ACM follow up. Briefly spoke with patient, she requested that I call her back tomorrow, as she will be off. Patient stated, anytime would be fine. Will follow up on 4/25.

## 2023-04-25 ENCOUNTER — PATIENT OUTREACH (OUTPATIENT)
Dept: OTHER | Age: 53
End: 2023-04-25

## 2023-04-25 NOTE — PROGRESS NOTES
Follow up phone call to patient, two pt identifiers verified. Discussed patient's goals:    Goals        Supportive resources in place to maintain patient in the community (ie. Home Health, DME equipment, refer to, medication assistant plan, etc.)      [employer] 24/7 (virtual visits 24/7)  [employer] Matters # [phone] PW: [facility]  Nurse Access line 24/7 # [phone]  Be Well ([email or web address])  HR Service Now - [employer]   BS Workday  IT - [phone]  [facility] Help - 1- [phone]  Associate Services for advice and direction, by calling [phone] and pressing 1              Patient's primary care provider relationship reviewed with patient and modified, as applicable. Patient states she completed 2nd round of antibiotics for UTI. Average glucose at -146. States she is taking her medication as prescribed. Depending on how she eats. Feels nutrition counseling has helped. She states she didn't make it to the appointment today for nutritionist, as she was tired from working 6 days straight. Rescheduled for 5/9. Discussed [employer] transition. States that she is concerned with her benefits. Encouraged her to check with her manager to confirm benefits information. Readiness to Change: []  Pre-contemplative    []  Contemplative  [x]  Preparation               []  Action                  []  Maintenance    Barriers/Challenges to Care: []  Decline in memory    []  Language barrier     []  Emotional                  []  Limited mobility  []  Lack of motivation     [] Vision, hearing or cognitive impairment []  Knowledge [] Financial Barriers []  Lack of support  []  Pain []  Other [x]  None    Key pt activities to achieve better health:   []  Weight loss  [x]  Improved diabetic control  []  Decreased cholesterol levels  []  Decreased blood pressure  []    []    Upcoming appointments: 5/9, Blayne Knight (diabetes educator)  Plan for next call:   One week, 5/2

## 2023-05-02 ENCOUNTER — PATIENT OUTREACH (OUTPATIENT)
Dept: OTHER | Age: 53
End: 2023-05-02

## 2023-05-09 ENCOUNTER — CARE COORDINATION (OUTPATIENT)
Dept: OTHER | Facility: CLINIC | Age: 53
End: 2023-05-09

## 2023-05-09 NOTE — CARE COORDINATION
Ambulatory Care Coordination Note  2023    Patient Current Location:  [place]     ACM contacted the patient by telephone. Verified name and  with patient as identifiers. Provided introduction to self, and explanation of the ACM role. Challenges to be reviewed by the provider   Additional needs identified to be addressed with provider: No  none               Method of communication with provider: none. ACM: Dalila Stapleton RN    Patient states she is doing as well as she can. Making plans for her sister's , waiting for her younger sister to get home to help with writing obituary. Blood sugar has been \"on a roller coaster\". Plans to attend appt today with Yaneli Recinos, diabetic nutritionist.  States, \"I'm not missing this appt\". Encouraged her to listen to her body and rest, as needed. Encouraged her to reach out should she have any medical needs.  Will f/up in one week,       Future Appointments   Date Time Provider Dave Sifuentes   2023  3:45 PM Yaneli Recinos RN PDHT BS AMB

## 2023-05-10 ENCOUNTER — CLINICAL DOCUMENTATION (OUTPATIENT)
Dept: PHARMACY | Facility: CLINIC | Age: 53
End: 2023-05-10

## 2023-05-10 NOTE — PROGRESS NOTES
Pharmacy Pop Care Documentation:      The application for Zaida Prakash for enrollment into the diabetes management program has been reviewed and accepted on 5/10/23.     [address] Only    Program: [address] in place:  No  Gap Closed?: Yes   Time Spent (min): 5

## 2023-05-17 ENCOUNTER — CARE COORDINATION (OUTPATIENT)
Dept: OTHER | Facility: CLINIC | Age: 53
End: 2023-05-17

## 2023-05-17 NOTE — CARE COORDINATION
ACM contacted the patient to follow up on progress, discuss new issues or concerns, and reinforce/provide patient education. Summary Note: Patient states she is preparing for her sister's  on Saturday. She is trying to \"maintain\". She states she is taking her medications, as prescribed. No current needs. Reinforced/Provided Education:  Discussed red flags and appropriate site of care based on symptoms and resources available to patient including: PCP  Benefits related nurse triage line. Importance and benefits of: Follow up with PCP and specialist, medication adherence, self monitoring and reporting of symptoms. Plan:  Plan for follow-up call in 10-14 days based on severity of symptoms and risk factors. Plan for next call:  check in, glucose management    Patient  verbalized understanding and is agreeable to follow up call.

## 2023-05-18 RX ORDER — LOSARTAN POTASSIUM 25 MG/1
1 TABLET ORAL DAILY
COMMUNITY
Start: 2023-02-21

## 2023-05-18 RX ORDER — INSULIN GLARGINE 100 [IU]/ML
INJECTION, SOLUTION SUBCUTANEOUS
COMMUNITY
Start: 2023-02-28 | End: 2023-05-30

## 2023-05-18 RX ORDER — PANTOPRAZOLE SODIUM 40 MG/1
40 TABLET, DELAYED RELEASE ORAL DAILY
COMMUNITY
Start: 2023-01-31

## 2023-05-18 RX ORDER — ROSUVASTATIN CALCIUM 40 MG/1
1 TABLET, COATED ORAL NIGHTLY
COMMUNITY
Start: 2022-09-28

## 2023-05-18 RX ORDER — ALBUTEROL SULFATE 90 UG/1
2 AEROSOL, METERED RESPIRATORY (INHALATION) EVERY 4 HOURS PRN
COMMUNITY
Start: 2020-11-03

## 2023-05-18 RX ORDER — DIAZEPAM 5 MG/1
5 TABLET ORAL 3 TIMES DAILY PRN
COMMUNITY
Start: 2022-10-20

## 2023-05-24 RX ORDER — SITAGLIPTIN 100 MG/1
TABLET, FILM COATED ORAL
Qty: 90 TABLET | Refills: 0 | Status: SHIPPED | OUTPATIENT
Start: 2023-05-24

## 2023-05-30 RX ORDER — INSULIN GLARGINE 100 [IU]/ML
INJECTION, SOLUTION SUBCUTANEOUS
Qty: 45 ML | Refills: 0 | Status: SHIPPED | OUTPATIENT
Start: 2023-05-30

## 2023-05-31 ENCOUNTER — CARE COORDINATION (OUTPATIENT)
Dept: OTHER | Facility: CLINIC | Age: 53
End: 2023-05-31

## 2023-05-31 NOTE — CARE COORDINATION
ACM contacted the patient to follow up on progress, discuss new issues or concerns, and reinforce/provide patient education. Summary Note: Patient concerned about getting a new sensor for her diabetic monitoring. States she will transition to [facility], states she has not gotten any phone numbers to contact them. Importance and benefits of: Follow up with PCP and specialist, medication adherence, self monitoring and reporting of symptoms. Plan:  Plan for follow-up call in 7-10 days based on severity of symptoms and risk factors. Plan for next call: self management-diabetes goals/education    Patient  verbalized understanding and is agreeable to follow up call.

## 2023-06-01 ENCOUNTER — PATIENT OUTREACH (OUTPATIENT)
Dept: OTHER | Age: 53
End: 2023-06-01

## 2023-06-02 ENCOUNTER — CARE COORDINATION (OUTPATIENT)
Dept: OTHER | Facility: CLINIC | Age: 53
End: 2023-06-02

## 2023-06-02 ENCOUNTER — TELEPHONE (OUTPATIENT)
Age: 53
End: 2023-06-02

## 2023-06-02 NOTE — CARE COORDINATION
Ambulatory Care Coordination Note    ACM attempted to reach patient for care management follow up call regarding questions about benefits after transition to [facility]. Unable to leave HIPAA compliant message, as mailbox was full. Plan for follow-up call in 3-5 days  Will attempt another outreach on 6/8. Patient needs to call Associate Services to obtain benefit information: [phone]    No future appointments.

## 2023-06-08 ENCOUNTER — CARE COORDINATION (OUTPATIENT)
Dept: OTHER | Facility: CLINIC | Age: 53
End: 2023-06-08

## 2023-06-08 NOTE — CARE COORDINATION
Ambulatory Care Coordination Note    ACM attempted to reach patient for care management follow up call regarding [facility] information for transitioning to new company. Unable to leave HIPAA compliant message due to mailbox being full. Patient needs to contact [employer] for information concerning new benefits. [phone]. Another resource that may assist is listed below.

## 2023-06-19 RX ORDER — PEN NEEDLE, DIABETIC 31 GX5/16"
NEEDLE, DISPOSABLE MISCELLANEOUS
COMMUNITY
Start: 2023-01-02 | End: 2023-06-19 | Stop reason: SDUPTHER

## 2023-06-19 RX ORDER — PEN NEEDLE, DIABETIC 31 GX5/16"
NEEDLE, DISPOSABLE MISCELLANEOUS
COMMUNITY
Start: 2023-05-24

## 2023-06-22 ENCOUNTER — CARE COORDINATION (OUTPATIENT)
Dept: OTHER | Facility: CLINIC | Age: 53
End: 2023-06-22

## 2023-06-22 NOTE — CARE COORDINATION
Ambulatory Care Coordination Note    ACM attempted to reach patient for care management follow up call regarding f/up for new HHS benefits information, prescription filled? Unable to leave message, patient's voicemail box was full. Plan for follow-up call in 10-14 days    No future appointments.

## 2023-06-23 ENCOUNTER — CARE COORDINATION (OUTPATIENT)
Dept: OTHER | Facility: CLINIC | Age: 53
End: 2023-06-23

## 2023-06-23 NOTE — CARE COORDINATION
ACM contacted the patient to follow up on progress, discuss new issues or concerns, and reinforce/provide patient education. Summary Note: Patient states she was able to get the [facility] refilled. No needs at this time. Patient states she is doing good and getting ready to go to work. Will call back in about two weeks, 7/6.

## 2023-07-06 ENCOUNTER — CARE COORDINATION (OUTPATIENT)
Dept: OTHER | Facility: CLINIC | Age: 53
End: 2023-07-06

## 2023-07-06 NOTE — CARE COORDINATION
Ambulatory Care Coordination Note    ACM attempted to reach patient for care management follow up call regarding f/up to care management. HIPAA compliant message left requesting a return phone call at patient convenience. Plan for follow-up call in 10-14 days    No future appointments.

## 2023-07-20 ENCOUNTER — CARE COORDINATION (OUTPATIENT)
Dept: OTHER | Facility: CLINIC | Age: 53
End: 2023-07-20

## 2023-07-20 NOTE — CARE COORDINATION
ACM outreach  Outreach to patient, states she is doing something important right now and will call me back. Will await phone call from patient.   F/up in two weeks, 8/3/23

## 2023-07-24 ENCOUNTER — CLINICAL DOCUMENTATION (OUTPATIENT)
Dept: PHARMACY | Facility: CLINIC | Age: 53
End: 2023-07-24

## 2023-07-24 ENCOUNTER — TELEPHONE (OUTPATIENT)
Age: 53
End: 2023-07-24

## 2023-07-24 RX ORDER — PANTOPRAZOLE SODIUM 40 MG/1
40 TABLET, DELAYED RELEASE ORAL DAILY
Qty: 90 TABLET | Refills: 1 | Status: SHIPPED | OUTPATIENT
Start: 2023-07-24

## 2023-07-24 NOTE — PROGRESS NOTES
Pharmacy Pop Care Documentation:     [facility] [place] is being removed from the diabetes management program for the following reason(s):  Patient DC from DM Program - Per [facility] patient's benefits have been Termed     [address] Only    Program: Nicholas in place:  No  Gap Closed?: Yes   Time Spent (min): 5

## 2023-07-24 NOTE — TELEPHONE ENCOUNTER
Medication Refill Request    Lg Sutton is requesting a refill of the following medication(s):   JANUVIA 100 MG tablet   pantoprazole (PROTONIX) 40 MG tablet     Please send refill to:      [address] [place] [address], [address] [place] [address] [phone] Paola Munguia [phone]  [address]  [address] [address]  Phone: [phone] Fax: [phone]

## 2023-08-03 ENCOUNTER — CARE COORDINATION (OUTPATIENT)
Dept: OTHER | Facility: CLINIC | Age: 53
End: 2023-08-03

## 2023-08-03 NOTE — CARE COORDINATION
ACM contacted the patient to follow up on progress, discuss new issues or concerns, and reinforce/provide patient education. Summary Note: Patient states she is walking out of the door now. States glucose has been around 120, has not been able to see her PCP, as she has been very busy. States she will call back later to talk. Importance and benefits of: Follow up with PCP and specialist, medication adherence, self monitoring and reporting of symptoms. Plan:  Plan for follow-up call in 10-14 days based on severity of symptoms and risk factors. Plan for next call: follow-up appointment-appt made w/ PCP? Patient  verbalized understanding and is agreeable to follow up call.

## 2023-08-17 ENCOUNTER — CARE COORDINATION (OUTPATIENT)
Dept: OTHER | Facility: CLINIC | Age: 53
End: 2023-08-17

## 2023-08-17 NOTE — CARE COORDINATION
Ambulatory Care Coordination Note    ACM attempted to reach patient for care management follow up call regarding diabetes mgmt. Unable to leave HIPAA compliant message as patient's voicemail box was full. convenience. Plan for follow-up call in 7-10 days  Graduate. No future appointments.

## 2023-08-22 ENCOUNTER — CARE COORDINATION (OUTPATIENT)
Dept: OTHER | Facility: CLINIC | Age: 53
End: 2023-08-22

## 2023-08-22 NOTE — CARE COORDINATION
Does not qualify for Case Management through [facility] due to non-[place] coverage.

## 2023-09-12 ENCOUNTER — OFFICE VISIT (OUTPATIENT)
Age: 53
End: 2023-09-12
Payer: COMMERCIAL

## 2023-09-12 VITALS
SYSTOLIC BLOOD PRESSURE: 164 MMHG | HEART RATE: 78 BPM | TEMPERATURE: 97.3 F | BODY MASS INDEX: 23.56 KG/M2 | HEIGHT: 60 IN | OXYGEN SATURATION: 98 % | DIASTOLIC BLOOD PRESSURE: 84 MMHG | RESPIRATION RATE: 18 BRPM | WEIGHT: 120 LBS

## 2023-09-12 DIAGNOSIS — E78.2 MIXED DYSLIPIDEMIA: ICD-10-CM

## 2023-09-12 DIAGNOSIS — F43.9 STRESS: ICD-10-CM

## 2023-09-12 DIAGNOSIS — I10 ESSENTIAL HYPERTENSION: ICD-10-CM

## 2023-09-12 DIAGNOSIS — L84 CALLUS OF FOOT: ICD-10-CM

## 2023-09-12 DIAGNOSIS — E11.9 COMPREHENSIVE DIABETIC FOOT EXAMINATION, TYPE 2 DM, ENCOUNTER FOR (HCC): ICD-10-CM

## 2023-09-12 DIAGNOSIS — E11.21 TYPE 2 DIABETES WITH NEPHROPATHY (HCC): Primary | ICD-10-CM

## 2023-09-12 PROCEDURE — 3052F HG A1C>EQUAL 8.0%<EQUAL 9.0%: CPT | Performed by: NURSE PRACTITIONER

## 2023-09-12 PROCEDURE — 99213 OFFICE O/P EST LOW 20 MIN: CPT | Performed by: NURSE PRACTITIONER

## 2023-09-12 PROCEDURE — 3078F DIAST BP <80 MM HG: CPT | Performed by: NURSE PRACTITIONER

## 2023-09-12 PROCEDURE — 3074F SYST BP LT 130 MM HG: CPT | Performed by: NURSE PRACTITIONER

## 2023-09-12 ASSESSMENT — PATIENT HEALTH QUESTIONNAIRE - PHQ9
1. LITTLE INTEREST OR PLEASURE IN DOING THINGS: 1
SUM OF ALL RESPONSES TO PHQ9 QUESTIONS 1 & 2: 2
SUM OF ALL RESPONSES TO PHQ QUESTIONS 1-9: 2
2. FEELING DOWN, DEPRESSED OR HOPELESS: 1

## 2023-09-18 ENCOUNTER — TELEPHONE (OUTPATIENT)
Age: 53
End: 2023-09-18

## 2023-09-18 NOTE — PROGRESS NOTES
Subjective:     Codie nSyder is a 46 y.o. 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 She is upset with her new insurance and does not understand what it will /will not cover. We discussed referral to [facility] . She is receptive. COMPLIANT WITH MEDICATION:   HTN; Denies chest pain, dyspnea, palpitations, headache and blurred vision. Blood pressure elevated high stress level. Sister passed away recently . BP is high and she is having difficulty navigating new insurance. Diabetes; poor control -Ms. Fabricio Vallejo is having difficulty affording medications, fuel for her car and food. Referral in place with [facility] care management . depression screening addressed not at risk    abuse screening addressed denies    learning assessment addressed reviewed nurses notes    fall risk addressed not at risk    HM: addressed declined today. Unsure of medical insurance coverage. ROS:  Gen: denies fever, chills, fatigue, weight loss, weight gain  HEENT:denies blurry vision, nasal congestion, sore throat  Resp: denies dypsnea, cough, wheezing  CV: denies chest pain radiating to the jaws or arms, palpitations, lower extremity edema  Abd: denies nausea, vomiting, diarrhea, constipation  Neuro: denies numbness/tingling  Endo: denies polyuria, polydipsia, heat/cold intolerance  Heme: no lymphadenopathy    Allergies   Allergen Reactions    Lisinopril Other (See Comments)     Hands and feet swelling         Current Outpatient Medications:      SITagliptin (JANUVIA) 100 MG tablet, TAKE ONE TABLET BY MOUTH ONE TIME A DAY, Disp: 90 tablet, Rfl: 0    pantoprazole

## 2023-09-18 NOTE — TELEPHONE ENCOUNTER
----- Message from SANDER Stahl NP sent at 9/18/2023  1:24 PM EDT -----  Regarding: FW: rheumatologist  Please ask Ms. Stahl if she is able to all the number on her insurance card to see who is in network for rheumatology and coverage regarding medications and continuation of care lab tests etc). Thanks! DL   ----- Message -----  From: Andie Buchanan RN  Sent: 9/18/2023   8:24 AM EDT  To: SANDER Stahl NP  Subject: RE: rheumatologist                               Good Morning,  Have her call Member Services on back of card to ask for participating Rheumotologists in her area. ----- Message -----  From: SANDER Stahl NP  Sent: 9/18/2023   1:40 AM EDT  To: Andie Buchanan RN  Subject: rheumatologist                                   I am having difficulty trying to find a rheumatologist that would take her insurance. Any suggestions?

## 2023-09-19 ENCOUNTER — TELEPHONE (OUTPATIENT)
Age: 53
End: 2023-09-19

## 2023-09-19 NOTE — TELEPHONE ENCOUNTER
----- Message from Candida Richard sent at 9/19/2023 11:01 AM EDT -----  Subject: Message to Provider    QUESTIONS  Information for Provider? Pt called to give information regarding her   insurance for her referral to rheumatology. Pt states her insurance   [facility] This is the information she had to give to office. Pt   would like a call back also.  ---------------------------------------------------------------------------  --------------  Jameel Lacy INFO  [phone]; Do not leave any message, patient will call back for answer  ---------------------------------------------------------------------------  --------------  SCRIPT ANSWERS  Relationship to Patient?  Self

## 2023-09-20 NOTE — TELEPHONE ENCOUNTER
S/w patient she states she called her insurance and she is in the [facility] or [facility] network, that's all she said they gave them for her rheumatology referral

## 2023-09-28 ENCOUNTER — TELEPHONE (OUTPATIENT)
Age: 53
End: 2023-09-28

## 2023-09-28 RX ORDER — MELOXICAM 7.5 MG/1
7.5 TABLET ORAL DAILY
Qty: 90 TABLET | Refills: 1 | Status: SHIPPED | OUTPATIENT
Start: 2023-09-28

## 2023-09-28 RX ORDER — CETIRIZINE HYDROCHLORIDE 10 MG/1
10 TABLET ORAL DAILY
Qty: 30 TABLET | Refills: 0 | Status: SHIPPED | OUTPATIENT
Start: 2023-09-28 | End: 2023-10-28

## 2023-09-28 NOTE — TELEPHONE ENCOUNTER
[facility] states she is doing everything she is supposed to do and working with everyone she can but the pain is not getting any better and she doesn't know what else to do. She would like a call from Hattie Murdock to see what her next steps should be.

## 2023-09-29 NOTE — TELEPHONE ENCOUNTER
SW Ms Nay Camejo, does not have a  for her new insurance. She did take the web site information, will try to get someone to help her do it. If a major problem, will give the office a call back.

## 2023-11-21 ENCOUNTER — TELEPHONE (OUTPATIENT)
Age: 53
End: 2023-11-21

## 2023-11-21 NOTE — TELEPHONE ENCOUNTER
Ren Queen states she has been sick since last week and not getting any better. Coughing up mucus, chills, SOB, and nothing OTC is working. Asked her to make an appt. Stated she was in [place] and couldn't get in. Suggested for her to go to the ER. Wants to speak to Rachel Meraz first to see what she should do.

## 2024-01-02 ENCOUNTER — TELEPHONE (OUTPATIENT)
Age: 54
End: 2024-01-02

## 2024-01-02 ENCOUNTER — NURSE TRIAGE (OUTPATIENT)
Dept: OTHER | Facility: CLINIC | Age: 54
End: 2024-01-02

## 2024-01-02 NOTE — TELEPHONE ENCOUNTER
S/w  patient has  concerns about kidneys and liver labs. She has appointment 1-, is getting over COVID. Her back pain is a # 7 what can she take. She also reports swollen eyes but this has improved , please advise

## 2024-01-02 NOTE — TELEPHONE ENCOUNTER
----- Message from Julie Behan sent at 1/2/2024  8:38 AM EST -----  Subject: Message to Provider    QUESTIONS  Information for Provider? would like to discuss her latest lab results   ---------------------------------------------------------------------------  --------------  CALL BACK INFO  [phone]; OK to leave message on voicemail  ---------------------------------------------------------------------------  --------------  SCRIPT ANSWERS  Relationship to Patient? Self

## 2024-01-02 NOTE — TELEPHONE ENCOUNTER
[place]    Received call from Katelyn at [facility] with Red Flag Complaint.    Subjective: Caller states \"my eyes are swelling and I have lower back pain\"     Current Symptoms:     Onset: back pain 3 days ago;   Eye swelling 1 day    Associated Symptoms:     Pain Severity: 8/10; ;     Temperature:      What has been tried: arthritis cream    LMP: NA Pregnant: NA    Recommended disposition: See in Office Today    Care advice provided, patient verbalizes understanding; denies any other questions or concerns; instructed to call back for any new or worsening symptoms.    Patient/Caller agrees with recommended disposition; writer provided warm transfer to Sia at [facility] for appointment scheduling    Attention Provider:  Thank you for allowing me to participate in the care of your patient.  The patient was connected to triage in response to information provided to the [facility].  Please do not respond through this encounter as the response is not directed to a shared pool.      Reason for Disposition   [1] MILD eyelid swelling (puffiness) AND [2] persists > 3 days  (Exception: Suspect mosquito bites.)   SEVERE back pain (e.g., excruciating, unable to do any normal activities) and not improved after pain medicine and CARE ADVICE    Protocols used: Eye - Swelling-ADULT-[facility], Back Pain-ADULT-[place]

## 2024-01-02 NOTE — TELEPHONE ENCOUNTER
----- Message from Julie Behan sent at 1/2/2024  8:38 AM EST -----  Subject: Message to Provider    QUESTIONS  Information for Provider? would like to discuss her latest lab results   ---------------------------------------------------------------------------  --------------  CALL BACK INFO  [phone]; OK to leave message on voicemail  ---------------------------------------------------------------------------  --------------  SCRIPT ANSWERS  Relationship to Patient? Self

## 2024-01-15 ENCOUNTER — OFFICE VISIT (OUTPATIENT)
Age: 54
End: 2024-01-15
Payer: COMMERCIAL

## 2024-01-15 VITALS
RESPIRATION RATE: 18 BRPM | BODY MASS INDEX: 23.32 KG/M2 | OXYGEN SATURATION: 98 % | SYSTOLIC BLOOD PRESSURE: 164 MMHG | HEIGHT: 60 IN | WEIGHT: 118.8 LBS | HEART RATE: 72 BPM | TEMPERATURE: 97.5 F | DIASTOLIC BLOOD PRESSURE: 90 MMHG

## 2024-01-15 DIAGNOSIS — E11.21 TYPE 2 DIABETES WITH NEPHROPATHY (HCC): ICD-10-CM

## 2024-01-15 DIAGNOSIS — R74.8 ELEVATED LIVER ENZYMES: ICD-10-CM

## 2024-01-15 DIAGNOSIS — E78.2 MIXED DYSLIPIDEMIA: ICD-10-CM

## 2024-01-15 DIAGNOSIS — G89.29 CHRONIC NONINTRACTABLE HEADACHE, UNSPECIFIED HEADACHE TYPE: ICD-10-CM

## 2024-01-15 DIAGNOSIS — R30.0 DYSURIA: ICD-10-CM

## 2024-01-15 DIAGNOSIS — I10 ESSENTIAL HYPERTENSION: Primary | ICD-10-CM

## 2024-01-15 DIAGNOSIS — Z82.49 FH: BRAIN ANEURYSM: ICD-10-CM

## 2024-01-15 DIAGNOSIS — M06.9 RHEUMATOID ARTHRITIS, INVOLVING UNSPECIFIED SITE, UNSPECIFIED WHETHER RHEUMATOID FACTOR PRESENT (HCC): ICD-10-CM

## 2024-01-15 DIAGNOSIS — R51.9 CHRONIC NONINTRACTABLE HEADACHE, UNSPECIFIED HEADACHE TYPE: ICD-10-CM

## 2024-01-15 DIAGNOSIS — N30.01 ACUTE CYSTITIS WITH HEMATURIA: ICD-10-CM

## 2024-01-15 LAB
BILIRUBIN, URINE, POC: NEGATIVE
BLOOD URINE, POC: NORMAL
GLUCOSE URINE, POC: 500
KETONES, URINE, POC: NEGATIVE
LEUKOCYTE ESTERASE, URINE, POC: NEGATIVE
NITRITE, URINE, POC: NEGATIVE
PH, URINE, POC: 7 (ref 4.6–8)
PROTEIN,URINE, POC: 300
SPECIFIC GRAVITY, URINE, POC: 1.02 (ref 1–1.03)
URINALYSIS CLARITY, POC: CLEAR
URINALYSIS COLOR, POC: YELLOW
UROBILINOGEN, POC: NORMAL

## 2024-01-15 PROCEDURE — 3080F DIAST BP >= 90 MM HG: CPT | Performed by: NURSE PRACTITIONER

## 2024-01-15 PROCEDURE — 99214 OFFICE O/P EST MOD 30 MIN: CPT | Performed by: NURSE PRACTITIONER

## 2024-01-15 PROCEDURE — 3077F SYST BP >= 140 MM HG: CPT | Performed by: NURSE PRACTITIONER

## 2024-01-15 PROCEDURE — 81003 URINALYSIS AUTO W/O SCOPE: CPT | Performed by: NURSE PRACTITIONER

## 2024-01-15 RX ORDER — AMLODIPINE BESYLATE 5 MG/1
2.5 TABLET ORAL DAILY
Qty: 30 TABLET | Refills: 5
Start: 2024-01-15

## 2024-01-15 RX ORDER — ROSUVASTATIN CALCIUM 40 MG/1
40 TABLET, COATED ORAL NIGHTLY
Qty: 30 TABLET | Refills: 5 | Status: SHIPPED | OUTPATIENT
Start: 2024-01-15

## 2024-01-15 ASSESSMENT — PATIENT HEALTH QUESTIONNAIRE - PHQ9
2. FEELING DOWN, DEPRESSED OR HOPELESS: 0
SUM OF ALL RESPONSES TO PHQ QUESTIONS 1-9: 2
SUM OF ALL RESPONSES TO PHQ QUESTIONS 1-9: 0
SUM OF ALL RESPONSES TO PHQ QUESTIONS 1-9: 0
1. LITTLE INTEREST OR PLEASURE IN DOING THINGS: 0
SUM OF ALL RESPONSES TO PHQ QUESTIONS 1-9: 0
SUM OF ALL RESPONSES TO PHQ9 QUESTIONS 1 & 2: 2
SUM OF ALL RESPONSES TO PHQ QUESTIONS 1-9: 2
SUM OF ALL RESPONSES TO PHQ QUESTIONS 1-9: 2
2. FEELING DOWN, DEPRESSED OR HOPELESS: 1
SUM OF ALL RESPONSES TO PHQ QUESTIONS 1-9: 0
1. LITTLE INTEREST OR PLEASURE IN DOING THINGS: 1
SUM OF ALL RESPONSES TO PHQ9 QUESTIONS 1 & 2: 0
SUM OF ALL RESPONSES TO PHQ QUESTIONS 1-9: 2

## 2024-01-16 ENCOUNTER — TELEPHONE (OUTPATIENT)
Age: 54
End: 2024-01-16

## 2024-01-16 NOTE — PROGRESS NOTES
Chief Complaint   Patient presents with    Abnormal Lab     liver       Vitals:    01/15/24 0743   BP: (!) 164/90   Pulse: 72   Resp: 18   Temp: 97.5 °F (36.4 °C)   SpO2: 98%       
\"Have you been to the ER, urgent care clinic since your last visit?  Hospitalized since your last visit?\"    NO    “Have you seen or consulted any other health care providers outside of [facility] since your last visit?”    NO        “Have you had a pap smear?”    YES -  too long to remember      
tenderness.  Extremities: without clubbing, cyanosis, or edema  Pulses: radial and femoral pulses are normal  Neuro: HMF intact. Cranial nerves II through XII grossly normal.  Motor: is 5 over 5 and symmetrical.   Deep tendon reflexes: +2 equal  Psych:appropriate behavior, mood, affect and judgement.   Results for orders placed or performed in visit on 01/15/24   AMB POC URINALYSIS DIP STICK AUTO W/O MICRO   Result Value Ref Range    Color, Urine, POC Yellow     Clarity, Urine, POC Clear     Glucose, Urine,      Bilirubin, Urine, POC Negative     Ketones, Urine, POC Negative     Specific Gravity, Urine, POC 1.025 1.001 - 1.035    Blood, Urine, POC Small     pH, Urine, POC 7 4.6 - 8.0    Protein, Urine,      Urobilinogen, POC 0.2 mg/dL     Nitrite, Urine, POC Negative     Leukocyte Esterase, Urine, POC Negative        Lab Results   Component Value Date    WBC 11.3 (H) 12/19/2023    HGB 13.2 12/19/2023    HCT 38.2 12/19/2023     12/19/2023    CHOL 296 (H) 12/19/2023    TRIG 141 12/19/2023    HDL 61 12/19/2023    LDLDIRECT 202 (H) 07/13/2021    ALT 23 12/19/2023    AST 13 (L) 12/19/2023     12/19/2023    K 3.7 12/19/2023     (H) 12/19/2023    CREATININE 1.05 (H) 12/19/2023    BUN 20 12/19/2023    CO2 25 12/19/2023    TSH 2.96 09/27/2022    LABA1C 8.2 (H) 12/19/2023        Assessment/ Plan:     1. Essential hypertension  BP elevated d/c losartan start amlodipine 2.5 mg po daily.  If tolerated may need to increase for BP control.    2. Type 2 diabetes with nephropathy (HCC)  Glucose in urine. Encourage increase fluid intake and check with pharmacy and regarding cost saving measures to help with medication costs    3. Mixed dyslipidemia  Encourage to keep Crestor in her bag/purse so that she remembers to take the medication at work.    4. Dysuria  Frequency of urination   High glucose content. Encourage increase fluid intake.   - AMB POC URINALYSIS DIP STICK AUTO W/O MICRO  - Culture,

## 2024-01-16 NOTE — TELEPHONE ENCOUNTER
----- Message from SANDER Flannery NP sent at 1/16/2024  1:56 AM EST -----  Regarding: contact radiology at [facility]  Please call radiology regarding to check to see if CT of brain is recommended versus MRI brain for FH of brain aneurysm and patient's frequent headaches.  New insurance and she is concerned regarding coverage.   Thanks! DL

## 2024-01-16 NOTE — TELEPHONE ENCOUNTER
KAMILA SILVERIO Trop, per him, they normally most of the time in the ER would do a CTA of the head and neck which covers the check for an aneurysm.  If nothing shows up, then sometimes moves up to a MRI, but still is not sure of what kind of MRI.  The best solution is for her to give  [facility] a call [phone] who works with NUSRAT GRIFFIN the radiologist for that advice.  They do not have one that's on site at all times.  The CS will automatic check her insurance availability once the test is ordered.

## 2024-01-17 RX ORDER — SULFAMETHOXAZOLE AND TRIMETHOPRIM 800; 160 MG/1; MG/1
1 TABLET ORAL 2 TIMES DAILY
Qty: 20 TABLET | Refills: 0 | Status: SHIPPED | OUTPATIENT
Start: 2024-01-17 | End: 2024-01-27

## 2024-01-18 ENCOUNTER — HOSPITAL ENCOUNTER (OUTPATIENT)
Facility: HOSPITAL | Age: 54
Discharge: HOME | End: 2024-01-18
Payer: COMMERCIAL

## 2024-01-18 DIAGNOSIS — R51.9 CHRONIC NONINTRACTABLE HEADACHE, UNSPECIFIED HEADACHE TYPE: ICD-10-CM

## 2024-01-18 DIAGNOSIS — Z82.49 FH: BRAIN ANEURYSM: ICD-10-CM

## 2024-01-18 DIAGNOSIS — G89.29 CHRONIC NONINTRACTABLE HEADACHE, UNSPECIFIED HEADACHE TYPE: ICD-10-CM

## 2024-01-18 LAB
BACTERIA SPEC CULT: ABNORMAL
CC UR VC: ABNORMAL
SERVICE CMNT-IMP: ABNORMAL

## 2024-01-18 PROCEDURE — 70496 CT ANGIOGRAPHY HEAD: CPT

## 2024-01-18 PROCEDURE — 6360000004 HC RX CONTRAST MEDICATION: Performed by: NURSE PRACTITIONER

## 2024-01-18 RX ADMIN — IOPAMIDOL 100 ML: 755 INJECTION, SOLUTION INTRAVENOUS at 14:55

## 2024-01-22 NOTE — RESULT ENCOUNTER NOTE
Patient verified stating name and date of birth.  Patient informed of CT results and states doesn't understand and would like a call back to explain the results

## 2024-01-23 NOTE — RESULT ENCOUNTER NOTE
Patient is going to make her own appt with vascular Physician name and phone information given they also have not called to schedule the other CT yet I told her to call if she hasn't heard by end of week

## 2024-01-29 ENCOUNTER — HOSPITAL ENCOUNTER (OUTPATIENT)
Facility: HOSPITAL | Age: 54
Discharge: HOME | End: 2024-02-01
Payer: COMMERCIAL

## 2024-01-29 DIAGNOSIS — R91.1 LUNG NODULE: ICD-10-CM

## 2024-01-29 PROCEDURE — 6360000004 HC RX CONTRAST MEDICATION: Performed by: NURSE PRACTITIONER

## 2024-01-29 PROCEDURE — 71260 CT THORAX DX C+: CPT

## 2024-01-29 RX ADMIN — IOPAMIDOL 100 ML: 612 INJECTION, SOLUTION INTRAVENOUS at 14:43

## 2024-02-01 ENCOUNTER — TELEPHONE (OUTPATIENT)
Age: 54
End: 2024-02-01

## 2024-02-01 NOTE — TELEPHONE ENCOUNTER
Kim states she buried her boyfriend yesterday and is having issues with her BP and sugar. BP was 163/70 and sugar is over 400. Wanted Kierra to write her a note today to be out of work so she could get herself together. Explained she would need to be seen before a note was written. She wants Kierra to give her a call back.

## 2024-03-15 ENCOUNTER — TELEPHONE (OUTPATIENT)
Age: 54
End: 2024-03-15

## 2024-03-15 NOTE — TELEPHONE ENCOUNTER
Patient requesting refill on     Requested Prescriptions     Pending Prescriptions Disp Refills    Continuous Blood Gluc Sensor (FREESTYLE ANKIT 2 SENSOR) MISC 2 each 3     Sig: Use as directed        Last OV 1/15/2024

## 2024-03-15 NOTE — TELEPHONE ENCOUNTER
Medication Refill Request    Kim Stahl is requesting a refill of the following medication(s):   Continuous Blood Gluc Sensor (FREESTYLE ANKIT 2 SENSOR) [facility]   Please send refill to:     [facility] [address] [facility] [place] [facility] [address] [place] [phone] - F [phone] [address]  [facility] [address]  Phone: [phone] Fax: [phone]

## 2024-03-18 ENCOUNTER — HOSPITAL ENCOUNTER (EMERGENCY)
Facility: HOSPITAL | Age: 54
Discharge: HOME | End: 2024-03-18
Attending: EMERGENCY MEDICINE
Payer: COMMERCIAL

## 2024-03-18 ENCOUNTER — APPOINTMENT (OUTPATIENT)
Facility: HOSPITAL | Age: 54
End: 2024-03-18
Payer: COMMERCIAL

## 2024-03-18 VITALS
SYSTOLIC BLOOD PRESSURE: 162 MMHG | TEMPERATURE: 98.5 F | HEIGHT: 60 IN | DIASTOLIC BLOOD PRESSURE: 68 MMHG | OXYGEN SATURATION: 99 % | BODY MASS INDEX: 24.54 KG/M2 | RESPIRATION RATE: 17 BRPM | HEART RATE: 75 BPM | WEIGHT: 125 LBS

## 2024-03-18 DIAGNOSIS — I16.0 HYPERTENSIVE URGENCY: ICD-10-CM

## 2024-03-18 DIAGNOSIS — R07.89 ATYPICAL CHEST PAIN: Primary | ICD-10-CM

## 2024-03-18 LAB
ANION GAP SERPL CALC-SCNC: 11 MMOL/L (ref 5–15)
APPEARANCE UR: CLEAR
BACTERIA URNS QL MICRO: NEGATIVE /HPF
BASOPHILS # BLD: 0.1 K/UL (ref 0–0.1)
BASOPHILS NFR BLD: 1 % (ref 0–1)
BILIRUB UR QL: NEGATIVE
BUN SERPL-MCNC: 15 MG/DL (ref 6–20)
BUN/CREAT SERPL: 13 (ref 12–20)
CALCIUM SERPL-MCNC: 9.2 MG/DL (ref 8.5–10.1)
CHLORIDE SERPL-SCNC: 101 MMOL/L (ref 97–108)
CO2 SERPL-SCNC: 27 MMOL/L (ref 21–32)
COLOR UR: ABNORMAL
CREAT SERPL-MCNC: 1.17 MG/DL (ref 0.55–1.02)
DIFFERENTIAL METHOD BLD: ABNORMAL
EOSINOPHIL # BLD: 0.1 K/UL (ref 0–0.4)
EOSINOPHIL NFR BLD: 1 % (ref 0–7)
EPITH CASTS URNS QL MICRO: ABNORMAL /LPF
ERYTHROCYTE [DISTWIDTH] IN BLOOD BY AUTOMATED COUNT: 13.7 % (ref 11.5–14.5)
GLUCOSE SERPL-MCNC: 196 MG/DL (ref 65–100)
GLUCOSE UR STRIP.AUTO-MCNC: 250 MG/DL
HCT VFR BLD AUTO: 42.1 % (ref 35–47)
HGB BLD-MCNC: 15.1 G/DL (ref 11.5–16)
HGB UR QL STRIP: ABNORMAL
IMM GRANULOCYTES # BLD AUTO: 0 K/UL (ref 0–0.04)
IMM GRANULOCYTES NFR BLD AUTO: 0 % (ref 0–0.5)
KETONES UR QL STRIP.AUTO: NEGATIVE MG/DL
LEUKOCYTE ESTERASE UR QL STRIP.AUTO: NEGATIVE
LYMPHOCYTES # BLD: 2.7 K/UL (ref 0.8–3.5)
LYMPHOCYTES NFR BLD: 22 % (ref 12–49)
MCH RBC QN AUTO: 28.7 PG (ref 26–34)
MCHC RBC AUTO-ENTMCNC: 35.9 G/DL (ref 30–36.5)
MCV RBC AUTO: 80 FL (ref 80–99)
MONOCYTES # BLD: 0.9 K/UL (ref 0–1)
MONOCYTES NFR BLD: 7 % (ref 5–13)
NEUTS SEG # BLD: 8.4 K/UL (ref 1.8–8)
NEUTS SEG NFR BLD: 69 % (ref 32–75)
NITRITE UR QL STRIP.AUTO: NEGATIVE
NRBC # BLD: 0 K/UL (ref 0–0.01)
NRBC BLD-RTO: 0 PER 100 WBC
PH UR STRIP: 5.5 (ref 5–8)
PLATELET # BLD AUTO: 359 K/UL (ref 150–400)
PMV BLD AUTO: 11.1 FL (ref 8.9–12.9)
POTASSIUM SERPL-SCNC: 4.3 MMOL/L (ref 3.5–5.1)
PROT UR STRIP-MCNC: >300 MG/DL
RBC # BLD AUTO: 5.26 M/UL (ref 3.8–5.2)
RBC #/AREA URNS HPF: ABNORMAL /HPF (ref 0–5)
SODIUM SERPL-SCNC: 139 MMOL/L (ref 136–145)
SP GR UR REFRACTOMETRY: 1.01 (ref 1–1.03)
TROPONIN I SERPL HS-MCNC: 16 NG/L (ref 0–51)
TROPONIN I SERPL HS-MCNC: 18 NG/L (ref 0–51)
URINE CULTURE IF INDICATED: ABNORMAL
UROBILINOGEN UR QL STRIP.AUTO: 0.2 EU/DL (ref 0.2–1)
WBC # BLD AUTO: 12.2 K/UL (ref 3.6–11)
WBC URNS QL MICRO: ABNORMAL /HPF (ref 0–4)

## 2024-03-18 PROCEDURE — 36415 COLL VENOUS BLD VENIPUNCTURE: CPT

## 2024-03-18 PROCEDURE — 93005 ELECTROCARDIOGRAM TRACING: CPT | Performed by: EMERGENCY MEDICINE

## 2024-03-18 PROCEDURE — 85025 COMPLETE CBC W/AUTO DIFF WBC: CPT

## 2024-03-18 PROCEDURE — 81001 URINALYSIS AUTO W/SCOPE: CPT

## 2024-03-18 PROCEDURE — 80048 BASIC METABOLIC PNL TOTAL CA: CPT

## 2024-03-18 PROCEDURE — 6360000002 HC RX W HCPCS: Performed by: EMERGENCY MEDICINE

## 2024-03-18 PROCEDURE — 96374 THER/PROPH/DIAG INJ IV PUSH: CPT

## 2024-03-18 PROCEDURE — 71045 X-RAY EXAM CHEST 1 VIEW: CPT

## 2024-03-18 PROCEDURE — 84484 ASSAY OF TROPONIN QUANT: CPT

## 2024-03-18 PROCEDURE — 99285 EMERGENCY DEPT VISIT HI MDM: CPT

## 2024-03-18 RX ORDER — LABETALOL HYDROCHLORIDE 5 MG/ML
20 INJECTION, SOLUTION INTRAVENOUS
Status: DISCONTINUED | OUTPATIENT
Start: 2024-03-18 | End: 2024-03-18

## 2024-03-18 RX ORDER — LABETALOL HYDROCHLORIDE 5 MG/ML
10 INJECTION, SOLUTION INTRAVENOUS
Status: COMPLETED | OUTPATIENT
Start: 2024-03-18 | End: 2024-03-18

## 2024-03-18 RX ADMIN — LABETALOL HYDROCHLORIDE 10 MG: 5 INJECTION, SOLUTION INTRAVENOUS at 14:44

## 2024-03-18 ASSESSMENT — ENCOUNTER SYMPTOMS
COUGH: 0
EYE REDNESS: 0
NAUSEA: 0
ABDOMINAL PAIN: 0
SHORTNESS OF BREATH: 0
SORE THROAT: 0
VOMITING: 0

## 2024-03-18 ASSESSMENT — PAIN SCALES - GENERAL: PAINLEVEL_OUTOF10: 8

## 2024-03-18 ASSESSMENT — LIFESTYLE VARIABLES
HOW MANY STANDARD DRINKS CONTAINING ALCOHOL DO YOU HAVE ON A TYPICAL DAY: PATIENT DOES NOT DRINK
HOW OFTEN DO YOU HAVE A DRINK CONTAINING ALCOHOL: NEVER

## 2024-03-18 ASSESSMENT — PAIN DESCRIPTION - ORIENTATION: ORIENTATION: LEFT

## 2024-03-18 ASSESSMENT — PAIN DESCRIPTION - LOCATION: LOCATION: CHEST

## 2024-03-18 ASSESSMENT — PAIN - FUNCTIONAL ASSESSMENT: PAIN_FUNCTIONAL_ASSESSMENT: 0-10

## 2024-03-18 NOTE — TELEPHONE ENCOUNTER
Incoming call from patient to verify requirements for first half.   Informed her she has completed A1c and OV and will get BWW points in late October
Negative
The patient has been re-examined and I agree with the above assessment or I updated with my findings.

## 2024-03-18 NOTE — ED TRIAGE NOTES
Pt arrived with c/o medial chest pain that radiates down her left arm and feels like there is something \"stuck in her throat\". She has taken pepto, pepcid for this issue

## 2024-03-18 NOTE — ED PROVIDER NOTES
Mesfin Maya MD at [facility] MAIN OR    GYN      BTL    IR LUMBAR TRANSFORAMINAL EPIDURAL SINGLE  5/11/2021       Family History:  Family History   Problem Relation Age of Onset    Breast Cancer Maternal Aunt     Breast Cancer Paternal Aunt     Diabetes Maternal Grandmother     Diabetes Sister     Heart Disease Father     Diabetes Mother     Stroke Mother        Social History:  Social History     Tobacco Use    Smoking status: Every Day     Current packs/day: 0.50     Types: Cigarettes    Smokeless tobacco: Never   Substance Use Topics    Alcohol use: No    Drug use: No       Allergies:  Allergies   Allergen Reactions    Lisinopril Other (See Comments)     Hands and feet swelling    Losartan Swelling     Swellling of hands and feet         Review of Systems   Review of Systems   Constitutional:  Positive for fatigue. Negative for chills and fever.   HENT:  Negative for congestion and sore throat.    Eyes:  Negative for redness.   Respiratory:  Negative for cough and shortness of breath.    Cardiovascular:  Positive for chest pain.   Gastrointestinal:  Negative for abdominal pain, diarrhea, nausea and vomiting.   Genitourinary:  Negative for difficulty urinating.   Skin:  Negative for rash.   Neurological:  Positive for headaches. Negative for dizziness.       Physical Exam   Physical Exam  Vitals and nursing note reviewed.   Constitutional:       Appearance: Normal appearance. She is not ill-appearing.   HENT:      Head: Normocephalic.   Eyes:      Conjunctiva/sclera: Conjunctivae normal.      Pupils: Pupils are equal, round, and reactive to light.   Cardiovascular:      Rate and Rhythm: Normal rate and regular rhythm.      Pulses: Normal pulses.      Heart sounds: Normal heart sounds.   Pulmonary:      Effort: Pulmonary effort is normal.      Breath sounds: Normal breath sounds.   Abdominal:      Palpations: Abdomen is soft.      Tenderness: There is no abdominal tenderness.   Skin:     General: Skin is warm and dry.

## 2024-03-20 ENCOUNTER — OFFICE VISIT (OUTPATIENT)
Age: 54
End: 2024-03-20
Payer: COMMERCIAL

## 2024-03-20 VITALS
BODY MASS INDEX: 22.38 KG/M2 | HEIGHT: 60 IN | OXYGEN SATURATION: 98 % | TEMPERATURE: 97.3 F | DIASTOLIC BLOOD PRESSURE: 80 MMHG | RESPIRATION RATE: 18 BRPM | WEIGHT: 114 LBS | SYSTOLIC BLOOD PRESSURE: 136 MMHG | HEART RATE: 94 BPM

## 2024-03-20 DIAGNOSIS — E11.65 TYPE 2 DIABETES MELLITUS WITH HYPERGLYCEMIA, WITH LONG-TERM CURRENT USE OF INSULIN (HCC): ICD-10-CM

## 2024-03-20 DIAGNOSIS — Z87.898 HX OF CHEST PAIN: ICD-10-CM

## 2024-03-20 DIAGNOSIS — Z79.4 TYPE 2 DIABETES MELLITUS WITH HYPERGLYCEMIA, WITH LONG-TERM CURRENT USE OF INSULIN (HCC): ICD-10-CM

## 2024-03-20 DIAGNOSIS — I10 PRIMARY HYPERTENSION: ICD-10-CM

## 2024-03-20 DIAGNOSIS — R35.0 URINARY FREQUENCY: Primary | ICD-10-CM

## 2024-03-20 DIAGNOSIS — E78.2 MIXED DYSLIPIDEMIA: ICD-10-CM

## 2024-03-20 LAB
BILIRUBIN, URINE, POC: NEGATIVE
BLOOD URINE, POC: ABNORMAL
GLUCOSE URINE, POC: 500
KETONES, URINE, POC: NEGATIVE
LEUKOCYTE ESTERASE, URINE, POC: NEGATIVE
NITRITE, URINE, POC: NEGATIVE
PH, URINE, POC: 5.5 (ref 4.6–8)
PROTEIN,URINE, POC: 300
SPECIFIC GRAVITY, URINE, POC: 1.02 (ref 1–1.03)
URINALYSIS CLARITY, POC: CLEAR
URINALYSIS COLOR, POC: YELLOW
UROBILINOGEN, POC: ABNORMAL

## 2024-03-20 PROCEDURE — 81001 URINALYSIS AUTO W/SCOPE: CPT | Performed by: NURSE PRACTITIONER

## 2024-03-20 PROCEDURE — 99214 OFFICE O/P EST MOD 30 MIN: CPT | Performed by: NURSE PRACTITIONER

## 2024-03-20 PROCEDURE — 3079F DIAST BP 80-89 MM HG: CPT | Performed by: NURSE PRACTITIONER

## 2024-03-20 PROCEDURE — 3075F SYST BP GE 130 - 139MM HG: CPT | Performed by: NURSE PRACTITIONER

## 2024-03-20 RX ORDER — FAMOTIDINE 20 MG/1
20 TABLET, FILM COATED ORAL 2 TIMES DAILY
COMMUNITY
End: 2024-03-20

## 2024-03-20 RX ORDER — FAMOTIDINE 20 MG/1
20 TABLET, FILM COATED ORAL DAILY
Qty: 90 TABLET | Refills: 3 | Status: SHIPPED | OUTPATIENT
Start: 2024-03-20

## 2024-03-20 ASSESSMENT — PATIENT HEALTH QUESTIONNAIRE - PHQ9
SUM OF ALL RESPONSES TO PHQ QUESTIONS 1-9: 2
SUM OF ALL RESPONSES TO PHQ QUESTIONS 1-9: 2
SUM OF ALL RESPONSES TO PHQ9 QUESTIONS 1 & 2: 2
SUM OF ALL RESPONSES TO PHQ QUESTIONS 1-9: 2
1. LITTLE INTEREST OR PLEASURE IN DOING THINGS: SEVERAL DAYS
SUM OF ALL RESPONSES TO PHQ QUESTIONS 1-9: 2
2. FEELING DOWN, DEPRESSED OR HOPELESS: SEVERAL DAYS

## 2024-03-21 LAB
BACTERIA SPEC CULT: NORMAL
EKG ATRIAL RATE: 84 BPM
EKG DIAGNOSIS: NORMAL
EKG P AXIS: 66 DEGREES
EKG P-R INTERVAL: 160 MS
EKG Q-T INTERVAL: 394 MS
EKG QRS DURATION: 64 MS
EKG QTC CALCULATION (BAZETT): 465 MS
EKG R AXIS: 68 DEGREES
EKG T AXIS: 63 DEGREES
EKG VENTRICULAR RATE: 84 BPM
SERVICE CMNT-IMP: NORMAL

## 2024-03-21 NOTE — PROGRESS NOTES
Chief Complaint   Patient presents with    Chest Pain     Follow up ER     Urinary Frequency       Vitals:    03/20/24 1445   BP: 136/80   Pulse: 94   Resp: 18   Temp: 97.3 °F (36.3 °C)   SpO2: 98%       
\"Have you been to the ER, urgent care clinic since your last visit?  Hospitalized since your last visit?\"    YES - When: approximately 2 days ago.  Where and Why: [facility].    “Have you seen or consulted any other health care providers outside of [facility] since your last visit?”    NO     “Have you had a pap smear?”      Chief Complaint   Patient presents with    Chest Pain     Follow up ER     Urinary Frequency       Vitals:    03/20/24 1445   BP: 136/80   Pulse: 94   Resp: 18   Temp: 97.3 °F (36.3 °C)   SpO2: 98%       Date of last Cervical Cancer screen (HPV or PAP): 7/11/2017             Click Here for Release of Records Request  
   Diabetic Alb to Cr ratio (uACR) test  03/01/2024         Follow up in 1 month soonerif needed.      THERESA Navarro-C

## 2024-04-04 ENCOUNTER — TRANSCRIBE ORDERS (OUTPATIENT)
Facility: HOSPITAL | Age: 54
End: 2024-04-04

## 2024-04-04 DIAGNOSIS — Z12.31 SCREENING MAMMOGRAM FOR BREAST CANCER: Primary | ICD-10-CM

## 2024-04-09 ENCOUNTER — OFFICE VISIT (OUTPATIENT)
Age: 54
End: 2024-04-09
Payer: COMMERCIAL

## 2024-04-09 VITALS
DIASTOLIC BLOOD PRESSURE: 78 MMHG | HEIGHT: 60 IN | WEIGHT: 115.8 LBS | HEART RATE: 88 BPM | SYSTOLIC BLOOD PRESSURE: 138 MMHG | BODY MASS INDEX: 22.74 KG/M2 | OXYGEN SATURATION: 98 % | RESPIRATION RATE: 20 BRPM

## 2024-04-09 DIAGNOSIS — R07.89 CHEST DISCOMFORT: Primary | ICD-10-CM

## 2024-04-09 DIAGNOSIS — I10 ESSENTIAL HYPERTENSION: ICD-10-CM

## 2024-04-09 DIAGNOSIS — Z72.0 TOBACCO ABUSE: ICD-10-CM

## 2024-04-09 PROBLEM — Z82.49 FAMILY HISTORY OF HEART DISEASE: Status: ACTIVE | Noted: 2024-04-09

## 2024-04-09 PROCEDURE — 3078F DIAST BP <80 MM HG: CPT | Performed by: INTERNAL MEDICINE

## 2024-04-09 PROCEDURE — 3075F SYST BP GE 130 - 139MM HG: CPT | Performed by: INTERNAL MEDICINE

## 2024-04-09 PROCEDURE — 99203 OFFICE O/P NEW LOW 30 MIN: CPT | Performed by: INTERNAL MEDICINE

## 2024-04-09 RX ORDER — ASPIRIN 81 MG/1
81 TABLET ORAL DAILY
COMMUNITY

## 2024-04-09 ASSESSMENT — PATIENT HEALTH QUESTIONNAIRE - PHQ9
2. FEELING DOWN, DEPRESSED OR HOPELESS: NOT AT ALL
SUM OF ALL RESPONSES TO PHQ QUESTIONS 1-9: 0
1. LITTLE INTEREST OR PLEASURE IN DOING THINGS: NOT AT ALL
SUM OF ALL RESPONSES TO PHQ QUESTIONS 1-9: 0
SUM OF ALL RESPONSES TO PHQ QUESTIONS 1-9: 0
SUM OF ALL RESPONSES TO PHQ9 QUESTIONS 1 & 2: 0
SUM OF ALL RESPONSES TO PHQ QUESTIONS 1-9: 0

## 2024-04-09 NOTE — PROGRESS NOTES
Chief Complaint   Patient presents with    New Patient    Chest Pain    Hypertension     /78 (Site: Left Upper Arm, Position: Sitting, Cuff Size: Medium Adult)   Pulse 88   Resp 20   Ht 1.524 m (5')   Wt 52.5 kg (115 lb 12.8 oz)   SpO2 98%   BMI 22.62 kg/m²     
Medical History:   Diagnosis Date    Abnormal EKG 2/27/2018    Abscess 7/24/2019    Asthma     Chest pain 2/27/2018    Chest wall contusion, left, subsequent encounter     Diabetes (HCC)     Diverticular disease     Diverticulitis large intestine w/o perforation or abscess w/o bleeding 10/25/2016    Dyslipidemia     Hypertension     Lumbar radiculopathy 02/01/2021    Menopause     Pelvic pain 7/26/2016    Rib fractures     Right hand pain 7/10/2018    Sciatica of left side 02/01/2021    Uterine leiomyoma 7/26/2016    Vaginal candidiasis 7/24/2019     Past Surgical History:   Procedure Laterality Date    COLONOSCOPY N/A 7/22/2022    COLONOSCOPY WITH POSSIBLE POLYPECTOMY with cold forcep polypectomy performed by Mesfin Maya MD at [facility] MAIN OR    GYN      BTL    IR LUMBAR TRANSFORAMINAL EPIDURAL SINGLE  5/11/2021     Family History   Problem Relation Age of Onset    Breast Cancer Maternal Aunt     Breast Cancer Paternal Aunt     Diabetes Maternal Grandmother     Diabetes Sister     Heart Disease Father     Diabetes Mother     Stroke Mother      Social History     Tobacco Use    Smoking status: Every Day     Current packs/day: 0.50     Types: Cigarettes    Smokeless tobacco: Never   Substance Use Topics    Alcohol use: No       REVIEW OF SYSTEMS:  Constitutional: Not present - Fatigue, chills, fever, weight loss, weight gain  Eyes: Not present - Visual changes, vision loss   Ears, Nose, Mouth, Throat:  Not present - Headache, earache, tinnitus, nasal congestion  Cardiovascular: as per HPI  Respiratory: Not present - Cough, wheezing, shortness of breath  Gastrointestinal: Reflux.  Not present - Nausea, vomiting, diarrhea, melena, hematochezia  Musculoskeletal: Not present -  Muscle pain, muscle weakness, joint pain  Endocrine: Not present - Excessive thirst, excessive urination, hair loss  Integumentary: Not present - Rashes, suspicious lesions  Neurological: Not present - Balance and gait difficulties, focal

## 2024-04-17 ENCOUNTER — HOSPITAL ENCOUNTER (OUTPATIENT)
Facility: HOSPITAL | Age: 54
Discharge: HOME | End: 2024-04-20
Payer: COMMERCIAL

## 2024-04-17 DIAGNOSIS — Z12.31 SCREENING MAMMOGRAM FOR BREAST CANCER: ICD-10-CM

## 2024-04-17 PROCEDURE — 77063 BREAST TOMOSYNTHESIS BI: CPT

## 2024-05-06 ENCOUNTER — TELEPHONE (OUTPATIENT)
Facility: HOSPITAL | Age: 54
End: 2024-05-06

## 2024-05-06 NOTE — TELEPHONE ENCOUNTER
Spoke with pt to discuss prep/education for stress test.  Pt states she needs to reschedule this test as she does not have childcare for 5/7 date.  Test to be rescheduled for Tuesday 5/14.

## 2024-05-10 ENCOUNTER — TELEPHONE (OUTPATIENT)
Facility: HOSPITAL | Age: 54
End: 2024-05-10

## 2024-05-10 NOTE — TELEPHONE ENCOUNTER
Spoke with pt via phone to discuss prep/education for stress test.  Pt states that she will need to reschedule her test and will call back when she can do so.  She no longer works at [employer] and her new job has not given her an orientation or work schedule as of yet.

## 2024-06-27 NOTE — TELEPHONE ENCOUNTER
2023 Annual Pharmacist Visit    **Patient is [facility]**   Called patient to schedule 2023 yearly pharmacist appointment to discuss medications for Diabetes Management Program.    Spoke to patient and appointment scheduled for 01-31-23 @ [facility]   Department, toll free: [phone] Option #3      For Pharmacy Admin Tracking Only    Program: [address] in place: No  Recommendation Provided To: Patient/Caregiver: 1 via Telephone  Intervention Detail: Scheduled Appointment  Intervention Accepted By: Patient/Caregiver: 1  Gap Closed?: Yes  Time Spent (min): 5 Addended by: MAGGY LUIS on: 6/27/2024 02:52 PM     Modules accepted: Orders

## 2024-07-23 RX ORDER — AMLODIPINE BESYLATE 5 MG/1
TABLET ORAL
Qty: 15 TABLET | Refills: 0 | Status: SHIPPED | OUTPATIENT
Start: 2024-07-23

## 2024-10-14 ENCOUNTER — TELEPHONE (OUTPATIENT)
Age: 54
End: 2024-10-14

## 2024-10-14 NOTE — TELEPHONE ENCOUNTER
Medication Refill Request    Kim Stahl is requesting a refill of the following medication(s):    PEN NEEDLES to go with lantus solostar  Please send refill to:       [facility] [address] [place], [place] - [address] -  [phone] - F [phone] [address]  [facility] [address]  Phone: [phone] Fax: [phone]

## 2024-10-16 DIAGNOSIS — E11.65 TYPE 2 DIABETES MELLITUS WITH HYPERGLYCEMIA, WITH LONG-TERM CURRENT USE OF INSULIN (HCC): Primary | ICD-10-CM

## 2024-10-16 DIAGNOSIS — Z79.4 TYPE 2 DIABETES MELLITUS WITH HYPERGLYCEMIA, WITH LONG-TERM CURRENT USE OF INSULIN (HCC): Primary | ICD-10-CM

## 2024-10-16 RX ORDER — PEN NEEDLE, DIABETIC 31 GX5/16"
1 NEEDLE, DISPOSABLE MISCELLANEOUS 2 TIMES DAILY
Qty: 100 EACH | Refills: 11 | Status: SHIPPED | OUTPATIENT
Start: 2024-10-16

## 2024-12-17 NOTE — PROGRESS NOTES
Briefly spoke with patient, states she is at work. She will call me back later today. Did confirm that she has an appointment with her PCP on 9/27. She has also spoken with pharmacy regarding her Be Well with diabetes benefits.
regular

## 2025-01-03 DIAGNOSIS — R35.0 URINARY FREQUENCY: ICD-10-CM

## 2025-01-03 DIAGNOSIS — E11.65 TYPE 2 DIABETES MELLITUS WITH HYPERGLYCEMIA, WITH LONG-TERM CURRENT USE OF INSULIN (HCC): ICD-10-CM

## 2025-01-03 DIAGNOSIS — Z79.4 TYPE 2 DIABETES MELLITUS WITH HYPERGLYCEMIA, WITH LONG-TERM CURRENT USE OF INSULIN (HCC): ICD-10-CM

## 2025-01-03 NOTE — TELEPHONE ENCOUNTER
Medication Refill Request    Kim Stahl is requesting a refill of the following medication(s):   Continuous Blood Gluc Sensor (FREESTYLE ANKIT 2 SENSOR) [facility]   Please send refill to:       [facility] [address] [facility] [place] [facility] [address] [place] [phone] - F [phone] [address]  [facility] [address]  Phone: [phone] Fax: [phone]

## 2025-01-23 ENCOUNTER — OFFICE VISIT (OUTPATIENT)
Age: 55
End: 2025-01-23
Payer: COMMERCIAL

## 2025-01-23 VITALS
RESPIRATION RATE: 18 BRPM | OXYGEN SATURATION: 99 % | BODY MASS INDEX: 22.89 KG/M2 | WEIGHT: 116.6 LBS | TEMPERATURE: 97.4 F | SYSTOLIC BLOOD PRESSURE: 142 MMHG | HEART RATE: 91 BPM | DIASTOLIC BLOOD PRESSURE: 60 MMHG | HEIGHT: 60 IN

## 2025-01-23 DIAGNOSIS — I63.9 CEREBROVASCULAR ACCIDENT (CVA), UNSPECIFIED MECHANISM (HCC): ICD-10-CM

## 2025-01-23 DIAGNOSIS — R39.9 UTI SYMPTOMS: ICD-10-CM

## 2025-01-23 DIAGNOSIS — E11.21 TYPE 2 DIABETES WITH NEPHROPATHY (HCC): Primary | ICD-10-CM

## 2025-01-23 DIAGNOSIS — I10 PRIMARY HYPERTENSION: ICD-10-CM

## 2025-01-23 LAB
BILIRUBIN, URINE, POC: NEGATIVE
BLOOD URINE, POC: ABNORMAL
GLUCOSE URINE, POC: 250
KETONES, URINE, POC: NEGATIVE
LEUKOCYTE ESTERASE, URINE, POC: NEGATIVE
NITRITE, URINE, POC: NEGATIVE
PH, URINE, POC: 5.5 (ref 4.6–8)
PROTEIN,URINE, POC: 300
SPECIFIC GRAVITY, URINE, POC: 1.02 (ref 1–1.03)
URINALYSIS CLARITY, POC: CLEAR
URINALYSIS COLOR, POC: YELLOW
UROBILINOGEN, POC: ABNORMAL MG/DL

## 2025-01-23 PROCEDURE — 81001 URINALYSIS AUTO W/SCOPE: CPT | Performed by: NURSE PRACTITIONER

## 2025-01-23 RX ORDER — AMLODIPINE BESYLATE 10 MG/1
10 TABLET ORAL DAILY
COMMUNITY
End: 2025-01-23 | Stop reason: SDUPTHER

## 2025-01-23 RX ORDER — AMLODIPINE BESYLATE 10 MG/1
10 TABLET ORAL DAILY
Qty: 30 TABLET | Refills: 3 | Status: SHIPPED | OUTPATIENT
Start: 2025-01-23

## 2025-01-23 RX ORDER — BUTALBITAL, ACETAMINOPHEN AND CAFFEINE 50; 325; 40 MG/1; MG/1; MG/1
TABLET ORAL
COMMUNITY
Start: 2025-01-05

## 2025-01-23 RX ORDER — CLOPIDOGREL BISULFATE 75 MG/1
75 TABLET ORAL DAILY
COMMUNITY
Start: 2024-12-22 | End: 2025-02-04

## 2025-01-23 RX ORDER — ATORVASTATIN CALCIUM 40 MG/1
40 TABLET, FILM COATED ORAL NIGHTLY
COMMUNITY
Start: 2024-12-22 | End: 2025-02-04

## 2025-01-23 SDOH — ECONOMIC STABILITY: FOOD INSECURITY: WITHIN THE PAST 12 MONTHS, YOU WORRIED THAT YOUR FOOD WOULD RUN OUT BEFORE YOU GOT MONEY TO BUY MORE.: OFTEN TRUE

## 2025-01-23 SDOH — ECONOMIC STABILITY: FOOD INSECURITY: WITHIN THE PAST 12 MONTHS, THE FOOD YOU BOUGHT JUST DIDN'T LAST AND YOU DIDN'T HAVE MONEY TO GET MORE.: OFTEN TRUE

## 2025-01-23 ASSESSMENT — PATIENT HEALTH QUESTIONNAIRE - PHQ9
SUM OF ALL RESPONSES TO PHQ QUESTIONS 1-9: 2
1. LITTLE INTEREST OR PLEASURE IN DOING THINGS: SEVERAL DAYS
SUM OF ALL RESPONSES TO PHQ9 QUESTIONS 1 & 2: 2
SUM OF ALL RESPONSES TO PHQ QUESTIONS 1-9: 2
2. FEELING DOWN, DEPRESSED OR HOPELESS: SEVERAL DAYS
SUM OF ALL RESPONSES TO PHQ QUESTIONS 1-9: 2
SUM OF ALL RESPONSES TO PHQ QUESTIONS 1-9: 2

## 2025-01-23 NOTE — PROGRESS NOTES
\"Have you been to the ER, urgent care clinic since your last visit?  Hospitalized since your last visit?\"    Yes  [facility], Rehab    “Have you seen or consulted any other health care providers outside of [facility] since your last visit?”    NO     “Have you had a pap smear?”        Date of last Cervical Cancer screen (HPV or PAP): 7/11/2017             Click Here for Release of Records Request      Chief Complaint   Patient presents with    Cerebrovascular Accident     F/u  [facility]         Vitals:    01/23/25 1122   BP: (!) 142/60   Pulse: 91   Resp: 18   Temp: 97.4 °F (36.3 °C)   SpO2: 99%     
weakness  Patient presenting to the emergency room today with right upper and lower extremity weakness and hypertensive. Initial CT head unremarkable; CTA negative for large vessel occlusion; out of window for intervention  MRI shows acute infarct small recent infarct in the left basal ganglia; appears to be in the anterior choroidal artery distribution. No large territorial infarct. No hemorrhage. No masses.  Echocardiogram normal  Treated with Aspirin/Plavix/statin  B12/folate levels WNL  Lipid profile performed  PT consulted, recommended inpatient rehab  No need for SLP evaluation at this time  Acute right-sided weakness    Type 2 diabetes mellitus (CMS/[facility])  Outpatient regimen includes Lantus 15 units once daily. She states that she usually wears dual Eduardo device however has run out and has not been able to afford the device in greater than a week.   Resume home dosing Lantus at discharge  Diabetic diet with SSI coverage is needed  A1c is 7.5     Primary hypertension  Patient presenting to the emergency room significantly hypertensive with acute neurological changes. Initially placed on Cardene infusion which was discontinued  Permissive hypertension was allowed for 48 hours  Antihypertensives resumed with elevated blood pressures     Tobacco abuse  Patient smokes less than 1/2 pack per day times 30 years. Continue to encourage smoking cessation.     GERD (gastroesophageal reflux disease)  Continue PPI     Hypokalemia  Oral repletion for initial potassium 3.2, now resolved     Acute cystitis with hematuria  Symptomatic with urinary frequency; no indication of UTI on admission  Urine cultures growing pansensitive E coli  Initial treatment with ceftriaxone, transitioned to oral Ceftin. She has completed her treatment for UTI.        The patient was evaluated by and participated in therapies and was found to be a good candidate for inpatient rehab; and given that the patient was medically stable, the patient was

## 2025-01-23 NOTE — PATIENT INSTRUCTIONS
[facility] Utility - Financial Resources*  (Call United Way/211 if need more resources.)  Utilities  NetManage  What they offer: Partnership with the [facility] of . Assist with finding and applying for government funded programs and benefits. You can also update your benefits or report changes through NetManage.  Website: https://www.Bootstrap Software/  Phone Number: 8335CALLVA (898-798-0147)    [employer]  What they offer: EnergyShare is [facility]'s energy assistance program of last resort for anyone who faces financial hardships from unemployment or family crisis.  Phone Number: [phone]  Address: [address] [place] [address]  Website: https://www.Data Driven Delivery System/[place]/billing/billing-options/energyshare    Energy Assistance Programs (EAP) - [facility] of   What they offer: EAP assists low-income households in meeting their immediate home energy needs.      Website: https://www.EduSourced.[place].gov/benefit/ea/  Available assistance:   Crisis Assistance - Heating Emergencies  Fuel Assistance - Offset Heating Fuel Costs  Cooling Assistance - Applies to Cooling Utility Bills and Equipment  How to apply:  Online:  https://Takwin LabsvirginiaLex Machina/  Call:  408.359.9186   Paper application:   Print application from https://www.EduSourced.[place].gov/benefit/ea/ and submit to your [facility] Department of     [facility] Department of   [facility] of  contacts: https://www.[facility].[place].[facility]/localagency/index.cgi  [place], [place] Utility Affordability Programs  https://ClassBuga.gov/public-utilities/billing  Call:  122.894.4499   Email:  khari@a.gov  Programs available:  Equal Monthly Payment Plan, MetroCare Heat Program, MetroCare Water Program, MetroCare Water Conservation Program, Senior Assistance, Other Fuel Assistance Programs, PromisePay Payment Plans, Low-Income Household Water

## 2025-01-24 LAB
BACTERIA SPEC CULT: NORMAL
CHOLEST SERPL-MCNC: 226 MG/DL
CREAT UR-MCNC: 96.1 MG/DL
EST. AVERAGE GLUCOSE BLD GHB EST-MCNC: 157 MG/DL
HBA1C MFR BLD: 7.1 % (ref 4–5.6)
HDLC SERPL-MCNC: 56 MG/DL
HDLC SERPL: 4 (ref 0–5)
LDLC SERPL CALC-MCNC: 124.8 MG/DL (ref 0–100)
MICROALBUMIN UR-MCNC: 772 MG/DL
MICROALBUMIN/CREAT UR-RTO: 8033 MG/G (ref 0–30)
SERVICE CMNT-IMP: NORMAL
TRIGL SERPL-MCNC: 226 MG/DL
VLDLC SERPL CALC-MCNC: 45.2 MG/DL

## 2025-02-03 NOTE — TELEPHONE ENCOUNTER
Medication Refill Request    Kim Stahl is requesting a refill of the following medication(s):   LANTUS SOLOSTAR 100 UNIT/ML injection pen     OMEPRAZOLE PO     clopidogrel (PLAVIX) 75 MG tablet  Please send refill to:         [facility] [address] [facility] [phone] - F [phone] [address] [facility] [address]  Phone: [phone] Fax: [phone]     Prescriptions electronically submitted to pharmacy from doctor's office

## 2025-02-05 RX ORDER — OMEPRAZOLE 40 MG/1
40 CAPSULE, DELAYED RELEASE ORAL DAILY
Qty: 30 CAPSULE | Refills: 1 | Status: SHIPPED | OUTPATIENT
Start: 2025-02-05

## 2025-02-05 RX ORDER — CLOPIDOGREL BISULFATE 75 MG/1
75 TABLET ORAL DAILY
Qty: 30 TABLET | Refills: 2 | Status: SHIPPED | OUTPATIENT
Start: 2025-02-05 | End: 2025-05-06

## 2025-02-05 RX ORDER — INSULIN GLARGINE 100 [IU]/ML
INJECTION, SOLUTION SUBCUTANEOUS
Qty: 45 ML | Refills: 5 | Status: SHIPPED | OUTPATIENT
Start: 2025-02-05

## 2025-02-13 DIAGNOSIS — E11.65 TYPE 2 DIABETES MELLITUS WITH HYPERGLYCEMIA, WITH LONG-TERM CURRENT USE OF INSULIN (HCC): ICD-10-CM

## 2025-02-13 DIAGNOSIS — Z79.4 TYPE 2 DIABETES MELLITUS WITH HYPERGLYCEMIA, WITH LONG-TERM CURRENT USE OF INSULIN (HCC): ICD-10-CM

## 2025-02-13 DIAGNOSIS — R35.0 URINARY FREQUENCY: ICD-10-CM

## 2025-02-24 ENCOUNTER — OFFICE VISIT (OUTPATIENT)
Age: 55
End: 2025-02-24
Payer: COMMERCIAL

## 2025-02-24 VITALS
WEIGHT: 114 LBS | OXYGEN SATURATION: 99 % | HEIGHT: 60 IN | SYSTOLIC BLOOD PRESSURE: 132 MMHG | TEMPERATURE: 97.2 F | RESPIRATION RATE: 18 BRPM | DIASTOLIC BLOOD PRESSURE: 60 MMHG | BODY MASS INDEX: 22.38 KG/M2 | HEART RATE: 106 BPM

## 2025-02-24 DIAGNOSIS — Z79.4 TYPE 2 DIABETES MELLITUS WITH OTHER DIABETIC ARTHROPATHY, WITH LONG-TERM CURRENT USE OF INSULIN (HCC): ICD-10-CM

## 2025-02-24 DIAGNOSIS — Z82.3 FAMILY HISTORY OF CEREBROVASCULAR ACCIDENT (CVA): ICD-10-CM

## 2025-02-24 DIAGNOSIS — Z79.4 TYPE 2 DIABETES MELLITUS WITH HYPERGLYCEMIA, WITH LONG-TERM CURRENT USE OF INSULIN (HCC): Primary | ICD-10-CM

## 2025-02-24 DIAGNOSIS — R53.1 ACUTE RIGHT-SIDED WEAKNESS: ICD-10-CM

## 2025-02-24 DIAGNOSIS — T78.40XA ALLERGIC REACTION, INITIAL ENCOUNTER: ICD-10-CM

## 2025-02-24 DIAGNOSIS — E11.21 TYPE 2 DIABETES WITH NEPHROPATHY (HCC): ICD-10-CM

## 2025-02-24 DIAGNOSIS — N89.8 VAGINAL ITCHING: ICD-10-CM

## 2025-02-24 DIAGNOSIS — E11.618 TYPE 2 DIABETES MELLITUS WITH OTHER DIABETIC ARTHROPATHY, WITH LONG-TERM CURRENT USE OF INSULIN (HCC): ICD-10-CM

## 2025-02-24 DIAGNOSIS — E11.65 TYPE 2 DIABETES MELLITUS WITH HYPERGLYCEMIA, WITH LONG-TERM CURRENT USE OF INSULIN (HCC): Primary | ICD-10-CM
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
## 2025-02-24 DIAGNOSIS — I10 ESSENTIAL HYPERTENSION: ICD-10-CM

## 2025-02-24 DIAGNOSIS — R35.0 URINARY FREQUENCY: ICD-10-CM

## 2025-02-24 DIAGNOSIS — I69.351 HEMIPARESIS AFFECTING RIGHT SIDE AS LATE EFFECT OF CEREBROVASCULAR ACCIDENT (CVA) (HCC): ICD-10-CM

## 2025-02-24 PROBLEM — I63.9 CEREBROVASCULAR ACCIDENT (CVA) (HCC): Status: ACTIVE | Noted: 2024-12-15

## 2025-02-24 PROCEDURE — 3075F SYST BP GE 130 - 139MM HG: CPT | Performed by: NURSE PRACTITIONER

## 2025-02-24 PROCEDURE — 3051F HG A1C>EQUAL 7.0%<8.0%: CPT | Performed by: NURSE PRACTITIONER

## 2025-02-24 PROCEDURE — 3078F DIAST BP <80 MM HG: CPT | Performed by: NURSE PRACTITIONER

## 2025-02-24 PROCEDURE — 99214 OFFICE O/P EST MOD 30 MIN: CPT | Performed by: NURSE PRACTITIONER

## 2025-02-24 RX ORDER — INSULIN GLARGINE 100 [IU]/ML
INJECTION, SOLUTION SUBCUTANEOUS
Qty: 45 ML | Refills: 5 | Status: SHIPPED | OUTPATIENT
Start: 2025-02-24

## 2025-02-24 SDOH — ECONOMIC STABILITY: FOOD INSECURITY: WITHIN THE PAST 12 MONTHS, YOU WORRIED THAT YOUR FOOD WOULD RUN OUT BEFORE YOU GOT MONEY TO BUY MORE.: OFTEN TRUE

## 2025-02-24 SDOH — ECONOMIC STABILITY: FOOD INSECURITY: WITHIN THE PAST 12 MONTHS, THE FOOD YOU BOUGHT JUST DIDN'T LAST AND YOU DIDN'T HAVE MONEY TO GET MORE.: OFTEN TRUE

## 2025-02-24 ASSESSMENT — PATIENT HEALTH QUESTIONNAIRE - PHQ9
2. FEELING DOWN, DEPRESSED OR HOPELESS: SEVERAL DAYS
SUM OF ALL RESPONSES TO PHQ QUESTIONS 1-9: 2
1. LITTLE INTEREST OR PLEASURE IN DOING THINGS: SEVERAL DAYS
SUM OF ALL RESPONSES TO PHQ QUESTIONS 1-9: 2
SUM OF ALL RESPONSES TO PHQ9 QUESTIONS 1 & 2: 2

## 2025-02-24 NOTE — PROGRESS NOTES
\"Have you been to the ER, urgent care clinic since your last visit?  Hospitalized since your last visit?\"    NO    “Have you seen or consulted any other health care providers outside of [facility] since your last visit?”    NO     “Have you had a pap smear?”        Date of last Cervical Cancer screen (HPV or PAP): 7/11/2017             Click Here for Release of Records Request      Chief Complaint   Patient presents with    Diabetes         Vitals:    02/24/25 1441   BP: 132/60   Pulse: (!) 106   Resp: 18   Temp: 97.2 °F (36.2 °C)   SpO2: 99%

## 2025-02-24 NOTE — PATIENT INSTRUCTIONS
[facility] Utility - Financial Resources*  (Call United Way/211 if need more resources.)  Utilities  Biz[employer]  What they offer: Partnership with the [facility] of . Assist with finding and applying for government funded programs and benefits. You can also update your benefits or report changes through Bizware.  Website: https://www.Ayudarum/  Phone Number: 8335CALLVA (749-400-0624)    [employer]  What they offer: EnergyShare is [facility]'s energy assistance program of last resort for anyone who faces financial hardships from unemployment or family crisis.  Phone Number: [phone]  Address: [address] [place] [address]  Website: https://www.Four Interactive/[place]/billing/billing-options/energyshare    Energy Assistance Programs (EAP) - [facility] of   What they offer: EAP assists low-income households in meeting their immediate home energy needs.      Website: https://www.Moki - formerly MokiMobility.[place].gov/benefit/ea/  Available assistance:   Crisis Assistance - Heating Emergencies  Fuel Assistance - Offset Heating Fuel Costs  Cooling Assistance - Applies to Cooling Utility Bills and Equipment  How to apply:  Online:  https://hdtMEDIAvirginiaGO-SIM/  Call:  915.883.4944   Paper application:   Print application from https://www.Moki - formerly MokiMobility.[place].gov/benefit/ea/ and submit to your [facility] Department of     [facility] Department of   [facility] of  contacts: https://www.[facility].[place].[facility]/localagency/index.cgi  [place], [place] Utility Affordability Programs  https://ParkWhiza.gov/public-utilities/billing  Call:  734.752.4960   Email:  khari@a.gov  Programs available:  Equal Monthly Payment Plan, MetroCare Heat Program, MetroCare Water Program, MetroCare Water Conservation Program, Senior Assistance, Other Fuel Assistance Programs, PromisePay Payment Plans, Low-Income Household Water

## 2025-02-26 NOTE — PROGRESS NOTES
Subjective:     Kim Stahl is a 54 y.o. female who presents today with the following:  Chief Complaint   Patient presents with    Diabetes       History of Present Illness  The patient is a 54-year-old female presenting with status post cerebrovascular accident (CVA) with hemiparesis on the right side, diabetes, hypertension, depression, allergic reaction, ingrown toenail, and vaginal itching.    She reports experiencing depressive episodes, which she attributes to her current state of isolation and limited mobility. She expresses a desire to engage in physical therapy to prevent further loss of mobility. She also mentions financial stressors, including concerns about bill payments. She is interested in starting therapy for her depression. She has been approved for Medicaid and is seeking transportation assistance. She resides on the second floor and requires assistance from her children to navigate stairs. She is currently unemployed and has applied for disability benefits. She has been approved for home care and is seeking a  to assist with these arrangements. She has been approved for food stamps through . She is right-handed and reports difficulty with tasks such as writing and hair care.    She experienced a fall at home yesterday due to leg weakness while attempting to go to the bathroom, resulting in a sore leg. She did not sustain any injuries or broken skin. She was not wearing her brace at the time of the fall, which she typically uses for stability. She relies on a walker or rollator for mobility but experiences rapid leg fatigue when standing. She is unable to transport her walker from the second floor of her residence. She has been approved for home care and is seeking a  to assist with these arrangements.    She has been managing her diabetes with insulin pens and has recently started using a new pen. She has not been monitoring her blood glucose levels for

## 2025-02-27 ENCOUNTER — TELEPHONE (OUTPATIENT)
Age: 55
End: 2025-02-27

## 2025-03-03 NOTE — TELEPHONE ENCOUNTER
Pt states she needs a prior auth for Continuous Glucose Sensor (FREESTYLE ANKIT 2 SENSOR) [facility]. She also states that she thought she was supposed to get a prescription for yeast infection and vitamin D. Please advise.   
S/w patient . [place] not needed patient , patient informed. She will check on a counselor in her network , she is requesting for her depression. LUDWIG  
patient

## 2025-03-05 ENCOUNTER — TELEPHONE (OUTPATIENT)
Age: 55
End: 2025-03-05

## 2025-03-05 NOTE — TELEPHONE ENCOUNTER
Called patient to advise if referral is needed it would depend on insurance. No answer/ voicemail is full. Will forward to provider.

## 2025-03-05 NOTE — TELEPHONE ENCOUNTER
Kim states that her and Kierra discussed her seeing a therapist. She wants to know who Kierra recommends in the [place] area and would she need a referral.

## 2025-03-20 ENCOUNTER — TELEPHONE (OUTPATIENT)
Age: 55
End: 2025-03-20

## 2025-03-20 NOTE — TELEPHONE ENCOUNTER
Essie is with [facility]. Kim had her first appt with them yesterday and is having issues with trying to do anything due to the knee brace being so old. [facility] works with OLIVIA. OLIVIA needs a referral for a right lower extremity brace. This can be faxed to [facility] since OLIVIA is there on Thursdays.   fax # [phone]

## 2025-03-25 ENCOUNTER — TELEPHONE (OUTPATIENT)
Age: 55
End: 2025-03-25

## 2025-03-31 ENCOUNTER — TELEPHONE (OUTPATIENT)
Age: 55
End: 2025-03-31

## 2025-03-31 DIAGNOSIS — E11.618 TYPE 2 DIABETES MELLITUS WITH OTHER DIABETIC ARTHROPATHY, WITH LONG-TERM CURRENT USE OF INSULIN: ICD-10-CM

## 2025-03-31 DIAGNOSIS — Z79.4 TYPE 2 DIABETES MELLITUS WITH OTHER DIABETIC ARTHROPATHY, WITH LONG-TERM CURRENT USE OF INSULIN: ICD-10-CM

## 2025-03-31 DIAGNOSIS — E11.21 TYPE 2 DIABETES WITH NEPHROPATHY (HCC): ICD-10-CM

## 2025-03-31 DIAGNOSIS — Z79.4 TYPE 2 DIABETES MELLITUS WITH HYPERGLYCEMIA, WITH LONG-TERM CURRENT USE OF INSULIN (HCC): ICD-10-CM

## 2025-03-31 DIAGNOSIS — E11.65 TYPE 2 DIABETES MELLITUS WITH HYPERGLYCEMIA, WITH LONG-TERM CURRENT USE OF INSULIN (HCC): ICD-10-CM

## 2025-03-31 NOTE — TELEPHONE ENCOUNTER
Pt would like to know if an order has ever been put in for her to get a quad cane. She also needs her    Continuous Glucose Sensor (FREESTYLE ANKIT 2 SENSOR) [facility] to go to [facility] - [place], [place] - [address] [place] [phone] - F [phone] [[id number]]  not to [facility].

## 2025-04-02 ENCOUNTER — TELEPHONE (OUTPATIENT)
Age: 55
End: 2025-04-02

## 2025-04-03 RX ORDER — OMEPRAZOLE 40 MG/1
40 CAPSULE, DELAYED RELEASE ORAL DAILY
Qty: 30 CAPSULE | Refills: 5 | Status: SHIPPED | OUTPATIENT
Start: 2025-04-03

## 2025-04-07 ENCOUNTER — TELEPHONE (OUTPATIENT)
Age: 55
End: 2025-04-07

## 2025-04-07 NOTE — TELEPHONE ENCOUNTER
Pt states she has not heard anymore about getting her walker. States she received a text states order received and would be delivered but never heard anymore and would someone to check on this

## 2025-04-09 ENCOUNTER — TRANSCRIBE ORDERS (OUTPATIENT)
Facility: HOSPITAL | Age: 55
End: 2025-04-09

## 2025-04-09 DIAGNOSIS — Z12.31 SCREENING MAMMOGRAM FOR BREAST CANCER: Primary | ICD-10-CM

## 2025-04-24 ENCOUNTER — TELEMEDICINE (OUTPATIENT)
Age: 55
End: 2025-04-24
Payer: COMMERCIAL

## 2025-04-24 DIAGNOSIS — J01.90 ACUTE BACTERIAL SINUSITIS: Primary | ICD-10-CM

## 2025-04-24 DIAGNOSIS — I10 ESSENTIAL HYPERTENSION: ICD-10-CM

## 2025-04-24 DIAGNOSIS — B96.89 ACUTE BACTERIAL SINUSITIS: Primary | ICD-10-CM

## 2025-04-24 PROCEDURE — 99213 OFFICE O/P EST LOW 20 MIN: CPT | Performed by: NURSE PRACTITIONER

## 2025-04-24 RX ORDER — LORATADINE 10 MG
CAPSULE ORAL
Qty: 168 CAPSULE | Refills: 0 | Status: SHIPPED | OUTPATIENT
Start: 2025-04-24

## 2025-04-24 RX ORDER — AZITHROMYCIN 250 MG/1
TABLET, FILM COATED ORAL
Qty: 6 TABLET | Refills: 0 | Status: SHIPPED | OUTPATIENT
Start: 2025-04-24 | End: 2025-05-04

## 2025-04-24 RX ORDER — HYDRALAZINE HYDROCHLORIDE 25 MG/1
25 TABLET, FILM COATED ORAL 3 TIMES DAILY PRN
Qty: 90 TABLET | Refills: 3
Start: 2025-04-24

## 2025-04-24 ASSESSMENT — PATIENT HEALTH QUESTIONNAIRE - PHQ9
SUM OF ALL RESPONSES TO PHQ QUESTIONS 1-9: 0
2. FEELING DOWN, DEPRESSED OR HOPELESS: NOT AT ALL
1. LITTLE INTEREST OR PLEASURE IN DOING THINGS: NOT AT ALL
SUM OF ALL RESPONSES TO PHQ QUESTIONS 1-9: 0

## 2025-04-24 NOTE — ASSESSMENT & PLAN NOTE
Orders:    hydrALAZINE (APRESOLINE) 25 MG tablet; Take 1 tablet by mouth 3 times daily as needed (/90 or higher)

## 2025-04-24 NOTE — PROGRESS NOTES
Have you been to the ER, urgent care clinic since your last visit?  Hospitalized since your last visit?   NO    Have you seen or consulted any other health care providers outside our system since your last visit?   NO     “Have you had a pap smear?”    NO    Date of last Cervical Cancer screen (HPV or PAP): 7/11/2017       “Have you had a diabetic eye exam?”    NO     Date of last diabetic eye exam: 10/12/2022

## 2025-04-24 NOTE — PROGRESS NOTES
Kim Stahl, was evaluated through a synchronous (real-time) audio-video encounter. The patient (or guardian if applicable) is aware that this is a billable service, which includes applicable co-pays. This Virtual Visit was conducted with patient's (and/or legal guardian's) consent. Patient identification was verified, and a caregiver was present when appropriate.   The patient was located at Home: [address] [facility]  [address]  [place] [address]  Provider was located at Facility (Appt Dept): [address] [place] [phone]  Confirm you are appropriately licensed, registered, or certified to deliver care in the state where the patient is located as indicated above. If you are not or unsure, please re-schedule the visit: Yes, I confirm.     Kim Stahl (:  1970) is a Established patient, presenting virtually for evaluation of the following:      Below is the assessment and plan developed based on review of pertinent history, physical exam, labs, studies, and medications.     Assessment & Plan  Essential hypertension       Orders:    hydrALAZINE (APRESOLINE) 25 MG tablet; Take 1 tablet by mouth 3 times daily as needed (/90 or higher)    Acute bacterial sinusitis    Symptomatic relief discussed increase fluid intake,rest and symptom relief     Orders:    azithromycin (ZITHROMAX) 250 MG tablet; 500mg on day 1 followed by 250mg on days 2 - 5      Return if symptoms worsen or fail to improve.       Subjective   HPI  Review of Systems   Head congestion and sinus pressure x 6 days.  She endorses nasal congestion,frontal headache  and blood when she blows her nose.     Objective   Patient-Reported Vitals  No data recorded     Physical Exam      Constitutional: [x] Appears well-developed and well-nourished [x] No apparent distress  . Ill appearing fatigue with puffiness noted under the eyes.     [] Abnormal -     Mental status: [x] Alert and awake  [x] Oriented to

## 2025-04-29 ENCOUNTER — HOSPITAL ENCOUNTER (OUTPATIENT)
Facility: HOSPITAL | Age: 55
Discharge: HOME | End: 2025-05-02
Payer: COMMERCIAL

## 2025-04-29 ENCOUNTER — RESULTS FOLLOW-UP (OUTPATIENT)
Age: 55
End: 2025-04-29

## 2025-04-29 DIAGNOSIS — Z12.31 SCREENING MAMMOGRAM FOR BREAST CANCER: ICD-10-CM

## 2025-04-29 PROCEDURE — 77063 BREAST TOMOSYNTHESIS BI: CPT

## 2025-04-29 NOTE — RESULT ENCOUNTER NOTE
Patient verified stating name and date of birth.  Patient informed of mammogram results and states understanding.

## 2025-05-09 DIAGNOSIS — Z79.4 TYPE 2 DIABETES MELLITUS WITH HYPERGLYCEMIA, WITH LONG-TERM CURRENT USE OF INSULIN (HCC): ICD-10-CM

## 2025-05-09 DIAGNOSIS — E11.65 TYPE 2 DIABETES MELLITUS WITH HYPERGLYCEMIA, WITH LONG-TERM CURRENT USE OF INSULIN (HCC): ICD-10-CM

## 2025-05-09 DIAGNOSIS — E11.618 TYPE 2 DIABETES MELLITUS WITH OTHER DIABETIC ARTHROPATHY, WITH LONG-TERM CURRENT USE OF INSULIN (HCC): ICD-10-CM

## 2025-05-09 DIAGNOSIS — E11.21 TYPE 2 DIABETES WITH NEPHROPATHY (HCC): ICD-10-CM

## 2025-05-09 DIAGNOSIS — Z79.4 TYPE 2 DIABETES MELLITUS WITH OTHER DIABETIC ARTHROPATHY, WITH LONG-TERM CURRENT USE OF INSULIN (HCC): ICD-10-CM

## 2025-05-09 RX ORDER — INSULIN GLARGINE 100 [IU]/ML
INJECTION, SOLUTION SUBCUTANEOUS
Qty: 45 ML | Refills: 5 | Status: SHIPPED | OUTPATIENT
Start: 2025-05-09

## 2025-05-09 RX ORDER — CLOPIDOGREL BISULFATE 75 MG/1
75 TABLET ORAL DAILY
Qty: 30 TABLET | Refills: 0 | Status: SHIPPED | OUTPATIENT
Start: 2025-05-09

## 2025-05-09 NOTE — TELEPHONE ENCOUNTER
Medication Refill Request    Kim Stahl is requesting a refill of the following medication(s):     insulin glargine (LANTUS SOLOSTAR) 100 UNIT/ML injection pen     Please send refill to:           [facility] [address] [place] [address] - [address] -  [phone] - F [phone] [address]  [facility] [address]  Phone: [phone] Fax: [phone]

## 2025-05-22 ENCOUNTER — OFFICE VISIT (OUTPATIENT)
Age: 55
End: 2025-05-22
Payer: COMMERCIAL

## 2025-05-22 VITALS
OXYGEN SATURATION: 99 % | WEIGHT: 114 LBS | HEIGHT: 60 IN | BODY MASS INDEX: 22.38 KG/M2 | HEART RATE: 89 BPM | SYSTOLIC BLOOD PRESSURE: 130 MMHG | TEMPERATURE: 97.2 F | DIASTOLIC BLOOD PRESSURE: 66 MMHG | RESPIRATION RATE: 18 BRPM

## 2025-05-22 DIAGNOSIS — M25.511 CHRONIC RIGHT SHOULDER PAIN: ICD-10-CM

## 2025-05-22 DIAGNOSIS — F33.1 MODERATE EPISODE OF RECURRENT MAJOR DEPRESSIVE DISORDER (HCC): ICD-10-CM

## 2025-05-22 DIAGNOSIS — E11.21 TYPE 2 DIABETES WITH NEPHROPATHY (HCC): Primary | ICD-10-CM

## 2025-05-22 DIAGNOSIS — I10 ESSENTIAL HYPERTENSION: ICD-10-CM

## 2025-05-22 DIAGNOSIS — I63.9 CEREBROVASCULAR ACCIDENT (CVA), UNSPECIFIED MECHANISM (HCC): ICD-10-CM

## 2025-05-22 DIAGNOSIS — N18.31 STAGE 3A CHRONIC KIDNEY DISEASE (HCC): ICD-10-CM

## 2025-05-22 DIAGNOSIS — G89.29 CHRONIC RIGHT SHOULDER PAIN: ICD-10-CM

## 2025-05-22 DIAGNOSIS — E78.2 MIXED DYSLIPIDEMIA: ICD-10-CM

## 2025-05-22 LAB
COMMENT:: NORMAL
SPECIMEN HOLD: NORMAL

## 2025-05-22 PROCEDURE — 3078F DIAST BP <80 MM HG: CPT | Performed by: NURSE PRACTITIONER

## 2025-05-22 PROCEDURE — 3051F HG A1C>EQUAL 7.0%<8.0%: CPT | Performed by: NURSE PRACTITIONER

## 2025-05-22 PROCEDURE — 36415 COLL VENOUS BLD VENIPUNCTURE: CPT | Performed by: NURSE PRACTITIONER

## 2025-05-22 PROCEDURE — 99214 OFFICE O/P EST MOD 30 MIN: CPT | Performed by: NURSE PRACTITIONER

## 2025-05-22 PROCEDURE — 3075F SYST BP GE 130 - 139MM HG: CPT | Performed by: NURSE PRACTITIONER

## 2025-05-22 ASSESSMENT — PATIENT HEALTH QUESTIONNAIRE - PHQ9
SUM OF ALL RESPONSES TO PHQ QUESTIONS 1-9: 11
6. FEELING BAD ABOUT YOURSELF - OR THAT YOU ARE A FAILURE OR HAVE LET YOURSELF OR YOUR FAMILY DOWN: NOT AT ALL
8. MOVING OR SPEAKING SO SLOWLY THAT OTHER PEOPLE COULD HAVE NOTICED. OR THE OPPOSITE, BEING SO FIGETY OR RESTLESS THAT YOU HAVE BEEN MOVING AROUND A LOT MORE THAN USUAL: NOT AT ALL
SUM OF ALL RESPONSES TO PHQ QUESTIONS 1-9: 11
1. LITTLE INTEREST OR PLEASURE IN DOING THINGS: NEARLY EVERY DAY
SUM OF ALL RESPONSES TO PHQ QUESTIONS 1-9: 11
5. POOR APPETITE OR OVEREATING: SEVERAL DAYS
9. THOUGHTS THAT YOU WOULD BE BETTER OFF DEAD, OR OF HURTING YOURSELF: NOT AT ALL
2. FEELING DOWN, DEPRESSED OR HOPELESS: NEARLY EVERY DAY
4. FEELING TIRED OR HAVING LITTLE ENERGY: NEARLY EVERY DAY
3. TROUBLE FALLING OR STAYING ASLEEP: SEVERAL DAYS
SUM OF ALL RESPONSES TO PHQ QUESTIONS 1-9: 11
7. TROUBLE CONCENTRATING ON THINGS, SUCH AS READING THE NEWSPAPER OR WATCHING TELEVISION: NOT AT ALL
10. IF YOU CHECKED OFF ANY PROBLEMS, HOW DIFFICULT HAVE THESE PROBLEMS MADE IT FOR YOU TO DO YOUR WORK, TAKE CARE OF THINGS AT HOME, OR GET ALONG WITH OTHER PEOPLE: SOMEWHAT DIFFICULT

## 2025-05-22 NOTE — PROGRESS NOTES
\"Have you been to the ER, urgent care clinic since your last visit?  Hospitalized since your last visit?\"    NO    “Have you seen or consulted any other health care providers outside of [facility] since your last visit?”    NO     “Have you had a pap smear?”        Date of last Cervical Cancer screen (HPV or PAP): 7/11/2017             Click Here for Release of Records Request      Chief Complaint   Patient presents with    Diabetes    Depression         Vitals:    05/22/25 1114   BP: 130/66   Pulse: 89   Resp: 18   Temp: 97.2 °F (36.2 °C)   SpO2: 99%

## 2025-05-23 ENCOUNTER — RESULTS FOLLOW-UP (OUTPATIENT)
Age: 55
End: 2025-05-23

## 2025-05-23 LAB
ALBUMIN SERPL-MCNC: 2.8 G/DL (ref 3.5–5)
ALBUMIN/GLOB SERPL: 0.7 (ref 1.1–2.2)
ALP SERPL-CCNC: 139 U/L (ref 45–117)
ALT SERPL-CCNC: 31 U/L (ref 12–78)
ANION GAP SERPL CALC-SCNC: 7 MMOL/L (ref 2–12)
AST SERPL-CCNC: 9 U/L (ref 15–37)
BASOPHILS # BLD: 0.05 K/UL (ref 0–0.1)
BASOPHILS NFR BLD: 0.4 % (ref 0–1)
BILIRUB SERPL-MCNC: 0.2 MG/DL (ref 0.2–1)
BUN SERPL-MCNC: 35 MG/DL (ref 6–20)
BUN/CREAT SERPL: 24 (ref 12–20)
CALCIUM SERPL-MCNC: 9.8 MG/DL (ref 8.5–10.1)
CHLORIDE SERPL-SCNC: 109 MMOL/L (ref 97–108)
CHOLEST SERPL-MCNC: 351 MG/DL
CO2 SERPL-SCNC: 25 MMOL/L (ref 21–32)
CREAT SERPL-MCNC: 1.46 MG/DL (ref 0.55–1.02)
DIFFERENTIAL METHOD BLD: ABNORMAL
EOSINOPHIL # BLD: 0.22 K/UL (ref 0–0.4)
EOSINOPHIL NFR BLD: 1.8 % (ref 0–7)
ERYTHROCYTE [DISTWIDTH] IN BLOOD BY AUTOMATED COUNT: 14 % (ref 11.5–14.5)
EST. AVERAGE GLUCOSE BLD GHB EST-MCNC: 166 MG/DL
GLOBULIN SER CALC-MCNC: 4.2 G/DL (ref 2–4)
GLUCOSE SERPL-MCNC: 112 MG/DL (ref 65–100)
HBA1C MFR BLD: 7.4 % (ref 4–5.6)
HCT VFR BLD AUTO: 36.7 % (ref 35–47)
HDLC SERPL-MCNC: 56 MG/DL
HDLC SERPL: 6.3 (ref 0–5)
HGB BLD-MCNC: 12.2 G/DL (ref 11.5–16)
IMM GRANULOCYTES # BLD AUTO: 0.03 K/UL (ref 0–0.04)
IMM GRANULOCYTES NFR BLD AUTO: 0.2 % (ref 0–0.5)
LDLC SERPL CALC-MCNC: 233 MG/DL (ref 0–100)
LYMPHOCYTES # BLD: 3.21 K/UL (ref 0.8–3.5)
LYMPHOCYTES NFR BLD: 26.6 % (ref 12–49)
MCH RBC QN AUTO: 27.4 PG (ref 26–34)
MCHC RBC AUTO-ENTMCNC: 33.2 G/DL (ref 30–36.5)
MCV RBC AUTO: 82.3 FL (ref 80–99)
MONOCYTES # BLD: 0.88 K/UL (ref 0–1)
MONOCYTES NFR BLD: 7.3 % (ref 5–13)
NEUTS SEG # BLD: 7.69 K/UL (ref 1.8–8)
NEUTS SEG NFR BLD: 63.7 % (ref 32–75)
NRBC # BLD: 0 K/UL (ref 0–0.01)
NRBC BLD-RTO: 0 PER 100 WBC
PLATELET # BLD AUTO: 348 K/UL (ref 150–400)
PMV BLD AUTO: 11.8 FL (ref 8.9–12.9)
POTASSIUM SERPL-SCNC: 4.2 MMOL/L (ref 3.5–5.1)
PROT SERPL-MCNC: 7 G/DL (ref 6.4–8.2)
RBC # BLD AUTO: 4.46 M/UL (ref 3.8–5.2)
SODIUM SERPL-SCNC: 141 MMOL/L (ref 136–145)
TRIGL SERPL-MCNC: 310 MG/DL
VLDLC SERPL CALC-MCNC: 62 MG/DL
WBC # BLD AUTO: 12.1 K/UL (ref 3.6–11)

## 2025-05-27 RX ORDER — BUTALBITAL, ACETAMINOPHEN AND CAFFEINE 50; 325; 40 MG/1; MG/1; MG/1
1 TABLET ORAL EVERY 12 HOURS PRN
Qty: 180 TABLET | Refills: 0 | Status: SHIPPED | OUTPATIENT
Start: 2025-05-27

## 2025-05-28 ENCOUNTER — TELEPHONE (OUTPATIENT)
Age: 55
End: 2025-05-28

## 2025-06-09 ENCOUNTER — PATIENT MESSAGE (OUTPATIENT)
Age: 55
End: 2025-06-09

## 2025-06-09 DIAGNOSIS — I10 PRIMARY HYPERTENSION: ICD-10-CM

## 2025-06-09 RX ORDER — AMLODIPINE BESYLATE 10 MG/1
10 TABLET ORAL DAILY
Qty: 30 TABLET | Refills: 0 | Status: SHIPPED | OUTPATIENT
Start: 2025-06-09

## 2025-06-09 RX ORDER — OMEPRAZOLE 40 MG/1
40 CAPSULE, DELAYED RELEASE ORAL DAILY
Qty: 30 CAPSULE | Refills: 0 | Status: SHIPPED | OUTPATIENT
Start: 2025-06-09

## 2025-06-09 RX ORDER — CLOPIDOGREL BISULFATE 75 MG/1
75 TABLET ORAL DAILY
Qty: 30 TABLET | Refills: 0 | Status: SHIPPED | OUTPATIENT
Start: 2025-06-09

## 2025-06-09 NOTE — TELEPHONE ENCOUNTER
Pt states that she is not able to go back and she will need her disability form amended to state that. She will need it submitted by 6/17.

## 2025-06-09 NOTE — TELEPHONE ENCOUNTER
Medication Refill Request    Kim Stahl is requesting a refill of the following medication(s):   amLODIPine (NORVASC) 10 MG tablet    clopidogrel (PLAVIX) 75 MG tablet     omeprazole (PRILOSEC) 40 MG delayed release capsule     vitamin D (CHOLECALCIFEROL) [address] UNIT CAPS   Please send refill to:     [address] [facility] [phone] -  [phone] [address] [facility] [address]  Phone: [phone] Fax: [phone]

## 2025-06-16 ENCOUNTER — TELEPHONE (OUTPATIENT)
Age: 55
End: 2025-06-16

## 2025-06-16 NOTE — TELEPHONE ENCOUNTER
Pt states she needs disability forms updated because she is unable to go back to work. States she needs them done by 6/17.

## 2025-06-18 ENCOUNTER — TELEPHONE (OUTPATIENT)
Age: 55
End: 2025-06-18

## 2025-06-18 NOTE — TELEPHONE ENCOUNTER
Kim missed her last appt due to transportation and there are no available times for a VV. She wants to know if Kierra would be able to update the letter for Leave of Absence since she still isn't able to work. She needs this update for her housing by 6/27 and also for her job.

## 2025-06-18 NOTE — TELEPHONE ENCOUNTER
Kierra wants to know if Kim needs an updated FMLA form or updated letter from Kierra. If FMLA forms are needed Kim will need to contact them to have new updated forms faxed.

## 2025-06-19 ENCOUNTER — TELEPHONE (OUTPATIENT)
Age: 55
End: 2025-06-19

## 2025-06-19 NOTE — TELEPHONE ENCOUNTER
Spoke with Kim. She does need a typed Letter of Absence from Kierra stating due to her disability she isn't able to work at this time for her to give to housing. She would like letter mailed to her. The [facility] paper just need to be updated per Tom to extend her time. Papers are in Kierra's box.

## 2025-07-09 DIAGNOSIS — I10 PRIMARY HYPERTENSION: ICD-10-CM

## 2025-07-09 RX ORDER — CLOPIDOGREL BISULFATE 75 MG/1
75 TABLET ORAL DAILY
Qty: 30 TABLET | Refills: 5 | Status: SHIPPED | OUTPATIENT
Start: 2025-07-09

## 2025-07-09 RX ORDER — AMLODIPINE BESYLATE 10 MG/1
10 TABLET ORAL DAILY
Qty: 30 TABLET | Refills: 5 | Status: SHIPPED | OUTPATIENT
Start: 2025-07-09

## 2025-07-09 RX ORDER — OMEPRAZOLE 40 MG/1
40 CAPSULE, DELAYED RELEASE ORAL DAILY
Qty: 30 CAPSULE | Refills: 5 | Status: SHIPPED | OUTPATIENT
Start: 2025-07-09

## 2025-07-09 NOTE — TELEPHONE ENCOUNTER
Medication Refill Request    Kim Stahl is requesting a refill of the following medication(s):   amLODIPine (NORVASC) 10 MG tablet     clopidogrel (PLAVIX) 75 MG tablet     omeprazole (PRILOSEC) 40 MG delayed release capsule   Please send refill to:     [facility] [address] [place] [address] [address] [facility] [phone] - F [phone] [address] [facility] [address]  Phone: [phone] Fax: [phone]

## 2025-08-14 ENCOUNTER — OFFICE VISIT (OUTPATIENT)
Age: 55
End: 2025-08-14
Payer: COMMERCIAL

## 2025-08-14 VITALS
HEART RATE: 78 BPM | SYSTOLIC BLOOD PRESSURE: 132 MMHG | WEIGHT: 117.8 LBS | OXYGEN SATURATION: 98 % | TEMPERATURE: 97 F | DIASTOLIC BLOOD PRESSURE: 70 MMHG | RESPIRATION RATE: 20 BRPM | HEIGHT: 60 IN | BODY MASS INDEX: 23.13 KG/M2

## 2025-08-14 DIAGNOSIS — E11.65 TYPE 2 DIABETES MELLITUS WITH HYPERGLYCEMIA, WITH LONG-TERM CURRENT USE OF INSULIN (HCC): ICD-10-CM

## 2025-08-14 DIAGNOSIS — I63.9 CEREBROVASCULAR ACCIDENT (CVA), UNSPECIFIED MECHANISM (HCC): ICD-10-CM

## 2025-08-14 DIAGNOSIS — E78.2 MIXED DYSLIPIDEMIA: ICD-10-CM

## 2025-08-14 DIAGNOSIS — I10 ESSENTIAL HYPERTENSION: Primary | ICD-10-CM

## 2025-08-14 DIAGNOSIS — Z79.4 TYPE 2 DIABETES MELLITUS WITH OTHER DIABETIC ARTHROPATHY, WITH LONG-TERM CURRENT USE OF INSULIN (HCC): ICD-10-CM

## 2025-08-14 DIAGNOSIS — Z79.4 TYPE 2 DIABETES MELLITUS WITH HYPERGLYCEMIA, WITH LONG-TERM CURRENT USE OF INSULIN (HCC): ICD-10-CM

## 2025-08-14 DIAGNOSIS — G62.9 NEUROPATHY: ICD-10-CM

## 2025-08-14 DIAGNOSIS — E11.21 TYPE 2 DIABETES WITH NEPHROPATHY (HCC): ICD-10-CM

## 2025-08-14 DIAGNOSIS — E11.618 TYPE 2 DIABETES MELLITUS WITH OTHER DIABETIC ARTHROPATHY, WITH LONG-TERM CURRENT USE OF INSULIN (HCC): ICD-10-CM

## 2025-08-14 DIAGNOSIS — N18.31 STAGE 3A CHRONIC KIDNEY DISEASE (HCC): ICD-10-CM

## 2025-08-14 DIAGNOSIS — E11.9 COMPREHENSIVE DIABETIC FOOT EXAMINATION, TYPE 2 DM, ENCOUNTER FOR (HCC): ICD-10-CM

## 2025-08-14 PROCEDURE — 3051F HG A1C>EQUAL 7.0%<8.0%: CPT | Performed by: NURSE PRACTITIONER

## 2025-08-14 PROCEDURE — 99214 OFFICE O/P EST MOD 30 MIN: CPT | Performed by: NURSE PRACTITIONER

## 2025-08-14 PROCEDURE — 3078F DIAST BP <80 MM HG: CPT | Performed by: NURSE PRACTITIONER

## 2025-08-14 PROCEDURE — 3075F SYST BP GE 130 - 139MM HG: CPT | Performed by: NURSE PRACTITIONER

## 2025-08-14 PROCEDURE — 36415 COLL VENOUS BLD VENIPUNCTURE: CPT | Performed by: NURSE PRACTITIONER

## 2025-08-14 RX ORDER — INSULIN GLARGINE 100 [IU]/ML
INJECTION, SOLUTION SUBCUTANEOUS
Qty: 45 ML | Refills: 5 | Status: SHIPPED | OUTPATIENT
Start: 2025-08-14

## 2025-08-14 ASSESSMENT — PATIENT HEALTH QUESTIONNAIRE - PHQ9
SUM OF ALL RESPONSES TO PHQ QUESTIONS 1-9: 7
3. TROUBLE FALLING OR STAYING ASLEEP: SEVERAL DAYS
7. TROUBLE CONCENTRATING ON THINGS, SUCH AS READING THE NEWSPAPER OR WATCHING TELEVISION: SEVERAL DAYS
6. FEELING BAD ABOUT YOURSELF - OR THAT YOU ARE A FAILURE OR HAVE LET YOURSELF OR YOUR FAMILY DOWN: SEVERAL DAYS
4. FEELING TIRED OR HAVING LITTLE ENERGY: SEVERAL DAYS
10. IF YOU CHECKED OFF ANY PROBLEMS, HOW DIFFICULT HAVE THESE PROBLEMS MADE IT FOR YOU TO DO YOUR WORK, TAKE CARE OF THINGS AT HOME, OR GET ALONG WITH OTHER PEOPLE: SOMEWHAT DIFFICULT
2. FEELING DOWN, DEPRESSED OR HOPELESS: SEVERAL DAYS
5. POOR APPETITE OR OVEREATING: SEVERAL DAYS
8. MOVING OR SPEAKING SO SLOWLY THAT OTHER PEOPLE COULD HAVE NOTICED. OR THE OPPOSITE, BEING SO FIGETY OR RESTLESS THAT YOU HAVE BEEN MOVING AROUND A LOT MORE THAN USUAL: SEVERAL DAYS
9. THOUGHTS THAT YOU WOULD BE BETTER OFF DEAD, OR OF HURTING YOURSELF: SEVERAL DAYS

## 2025-08-14 ASSESSMENT — COLUMBIA-SUICIDE SEVERITY RATING SCALE - C-SSRS
2. HAVE YOU ACTUALLY HAD ANY THOUGHTS OF KILLING YOURSELF?: NO
1. WITHIN THE PAST MONTH, HAVE YOU WISHED YOU WERE DEAD OR WISHED YOU COULD GO TO SLEEP AND NOT WAKE UP?: NO
6. HAVE YOU EVER DONE ANYTHING, STARTED TO DO ANYTHING, OR PREPARED TO DO ANYTHING TO END YOUR LIFE?: NO

## 2025-08-15 LAB
25(OH)D3 SERPL-MCNC: 23.4 NG/ML (ref 30–100)
ALBUMIN SERPL-MCNC: 2.7 G/DL (ref 3.5–5.2)
ALBUMIN/GLOB SERPL: 0.7 (ref 1.1–2.2)
ALP SERPL-CCNC: 140 U/L (ref 35–104)
ALT SERPL-CCNC: 23 U/L (ref 10–35)
ANION GAP SERPL CALC-SCNC: 11 MMOL/L (ref 2–14)
AST SERPL-CCNC: 14 U/L (ref 10–35)
BASOPHILS # BLD: 0.06 K/UL (ref 0–0.1)
BASOPHILS NFR BLD: 0.5 % (ref 0–1)
BILIRUB SERPL-MCNC: <0.2 MG/DL (ref 0–1.2)
BUN SERPL-MCNC: 21 MG/DL (ref 6–20)
BUN/CREAT SERPL: 14 (ref 12–20)
CALCIUM SERPL-MCNC: 9.1 MG/DL (ref 8.6–10)
CALCIUM SERPL-MCNC: 9.2 MG/DL (ref 8.6–10)
CHLORIDE SERPL-SCNC: 105 MMOL/L (ref 98–107)
CHOLEST SERPL-MCNC: 387 MG/DL (ref 0–200)
CO2 SERPL-SCNC: 25 MMOL/L (ref 20–29)
CREAT SERPL-MCNC: 1.45 MG/DL (ref 0.6–1)
DIFFERENTIAL METHOD BLD: ABNORMAL
EOSINOPHIL # BLD: 0.19 K/UL (ref 0–0.4)
EOSINOPHIL NFR BLD: 1.5 % (ref 0–7)
ERYTHROCYTE [DISTWIDTH] IN BLOOD BY AUTOMATED COUNT: 14.6 % (ref 11.5–14.5)
GLOBULIN SER CALC-MCNC: 3.7 G/DL (ref 2–4)
GLUCOSE SERPL-MCNC: 202 MG/DL (ref 65–100)
HCT VFR BLD AUTO: 35 % (ref 35–47)
HDLC SERPL-MCNC: 48 MG/DL (ref 40–60)
HDLC SERPL: 8 (ref 0–5)
HGB BLD-MCNC: 11.6 G/DL (ref 11.5–16)
IMM GRANULOCYTES # BLD AUTO: 0.04 K/UL (ref 0–0.04)
IMM GRANULOCYTES NFR BLD AUTO: 0.3 % (ref 0–0.5)
LDLC SERPL CALC-MCNC: 272 MG/DL (ref 0–100)
LYMPHOCYTES # BLD: 3.28 K/UL (ref 0.8–3.5)
LYMPHOCYTES NFR BLD: 25.4 % (ref 12–49)
MCH RBC QN AUTO: 26.9 PG (ref 26–34)
MCHC RBC AUTO-ENTMCNC: 33.1 G/DL (ref 30–36.5)
MCV RBC AUTO: 81 FL (ref 80–99)
MONOCYTES # BLD: 0.83 K/UL (ref 0–1)
MONOCYTES NFR BLD: 6.4 % (ref 5–13)
NEUTS SEG # BLD: 8.53 K/UL (ref 1.8–8)
NEUTS SEG NFR BLD: 65.9 % (ref 32–75)
NRBC # BLD: 0 K/UL (ref 0–0.01)
NRBC BLD-RTO: 0 PER 100 WBC
PLATELET # BLD AUTO: 384 K/UL (ref 150–400)
PMV BLD AUTO: 11.7 FL (ref 8.9–12.9)
POTASSIUM SERPL-SCNC: 4 MMOL/L (ref 3.5–5.1)
PROT SERPL-MCNC: 6.4 G/DL (ref 6.4–8.3)
PTH-INTACT SERPL-MCNC: 68.8 PG/ML (ref 15–65)
RBC # BLD AUTO: 4.32 M/UL (ref 3.8–5.2)
SODIUM SERPL-SCNC: 141 MMOL/L (ref 136–145)
TRIGL SERPL-MCNC: 333 MG/DL (ref 0–150)
VLDLC SERPL CALC-MCNC: 67 MG/DL
WBC # BLD AUTO: 12.9 K/UL (ref 3.6–11)

## (undated) DEVICE — SOLUTION IRRIG 1000ML STRL H2O USP PLAS POUR BTL

## (undated) DEVICE — FORCEPS BX L240CM JAW DIA2.2MM RAD JAW 4 HOT DISP

## (undated) DEVICE — ENDO CARRY-ON PROCEDURE KIT INCLUDES ENZYMATIC SPONGE, GAUZE, BIOHAZARD LABEL, TRAY, LUBRICANT, DIRTY SCOPE LABEL, WATER LABEL, TRAY, DRAWSTRING PAD, AND DEFENDO 4-PIECE KIT.: Brand: ENDO CARRY-ON PROCEDURE KIT